# Patient Record
Sex: MALE | Race: WHITE | NOT HISPANIC OR LATINO | Employment: OTHER | URBAN - METROPOLITAN AREA
[De-identification: names, ages, dates, MRNs, and addresses within clinical notes are randomized per-mention and may not be internally consistent; named-entity substitution may affect disease eponyms.]

---

## 2017-01-24 ENCOUNTER — ALLSCRIPTS OFFICE VISIT (OUTPATIENT)
Dept: OTHER | Facility: OTHER | Age: 27
End: 2017-01-24

## 2017-06-26 ENCOUNTER — ALLSCRIPTS OFFICE VISIT (OUTPATIENT)
Dept: OTHER | Facility: OTHER | Age: 27
End: 2017-06-26

## 2017-07-07 ENCOUNTER — GENERIC CONVERSION - ENCOUNTER (OUTPATIENT)
Dept: OTHER | Facility: OTHER | Age: 27
End: 2017-07-07

## 2017-10-12 ENCOUNTER — ALLSCRIPTS OFFICE VISIT (OUTPATIENT)
Dept: OTHER | Facility: OTHER | Age: 27
End: 2017-10-12

## 2018-01-10 NOTE — PROCEDURES
Procedures by Berenice Doshi MD at 2016  11:19 AM      Author:  Berenice Doshi MD Service:  Neurology Author Type:  Physician     Filed:  2016 11:26 AM Date of Service:  2016 11:19 AM Status:  Signed     :  Berenice Doshi MD (Physician)            ELECTROENCEPHALOGRAM (EEG)      Patient Name:  Aliyah Fletcher  MRN: [id number]   :  1990 File #: [id number]   Age: 23 y o  Encounter #: [id number]   Date performed: 2016           Report date: 2016          Study type: Routine EEG    ICD 10 diagnosis: Spells/Fit NOS R56 9    Start time: 08:15 End time: 08:48     -------------------------------------------------------------------------------------------------------------------   Patient History: This recording was observed in a 23 y o  male with autistic spectrum disorder  and a single event concerning for a seizure to evaluate for risk of epilepsy  Medications: none listed    -------------------------------------------------------------------------------------------------------------------   Description of Procedure:  ·  32 channel digital recording with electrodes placed according to the International 10-20 system with additional  T1/T2 electrodes, EOG, EKG, and simultaneous  video  The recording was technically satisfactory  -------------------------------------------------------------------------------------------------------------------   EEG Description:    The recording was performed with the patient awake  During wakefulness, there were long runs of well regulated, low amplitude, posteriorly  dominant, symmetric 9-10 cps alpha rhythm that attenuated with eye opening  There were symmetric low amplitude, frontally dominant beta activities  There were symmetric low amplitude, centrally dominant theta activities  Drowsiness and sleep were not attained  -------------------------------------------------------------------------------------------------------------------   Activation Procedures:  Hyperventilation was not performed  Stepped photic stimulation between 1-20 cps was performed and induced symmetric  photic driving  Other findings: The single lead ECG demonstrated a regular rhythm     -------------------------------------------------------------------------------------------------------------------   EEG Interpretation: This Routine EEG recorded during wakefulness is normal  A normal EEG does not exclude the possibility of epilepsy  If clinically warranted, a repeat EEG  following sleep deprivation could be considered  Amna Myrick MD   [address] [facility]               Received for:Scott Hall Meckel M D    Nov 1 2016 12:05PM [facility] Standard Time

## 2018-01-12 NOTE — PROGRESS NOTES
Chief Complaint  pt here for Adacel shot - GMP      Active Problems    1  Anal irritation (569 49) (K62 89)   2  Autistic disorder, active (299 00) (F84 0)   3  External hemorrhoids (455 3) (K64 4)   4  Generalized tonic-clonic seizure (780 39) (G40 409)   5  Rectal bleeding (569 3) (K62 5)   6  Screening for depression (V79 0) (Z13 89)   7  Seizure disorder (345 90) (G40 909)    Current Meds   1  Anusol-HC 25 MG Rectal Suppository; INSERT 1 SUPP Twice daily PRN hemorrhoid; Therapy: 64TDT8248 to (Last [id number])  Requested for: 26Jun2017 Ordered   2  Claritin CAPS; Therapy: (Recorded:13Oct2016) to Recorded   3  LevoFLOXacin 500 MG Oral Tablet; TAKE 1 TABLET DAILY AS DIRECTED; Therapy: 44HBH8001 to ([id number])  Requested for: 26Jun2017; Last   Rx:26Jun2017 Ordered    Allergies    1  Bactrim TABS   2   Ceclor CAPS    Plan  Need for tetanus booster    · Adacel 5-2-15 5 LF-MCG/0 5 Intramuscular Suspension    Future Appointments    Date/Time Provider Specialty Site   40/64/3318 79:74 AM Jacoby Cano DO Family [facility]     Signatures   Electronically signed by : Jerome Sibley DO; Oct 12 1460 11:11AM EST                       (Author)

## 2018-01-12 NOTE — PROGRESS NOTES
Assessment    1  Encounter for preventive health examination (V70 0) (Z00 00)   2  Autistic disorder (299 00) (F84 0)   3  Generalized tonic-clonic seizure (780 39) (G40 409)    Discussion/Summary  Impression: health maintenance visit  Currently, he eats a healthy diet  Prostate cancer screening: PSA is not indicated  Testicular cancer screening: the risks and benefits of testicular cancer screening were discussed  Colorectal cancer screening: colorectal cancer screening is not indicated  Advice and education were given regarding nutrition, aerobic exercise, sunscreen use and seat belt use  Patient discussion: discussed with the patient's family  HM done  form completed  mago 1 yr  Chief Complaint  Pt here for yearly PE and form completion  History of Present Illness  , Adult Male: The patient is being seen for a health maintenance evaluation  General Health: The patient's health since the last visit is described as good  He has regular dental visits  He denies vision problems  Lifestyle:  He consumes a diverse and healthy diet  He does not have any weight concerns  He does not use tobacco  He denies alcohol use  Screening: cancer screening reviewed and current  Prostate cancer screening includes no previous prostate-specific antigen testing  Testicular cancer screening includes no self testicular examinations  Colorectal cancer screening includes no previous colonoscopy  metabolic screening reviewed and current  risk screening reviewed and current  Cardiovascular risk factors: no hypertension, no diabetes, no stress, no obesity, no tobacco use and no sedentary lifestyle  Safety elements used: seat belt and smoke detector  HPI: HM  Needs yearly form  No new issues  Released from SLN  Review of Systems    Constitutional: No fever or chills, feels well, no tiredness, no recent weight gain or weight loss     Eyes: No complaints of eye pain, no red eyes, no discharge from eyes, no itchy eyes  ENT: no complaints of earache, no hearing loss, no nosebleeds, no nasal discharge, no sore throat, no hoarseness  Cardiovascular: No complaints of slow heart rate, no fast heart rate, no chest pain, no palpitations, no leg claudication, no lower extremity  Respiratory: No complaints of shortness of breath, no wheezing, no cough, no SOB on exertion, no orthopnea or PND  Gastrointestinal: No complaints of abdominal pain, no constipation, no nausea or vomiting, no diarrhea or bloody stools  Genitourinary: No complaints of dysuria, no incontinence, no hesitancy, no nocturia, no genital lesion, no testicular pain  Musculoskeletal: No complaints of arthralgia, no myalgias, no joint swelling or stiffness, no limb pain or swelling  Integumentary: No complaints of skin rash or skin lesions, no itching, no skin wound, no dry skin  Neurological: No compliants of headache, no confusion, no convulsions, no numbness or tingling, no dizziness or fainting, no limb weakness, no difficulty walking  Psychiatric: Is not suicidal, no sleep disturbances, no anxiety or depression, no change in personality, no emotional problems  Endocrine: No complaints of proptosis, no hot flashes, no muscle weakness, no erectile dysfunction, no deepening of the voice, no feelings of weakness  Hematologic/Lymphatic: No complaints of swollen glands, no swollen glands in the neck, does not bleed easily, no easy bruising  Active Problems    1  Autistic disorder (299 00) (F84 0)   2  Generalized tonic-clonic seizure (780 39) (G40 409)   3   Seizure disorder (345 90) (G40 909)    Past Medical History    · History of Acute noninfective otitis externa of left ear, unspecified type (380 22) (H60 502)   · History of Cough (786 2) (R05)   · History of upper respiratory infection (V12 09) (Z87 09)   · History of Other acute nonsuppurative otitis media of left ear (381 00) (H65 192)   · History of Other acute nonsuppurative otitis media of left ear (381 00) (H65 192)   · History of Other fatigue (780 79) (R53 83)   · History of Paronychia (681 9)   · History of Seizure-like activity (780 39) (R56 9)   · History of Skin lesion (709 9) (L98 9)   · History of Skin rash (782 1) (R21)   · History of Staphylococcus aureus infection (041 11) (A49 01)   · History of Visit for pre-operative examination (V72 84) (T44 245)    Surgical History    · History of Adenoidectomy   · History of Dental Surgery   · History of Myringotomy - With Ventilating Tube Insertion    Family History  Mother    · No pertinent family history  Father    · No pertinent family history  Maternal Grandmother    · Family history of malignant neoplasm of breast (V16 3) (Z80 3)  Paternal Grandmother    · Family history of Brain tumor   · Family history of chronic obstructive pulmonary disease (V17 6) (Z82 5)   · Family history of hypertension (V17 49) (Z82 49)  Maternal Grandfather    · Family history of Salivary gland cancer  Family History    · Family history of No Significant Family History    Social History    · Never A Smoker   · Never Drank Alcohol    Current Meds   1  Claritin CAPS; Therapy: (Recorded:13Oct2016) to Recorded    Allergies    1  Bactrim TABS   2  Ceclor CAPS    Vitals   Recorded: 01OJQ8245 73:80UY   Systolic 276   Diastolic 70   Height 6 ft 0 5 in   Weight 189 lb 8 0 oz   BMI Calculated 25 35   BSA Calculated 2 1     Physical Exam    Constitutional   General appearance: No acute distress, well appearing and well nourished  Eyes   Pupils and irises: Equal, round and reactive to light  Ears, Nose, Mouth, and Throat   Oropharynx: Normal with no erythema, edema, exudate or lesions  Pulmonary   Auscultation of lungs: Clear to auscultation  Cardiovascular   Auscultation of heart: Normal rate and rhythm, normal S1 and S2, without murmurs      Examination of extremities for edema and/or varicosities: Normal     Abdomen   Abdomen: Non-tender, no masses  Lymphatic   Palpation of lymph nodes in neck: No lymphadenopathy  Musculoskeletal   Gait and station: Normal     Neurologic   Cranial nerves: Cranial nerves 2-12 intact  Psychiatric   Orientation to person, place and time: Abnormal   stable for Damian        Signatures   Electronically signed by : Rekha Zheng DO; Jan 24 1568  9:17AM EST                       (Author)

## 2018-01-13 VITALS
DIASTOLIC BLOOD PRESSURE: 86 MMHG | WEIGHT: 192 LBS | BODY MASS INDEX: 26.01 KG/M2 | SYSTOLIC BLOOD PRESSURE: 122 MMHG | HEIGHT: 72 IN

## 2018-01-13 VITALS
DIASTOLIC BLOOD PRESSURE: 70 MMHG | BODY MASS INDEX: 25.12 KG/M2 | SYSTOLIC BLOOD PRESSURE: 118 MMHG | HEIGHT: 73 IN | WEIGHT: 189.5 LBS

## 2018-01-16 NOTE — PROGRESS NOTES
Assessment    1  Encounter for preventive health examination (V70 0) (Z00 00)   2  Autistic disorder (299 00) (F84 0)    Plan  Health Maintenance    · Begin or continue regular aerobic exercise  Gradually work up to at least 3 sessions of 30  minutes of exercise a week ; Status:Complete;   Done: 04GXS5410 08:56AM   · Eat a low fat and low cholesterol diet ; Status:Complete;   Done: 49DYQ0830 08:56AM   · We recommend routine visits to a dentist ; Status:Complete;   Done: 21JOP4205 08:56AM   · We recommend that you follow the "Mediterranean diet "; Status:Complete;   Done:  30XNL7504 08:56AM    Discussion/Summary  Impression: health maintenance visit  Currently, he eats a healthy diet  Testicular cancer screening: the risks and benefits of testicular cancer screening were discussed  Colorectal cancer screening: colorectal cancer screening is not indicated  The adacel revd  Advice and education were given regarding nutrition and aerobic exercise  Patient discussion: discussed with the patient's family  Chief Complaint  PT IS HERE FOR A YEARLY PHYS  History of Present Illness  HM, Adult Male: The patient is being seen for a health maintenance evaluation  General Health: The patient's health since the last visit is described as good  He has regular dental visits  He denies vision problems  Lifestyle:  He consumes a diverse and healthy diet  He does not have any weight concerns  He exercises regularly  He does not use tobacco  He denies alcohol use  He denies drug use  Screening: cancer screening reviewed and current  Prostate cancer screening includes no previous evaluation  Testicular cancer screening includes no self testicular examinations  Colorectal cancer screening includes no previous screening  metabolic screening reviewed and updated  Metabolic screening includes no previous lipid profile and no previous glucose screening  risk screening reviewed and current     Cardiovascular risk factors: no hypertension, no diabetes, no obesity and no tobacco use  Safety elements used: seat belt, smoke detector and gun safe  HPI: 24 yo WM for HM OV  Review of Systems    Constitutional: no fever and no chills  Eyes: no eye pain  ENT: no complaints of earache, no hearing loss, no nosebleeds, no nasal discharge, no sore throat, no hoarseness  Cardiovascular: no chest pain  Respiratory: no shortness of breath  Gastrointestinal: No complaints of abdominal pain, no constipation, no nausea or vomiting, no diarrhea or bloody stools  Genitourinary: No complaints of dysuria, no incontinence, no hesitancy, no nocturia, no genital lesion, no testicular pain  Neurological: as noted in HPI  Psychiatric: as noted in HPI  Active Problems    1  Autistic disorder (299 00) (F84 0)    Past Medical History    · History of Cough (786 2) (R05)   · History of upper respiratory infection (V12 09) (Z87 09)   · History of Paronychia (681 9)   · History of Skin lesion (709 9) (L98 9)   · History of Skin rash (782 1) (R21)   · History of Staphylococcus aureus infection (041 11) (A49 01)   · History of Visit for pre-operative examination (V72 84) (W95 077)    Surgical History    · History of Adenoidectomy   · History of Dental Surgery   · History of Myringotomy - With Ventilating Tube Insertion    Family History    · No pertinent family history    · No pertinent family history    · Family history of No Significant Family History    Social History    · Never A Smoker   · Never Drank Alcohol    Current Meds   1  No Reported Medications Recorded    Allergies    1  Bactrim TABS   2  Ceclor CAPS    Vitals   Recorded: 33HTG5476 16:46KN   Systolic 586   Diastolic 72   Height 6 ft 0 5 in   Weight 189 lb 8 0 oz   BMI Calculated 25 35   BSA Calculated 2 1     Physical Exam    Constitutional   General appearance: No acute distress, well appearing and well nourished      Eyes   Pupils and irises: Equal, round and reactive to light     Pulmonary   Respiratory effort: No increased work of breathing or signs of respiratory distress  Auscultation of lungs: Clear to auscultation  Cardiovascular   Auscultation of heart: Normal rate and rhythm, normal S1 and S2, without murmurs  Examination of extremities for edema and/or varicosities: Normal     Abdomen   Abdomen: Non-tender, no masses  Lymphatic   Palpation of lymph nodes in neck: No lymphadenopathy  Neurologic   Cranial nerves: Cranial nerves 2-12 intact      Psychiatric   Orientation to person, place and time: Abnormal        Signatures   Electronically signed by : Mariana Colunga DO; Jan 19 8454  8:57AM EST                       (Author)

## 2018-01-24 RX ORDER — HYDROCORTISONE ACETATE 25 MG/1
SUPPOSITORY RECTAL 2 TIMES DAILY
COMMUNITY
Start: 2017-06-26 | End: 2018-10-15 | Stop reason: ALTCHOICE

## 2018-01-24 RX ORDER — NEOMYCIN SULFATE, POLYMYXIN B SULFATE AND HYDROCORTISONE 10; 3.5; 1 MG/ML; MG/ML; [USP'U]/ML
SUSPENSION/ DROPS AURICULAR (OTIC)
COMMUNITY
Start: 2016-04-29 | End: 2020-01-17

## 2018-01-24 RX ORDER — LORATADINE 10 MG/1
10 CAPSULE, LIQUID FILLED ORAL AS NEEDED
COMMUNITY

## 2018-01-26 ENCOUNTER — OFFICE VISIT (OUTPATIENT)
Dept: FAMILY MEDICINE CLINIC | Facility: CLINIC | Age: 28
End: 2018-01-26
Payer: COMMERCIAL

## 2018-01-26 VITALS
BODY MASS INDEX: 24.09 KG/M2 | DIASTOLIC BLOOD PRESSURE: 74 MMHG | WEIGHT: 181.8 LBS | SYSTOLIC BLOOD PRESSURE: 110 MMHG | HEIGHT: 73 IN

## 2018-01-26 DIAGNOSIS — G40.409 GENERALIZED TONIC-CLONIC SEIZURE (HCC): ICD-10-CM

## 2018-01-26 DIAGNOSIS — Z00.00 PHYSICAL EXAM, ANNUAL: Primary | ICD-10-CM

## 2018-01-26 DIAGNOSIS — F84.0 AUTISTIC DISORDER, ACTIVE: ICD-10-CM

## 2018-01-26 PROCEDURE — 99395 PREV VISIT EST AGE 18-39: CPT | Performed by: FAMILY MEDICINE

## 2018-01-26 RX ORDER — LORATADINE AND PSEUDOEPHEDRINE 10; 240 MG/1; MG/1
1 TABLET, EXTENDED RELEASE ORAL
COMMUNITY
End: 2019-02-15 | Stop reason: SDUPTHER

## 2018-01-26 RX ORDER — NEOMYCIN SULFATE, POLYMYXIN B SULFATE AND HYDROCORTISONE 10; 3.5; 1 MG/ML; MG/ML; [USP'U]/ML
SUSPENSION/ DROPS AURICULAR (OTIC)
COMMUNITY
Start: 2016-04-29 | End: 2019-02-15 | Stop reason: SDUPTHER

## 2018-01-26 NOTE — PROGRESS NOTES
Assessment/Plan:    Physical exam, annual  HM completed  No new issues  Autistic disorder, active  Stable    Generalized tonic-clonic seizure ([facility] [address] )  None since July 2016  No meds  Diagnoses and all orders for this visit:    Physical exam, annual    Autistic disorder, active    Generalized tonic-clonic seizure ([facility] [address] )    Other orders  -     neomycin-polymyxin-hydrocortisone (CORTISPORIN) 0 35%-10,000 units/mL-1% otic suspension; Administer into ears  -     Loratadine (CLARITIN) 10 MG CAPS; Take by mouth  -     hydrocortisone (ANUSOL-HC) 25 mg suppository; Insert into the rectum Twice daily  -     loratadine-pseudoephedrine (CLARITIN-D 24-HOUR)  mg per 24 hr tablet; Take 1 tablet by mouth  -     neomycin-polymyxin-hydrocortisone (CORTISPORIN) 0 35%-10,000 units/mL-1% otic suspension; INSTILL 3 DROPS IN AFFECTED EAR(S) 3-4 TIMES DAILY  Subjective:        Patient ID: Elsa Guerrero is a 24 y o  male  Pt here for yearly prev exam              Review of Systems   Constitutional: Negative for fever and unexpected weight change  Eyes: Negative for visual disturbance  Respiratory: Negative for chest tightness and shortness of breath  Cardiovascular: Negative for chest pain, palpitations and leg swelling  Gastrointestinal: Negative for abdominal pain  Genitourinary: Negative for dysuria  Musculoskeletal: Negative for arthralgias and gait problem  Skin: Negative for rash  Neurological: Negative for dizziness and headaches  Psychiatric/Behavioral: Negative for behavioral problems  The patient is not nervous/anxious  Objective:  /74 (BP Location: Right arm, Patient Position: Sitting, Cuff Size: Standard)   Ht 6' 1" (1 854 m)   Wt 82 5 kg (181 lb 12 8 oz)   BMI 23 99 kg/m²        Physical Exam   Constitutional: He appears well-nourished  HENT:   Head: Normocephalic and atraumatic     Right Ear: External ear normal    Left Ear: External ear normal    Mouth/Throat: Oropharynx is clear and moist    Eyes: EOM are normal  Pupils are equal, round, and reactive to light  No scleral icterus  Neck: Normal range of motion  No thyromegaly present  Cardiovascular: Normal rate, regular rhythm and intact distal pulses  No murmur heard  Pulmonary/Chest: Effort normal and breath sounds normal    Abdominal: Soft  Bowel sounds are normal    Musculoskeletal: He exhibits no edema  Lymphadenopathy:     He has no cervical adenopathy  Neurological: He displays normal reflexes  No cranial nerve deficit  Coordination normal    Skin: Skin is warm  Psychiatric: He has a normal mood and affect   His behavior is normal  Thought content normal

## 2018-08-23 ENCOUNTER — TRANSITIONAL CARE MANAGEMENT (OUTPATIENT)
Dept: FAMILY MEDICINE CLINIC | Facility: CLINIC | Age: 28
End: 2018-08-23

## 2018-08-29 ENCOUNTER — OFFICE VISIT (OUTPATIENT)
Dept: FAMILY MEDICINE CLINIC | Facility: CLINIC | Age: 28
End: 2018-08-29
Payer: COMMERCIAL

## 2018-08-29 VITALS
BODY MASS INDEX: 25.49 KG/M2 | WEIGHT: 192.34 LBS | SYSTOLIC BLOOD PRESSURE: 122 MMHG | DIASTOLIC BLOOD PRESSURE: 70 MMHG | HEIGHT: 73 IN

## 2018-08-29 DIAGNOSIS — R56.9 SEIZURE (HCC): Primary | ICD-10-CM

## 2018-08-29 DIAGNOSIS — F84.0 AUTISM DISORDER: ICD-10-CM

## 2018-08-29 DIAGNOSIS — R74.01 TRANSAMINITIS: ICD-10-CM

## 2018-08-29 PROCEDURE — 99496 TRANSJ CARE MGMT HIGH F2F 7D: CPT | Performed by: FAMILY MEDICINE

## 2018-08-29 RX ORDER — LEVETIRACETAM 500 MG/1
500 TABLET ORAL 2 TIMES DAILY
COMMUNITY
Start: 2018-08-22 | End: 2018-08-29 | Stop reason: SDUPTHER

## 2018-08-30 RX ORDER — LEVETIRACETAM 500 MG/1
500 TABLET ORAL 2 TIMES DAILY
Qty: 60 TABLET | Refills: 3 | Status: SHIPPED | OUTPATIENT
Start: 2018-08-30 | End: 2018-10-15 | Stop reason: SDUPTHER

## 2018-08-30 NOTE — PROGRESS NOTES
Assessment/Plan:    Patient's hospitalization has been reviewed in full with the patient's mother and father  All studies labs and EEG have been reviewed  At this time Rohan Ramirez is stable and back to his normal routine  He is back to work  He did have a seizure approximately 2 years ago  He did see [facility] in the past   We will try to get him and for follow-up  We will continue to write for his medication  He will need a hepatic panel and lipid panel in approximately 2 weeks  Hopefully his ALT and AST are back to normal   He otherwise will continue with his routine follow-up with our office  Diagnoses and all orders for this visit:    Seizure [facility])  -     Ambulatory referral to Neurology; Future    Transaminitis  -     Hepatic function panel; Future  -     Lipid panel; Future    Autism disorder    Other orders  -     levETIRAcetam (KEPPRA) 500 mg tablet; Take 500 mg by mouth 2 (two) times a day  -     levETIRAcetam (KEPPRA) 500 mg tablet; Take 1 tablet (500 mg total) by mouth 2 (two) times a day          Subjective:        Patient ID: Queta Arguello is a 24 y o  male  Chief Complaint   Patient presents with    Transition of Care Management     Here for hospital follow up, seizure  Patient here status post seizure  Patient was hospitalized at [facility] from August 20th to 20 second  He has been placed on Keppra  He is doing well and is back to his normal routine including work  Mom and dad her here today with him  The following portions of the patient's history were reviewed and updated as appropriate: allergies, current medications, past family history, past social history and problem list     Review of Systems   Constitutional: Negative for appetite change, fatigue, fever and unexpected weight change  HENT: Negative for congestion, ear pain, postnasal drip, rhinorrhea, sinus pain, sinus pressure and sore throat  Eyes: Negative for redness and visual disturbance  Respiratory: Negative for chest tightness and shortness of breath  Cardiovascular: Negative for chest pain, palpitations and leg swelling  Gastrointestinal: Negative for abdominal distention, abdominal pain, diarrhea and nausea  Endocrine: Negative for cold intolerance and heat intolerance  Genitourinary: Negative for dysuria and hematuria  Musculoskeletal: Negative for gait problem  Skin: Negative for pallor and rash  Neurological: Negative for dizziness and headaches  Autism   Psychiatric/Behavioral: Negative for behavioral problems  The patient is nervous/anxious  Objective:  Date and time hospital follow up call was made:  8/23/2018  8:39 AM  Hospital care reviewed:  Records reviewed  Patient was hopsitalized at:  [facility]  Date of admission:  8/20/18  Date of discharge:  8/22/18  Diagnosis:  seizures  Disposition:  Home  Were the patients medicaitons reviewed and updated:  Yes  Current symptoms:  None  Post hospital issues:  None  Should patient be enrolled in anticoag monitoring?:  No  Scheduled for follow up?:  Yes  Did you obtain your prescribed medications:  Yes  Do you need help managing your perscriptions or medications:  No  Is transportation to your appointments needed:  No  I have advised the patient to call PCP with any new or worsening symptoms (please type in name along with any credentials):  Jose Dixon CMA  Living Arrangements:  Parents  Counseling:  Family       /70 (BP Location: Right arm, Patient Position: Sitting, Cuff Size: Standard)   Ht 6' 0 5" (1 842 m)   Wt 87 2 kg (192 lb 5 4 oz)   BMI 25 73 kg/m²      Physical Exam   Constitutional: He appears well-nourished  No distress  HENT:   Head: Normocephalic and atraumatic  Eyes: Conjunctivae and EOM are normal  Pupils are equal, round, and reactive to light  No scleral icterus  Neck: Neck supple  No thyromegaly present     Cardiovascular: Normal rate, regular rhythm and intact distal pulses  No murmur heard  Pulmonary/Chest: Effort normal and breath sounds normal  He has no wheezes  Abdominal: Soft  He exhibits no distension  Musculoskeletal: He exhibits no edema  Lymphadenopathy:     He has no cervical adenopathy  Neurological: He is alert  He displays normal reflexes  Coordination normal    Skin: Skin is warm  No pallor  Psychiatric:   Stable autistic behavior   Nursing note and vitals reviewed

## 2018-09-06 ENCOUNTER — TELEPHONE (OUTPATIENT)
Dept: FAMILY MEDICINE CLINIC | Facility: CLINIC | Age: 28
End: 2018-09-06

## 2018-09-06 DIAGNOSIS — F84.5 ASPERGER SYNDROME: Primary | ICD-10-CM

## 2018-09-06 DIAGNOSIS — F84.0 AUTISM: ICD-10-CM

## 2018-09-06 NOTE — TELEPHONE ENCOUNTER
Patient's mother is asking for a script for Equine Therapy (horseback riding)    She will pick it up [phone]

## 2018-10-15 ENCOUNTER — TELEPHONE (OUTPATIENT)
Dept: NEUROLOGY | Facility: CLINIC | Age: 28
End: 2018-10-15

## 2018-10-15 ENCOUNTER — OFFICE VISIT (OUTPATIENT)
Dept: NEUROLOGY | Facility: CLINIC | Age: 28
End: 2018-10-15

## 2018-10-15 VITALS
HEIGHT: 73 IN | WEIGHT: 194 LBS | BODY MASS INDEX: 25.71 KG/M2 | SYSTOLIC BLOOD PRESSURE: 111 MMHG | HEART RATE: 91 BPM | DIASTOLIC BLOOD PRESSURE: 67 MMHG

## 2018-10-15 DIAGNOSIS — G40.409 GENERALIZED TONIC-CLONIC SEIZURE (HCC): Primary | ICD-10-CM

## 2018-10-15 DIAGNOSIS — R56.9 SEIZURE (HCC): ICD-10-CM

## 2018-10-15 PROCEDURE — 99214 OFFICE O/P EST MOD 30 MIN: CPT | Performed by: NURSE PRACTITIONER

## 2018-10-15 RX ORDER — LEVETIRACETAM 500 MG/1
500 TABLET ORAL 2 TIMES DAILY
Qty: 60 TABLET | Refills: 5 | Status: SHIPPED | OUTPATIENT
Start: 2018-10-15 | End: 2019-02-15 | Stop reason: SDUPTHER

## 2018-10-15 NOTE — PROGRESS NOTES
Patient ID: Anayeli Bangura is a 24 y o  male  Assessment/Plan:    Generalized tonic-clonic seizure ([address] )  Second lifetime seizure on 8/20/18, witnessed by his parents  Again, the description fits with a generalized tonic clonic seizure  He was started on keppra 500mg BID at [facility] AT THE [facility], and has not had any recurrence of seizures since then  [address] and EEG done during August 2018 admission were normal  They did not pursue an MRI, as Erick Mcdonald would need to be put anesthesia for this to be done and ultimately, the results would not change our treatment plan at this time  I will have him remain on keppra 500mg twice daily  Side effects were discussed  I do not feel that any further testing is needed at this time, as again the results would not change our treatment plan  Seizure safety and first aid were discussed  They will call with any new or suspected seizures  Subjective:    Anayeli Bangura is a 24year old male, last seen by Dr Gracy Bonilla on 11/09/16 for follow-up of a witnessed seizure on 7/5/16  The patient was seen at [facility] for that event  According to the ED note the patient was at his care center sitting at a table when he had an episode of generalized tonic clonic activity for 2-3 minutes witnessed by staff  The patient reportedly fell out of his chair and hit his head at that time  EMS was told that the patient had stopped breathing for about 30 seconds after the episode ended  The patient was not started on any seizure medication at that time, and ultimately after 2 visits with Dr Gracy Bonilla, it was decided not to start any AEDs given normal EEG and very few overall risk factors for epilepsy  He presents for follow-up today due to a second seizure event, witnessed by his parents  His mother tells me that on the morning of 8/20/18 Erick Mcdonald was sitting at the table in the kitchen coloring  She went out into the garage for no more than 10 minutes to help her    When she came back into the kitchen, he was slumped over in the chair  Initially she thought he was bending over to pick something up off the floor but when she got closer she saw that he was convulsing and his eyes were rolled back into his head  He was also making gagging sounds  His mother lowered him onto the floor on his side and told her  to call 911  The shaking lasted about 4 minutes and then resolved  By the time that EMS arrived, he was fully awake, but was tired  He was not able to walk from the kitchen to the front door  He was admitted at [facility] for 3 days  There were no further seizures  He was started on keppra 500mg BID  Since coming home he has not had any further events concerning for seizure  He is taking the keppra 500mg BID without issues  No side effects noted, mood is stable  His parents do tell me that he often "spaces out" but they feel that this is more daydreaming  His parents deny any history of myoclonus, staring spells, automatisms, unexplained hyperkinetic behaviors, olfactory / gustatory hallucinations, epigastric rising events, ben vu events, visual hallucinations, unexplained nocturnal enuresis, or nocturnal tongue biting  Last seizure prior to this was on 7/5/16         INTERVAL HISTORY:    [facility] 8/22/18: Partially motion degraded study demonstrates no acute intracranial hemorrhage, mass effect, or CT evidence of acute infarction  Extended EEG from 8/22/18: Normal      The patient had a normal EEG on 11/1/16      Current AEDs:  keppra 500mg BID       PREVIOUS HISTORY REVIEWED:     Note from 10/13/16:     Per mother, the patient had just finished lunch when the seizure started to occur  She says that he got stiff, "went down", starting jerking, and then stopped breathing  The caretaker gave the patient CPR at that time  His mother states that he was confused after the episode and it took him an hour or two to return to baseline   She denies him ever having any seizures before this occurred  The patient's parents deny any jerking activity but do note that the patient has a tremor in his hands sometimes  Per parents, the patient did not have difficulty sleeping the nights before the seizure occurred  His mother denies any changes in the patient's behavior the days leading up to when the seizure occurred       Seizure Types:  1   generalized tonic clonic seizure  See description above      Risk Factors for Epilepsy:   Autism     Seizure Triggers:  None     Previous Work-up:  The patient had a normal head CT on 7/5/16 after his witnessed seizure  Note that this CT was degraded by motion      Previous Medical and Surgical treatments for Epilepsy:  None      Discussion from 813/16:  23year old male with no risk factors for epilepsy other than autism had a single generalized tonic clonic seizure on July 5th  No identifiable precipitants for the event  Patient did appear to stop breathing during the seizure  CT was normal      The description provided of the event was sufficiently typical of a generalized tonic clonic seizure that I don't think the diagnosis is in doubt      Exact etiology of the seizure is unclear, though patients with autism do develop seizures, often without any other risk factors  Thus, I don't think an MRI is warranted at this point especially since the patient would have to be put under general anesthesia to get a good MRI      The decision whether or not to start the patient on a seizure medication after the first seizure depends on many factors  The seizure was of typical duration  There was no evidence of focality in the description of what the seizure looked like  Had there been evidence of focality, that would favor putting the patient on medication as a focal manifestation implies the presence of an epileptogenic area of the brain and higher risk of seizure occurrence   On the other hand, favoring a decision to start him on medications, are the fact that there was no apparent precipitant the seizure and the fact that the patient stopped breathing during the seizure, making it more dangerous for him to potentially have another seizure  The following portions of the patient's history were reviewed and updated as appropriate: past family history, past social history and past surgical history  Objective:    Blood pressure 111/67, pulse 91, height 6' 0 5" (1 842 m), weight 88 kg (194 lb)  Physical Exam   Constitutional: He appears well-developed and well-nourished  HENT:   Head: Normocephalic  Eyes: Pupils are equal, round, and reactive to light  Lids are normal    Neurological: He is alert  He has normal strength  Coordination normal    Psychiatric:   Limited verbal, occasionally interrupting during conversation but able to be re-directed  Pleasant   Vitals reviewed  Neurological Exam  Mental Status  Awake and alert  Follows one-step commands  Cranial Nerves  CN III, IV, VI: Extraocular movements intact bilaterally  Normal lids and orbits bilaterally  Pupils equal round and reactive to light bilaterally  CN V: Facial sensation is normal   CN VII: Full and symmetric facial movement  CN VIII: Hearing is normal   CN IX, X: Palate elevates symmetrically  Normal gag reflex  CN XI: Shoulder shrug strength is normal   CN XII: Tongue midline without atrophy or fasciculations  Motor   Strength is 5/5 throughout all four extremities  Coordination  Finger-to-nose, rapid alternating movements and heel-to-shin normal bilaterally without dysmetria  Gait  Casual gait is normal including stance, stride, and arm swing  Able to rise from chair without using arms  ROS:    Review of Systems   Constitutional: Negative  HENT: Negative  Eyes: Negative  Respiratory: Negative  Cardiovascular: Negative  Gastrointestinal: Negative  Endocrine: Negative  Genitourinary: Negative  Musculoskeletal: Negative  Skin: Negative  Allergic/Immunologic: Positive for environmental allergies  Neurological: Positive for tremors and seizures  Hematological: Negative  Psychiatric/Behavioral: Positive for sleep disturbance (Occasionally)

## 2018-10-15 NOTE — TELEPHONE ENCOUNTER
Left VM on mom number ketting her know about provider will be coming in at 11:00 but we also have a 1:15 on Angy's schedule

## 2018-10-15 NOTE — PATIENT INSTRUCTIONS
1  Continue on keppra 500mg twice daily  2  Call with any new or suspected seizures or any mood/personality changes  FIRST AID FOR SEIZURES    What to Do If You Witness a Seizure:     Generalized convulsive (called a generalized tonic-clonic or grand mal seizure)  During this seizure, the person may cry out, suddenly stiffen up, make jerking movements, and fall  The person may turn pale or blue from difficulty breathing  Actions:  1  Stay calm  Talk in a soothing voice and if possible keep onlookers away  2  Prevent injury  Move objects away that the person might hit while jerking uncontrollably  3  Time when the seizure starts and ends  Most seizures stop after only a few minutes  4  Turn him or her gently onto one side  This will help keep the airway clear  5  Never place anything in his/her mouth or give him/her anything by mouth during a seizure  -- Do not give the person water, pills, or food until fully alert  6  Loosen tight clothing or jewelry around his/her neck  7  Make the person as comfortable as possible  8  Place something soft under their head  9  Do not hold the person down  If the person having a seizure thrashes around there is no need for you to restrain them  They are more likely to be combative if restrained  Remember to consider your safety as well  10  Keep onlookers away  11  Be sensitive and supportive, and ask others to do the same  12  Stay with the person until he/she is fully alert  Complex partial seizure (confusional spells)  During this kind of seizure, the person may have a glassy stare; give no response or inappropriate responses when questioned; sit, stand, or walk about aimlessly; make lip smacking or chewing motions; fidget with clothes; appear to be drunk, drugged, or confused  Actions:  1  Make sure the person is safe and wont harm themselves  2  Try to remove harmful objects from around the person (tools, utensils, glasses)    3  Do NOT be aggressive or attempt to restrain the person  They are more likely to be combative if restrained  Remember to consider your safety as well  4  Help prevent the person from wandering, and direct the person to chair or safe position  5  Never place anything in his/her mouth or give him/her anything by mouth during a seizure  -- Do not give the person water, pills, or food until fully alert  6  Keep onlookers away  7  Be sensitive and supportive, and ask others to do the same  8  Stay with the person until he/she is fully alert  CALL 911 if:  1  A convulsive seizure lasts more than 5 minutes  2  The person turns blue during the seizure  3  The person does not start breathing after the seizure  Begin mouth to mouth resuscitation if this would occur  4  The person has one seizure right after the other without coming back to normal consciousness between the seizures  5  The person has not regained consciousness or is still confused after 30 minutes  6  You know the person does not have epilepsy  7  You know the person has diabetes or low blood sugar  8  The person is pregnant, ill, or injured  9  The seizure occurred in water, because the person may have inhaled water  10  The person requests an ambulance or medical help  Rescue medication  Your doctor may prescribe a rescue medication such as lorazepam (Ativan), diazepam (Valium / Diastat), or clonazepam (Klonopin) to terminate a seizure or if you have a history of cluster of seizures   Follow the instructions given by your doctor for these medications    Recognizing Common Seizures (examples)   · Simple partial seizures: Isolated twitching, numbness, sweating, dizziness, nausea/vomiting, disturbances to hearing, vision, smell or taste  No loss of consciousness occurs, and the person remains aware of his/her environment     · Complex partial seizures: Staring, motionless, picking at clothes, smacking lips, swallowing repeatedly or wandering around  The person is not aware of their surroundings and is not fully responsive  · Atonic seizures: Drop attacks or sudden, rapid fall to ground with rapid recovery  · Myoclonic seizures: Brief forceful jerks which can affect the whole body or just part of it  · Absence seizures: May appear to be daydreaming or spacing out   The person is momentarily unresponsive and unaware of what is happening around him/her  · Tonic seizures: Stiffening of part or of the entire body  · Generalized Tonic-Clonic Seizures  Grand-mal seizure   Sudden loss of consciousness with body stiffening followed by continuous jerking movements  A blue tinge around the mouth is likely but lack of oxygen is rare  Loss of bladder and/or bowel control may occur

## 2018-10-15 NOTE — ASSESSMENT & PLAN NOTE
Second lifetime seizure on 8/20/18, witnessed by his parents  Again, the description fits with a generalized tonic clonic seizure  He was started on keppra 500mg BID at [facility] THE [facility], and has not had any recurrence of seizures since then  [facility] and EEG done during August 2018 admission were normal  They did not pursue an MRI, as Isak Law would need to be put anesthesia for this to be done and ultimately, the results would not change our treatment plan at this time  I will have him remain on keppra 500mg twice daily  Side effects were discussed  I do not feel that any further testing is needed at this time, as again the results would not change our treatment plan  Seizure safety and first aid were discussed  They will call with any new or suspected seizures

## 2019-02-15 ENCOUNTER — APPOINTMENT (OUTPATIENT)
Dept: LAB | Facility: CLINIC | Age: 29
End: 2019-02-15
Payer: COMMERCIAL

## 2019-02-15 ENCOUNTER — OFFICE VISIT (OUTPATIENT)
Dept: FAMILY MEDICINE CLINIC | Facility: CLINIC | Age: 29
End: 2019-02-15
Payer: COMMERCIAL

## 2019-02-15 ENCOUNTER — OFFICE VISIT (OUTPATIENT)
Dept: NEUROLOGY | Facility: CLINIC | Age: 29
End: 2019-02-15
Payer: COMMERCIAL

## 2019-02-15 VITALS
HEIGHT: 72 IN | SYSTOLIC BLOOD PRESSURE: 100 MMHG | DIASTOLIC BLOOD PRESSURE: 60 MMHG | BODY MASS INDEX: 25.81 KG/M2 | WEIGHT: 190.6 LBS

## 2019-02-15 VITALS
HEART RATE: 82 BPM | BODY MASS INDEX: 25.31 KG/M2 | DIASTOLIC BLOOD PRESSURE: 70 MMHG | HEIGHT: 73 IN | WEIGHT: 191 LBS | SYSTOLIC BLOOD PRESSURE: 104 MMHG

## 2019-02-15 DIAGNOSIS — H69.82 DYSFUNCTION OF LEFT EUSTACHIAN TUBE: Primary | ICD-10-CM

## 2019-02-15 DIAGNOSIS — F84.0 AUTISM DISORDER: ICD-10-CM

## 2019-02-15 DIAGNOSIS — R56.9 SEIZURE (HCC): ICD-10-CM

## 2019-02-15 DIAGNOSIS — G40.909 SEIZURE DISORDER (HCC): ICD-10-CM

## 2019-02-15 DIAGNOSIS — G40.909 SEIZURE DISORDER (HCC): Primary | ICD-10-CM

## 2019-02-15 PROCEDURE — 80177 DRUG SCRN QUAN LEVETIRACETAM: CPT

## 2019-02-15 PROCEDURE — 99213 OFFICE O/P EST LOW 20 MIN: CPT | Performed by: FAMILY MEDICINE

## 2019-02-15 PROCEDURE — 99213 OFFICE O/P EST LOW 20 MIN: CPT | Performed by: PSYCHIATRY & NEUROLOGY

## 2019-02-15 PROCEDURE — 36415 COLL VENOUS BLD VENIPUNCTURE: CPT

## 2019-02-15 RX ORDER — PREDNISONE 10 MG/1
TABLET ORAL
Qty: 21 TABLET | Refills: 0 | Status: SHIPPED | OUTPATIENT
Start: 2019-02-15 | End: 2020-01-16

## 2019-02-15 RX ORDER — LEVETIRACETAM 500 MG/1
500 TABLET ORAL 2 TIMES DAILY
Qty: 90 TABLET | Refills: 5 | Status: SHIPPED | OUTPATIENT
Start: 2019-02-15 | End: 2019-08-27 | Stop reason: SDUPTHER

## 2019-02-15 NOTE — PROGRESS NOTES
Patient ID: Betty Martínez is a 34 y o  male  Assessment/Plan:     1  Symptomatic secondarily generalized epilepsy ([id number])  2  Autism     Discussion Summary:  I last saw the patient in November 2016  I'd only seen him twice at that point and he had been referred after he had a seizure in July 2016  At that time no evidence of focality was observed in the seizure and CT scan and EEG were normal  No apparent precipitants were identified so I didn't think it necessary to put the patient on seizure medication at that time  I did tell the patient's mother that there was still of course a chance that a seizure could recur and if they did that he should be reevaluated  Indeed in August 2018 the patient had another generalized tonic clonic seizure  This was witnessed by his parents and his mother states that the patient was turning his head and body to the left at the onset  There were no precipitants for the event and again CT scan and EEG were normal at [facility]  The patient does have autism but other than that does not have any other risk factors for epilepsy  The patient was started on Keppra 500 mg tablets, 1 tablet twice a day and has been seizure free since then with no apparent side effects  Mina Bland saw him at the epilepsy center on 10/15/18 and he's here for additional follow-up  I agreed that the patient needs to be on seizure medication because he had two seizures now and evidence of focality in this second seizure increases the risk for seizures recurring  Letha Lob is a good choice and I do think he needs to get a Keppra level drawn to see whether this dose can be expected to maintain seizure control  With the evidence of focality in the semiology of the event I considered having him undergo an MRI, but the patient needs to be put under general anesthesia to have that done so at this point with his neurologic exam being non-focal I think we can hold off on getting an MRI   I would only schedule one if seizures continue to occur despite his seizure medication  I will see the patient back in 6 months for 1 hour  Subjective:    HPI    32 month follow-up of a 34year old male referred to me by Dr Emmanuel Wade for further evaluation and management of a witnessed seizure on 7/5/16  He has now known risk factors other than autism  The patient was seen at [facility] for that event  According to the ED note the patient was at this care center sitting at a table when he had a single generalized tonic clonic seizure for 2-3 minutes witnessed by staff  The patient reportedly fell out of his chair and hit his head at that time  EMS was told that the patient had stopped breathing for about 30 seconds after the episode ended  The patient was not started on any seizure medication at that time  When I saw the patient for the first time on 10/13/16, his CT scan had been normal though the description of the episode he experienced led me not to doubt the diagnosis  Exact etiology of the seizure was unclear, though patients with autism do develop seizures, often without any other risk factors so an MRI was not ordered  The seizure was of typical duration  There was no evidence of focality in the description of what the seizure looked like and no identifiable precipitants, so the patient was not started on any seizure medication  When I saw the patient on 11/9/16, his EEG had come back normal, reaffirming my decision not to start the patient on any AEDs  The patients mother understood that this didnt mean that patient would never have a seizure again and would avoid common seizure triggers such as sleep deprivation  Last seen 11/09/16  INTERVAL HISTORY:     The patient saw [employer], CRNP on 10/15/18 after the patient had his second witnessed seizure on 8/20/18  The description of the seizure fit with a generalized tonic clonic seizure   He was started on Keppra 500 mg BID at [facility] and CT head and EEG done while there were normal  Darlene Miller decided to keep him on that dose at the time and did not see the need for any further workup  Patient did not have any seizures since his visit with Marisel Avalos  Current AEDs:  Keppra 500 mg, 1 tablet BID    The patient's mother described his second seizure as an event when the patient got stiff and started to tremor  He turned to the left and slid down to the floor  The event lasted for about 5 minutes  The first one was worse as he came out a lot slower  He was able to walk the 20 feet to the front door at this second event with assistance  He was confused ad weak for several hours afterward  His mother stated that there was nothing different about that day and that the patient had a good night sleep before  The patient initially had some fatigue on this medication but has no side effects now  The patient reportedly has not slept well the last 2 days and his mother thinks he has an ear infection  The following portions of the patient's history were reviewed and updated as appropriate: allergies, current medications, past family history, past medical history, past social history, past surgical history and problem list          Objective:    Blood pressure 104/70, pulse 82, height 6' 0 5" (1 842 m), weight 86 6 kg (191 lb)  Physical Exam   Constitutional: He is oriented to person, place, and time  He appears well-developed and well-nourished  HENT:   Head: Normocephalic and atraumatic  Left Ear: External ear normal    Erythema of right tympanic membrane   Eyes: Pupils are equal, round, and reactive to light  EOM and lids are normal    Neck: Neck supple  Cardiovascular: Normal rate and regular rhythm  Pulmonary/Chest: Effort normal    Musculoskeletal: Normal range of motion  Neurological: He is alert and oriented to person, place, and time  He has normal strength and normal reflexes  Skin: Skin is warm and dry  Psychiatric: His speech is normal    Limited verbal, occasionally interrupting during conversation but able to be re-directed  Slightly distressed from an ear infection today   Vitals reviewed  Neurological Exam  Mental Status  Awake and alert  Speech is normal  Language is fluent with no aphasia  Fund of knowledge is abnormal     Cranial Nerves  CN II: Visual fields full to confrontation  CN III, IV, VI: Extraocular movements intact bilaterally  Extraocular movements intact bilaterally  Normal lids and orbits bilaterally  Pupils equal round and reactive to light bilaterally  CN VII: Full and symmetric facial movement  CN VIII: Hearing is normal   CN IX, X: Palate elevates symmetrically  Normal gag reflex  CN XI: Shoulder shrug strength is normal   CN XII: Tongue midline without atrophy or fasciculations  Motor   Strength is 5/5 throughout all four extremities  Patient has slight tremor bilaterally in his upper extremities  Sensory  Light touch is normal in upper and lower extremities  Reflexes  Deep tendon reflexes are 2+ and symmetric in all four extremities with downgoing toes bilaterally  Coordination  Right: Finger-to-nose normal   Left: Finger-to-nose normal     Gait  Casual gait is normal including stance, stride, and arm swing  Normal tandem gait  ROS:    Review of Systems   Constitutional: Negative for activity change, appetite change, chills, diaphoresis, fatigue, fever and unexpected weight change  HENT: Negative for congestion, dental problem, drooling, ear discharge, ear pain, facial swelling, hearing loss, mouth sores, nosebleeds, postnasal drip, rhinorrhea, sinus pressure, sinus pain, sneezing, sore throat, tinnitus, trouble swallowing and voice change  Eyes: Negative for photophobia, pain, discharge, redness, itching and visual disturbance  Respiratory: Negative for apnea, cough, choking, chest tightness, shortness of breath, wheezing and stridor  Cardiovascular: Negative for chest pain, palpitations and leg swelling  Gastrointestinal: Negative for abdominal distention, abdominal pain, anal bleeding, blood in stool, constipation, diarrhea, nausea, rectal pain and vomiting  Endocrine: Negative for cold intolerance, heat intolerance, polydipsia, polyphagia and polyuria  Genitourinary: Negative for decreased urine volume, difficulty urinating, discharge, dysuria, enuresis, flank pain, frequency, genital sores, hematuria, penile pain, penile swelling, scrotal swelling, testicular pain and urgency  Musculoskeletal: Negative for arthralgias, back pain, gait problem, joint swelling, myalgias, neck pain and neck stiffness  Skin: Negative for color change, pallor, rash and wound  Allergic/Immunologic: Negative for environmental allergies, food allergies and immunocompromised state  Neurological: Negative for dizziness, tremors, seizures, syncope, facial asymmetry, speech difficulty, weakness, light-headedness, numbness and headaches  Hematological: Negative for adenopathy  Does not bruise/bleed easily  Psychiatric/Behavioral: Positive for sleep disturbance  Negative for agitation, behavioral problems, confusion, decreased concentration, dysphoric mood, hallucinations, self-injury and suicidal ideas  The patient is not nervous/anxious and is not hyperactive  Counseling Attestation:  Time in: 9:15, Time out: 9:30  Total time encounter was 15 minutes and 10 minutes were spent counseling the patient      Attestation:   By signing my name below, Anya Mix, attest that this documentation has been prepared under the direction and in the presence of Shahriar Travis MD  Electronically Signed: Ish Franklin  02/15/19     I, Shahriar Travis, personally performed the services described in this documentation  All medical record entries made by the ish were at my direction and in my presence   I have reviewed the chart and discharge instructions and agree that the record reflects my personal performance and is accurate and complete  Shyla Paz MD  02/15/19

## 2019-02-15 NOTE — PROGRESS NOTES
Assessment/Plan:     Diagnoses and all orders for this visit:    Dysfunction of left eustachian tube  -     predniSONE 10 mg tablet; 6-5-4-3-2-1        Based on exam today it appears that the patient has eustachian tube dysfunction on the left side  There is some retraction on the right also so treatment will be OTC antihistamine with Claritin  Would advise prednisone taper pack  Patient would likely not be compliant with Flonase or Nasacort  Discussed that prednisone and Keppra are compatible per neurologist   Follow up if no improvement  Subjective:   Chief Complaint   Patient presents with    Earache     right ear pain      Patient ID: Dhaval Villa is a 34 y o  male  This is a pleasant 61-year-old autistic male who is here with his mom  She is noticing complaints of restlessness and decrease sleep with the patient cupping his right ear  There has been no fever  There is no cough or nasal congestion  There is no recent travel  Patient just seen this morning by Neurology for seizure follow-up  Patient is on [address]  Earache    Affected ear: Complaint is of the right but findings are on the left  This is a new problem  The current episode started yesterday  Episode frequency: Nighttime was worse  The problem has been waxing and waning  There has been no fever  Associated symptoms comments: Appetite seems to be down  He has tried nothing for the symptoms  His past medical history is significant for a tympanostomy tube ( remote history of multiple tubes)  The following portions of the patient's history were reviewed and updated as appropriate: allergies, current medications, past family history, past medical history, past social history, past surgical history and problem list     Review of Systems   Constitutional: Negative for fatigue and fever  HENT: Positive for ear pain  Respiratory: Negative  Cardiovascular: Negative  Gastrointestinal: Negative           Was not himself last night out at dinner at his favorite [employer]  Genitourinary: Negative  Musculoskeletal: Negative  Psychiatric/Behavioral: Negative  Objective:      /60   Ht 6' (1 829 m)   Wt 86 5 kg (190 lb 9 6 oz)   BMI 25 85 kg/m²          Physical Exam   Constitutional: He appears well-developed and well-nourished  Relatively nonverbal 81-year-old male cooperative in no acute distress   HENT:   Head: Normocephalic  Right Ear: Ear canal normal  Tympanic membrane is retracted  Left Ear: Ear canal normal  Tympanic membrane is bulging  Tympanic membrane mobility is abnormal    Ears:    Neck: Normal range of motion  Cardiovascular: Normal rate  Pulmonary/Chest: Effort normal and breath sounds normal    Musculoskeletal: Normal range of motion  Lymphadenopathy:     He has no cervical adenopathy  Psychiatric: He has a normal mood and affect   His behavior is normal

## 2019-02-15 NOTE — PATIENT INSTRUCTIONS
1  Continue taking Keppra 500 mg, 1 tablet twice daily  2  Have your blood work drawn to check the level of your seizure medication  3  Follow up with me in 6 months and call with any questions or concerns

## 2019-02-17 LAB — LEVETIRACETAM SERPL-MCNC: 12 UG/ML (ref 10–40)

## 2019-03-01 ENCOUNTER — OFFICE VISIT (OUTPATIENT)
Dept: FAMILY MEDICINE CLINIC | Facility: CLINIC | Age: 29
End: 2019-03-01
Payer: COMMERCIAL

## 2019-03-01 VITALS
HEIGHT: 73 IN | DIASTOLIC BLOOD PRESSURE: 84 MMHG | BODY MASS INDEX: 24.65 KG/M2 | WEIGHT: 186 LBS | SYSTOLIC BLOOD PRESSURE: 122 MMHG

## 2019-03-01 DIAGNOSIS — G40.409 GENERALIZED TONIC-CLONIC SEIZURE (HCC): ICD-10-CM

## 2019-03-01 DIAGNOSIS — F84.0 AUTISM DISORDER: ICD-10-CM

## 2019-03-01 DIAGNOSIS — Z00.00 HEALTHCARE MAINTENANCE: Primary | ICD-10-CM

## 2019-03-01 PROCEDURE — 99395 PREV VISIT EST AGE 18-39: CPT | Performed by: FAMILY MEDICINE

## 2019-03-04 PROBLEM — Z00.00 PHYSICAL EXAM, ANNUAL: Status: RESOLVED | Noted: 2018-01-26 | Resolved: 2019-03-04

## 2019-03-05 NOTE — PROGRESS NOTES
[facility] MAINTENANCE COMPLETED  MEDICAL PROBLEM LIST REVIEWED  RECOMMEND REPEAT LIPID PANEL AND LFTS  RECHECK 1 YEAR  ssment/Plan:  [facility]IS [address]       Diagnoses and all orders for this visit:    Healthcare maintenance    Autism disorder    Generalized tonic-clonic seizure ([address] )        Lifestyle Advice: continue current medications          Subjective:      Patient ID: Dhaval Villa is a 34 y o  male  HERE FOR   NO NEW ISSUES    Diet: low fat diet  Exercise: intermittently  Dental: regular dental visits  Vision: no vision problems  Preventative Health: Male Preventative: Cholesterol screening up to date    The following portions of the patient's history were reviewed and updated as appropriate: allergies, current medications, past family history, past medical history, past social history, past surgical history and problem list     Review of Systems   Constitutional: Negative for appetite change, fatigue, fever and unexpected weight change  HENT: Negative for congestion, ear pain, postnasal drip, rhinorrhea, sinus pressure, sinus pain and sore throat  Eyes: Negative for redness and visual disturbance  Respiratory: Negative for chest tightness and shortness of breath  Cardiovascular: Negative for chest pain, palpitations and leg swelling  Gastrointestinal: Negative for abdominal distention, abdominal pain, diarrhea and nausea  Endocrine: Negative for cold intolerance and heat intolerance  Genitourinary: Negative for dysuria and hematuria  Musculoskeletal: Negative for arthralgias, gait problem and myalgias  Skin: Negative for pallor and rash  Neurological: Negative for dizziness and headaches  Psychiatric/Behavioral: Negative for behavioral problems           Objective:    /84 (BP Location: Right arm, Patient Position: Standing)   Ht 6' 0 5" (1 842 m)   Wt 84 4 kg (186 lb)   BMI 24 88 kg/m²          Physical Exam   Constitutional: He is oriented to person, place, and time  He appears well-nourished  No distress  HENT:   Head: Normocephalic and atraumatic  Right Ear: Tympanic membrane and external ear normal    Left Ear: Tympanic membrane and external ear normal    Nose: Nose normal    Mouth/Throat: Oropharynx is clear and moist    Eyes: Pupils are equal, round, and reactive to light  Conjunctivae and EOM are normal  No scleral icterus  Neck: Normal range of motion  Neck supple  No thyromegaly present  Cardiovascular: Normal rate, regular rhythm and intact distal pulses  No murmur heard  Pulses:       Carotid pulses are 0 on the right side, and 0 on the left side  Pulmonary/Chest: Effort normal and breath sounds normal  He has no wheezes  Abdominal: Soft  He exhibits no distension  Musculoskeletal: He exhibits no edema  Lymphadenopathy:     He has no cervical adenopathy  Neurological: He is alert and oriented to person, place, and time  He has normal reflexes  No cranial nerve deficit  Coordination normal    Skin: Skin is warm  No pallor  Psychiatric:   Denisa Whitlock note and vitals reviewed  Cefaclor and Sulfamethoxazole-trimethoprim    Saulo Dougherty had no medications administered during this visit    Health Maintenance   Topic Date Due    Depression Screening PHQ  08/29/2019 (Originally 1990)    INFLUENZA VACCINE  02/15/2020 (Originally 7/1/2018)    BMI: Adult  03/01/2020    DTaP,Tdap,and Td Vaccines (7 - Td) 10/12/2027    HEPATITIS B VACCINES  Completed      Social History     Socioeconomic History    Marital status: Single     Spouse name: Not on file    Number of children: Not on file    Years of education: Not on file    Highest education level: Not on file   Occupational History    Not on file   Social Needs    Financial resource strain: Not on file    Food insecurity:     Worry: Not on file     Inability: Not on file    Transportation needs:     Medical: Not on file     Non-medical: Not on file   Tobacco Use    Smoking status: Never Smoker    Smokeless tobacco: Never Used   Substance and Sexual Activity    Alcohol use: No    Drug use: Not on file    Sexual activity: Not on file   Lifestyle    Physical activity:     Days per week: Not on file     Minutes per session: Not on file    Stress: Not on file   Relationships    Social connections:     Talks on phone: Not on file     Gets together: Not on file     Attends Jainism service: Not on file     Active member of club or organization: Not on file     Attends meetings of clubs or organizations: Not on file     Relationship status: Not on file    Intimate partner violence:     Fear of current or ex partner: Not on file     Emotionally abused: Not on file     Physically abused: Not on file     Forced sexual activity: Not on file   Other Topics Concern    Not on file   Social History Narrative    Not on file      Family History   Problem Relation Age of Onset    Breast cancer Maternal Grandmother     Cancer Maternal Grandfather         Salivary gland cancer    Other Paternal Grandmother         brain tumor    COPD Paternal Grandmother     Hypertension Paternal Grandmother     No Known Problems Mother     No Known Problems Father       Past Medical History:   Diagnosis Date    Seizure-like activity ([facility] )     last assessed: 7/11/16    Skin lesion     last assessed: 12/30/13    Skin rash     last assessed: 3/1/13    Staph aureus infection     last assessed: 8/20/13      has a past surgical history that includes Adenoidectomy; Dental surgery; and Myringotomy w/ tubes     Patient Active Problem List    Diagnosis Date Noted    Seizure [facility]) 08/21/2018    Generalized tonic-clonic seizure ([facility] ) 10/13/2016    Autism disorder 10/10/2012       Current Outpatient Medications:     levETIRAcetam (KEPPRA) 500 mg tablet, Take 1 tablet (500 mg total) by mouth 2 (two) times a day, Disp: 90 tablet, Rfl: 5    Loratadine (CLARITIN) 10 MG CAPS, Take 10 mg by mouth as needed  , Disp: , Rfl:     neomycin-polymyxin-hydrocortisone (CORTISPORIN) 0 35%-10,000 units/mL-1% otic suspension, Administer into ears, Disp: , Rfl:     predniSONE 10 mg tablet, 6-5-4-3-2-1 (Patient not taking: Reported on 3/1/2019), Disp: 21 tablet, Rfl: 0

## 2019-03-13 ENCOUNTER — CLINICAL SUPPORT (OUTPATIENT)
Dept: FAMILY MEDICINE CLINIC | Facility: CLINIC | Age: 29
End: 2019-03-13
Payer: COMMERCIAL

## 2019-03-13 DIAGNOSIS — Z23 ENCOUNTER FOR IMMUNIZATION: Primary | ICD-10-CM

## 2019-03-13 PROCEDURE — 86580 TB INTRADERMAL TEST: CPT | Performed by: FAMILY MEDICINE

## 2019-03-15 ENCOUNTER — CLINICAL SUPPORT (OUTPATIENT)
Dept: FAMILY MEDICINE CLINIC | Facility: CLINIC | Age: 29
End: 2019-03-15

## 2019-03-15 DIAGNOSIS — Z11.1 ENCOUNTER FOR PPD SKIN TEST READING: Primary | ICD-10-CM

## 2019-03-15 LAB
INDURATION: 0 MM
TB SKIN TEST: NEGATIVE

## 2019-08-27 ENCOUNTER — OFFICE VISIT (OUTPATIENT)
Dept: NEUROLOGY | Facility: CLINIC | Age: 29
End: 2019-08-27
Payer: COMMERCIAL

## 2019-08-27 VITALS
SYSTOLIC BLOOD PRESSURE: 112 MMHG | DIASTOLIC BLOOD PRESSURE: 68 MMHG | WEIGHT: 171.2 LBS | HEART RATE: 80 BPM | BODY MASS INDEX: 22.69 KG/M2 | HEIGHT: 73 IN

## 2019-08-27 DIAGNOSIS — G47.09 OTHER INSOMNIA: ICD-10-CM

## 2019-08-27 DIAGNOSIS — R56.9 SEIZURE (HCC): ICD-10-CM

## 2019-08-27 DIAGNOSIS — F84.0 AUTISM DISORDER: Primary | ICD-10-CM

## 2019-08-27 PROCEDURE — 99213 OFFICE O/P EST LOW 20 MIN: CPT | Performed by: PSYCHIATRY & NEUROLOGY

## 2019-08-27 RX ORDER — LEVETIRACETAM 500 MG/1
500 TABLET ORAL 2 TIMES DAILY
Qty: 180 TABLET | Refills: 3 | Status: SHIPPED | OUTPATIENT
Start: 2019-08-27 | End: 2020-09-09

## 2019-08-27 NOTE — PROGRESS NOTES
Patient ID: Gina Saucedo is a 34 y o  male  Assessment/Plan:    Rosalinda Ramos was seen today for seizures  Diagnoses and all orders for this visit:    Autism disorder    Seizure ([address] )  -     levETIRAcetam (KEPPRA) 500 mg tablet; Take 1 tablet (500 mg total) by mouth 2 (two) times a day    Other insomnia        Discussion Summary:  34year old male with autism who had had a seizure in 2016 for which I did not put him on any seizure medication since CT and EEG were normal  He was then lost to follow up but came back in November 2018 after his having had a second seizure in August 2018  There was some focality to the onset with the patient turning to the left at the onset of the seizure  He had been started on Keppra 500 mg BID and I agreed with the plan for him to stay on it given that this was his second seizure and there was evidence of focality of onset  He's remained seizure free since then and has been tolerating the Keppra well  He has had trouble staying asleep at night however  He had that problem to some degree already anyway and his mom is not sure how much of the current persistent sleep problem is simply part of his autism versus how much is due to [address]  Keppra can contribute to this but the patient has not shown any indication of the irritability and anxiety that Keppra can cause in the daytime  In order to alleviate the sleep problem, patient's mom will try giving the patient melatonin and if that doesn't work we'll give him CBD oil  His sleep is important not only because it is keeping his parents up, but also because he needs good quality and amount of sleep in order to remain seizure free  Patient and his family are going [facility] in [place]  The first time they went a few years ago the patient's grandmother was diagnosed with a brain tumor and hopefully this visit will be more pleasant  I'll see the patient back in 6 months for 30 minutes   Patient's mother will call me if neither the melatonin nor CBD oil help him sleep  Subjective:    HPI    6 month follow-up of a 34year old male referred to me by Dr Rickey Carrasquillo for further evaluation and management of a witnessed seizure on 7/5/16  He has no known risk factors other than autism  The patient was seen at [facility] for that event  According to the ED note the patient was at his care center sitting at a table when he had a single generalized tonic clonic seizure for 2-3 minutes witnessed by staff  The patient reportedly fell out of his chair and hit his head at that time  EMS was told that the patient had stopped breathing for about 30 seconds after the episode ended  The patient was not started on any seizure medication at that time  When I saw the patient for the first time on 10/13/16, his CT scan had been normal though the description of the episode he experienced led me not to doubt the diagnosis  Exact etiology of the seizure was unclear, though patients with autism do develop seizures, often without any other risk factors so an MRI was not ordered  The seizure was of typical duration  There was no evidence of focality in the description of what the seizure looked like and no identifiable precipitants, so the patient was not started on any seizure medication  When I saw the patient on 11/9/16, his EEG had come back normal, reaffirming my decision not to start the patient on any AEDs  The patients mother understood that this didnt mean that patient would never have a seizure again and would avoid common seizure triggers such as sleep deprivation  In August 2018 the patient had another generalized tonic clonic seizure  This was witnessed by his parents and his mother states that the patient was turning his head and body to the left at the onset  There were no precipitants for the event and again CT scan and EEG were normal at [facility]   The patient was started on Keppra 500 mg tablets, 1 tablet twice a day     When I saw the patient on 2/15/19, I agreed that the patient needed to be on seizure medication and fortunately he had not had another seizure  I ordered a levetiracetam level and considered an MRI to better determine focality but the patient who require anesthesia for the study so I held off at that time  Last seen 2/15/19  INTERVAL HISTORY:     Levetiracetam level on 2/15/19 was 12  Current AEDs:  Keppra 500 mg, 1 tablet BID    The patient has not had any seizures but has had trouble sleeping at night  He wakes up a lot  Last night was the first night he had 7 hours of straight sleep in some time  His mother thinks side effects has had this for ages but that lately it has gotten worse  She is unsure if this started progressing when the Keppra was added  Her niece had seizures and cannot take melatonin so she was concerned about giving that to the patient  She also had questions about CBD oil  His mother does not think the patient is overly tired during the day  They are going on vacation to [facility] soon  They have not been there in 5-6 years  The patients grandmother was there with them at that time and while in [facility] they had to take her to the [facility] ED for a brain tumor  She was discharged to the [facility] then flew back and had an operation at [facility]  The following portions of the patient's history were reviewed and updated as appropriate: allergies, current medications, past family history, past medical history, past social history, past surgical history and problem list          Objective:    Blood pressure 112/68, pulse 80, height 6' 0 5" (1 842 m), weight 77 7 kg (171 lb 3 2 oz)  Physical Exam   Constitutional: He is oriented to person, place, and time  He appears well-developed and well-nourished  HENT:   Head: Normocephalic and atraumatic     Right Ear: External ear normal    Left Ear: External ear normal    Eyes: Pupils are equal, round, and reactive to light  EOM and lids are normal    Neck: Neck supple  Cardiovascular: Normal rate and regular rhythm  Pulmonary/Chest: Effort normal    Musculoskeletal: Normal range of motion  Neurological: He is alert and oriented to person, place, and time  He has normal reflexes  Coordination normal    Skin: Skin is warm and dry  Psychiatric: He has a normal mood and affect  His speech is normal    Autism   Vitals reviewed  Neurological Exam  Mental Status  Awake and alert  Oriented to person, place and time  Speech is normal  Language is fluent with no aphasia  Limited verbal, occasionally interrupting during conversation but able to be re-directed  Cranial Nerves  CN II: Visual acuity is normal  Visual fields full to confrontation  CN III, IV, VI: Extraocular movements intact bilaterally  Extraocular movements intact bilaterally  Normal lids and orbits bilaterally  Pupils equal round and reactive to light bilaterally  CN V: Facial sensation is normal   CN VII: Full and symmetric facial movement  CN VIII: Hearing is normal   CN IX, X: Palate elevates symmetrically  Normal gag reflex  CN XI: Shoulder shrug strength is normal   CN XII: Tongue midline without atrophy or fasciculations  Sensory  Sensation is intact to light touch, pinprick, vibration and proprioception in all four extremities  Reflexes  Deep tendon reflexes are 2+ and symmetric in all four extremities with downgoing toes bilaterally  Coordination  Finger-to-nose, rapid alternating movements and heel-to-shin normal bilaterally without dysmetria  Gait  Normal casual, toe, heel and tandem gait  Romberg is absent  ROS:    Review of Systems   Constitutional: Negative  Negative for appetite change and fever  HENT: Negative  Negative for hearing loss, tinnitus, trouble swallowing and voice change  Eyes: Negative  Negative for photophobia and pain  Respiratory: Negative  Negative for shortness of breath  Cardiovascular: Negative  Negative for palpitations  Gastrointestinal: Negative  Negative for nausea and vomiting  Endocrine: Negative  Negative for cold intolerance and heat intolerance  Genitourinary: Negative  Negative for dysuria, frequency and urgency  Musculoskeletal: Negative  Negative for myalgias and neck pain  Skin: Negative  Negative for rash  Neurological: Negative  Negative for dizziness, tremors, seizures, syncope, facial asymmetry, speech difficulty, weakness, light-headedness, numbness and headaches  Hematological: Negative  Does not bruise/bleed easily  Psychiatric/Behavioral: Positive for sleep disturbance  Negative for confusion and hallucinations  Counseling Attestation:  Time in: 9:40, Time out: 9:55  Total time encounter was 15 minutes and 10 minutes were spent counseling the patient      Attestation:   By signing my name below, vinicius Dean that this documentation has been prepared under the direction and in the presence of Selma North MD  Electronically Signed: Avinash Ling  08/27/19     I, Selma North, personally performed the services described in this documentation  All medical record entries made by the scribe were at my direction and in my presence   I have reviewed the chart and discharge instructions and agree that the record reflects my personal performance and is accurate and complete  Allison Matias MD  08/27/19

## 2019-08-27 NOTE — PATIENT INSTRUCTIONS
1  Continue to take Keppra 500 mg tablets, 1 tablet twice a day  2  You may start to take melatonin or CBD oil as needed for sleep  3  Follow up with me in 6 months and call with any questions or concerns

## 2019-08-28 PROBLEM — G47.09 OTHER INSOMNIA: Status: ACTIVE | Noted: 2019-08-28

## 2020-01-16 ENCOUNTER — OFFICE VISIT (OUTPATIENT)
Dept: FAMILY MEDICINE CLINIC | Facility: CLINIC | Age: 30
End: 2020-01-16
Payer: COMMERCIAL

## 2020-01-16 VITALS
WEIGHT: 163 LBS | TEMPERATURE: 97.9 F | HEART RATE: 71 BPM | DIASTOLIC BLOOD PRESSURE: 84 MMHG | SYSTOLIC BLOOD PRESSURE: 124 MMHG | OXYGEN SATURATION: 97 % | HEIGHT: 73 IN | BODY MASS INDEX: 21.6 KG/M2

## 2020-01-16 DIAGNOSIS — H72.92 PERFORATION OF EAR DRUM, LEFT: Primary | ICD-10-CM

## 2020-01-16 PROCEDURE — 99213 OFFICE O/P EST LOW 20 MIN: CPT | Performed by: NURSE PRACTITIONER

## 2020-01-16 PROCEDURE — 3008F BODY MASS INDEX DOCD: CPT | Performed by: NURSE PRACTITIONER

## 2020-01-16 NOTE — PATIENT INSTRUCTIONS
Only use q tips externally as needed  If any concerns regarding muffled hearing/clogged you can start Flonase  Please call the office if you are experiencing any worsening of symptoms or no symptom improvement

## 2020-01-16 NOTE — PROGRESS NOTES
Assessment/Plan:    Perforation of left TM/ Cerumen  Mother reassured that findings show cerumen in ear, no evidence of bleeding or abnormal discharge  Chronic perforation noted- discussed with mother that ENT would be recommended but she states they never do anything for it and he isn't symptomatic so she will just watch at this time  Advised to not use q tips except for very external use  Noted small amount of fluid in right ear, advised to try flonase  Start Flonase, this is an intranasal corticosteroid  This is 1-2 sprays each nostril once daily  Please be aware that this can cause nasal mucosa irritation  Stop using if you experience any nose bleeds  This can be used for allergy symptoms as well as ear discomfort/pressure  Please call the office if you are experiencing any worsening of symptoms or no symptom improvement  Patient verbalizes understand and agrees with treatment plan  Diagnoses and all orders for this visit:    Perforation of ear drum, left                Subjective:        Patient ID: Qing Bremudez is a 34 y o  male  Chief Complaint   Patient presents with    Ear Problem     L ear fullness; no pain indicated       Here for evaluation of possible impacted cerumen or ear infection/abnormality  Mom cleaned ear with 4 q tips which was brown wet discharge, she wasn't sure if it was wax or not  This was Tuesday evening  Wednesday morning they used 3 q tips on it, and then that evening the discharge looked bloody  Has had 6 sets of tubes and numerous ear infections  Tubes not present at this time  There is one ear that they were told has an ear in it  This is just the left ear  No indication that ear is painful  Mom states that the discharge was reddish in color and she wasn't sure if this was blood or not  She states he has hx of multiple tube placements and has hx of perforated ear drum on one side that has stayed persistent  He does not exhibit symptoms of hearing loss or dizziness  The following portions of the patient's history were reviewed and updated as appropriate: allergies, current medications, past family history, past social history and problem list     Review of Systems   Unable to perform ROS: Other (autism/ limited verbal)         Objective:  /84 (BP Location: Left arm, Patient Position: Sitting, Cuff Size: Standard)   Pulse 71   Temp 97 9 °F (36 6 °C) (Temporal)   Ht 6' 0 5" (1 842 m)   Wt 73 9 kg (163 lb)   SpO2 97%   BMI 21 80 kg/m²      Physical Exam   Constitutional: He is oriented to person, place, and time  He appears well-developed and well-nourished  No distress  HENT:   Head: Normocephalic and atraumatic  Right Ear: External ear normal  No tenderness  No foreign bodies  Tympanic membrane is not perforated, not erythematous, not retracted and not bulging  A middle ear effusion is present  Left Ear: External ear normal  No tenderness  No foreign bodies  Tympanic membrane is perforated  Tympanic membrane is not erythematous  No middle ear effusion  Mild amount of yellow/brown/reddish cerumen external left ear   Chronic perforation noted in left TM around 2-3:00   Eyes: Conjunctivae and lids are normal  Right eye exhibits no discharge  Left eye exhibits no discharge  Neck: Normal range of motion  Neck supple  Cardiovascular: Normal rate and regular rhythm  No murmur heard  Pulmonary/Chest: Effort normal and breath sounds normal  No respiratory distress  He has no wheezes  Musculoskeletal: Normal range of motion  He exhibits no deformity  Neurological: He is alert and oriented to person, place, and time  Coordination normal    Skin: Skin is warm and dry  He is not diaphoretic  Psychiatric: He has a normal mood and affect  His speech is normal and behavior is normal  Judgment and thought content normal  Cognition and memory are normal    Nursing note and vitals reviewed               Current Outpatient Medications:     levETIRAcetam (KEPPRA) 500 mg tablet, Take 1 tablet (500 mg total) by mouth 2 (two) times a day, Disp: 180 tablet, Rfl: 3    Loratadine (CLARITIN) 10 MG CAPS, Take 10 mg by mouth as needed  , Disp: , Rfl:   Allergies   Allergen Reactions    Cefaclor Hives    Sulfamethoxazole-Trimethoprim Hives

## 2020-02-24 ENCOUNTER — TELEPHONE (OUTPATIENT)
Dept: FAMILY MEDICINE CLINIC | Facility: CLINIC | Age: 30
End: 2020-02-24

## 2020-02-24 ENCOUNTER — TELEPHONE (OUTPATIENT)
Dept: NEUROLOGY | Facility: CLINIC | Age: 30
End: 2020-02-24

## 2020-02-24 DIAGNOSIS — G47.09 OTHER INSOMNIA: Primary | ICD-10-CM

## 2020-02-24 DIAGNOSIS — R56.9 SEIZURE (HCC): ICD-10-CM

## 2020-02-24 DIAGNOSIS — R56.9 GENERALIZED-ONSET SEIZURES (HCC): ICD-10-CM

## 2020-02-24 DIAGNOSIS — G40.409 GENERALIZED TONIC-CLONIC SEIZURE (HCC): ICD-10-CM

## 2020-02-24 NOTE — TELEPHONE ENCOUNTER
Richie Cutler called from [facility]'s neurology requesting an ambulatory referral to neurology be placed for pt for [id number]   [id number]  [id number]    Please advise if ok to do I will place pt has an appointment on 2/27/2020 they just need this in epic

## 2020-02-24 NOTE — TELEPHONE ENCOUNTER
Ambulatory referral was placed per Dr Murcia's permission  Calling to inform ambulatory referral was placed  lmom for Julia Muller informing referral was done in epic any questions please call

## 2020-02-26 ENCOUNTER — TELEPHONE (OUTPATIENT)
Dept: FAMILY MEDICINE CLINIC | Facility: CLINIC | Age: 30
End: 2020-02-26

## 2020-02-26 NOTE — TELEPHONE ENCOUNTER
Patient's mother will be faxing a physical form for Dr Garcia Gandara to fill out  His next physical is in March, but he needs it filled out before

## 2020-02-27 ENCOUNTER — TELEPHONE (OUTPATIENT)
Dept: NEUROLOGY | Facility: CLINIC | Age: 30
End: 2020-02-27

## 2020-03-05 ENCOUNTER — OFFICE VISIT (OUTPATIENT)
Dept: NEUROLOGY | Facility: CLINIC | Age: 30
End: 2020-03-05
Payer: COMMERCIAL

## 2020-03-05 VITALS
HEIGHT: 73 IN | SYSTOLIC BLOOD PRESSURE: 124 MMHG | DIASTOLIC BLOOD PRESSURE: 86 MMHG | WEIGHT: 164.5 LBS | HEART RATE: 74 BPM | BODY MASS INDEX: 21.8 KG/M2

## 2020-03-05 DIAGNOSIS — R56.9 GENERALIZED-ONSET SEIZURES (HCC): ICD-10-CM

## 2020-03-05 DIAGNOSIS — R56.9 SEIZURE (HCC): ICD-10-CM

## 2020-03-05 DIAGNOSIS — G40.409 GENERALIZED TONIC-CLONIC SEIZURE (HCC): Primary | ICD-10-CM

## 2020-03-05 PROCEDURE — 1036F TOBACCO NON-USER: CPT | Performed by: NURSE PRACTITIONER

## 2020-03-05 PROCEDURE — 99214 OFFICE O/P EST MOD 30 MIN: CPT | Performed by: NURSE PRACTITIONER

## 2020-03-05 NOTE — TELEPHONE ENCOUNTER
Mom called and asked for the status for his paperwork  She asked if the paperwork can be faxed today

## 2020-03-05 NOTE — PROGRESS NOTES
Patient ID: Philippe Gaines is a 34 y o  male  [address] Neurology [facility]  Follow Up Visit    Impression/Plan    Mr Blanca Ojeda is a 34 y o  male with autism and epilepsy manifest generalized tonic clonic seizures  Suspect some localization related to the fact that the patient turned his head and body to the left prior to having a GTC seizure  He has had two unprovoked lifetime seizures that were both GTC seizures  Mother would like patient to eventually stop seizure medication  Discussed that after two years of seizure freedom we can have the patient obtain an EEG  If the EEG is normal, we can wean the patient off of his Levetiracetam that he has been taking since August of 2018  Would like to obtain an MRI but patient would require sedation; discussed with mother and will defer at this time  She is aware that there is still some risk of having another seizure even if the EEG is normal and his medication are stopped  He will continue taking his current medication, follow up in 6 months after obtaining EEG, and we will discuss results and stopping his medication at that time  Patient Instructions   1  Continue to take the Levetiracetam (Keppra) 500mg twice daily  2  If you would like to come off of medication, two years will be up after August of 2020  We would like you to obtain an EEG after the two years is complete (August or September before your appointment)  We will review the study and discuss coming off of medication at your next appointment in September  3  Please call office with any seizures or concerns  Diagnoses and all orders for this visit:    Generalized tonic-clonic seizure ([address] )  -     Ambulatory referral to Neurology  -     EEG Routine and awake;  Future    Seizure [facility])  -     Ambulatory referral to Neurology    Generalized-onset seizures [facility])  -     Ambulatory referral to Neurology        Subjective    Philippe Gaines is a 34year old male returning to the [facility] for follow up  His first seizure was in 2016 while at work  He had what was described as a GTC seizure that lasted 2-3 minutes  One of the co-workers thought he may have stopped breathing so CPR was started  When he went to the ER his head CT was normal and no medications were started  He had an EEG on 11/01/2016 which was normal     Anila Robertson had a second GTC seizure 08/22/2018  This event was witnessed by his mother who states that he was sitting in a chair and his head and body turned to the left  He then slumped over in the chair, slid to the floor and started having convulsions  This lasted longer than the last one, but mother was unable to tell me for how long  He had an EEG and CT of the head at that time which were both normal  He was then started on Levetiracetam 500mg tablets, one tablet twice per day  Since starting on the Levetiracetam, the patient has not had any further seizures  He is tolerating the medication well  Mother denies missed doses or side effects  No mood changes  Her only complaint is about his sleep but does not find it very bothersome and does not think that it effects the patient that much  He does not seem tired and functions well at work  He wakes up in the middle of the night and knocks on her bedroom door, she asks if he is okay and when he says yes she tells him to go back to bed and he usually does  She has tried giving him melatonin and CBD oil but does not think they help much  Advised against using products that contain diphenhydramine or benadryl as they can decrease the seizure threshold       Mother is inquiring about wether he can come off of medication after he has been seizure free for two years as she states that this was something she had discussed with Dr Vito Noe in the past      Interval Events:   Seizures since last visit: None  Hospitalizations: no    Current AEDs:  Levetiracetam 500mg tablets- one tablet twice per day  Medication side effects: None  Medication adherence: Yes    Event/Seizure semiology:  1  GTC seizure with possible focal onset as patient turned his head and body to the left at onset prior to GTC seizure  Special Features  Status epilepticus: no  Self Injury Seizures: no  Precipitating Factors: none    Epilepsy Risk Factors:  Intellectual disability    Prior AEDs:  None    Prior Evaluation:  11/1/2016- routine EEG- normal  08/22/2018- routine EEG- normal  CT head wo contrast 07/05/2016 and 08/22/2018 - both motion degraded studies without acute findings or evidence of mass  History Reviewed: The following were reviewed and updated as appropriate: allergies, current medications, past medical history, past social history, past surgical history and problem list    Psychiatric History:  None    Social History:   Driving: No  Lives Alone: No  Lives with parents  Occupation: works at [employer] obtained by [place] and reviewed by myself:  Review of Systems   Constitutional: Negative  Negative for appetite change and fever  HENT: Negative  Negative for hearing loss, tinnitus, trouble swallowing and voice change  Eyes: Negative  Negative for photophobia and pain  Respiratory: Negative  Negative for shortness of breath  Cardiovascular: Negative  Negative for palpitations  Gastrointestinal: Negative  Negative for nausea and vomiting  Endocrine: Negative  Negative for cold intolerance and heat intolerance  Genitourinary: Negative  Negative for dysuria, frequency and urgency  Musculoskeletal: Negative  Negative for myalgias and neck pain  Skin: Negative  Negative for rash  Neurological: Negative  Negative for dizziness, tremors, seizures, syncope, facial asymmetry, speech difficulty, weakness, light-headedness, numbness and headaches  Hematological: Negative  Does not bruise/bleed easily  Psychiatric/Behavioral: Negative  Negative for confusion, hallucinations and sleep disturbance  Objective    /86 (BP Location: Left arm, Patient Position: Sitting, Cuff Size: Standard)   Pulse 74   Ht 6' 0 5" (1 842 m)   Wt 74 6 kg (164 lb 8 oz)   BMI 22 00 kg/m²          Physical Exam   Constitutional: He appears well-developed and well-nourished  HENT:   Head: Normocephalic  Eyes: Pupils are equal, round, and reactive to light  Conjunctivae and EOM are normal    Cardiovascular: Normal rate, regular rhythm and normal heart sounds  Pulmonary/Chest: Effort normal and breath sounds normal    Abdominal: Soft  Neurological: He has normal strength  Reflex Scores:       Bicep reflexes are 2+ on the right side and 2+ on the left side  Brachioradialis reflexes are 2+ on the right side and 2+ on the left side  Patellar reflexes are 2+ on the right side and 2+ on the left side  Achilles reflexes are 2+ on the right side and 2+ on the left side  Vitals reviewed  Neurologic Exam :    Mental Status   Attention: decreased  Concentration: decreased  Level of consciousness: alert  Patient has autism  Verbalizes with single words  Able to follow simple, one step commands  Usually answers "yes" to most questions  Easily distractible  There was some difficulty maintaining focus and cooperation of patient during neurologic exam      Cranial Nerves     CN II   Visual fields full to confrontation  CN III, IV, VI   Pupils are equal, round, and reactive to light  Extraocular motions are normal      CN V   Facial sensation intact  CN VII   Facial expression full, symmetric  CN VIII   Hearing: intact    CN XI   CN XI normal      CN XII   CN XII normal      Motor Exam   Muscle bulk: normal  Overall muscle tone: normal    Strength   Strength 5/5 throughout       Sensory Exam   Light touch normal    Vibration normal      Gait, Coordination, and Reflexes     Gait  Gait: wide-based    Reflexes   Right brachioradialis: 2+  Left brachioradialis: 2+  Right biceps: 2+  Left biceps: 2+  Right patellar: 2+  Left patellar: 2+  Right achilles: 2+  Left achilles: 2+

## 2020-03-05 NOTE — PATIENT INSTRUCTIONS
1  Continue to take the Levetiracetam (Keppra) 500mg twice daily  2  If you would like to come off of medication, two years will be up after August of 2020  We would like you to obtain an EEG after the two years is complete (August or September before your appointment)  We will review the study and discuss coming off of medication at your next appointment in September  3  Please call office with any seizures or concerns

## 2020-03-12 ENCOUNTER — OFFICE VISIT (OUTPATIENT)
Dept: FAMILY MEDICINE CLINIC | Facility: CLINIC | Age: 30
End: 2020-03-12
Payer: COMMERCIAL

## 2020-03-12 VITALS
WEIGHT: 164.8 LBS | HEART RATE: 70 BPM | TEMPERATURE: 97.8 F | SYSTOLIC BLOOD PRESSURE: 120 MMHG | HEIGHT: 73 IN | OXYGEN SATURATION: 97 % | BODY MASS INDEX: 21.84 KG/M2 | DIASTOLIC BLOOD PRESSURE: 80 MMHG

## 2020-03-12 DIAGNOSIS — G40.409 GENERALIZED TONIC-CLONIC SEIZURE (HCC): ICD-10-CM

## 2020-03-12 DIAGNOSIS — F84.0 AUTISM DISORDER: ICD-10-CM

## 2020-03-12 DIAGNOSIS — Z00.00 HEALTHCARE MAINTENANCE: Primary | ICD-10-CM

## 2020-03-12 PROCEDURE — 99395 PREV VISIT EST AGE 18-39: CPT | Performed by: FAMILY MEDICINE

## 2020-03-12 PROCEDURE — 3008F BODY MASS INDEX DOCD: CPT | Performed by: FAMILY MEDICINE

## 2020-03-12 NOTE — PROGRESS NOTES
Assessment/Plan:    Health Maintenance  Lifestyle Advice: return for routine annual checkups  Diet: well balanced diet  Exercise: occasionally  Dental: regular dental visits  Vision: no vision problems  Preventative Health: Male Preventative: Exercises regularly    Health care maintenance completed  Exam stable  Forms completed for via  Recheck yearly  Diagnoses and all orders for this visit:    Healthcare maintenance    Autism disorder    Generalized tonic-clonic seizure ([facility] [address] )                  Subjective:      Patient ID: Megan Blount is a 34 y o  male  Yearly physical   Mom says everything is stable  The following portions of the patient's history were reviewed and updated as appropriate: allergies, current medications, past family history, past medical history, past social history, past surgical history and problem list     Review of Systems   Constitutional: Negative for appetite change, fatigue, fever and unexpected weight change  HENT: Negative for congestion, dental problem, postnasal drip and rhinorrhea  Eyes: Negative for redness and visual disturbance  Respiratory: Negative for shortness of breath  Cardiovascular: Negative for chest pain, palpitations and leg swelling  Gastrointestinal: Negative for abdominal distention, abdominal pain, diarrhea and nausea  Endocrine: Negative for cold intolerance and heat intolerance  Musculoskeletal: Negative for gait problem and myalgias  Skin: Negative for pallor and rash  Neurological: Negative for dizziness  Hematological: Does not bruise/bleed easily  Psychiatric/Behavioral: Negative for behavioral problems          Stable autism spectrum         Objective:    /80 (BP Location: Left arm, Patient Position: Sitting, Cuff Size: Standard)   Pulse 70   Temp 97 8 °F (36 6 °C)   Ht 6' 0 5" (1 842 m)   Wt 74 8 kg (164 lb 12 8 oz)   SpO2 97%   BMI 22 04 kg/m²          Physical Exam   Constitutional: He is oriented to person, place, and time  He appears well-nourished  No distress  HENT:   Head: Normocephalic and atraumatic  Right Ear: Tympanic membrane normal    Left Ear: Tympanic membrane normal    Eyes: Pupils are equal, round, and reactive to light  Conjunctivae and EOM are normal  No scleral icterus  Neck: Normal range of motion  Neck supple  No thyromegaly present  Cardiovascular: Normal rate, regular rhythm, normal heart sounds and intact distal pulses  No murmur heard  Pulses:       Carotid pulses are 0 on the right side, and 0 on the left side  Pulmonary/Chest: Effort normal and breath sounds normal  No respiratory distress  He has no wheezes  Abdominal: Soft  He exhibits no distension  Musculoskeletal: He exhibits no edema  Lymphadenopathy:     He has no cervical adenopathy  Neurological: He is alert and oriented to person, place, and time  He has normal reflexes  No cranial nerve deficit  Skin: Skin is warm  No pallor  Psychiatric:   Stable mood for patient   Nursing note and vitals reviewed  Cefaclor and Sulfamethoxazole-trimethoprim    [place] Given had no medications administered during this visit    Health Maintenance   Topic Date Due    Depression Screening PHQ  1992    HIV Screening  12/02/2007    Annual Physical  03/01/2020    Influenza Vaccine  04/08/2020 (Originally 7/1/2019)    BMI: Adult  03/05/2021    DTaP,Tdap,and Td Vaccines (7 - Td) 10/12/2027    Pneumococcal Vaccine: 65+ Years (1 of 2 - PCV13) 12/02/2057    Hepatitis B Vaccine  Completed    IPV Vaccine  Completed    Pneumococcal Vaccine: Pediatrics (0 to 5 Years) and At-Risk Patients (6 to 59 Years)  Aged Out    HIB Vaccine  Aged Out    Hepatitis A Vaccine  Aged Out    Meningococcal ACWY Vaccine  Aged Out    HPV Vaccine  Aged Out      Social History     Socioeconomic History    Marital status: Single     Spouse name: Not on file    Number of children: Not on file    Years of education: Not on file  Highest education level: Not on file   Occupational History    Not on file   Social Needs    Financial resource strain: Not on file    Food insecurity:     Worry: Not on file     Inability: Not on file    Transportation needs:     Medical: Not on file     Non-medical: Not on file   Tobacco Use    Smoking status: Never Smoker    Smokeless tobacco: Never Used   Substance and Sexual Activity    Alcohol use: No    Drug use: Not on file    Sexual activity: Not on file   Lifestyle    Physical activity:     Days per week: Not on file     Minutes per session: Not on file    Stress: Not on file   Relationships    Social connections:     Talks on phone: Not on file     Gets together: Not on file     Attends Worship service: Not on file     Active member of club or organization: Not on file     Attends meetings of clubs or organizations: Not on file     Relationship status: Not on file    Intimate partner violence:     Fear of current or ex partner: Not on file     Emotionally abused: Not on file     Physically abused: Not on file     Forced sexual activity: Not on file   Other Topics Concern    Not on file   Social History Narrative    Not on file      Family History   Problem Relation Age of Onset    Breast cancer Maternal Grandmother     Cancer Maternal Grandfather         Salivary gland cancer    Other Paternal Grandmother         brain tumor    COPD Paternal Grandmother     Hypertension Paternal Grandmother     No Known Problems Mother     No Known Problems Father       Past Medical History:   Diagnosis Date    Seizure-like activity ([facility] [address] )     last assessed: 7/11/16    Skin lesion     last assessed: 12/30/13    Skin rash     last assessed: 3/1/13    Staph aureus infection     last assessed: 8/20/13      has a past surgical history that includes Adenoidectomy; Dental surgery; and Myringotomy w/ tubes     Patient Active Problem List    Diagnosis Date Noted    Other insomnia 08/28/2019    Seizure ([facility] ) 08/21/2018    Generalized tonic-clonic seizure ([facility] ) 10/13/2016    Autism disorder 10/10/2012       Current Outpatient Medications:     levETIRAcetam (KEPPRA) 500 mg tablet, Take 1 tablet (500 mg total) by mouth 2 (two) times a day, Disp: 180 tablet, Rfl: 3    Loratadine (CLARITIN) 10 MG CAPS, Take 10 mg by mouth as needed  , Disp: , Rfl:

## 2020-08-20 ENCOUNTER — HOSPITAL ENCOUNTER (OUTPATIENT)
Dept: NEUROLOGY | Facility: CLINIC | Age: 30
Discharge: HOME | End: 2020-08-20
Payer: COMMERCIAL

## 2020-08-20 DIAGNOSIS — G40.409 GENERALIZED TONIC-CLONIC SEIZURE (HCC): ICD-10-CM

## 2020-08-20 PROCEDURE — 95816 EEG AWAKE AND DROWSY: CPT | Performed by: PSYCHIATRY & NEUROLOGY

## 2020-08-20 PROCEDURE — 95816 EEG AWAKE AND DROWSY: CPT

## 2020-08-21 ENCOUNTER — TELEPHONE (OUTPATIENT)
Dept: NEUROLOGY | Facility: CLINIC | Age: 30
End: 2020-08-21

## 2020-08-21 NOTE — TELEPHONE ENCOUNTER
----- Message from [facility], RN sent at 8/21/2020  1:15 PM EDT -----    ----- Message -----  From: LIAM Dias  Sent: 8/20/2020   5:06 PM EDT  To: [facility]    EEG is normal we will discuss medication changes at his visit in a few weeks

## 2020-08-21 NOTE — TELEPHONE ENCOUNTER
Left detailed message advising pt of EEG results and recommendations  Pt was advised to call back if any questions arises

## 2020-09-09 ENCOUNTER — OFFICE VISIT (OUTPATIENT)
Dept: NEUROLOGY | Facility: CLINIC | Age: 30
End: 2020-09-09
Payer: COMMERCIAL

## 2020-09-09 VITALS
SYSTOLIC BLOOD PRESSURE: 121 MMHG | HEART RATE: 80 BPM | BODY MASS INDEX: 22.13 KG/M2 | TEMPERATURE: 97.4 F | WEIGHT: 167 LBS | DIASTOLIC BLOOD PRESSURE: 71 MMHG | HEIGHT: 73 IN

## 2020-09-09 DIAGNOSIS — R56.9 SEIZURE (HCC): ICD-10-CM

## 2020-09-09 DIAGNOSIS — F84.0 AUTISM DISORDER: ICD-10-CM

## 2020-09-09 DIAGNOSIS — G40.409 GENERALIZED TONIC-CLONIC SEIZURE (HCC): Primary | ICD-10-CM

## 2020-09-09 PROCEDURE — 99214 OFFICE O/P EST MOD 30 MIN: CPT | Performed by: NURSE PRACTITIONER

## 2020-09-09 PROCEDURE — 1036F TOBACCO NON-USER: CPT | Performed by: NURSE PRACTITIONER

## 2020-09-09 RX ORDER — LEVETIRACETAM 500 MG/1
TABLET ORAL
Qty: 30 TABLET | Refills: 0 | Status: SHIPPED | OUTPATIENT
Start: 2020-09-09 | End: 2020-09-30

## 2020-09-09 NOTE — PROGRESS NOTES
Patient ID: Tricia Pollock is a 34 y o  male with autism and epilepsy, who is returning to Neurology office for follow up of his seizures  Assessment/Plan:    Generalized tonic-clonic seizure ([address] )  Patient with a total of two lifetime seizures who has been seizure free for over two years on levetiracetam monotherapy  He has had 3 EEGs in the past (2016, 2018, and August 2020) all of which have been normal  No MRI has been done as this would likely require sedation  2 prior CT head w/o contrast in 2016 and 2018 were both without acute findings or evidence of mass but were motion degraded studies  His intellectual disability does place him at increased risk for seizures  Mother has continued to express wanting patient to come off of medication given his 2 years of seizure freedom  We did discuss the risks of coming off of the medication, particularly that even though he has had normal EEGs, he may have a seizure once decreasing or coming off of medication  We also discussed that there are no clear long term effects of levetiracetam and is generally safe for continued long term use  He is currently on the lowest dose of levetiracetam used to prevent seizures  After risks were discussed, mother did decide that she would like to attempt to have patient come off of medication  Will have patient taper off of medication slowly  Instructed mother to call the office with any breakthrough seizures and we will have patient restart current dose of levetiracetam  Discussed that if she notices the patient having abnormal behaviors, staring spells, episodes of incontinence overnight, tongue bite during sleep (blood on pillow), patient complaining of pain or soreness in the morning, or new uncontrolled muscle jerks she should call the office  We discussed that he should not be left alone in case he does have a seizure as he would be at risk for injury  Advised against bathing alone, swimming, and ladder use  Patient will follow up with Dr Gee Angelo in 3 months or sooner if needed  Autism disorder  Patient remains at baseline level of functioning  Lives with mother who is his caretaker  There are some behavioral issues which may be worsened by use of levetiracetam  Continue supportive care  He will Return in about 3 months (around 12/9/2020) for with Dr Gee Angelo  Subjective:  Mr Junior Ayala is a 34 y o  male with autism and epilepsy manifest generalized tonic clonic seizures  Suspect some localization related to the fact that the patient turned his head and body to the left prior to having a GTC seizure  He has had two unprovoked lifetime seizures that were both GTC seizures  He was last seen in the office on 03/05/2020  He had remained seizure free at that time on levetiracetam monotherapy  Mother expressed desire to have patient come off of medication as soon as he is able  At that time we discussed that after two years of seizure freedom we could obtain EEG  If the EEG was normal, could discuss weaning off of levetiracetam  EEG was performed on 08/20/2020 and was normal      At today's visit, patient's mother confirms that he has remained seizure free  Discussed EEG results  No new medical issues or recent illnesses since prior visit  At baseline level of functioning  Notes that he may have an increase in behavioral issues and trouble sleeping from the levetiracetam       Mother still wishes to attempt to come off of levetiracetam  Agreeable to decrease slowly  Discussed risks of coming off of seizure medications and mother confirms that she is fully aware of risks but would like to try to get him off of the medication if it is possible that he may not need it any longer  Patient has constant supervision at home       Interval Events:   Seizures since last visit: None  Hospitalizations: no     Current AEDs:  Levetiracetam 500mg tablets- one tablet twice per day  Medication side effects: possible mood changes, insomnia  Medication adherence: Yes     Event/Seizure semiology:  1  GTC seizure with possible focal onset as patient turned his head and body to the left at onset prior to [place] Yusefrt seizure      Special Features  Status epilepticus: no  Self Injury Seizures: no  Precipitating Factors: none     Epilepsy Risk Factors:  Intellectual disability     Prior AEDs:  None     Prior Evaluation:  11/1/2016- routine EEG- normal  08/22/2018- routine EEG- normal  08/20/2020- Routine EEG- normal  CT head wo contrast 07/05/2016 and 08/22/2018 - both motion degraded studies without acute findings or evidence of mass         History Reviewed: The following were reviewed and updated as appropriate: allergies, current medications, past medical history, past social history, past surgical history and problem list     Psychiatric History:  None     Social History:   Driving: No  Lives Alone: No  Lives with parents  Occupation: works at [employer]    His history was also obtained from his mother, who was present at today's visit  I reviewed prior neurology notes, as documented in [employer], and summarized above  The following portions of the patient's history were reviewed and updated as appropriate: allergies, current medications, past family history, past medical history, past social history, past surgical history and problem list      Objective:    Blood pressure 121/71, pulse 80, temperature (!) 97 4 °F (36 3 °C), height 6' 0 5" (1 842 m), weight 75 8 kg (167 lb)  Physical Exam   No apparent distress  Appears well nourished  Head normocephalic  No peripheral edema  Visible skin without rashes or abnormalities     Neurologic Exam  Mental status- alert to person  Verbalizes with single words  Frequent interruptions and trying to walk out of room to go look for his pens he lost  Follows simple, one step commands  May require repeated commands for some tasks   Performs tasks better when asked by mother  Answers yes to most questions (mother notes he won't usually say no)  Easily distractible  Limited attention and knowledge  No clear understanding of medical situation  Cranial Nerves- blinks to stimuli from all directions, EOMS normal, facial sensation and muscles symmetric, hearing intact bilaterally to finger rubs, tongue midline, palate rise symmetrical, shoulder shrug symmetrical     Motor-  5/5 strength all extremities  Normal bulk and tone  Moves all extremities freely  No tremor  Sensory-  Intact distally in all extremities to light touch  DTRs- 2+ and symmetric in all extremities    Gait- wide based casual gait, mostly steady  No assistive device  Coordination- FNF intact with multiple prompts and direction assistance from mother  ROS:  Review of Systems   Constitutional: Negative  Negative for appetite change and fever  HENT: Negative  Negative for hearing loss, tinnitus, trouble swallowing and voice change  Eyes: Negative  Negative for photophobia and pain  Respiratory: Negative  Negative for shortness of breath  Cardiovascular: Negative  Negative for palpitations  Gastrointestinal: Negative  Negative for nausea and vomiting  Endocrine: Negative  Negative for cold intolerance  Genitourinary: Negative  Negative for dysuria, frequency and urgency  Musculoskeletal: Negative  Negative for myalgias and neck pain  Skin: Negative  Negative for rash  Neurological: Negative  Negative for dizziness, tremors, seizures, syncope, facial asymmetry, speech difficulty, weakness, light-headedness, numbness and headaches  Hematological: Negative  Does not bruise/bleed easily  Psychiatric/Behavioral: Negative  Negative for confusion, hallucinations and sleep disturbance  ROS obtained by MA and reviewed by myself

## 2020-09-10 NOTE — ASSESSMENT & PLAN NOTE
Patient remains at baseline level of functioning  Lives with mother who is his caretaker  There are some behavioral issues which may be worsened by use of levetiracetam  Continue supportive care

## 2020-09-10 NOTE — ASSESSMENT & PLAN NOTE
Patient with a total of two lifetime seizures who has been seizure free for over two years on levetiracetam monotherapy  He has had 3 EEGs in the past (2016, 2018, and August 2020) all of which have been normal  No MRI has been done as this would likely require sedation  2 prior CT head w/o contrast in 2016 and 2018 were both without acute findings or evidence of mass but were motion degraded studies  His intellectual disability does place him at increased risk for seizures  Mother has continued to express wanting patient to come off of medication given his 2 years of seizure freedom  We did discuss the risks of coming off of the medication, particularly that even though he has had normal EEGs, he may have a seizure once decreasing or coming off of medication  We also discussed that there are no clear long term effects of levetiracetam and is generally safe for continued long term use  He is currently on the lowest dose of levetiracetam used to prevent seizures  After risks were discussed, mother did decide that she would like to attempt to have patient come off of medication  Will have patient taper off of medication slowly  Instructed mother to call the office with any breakthrough seizures and we will have patient restart current dose of levetiracetam  Discussed that if she notices the patient having abnormal behaviors, staring spells, episodes of incontinence overnight, tongue bite during sleep (blood on pillow), patient complaining of pain or soreness in the morning, or new uncontrolled muscle jerks she should call the office  We discussed that he should not be left alone in case he does have a seizure as he would be at risk for injury  Advised against bathing alone, swimming, and ladder use  Patient will follow up with Dr Faustino Zaragoza in 3 months or sooner if needed

## 2020-09-30 DIAGNOSIS — R56.9 SEIZURE (HCC): ICD-10-CM

## 2020-09-30 RX ORDER — LEVETIRACETAM 500 MG/1
TABLET ORAL
Qty: 180 TABLET | Refills: 3 | Status: ON HOLD | OUTPATIENT
Start: 2020-09-30 | End: 2021-03-31 | Stop reason: SDUPTHER

## 2021-01-13 ENCOUNTER — TELEPHONE (OUTPATIENT)
Dept: NEUROLOGY | Facility: CLINIC | Age: 31
End: 2021-01-13

## 2021-01-13 NOTE — TELEPHONE ENCOUNTER
LMOM for pt to call office to r/s appt with new epileptologist   If pt calls office please schedule 60 new appt with epileptologist  [FreeTextEntry1] : Urinary retention managed with indwelling fowler. Does well when he uses the correct catheter. Silver coating seems to be helping decrease his infection risk. \par \par --COnt monthly cath change. will order via item #\par --Increase po fluid intake\par --Begin methenamine with Vit C\par --Keep cath secure at all times\par --Bowel regimen\par

## 2021-03-27 ENCOUNTER — HOSPITAL ENCOUNTER (INPATIENT)
Facility: HOSPITAL | Age: 31
LOS: 1 days | DRG: 201 | End: 2021-03-28
Attending: EMERGENCY MEDICINE | Admitting: INTERNAL MEDICINE
Payer: COMMERCIAL

## 2021-03-27 ENCOUNTER — APPOINTMENT (EMERGENCY)
Dept: CT IMAGING | Facility: HOSPITAL | Age: 31
DRG: 201 | End: 2021-03-27
Payer: COMMERCIAL

## 2021-03-27 DIAGNOSIS — S09.90XA TRAUMATIC INJURY OF HEAD, INITIAL ENCOUNTER: ICD-10-CM

## 2021-03-27 DIAGNOSIS — R55 SYNCOPE: Primary | ICD-10-CM

## 2021-03-27 DIAGNOSIS — R94.31 ABNORMAL EKG: ICD-10-CM

## 2021-03-27 DIAGNOSIS — G40.409 GENERALIZED TONIC-CLONIC SEIZURE (HCC): ICD-10-CM

## 2021-03-27 DIAGNOSIS — R56.9 SEIZURE (HCC): ICD-10-CM

## 2021-03-27 LAB
ALBUMIN SERPL BCP-MCNC: 3.7 G/DL (ref 3.5–5)
ALP SERPL-CCNC: 67 U/L (ref 46–116)
ALT SERPL W P-5'-P-CCNC: 26 U/L (ref 12–78)
ANION GAP SERPL CALCULATED.3IONS-SCNC: 9 MMOL/L (ref 4–13)
AST SERPL W P-5'-P-CCNC: 18 U/L (ref 5–45)
ATRIAL RATE: 74 BPM
BASOPHILS # BLD AUTO: 0.02 THOUSANDS/ΜL (ref 0–0.1)
BASOPHILS NFR BLD AUTO: 0 % (ref 0–1)
BILIRUB SERPL-MCNC: 0.46 MG/DL (ref 0.2–1)
BUN SERPL-MCNC: 15 MG/DL (ref 5–25)
CALCIUM SERPL-MCNC: 8.9 MG/DL (ref 8.3–10.1)
CHLORIDE SERPL-SCNC: 95 MMOL/L (ref 100–108)
CO2 SERPL-SCNC: 29 MMOL/L (ref 21–32)
CREAT SERPL-MCNC: 0.91 MG/DL (ref 0.6–1.3)
EOSINOPHIL # BLD AUTO: 0.05 THOUSAND/ΜL (ref 0–0.61)
EOSINOPHIL NFR BLD AUTO: 1 % (ref 0–6)
ERYTHROCYTE [DISTWIDTH] IN BLOOD BY AUTOMATED COUNT: 11.9 % (ref 11.6–15.1)
GFR SERPL CREATININE-BSD FRML MDRD: 114 ML/MIN/1.73SQ M
GLUCOSE SERPL-MCNC: 101 MG/DL (ref 65–140)
HCT VFR BLD AUTO: 42.9 % (ref 36.5–49.3)
HGB BLD-MCNC: 15.2 G/DL (ref 12–17)
IMM GRANULOCYTES # BLD AUTO: 0.05 THOUSAND/UL (ref 0–0.2)
IMM GRANULOCYTES NFR BLD AUTO: 1 % (ref 0–2)
LYMPHOCYTES # BLD AUTO: 1.24 THOUSANDS/ΜL (ref 0.6–4.47)
LYMPHOCYTES NFR BLD AUTO: 17 % (ref 14–44)
MCH RBC QN AUTO: 30.4 PG (ref 26.8–34.3)
MCHC RBC AUTO-ENTMCNC: 35.4 G/DL (ref 31.4–37.4)
MCV RBC AUTO: 86 FL (ref 82–98)
MONOCYTES # BLD AUTO: 0.44 THOUSAND/ΜL (ref 0.17–1.22)
MONOCYTES NFR BLD AUTO: 6 % (ref 4–12)
NEUTROPHILS # BLD AUTO: 5.7 THOUSANDS/ΜL (ref 1.85–7.62)
NEUTS SEG NFR BLD AUTO: 75 % (ref 43–75)
NRBC BLD AUTO-RTO: 0 /100 WBCS
P AXIS: 54 DEGREES
PLATELET # BLD AUTO: 145 THOUSANDS/UL (ref 149–390)
PMV BLD AUTO: 10.4 FL (ref 8.9–12.7)
POTASSIUM SERPL-SCNC: 3.8 MMOL/L (ref 3.5–5.3)
PR INTERVAL: 158 MS
PROT SERPL-MCNC: 6.7 G/DL (ref 6.4–8.2)
QRS AXIS: 17 DEGREES
QRSD INTERVAL: 110 MS
QT INTERVAL: 372 MS
QTC INTERVAL: 412 MS
RBC # BLD AUTO: 5 MILLION/UL (ref 3.88–5.62)
SODIUM SERPL-SCNC: 133 MMOL/L (ref 136–145)
T WAVE AXIS: 60 DEGREES
TROPONIN I SERPL-MCNC: <0.02 NG/ML
VENTRICULAR RATE: 74 BPM
WBC # BLD AUTO: 7.5 THOUSAND/UL (ref 4.31–10.16)

## 2021-03-27 PROCEDURE — 99285 EMERGENCY DEPT VISIT HI MDM: CPT

## 2021-03-27 PROCEDURE — 80053 COMPREHEN METABOLIC PANEL: CPT | Performed by: EMERGENCY MEDICINE

## 2021-03-27 PROCEDURE — 96361 HYDRATE IV INFUSION ADD-ON: CPT

## 2021-03-27 PROCEDURE — 93005 ELECTROCARDIOGRAM TRACING: CPT

## 2021-03-27 PROCEDURE — 99291 CRITICAL CARE FIRST HOUR: CPT | Performed by: EMERGENCY MEDICINE

## 2021-03-27 PROCEDURE — 36415 COLL VENOUS BLD VENIPUNCTURE: CPT | Performed by: EMERGENCY MEDICINE

## 2021-03-27 PROCEDURE — 72125 CT NECK SPINE W/O DYE: CPT

## 2021-03-27 PROCEDURE — G1004 CDSM NDSC: HCPCS

## 2021-03-27 PROCEDURE — 84484 ASSAY OF TROPONIN QUANT: CPT | Performed by: EMERGENCY MEDICINE

## 2021-03-27 PROCEDURE — 93010 ELECTROCARDIOGRAM REPORT: CPT

## 2021-03-27 PROCEDURE — 96360 HYDRATION IV INFUSION INIT: CPT

## 2021-03-27 PROCEDURE — 70450 CT HEAD/BRAIN W/O DYE: CPT

## 2021-03-27 PROCEDURE — 85025 COMPLETE CBC W/AUTO DIFF WBC: CPT | Performed by: EMERGENCY MEDICINE

## 2021-03-27 RX ADMIN — SODIUM CHLORIDE 1000 ML: 0.9 INJECTION, SOLUTION INTRAVENOUS at 22:21

## 2021-03-28 ENCOUNTER — HOSPITAL ENCOUNTER (INPATIENT)
Facility: HOSPITAL | Age: 31
LOS: 3 days | Discharge: HOME | DRG: 179 | End: 2021-03-31
Attending: GENERAL PRACTICE | Admitting: INTERNAL MEDICINE
Payer: COMMERCIAL

## 2021-03-28 VITALS
HEART RATE: 127 BPM | DIASTOLIC BLOOD PRESSURE: 76 MMHG | HEIGHT: 73 IN | SYSTOLIC BLOOD PRESSURE: 118 MMHG | RESPIRATION RATE: 20 BRPM | OXYGEN SATURATION: 96 % | TEMPERATURE: 97 F | WEIGHT: 185.41 LBS | BODY MASS INDEX: 24.57 KG/M2

## 2021-03-28 DIAGNOSIS — R55 SYNCOPE AND COLLAPSE: ICD-10-CM

## 2021-03-28 DIAGNOSIS — G40.409 GENERALIZED TONIC-CLONIC SEIZURE (HCC): ICD-10-CM

## 2021-03-28 DIAGNOSIS — R56.9 SEIZURE (HCC): ICD-10-CM

## 2021-03-28 DIAGNOSIS — F84.0 AUTISM DISORDER: Primary | ICD-10-CM

## 2021-03-28 DIAGNOSIS — I49.8 BRUGADA SYNDROME: ICD-10-CM

## 2021-03-28 PROBLEM — R94.31 ABNORMAL EKG: Status: ACTIVE | Noted: 2021-03-28
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
## 2021-03-28 LAB
ANION GAP SERPL CALCULATED.3IONS-SCNC: 10 MMOL/L (ref 4–13)
ATRIAL RATE: 73 BPM
BASOPHILS # BLD AUTO: 0.01 THOUSANDS/ΜL (ref 0–0.1)
BASOPHILS NFR BLD AUTO: 0 % (ref 0–1)
BUN SERPL-MCNC: 14 MG/DL (ref 5–25)
CALCIUM SERPL-MCNC: 8.7 MG/DL (ref 8.3–10.1)
CHLORIDE SERPL-SCNC: 104 MMOL/L (ref 100–108)
CO2 SERPL-SCNC: 26 MMOL/L (ref 21–32)
CREAT SERPL-MCNC: 0.81 MG/DL (ref 0.6–1.3)
EOSINOPHIL # BLD AUTO: 0.06 THOUSAND/ΜL (ref 0–0.61)
EOSINOPHIL NFR BLD AUTO: 1 % (ref 0–6)
ERYTHROCYTE [DISTWIDTH] IN BLOOD BY AUTOMATED COUNT: 12.1 % (ref 11.6–15.1)
GFR SERPL CREATININE-BSD FRML MDRD: 121 ML/MIN/1.73SQ M
GLUCOSE SERPL-MCNC: 90 MG/DL (ref 65–140)
HCT VFR BLD AUTO: 43.5 % (ref 36.5–49.3)
HGB BLD-MCNC: 15.2 G/DL (ref 12–17)
IMM GRANULOCYTES # BLD AUTO: 0.05 THOUSAND/UL (ref 0–0.2)
IMM GRANULOCYTES NFR BLD AUTO: 1 % (ref 0–2)
LYMPHOCYTES # BLD AUTO: 0.98 THOUSANDS/ΜL (ref 0.6–4.47)
LYMPHOCYTES NFR BLD AUTO: 11 % (ref 14–44)
MCH RBC QN AUTO: 30 PG (ref 26.8–34.3)
MCHC RBC AUTO-ENTMCNC: 34.9 G/DL (ref 31.4–37.4)
MCV RBC AUTO: 86 FL (ref 82–98)
MONOCYTES # BLD AUTO: 0.68 THOUSAND/ΜL (ref 0.17–1.22)
MONOCYTES NFR BLD AUTO: 7 % (ref 4–12)
NEUTROPHILS # BLD AUTO: 7.46 THOUSANDS/ΜL (ref 1.85–7.62)
NEUTS SEG NFR BLD AUTO: 80 % (ref 43–75)
NRBC BLD AUTO-RTO: 0 /100 WBCS
P AXIS: 47 DEGREES
PLATELET # BLD AUTO: 171 THOUSANDS/UL (ref 149–390)
PMV BLD AUTO: 9.5 FL (ref 8.9–12.7)
POTASSIUM SERPL-SCNC: 3.6 MMOL/L (ref 3.5–5.3)
PR INTERVAL: 176 MS
QRS AXIS: 12 DEGREES
QRSD INTERVAL: 104 MS
QT INTERVAL: 370 MS
QTC INTERVAL: 407 MS
RBC # BLD AUTO: 5.06 MILLION/UL (ref 3.88–5.62)
SODIUM SERPL-SCNC: 140 MMOL/L (ref 136–145)
T WAVE AXIS: 56 DEGREES
TSH SERPL DL<=0.05 MIU/L-ACNC: 0.95 UIU/ML (ref 0.36–3.74)
VENTRICULAR RATE: 73 BPM
WBC # BLD AUTO: 9.24 THOUSAND/UL (ref 4.31–10.16)

## 2021-03-28 PROCEDURE — 99236 HOSP IP/OBS SAME DATE HI 85: CPT | Performed by: HOSPITALIST

## 2021-03-28 PROCEDURE — 85025 COMPLETE CBC W/AUTO DIFF WBC: CPT | Performed by: PHYSICIAN ASSISTANT

## 2021-03-28 PROCEDURE — 84443 ASSAY THYROID STIM HORMONE: CPT | Performed by: PHYSICIAN ASSISTANT

## 2021-03-28 PROCEDURE — 99254 IP/OBS CNSLTJ NEW/EST MOD 60: CPT

## 2021-03-28 PROCEDURE — 93010 ELECTROCARDIOGRAM REPORT: CPT

## 2021-03-28 PROCEDURE — 99253 IP/OBS CNSLTJ NEW/EST LOW 45: CPT | Performed by: PSYCHIATRY & NEUROLOGY

## 2021-03-28 PROCEDURE — 80048 BASIC METABOLIC PNL TOTAL CA: CPT | Performed by: PHYSICIAN ASSISTANT

## 2021-03-28 PROCEDURE — 99223 1ST HOSP IP/OBS HIGH 75: CPT | Performed by: INTERNAL MEDICINE

## 2021-03-28 RX ORDER — MAGNESIUM HYDROXIDE/ALUMINUM HYDROXICE/SIMETHICONE 120; 1200; 1200 MG/30ML; MG/30ML; MG/30ML
30 SUSPENSION ORAL EVERY 6 HOURS PRN
Status: DISCONTINUED | OUTPATIENT
Start: 2021-03-28 | End: 2021-03-28 | Stop reason: HOSPADM

## 2021-03-28 RX ORDER — ACETAMINOPHEN 325 MG/1
975 TABLET ORAL EVERY 6 HOURS PRN
Status: DISCONTINUED | OUTPATIENT
Start: 2021-03-28 | End: 2021-03-31 | Stop reason: HOSPADM

## 2021-03-28 RX ORDER — ONDANSETRON 2 MG/ML
4 INJECTION INTRAMUSCULAR; INTRAVENOUS EVERY 6 HOURS PRN
Status: DISCONTINUED | OUTPATIENT
Start: 2021-03-28 | End: 2021-03-28 | Stop reason: HOSPADM

## 2021-03-28 RX ORDER — SODIUM CHLORIDE 9 MG/ML
75 INJECTION, SOLUTION INTRAVENOUS CONTINUOUS
Status: DISCONTINUED | OUTPATIENT
Start: 2021-03-28 | End: 2021-03-28

## 2021-03-28 RX ORDER — LEVETIRACETAM 500 MG/1
500 TABLET ORAL EVERY 12 HOURS SCHEDULED
Status: CANCELLED | OUTPATIENT
Start: 2021-03-28

## 2021-03-28 RX ORDER — LEVETIRACETAM 250 MG/1
500 TABLET ORAL EVERY 12 HOURS SCHEDULED
Status: DISCONTINUED | OUTPATIENT
Start: 2021-03-28 | End: 2021-03-28 | Stop reason: HOSPADM

## 2021-03-28 RX ORDER — SODIUM CHLORIDE 9 MG/ML
75 INJECTION, SOLUTION INTRAVENOUS CONTINUOUS
Status: CANCELLED | OUTPATIENT
Start: 2021-03-28 | End: 2021-03-28

## 2021-03-28 RX ORDER — ONDANSETRON 2 MG/ML
4 INJECTION INTRAMUSCULAR; INTRAVENOUS ONCE
Status: COMPLETED | OUTPATIENT
Start: 2021-03-28 | End: 2021-03-28

## 2021-03-28 RX ORDER — ONDANSETRON 2 MG/ML
4 INJECTION INTRAMUSCULAR; INTRAVENOUS EVERY 6 HOURS PRN
Status: DISCONTINUED | OUTPATIENT
Start: 2021-03-28 | End: 2021-03-31 | Stop reason: HOSPADM

## 2021-03-28 RX ORDER — LORAZEPAM 2 MG/ML
INJECTION INTRAMUSCULAR
Status: COMPLETED
Start: 2021-03-28 | End: 2021-03-28

## 2021-03-28 RX ORDER — LORAZEPAM 2 MG/ML
1 INJECTION INTRAMUSCULAR ONCE
Status: COMPLETED | OUTPATIENT
Start: 2021-03-28 | End: 2021-03-28

## 2021-03-28 RX ORDER — LEVETIRACETAM 500 MG/1
500 TABLET ORAL EVERY 12 HOURS SCHEDULED
Status: DISCONTINUED | OUTPATIENT
Start: 2021-03-29 | End: 2021-03-31 | Stop reason: HOSPADM

## 2021-03-28 RX ORDER — MAGNESIUM HYDROXIDE/ALUMINUM HYDROXICE/SIMETHICONE 120; 1200; 1200 MG/30ML; MG/30ML; MG/30ML
30 SUSPENSION ORAL EVERY 6 HOURS PRN
Status: CANCELLED | OUTPATIENT
Start: 2021-03-28

## 2021-03-28 RX ORDER — LANOLIN ALCOHOL/MO/W.PET/CERES
100 CREAM (GRAM) TOPICAL DAILY
Status: DISCONTINUED | OUTPATIENT
Start: 2021-03-29 | End: 2021-03-31 | Stop reason: HOSPADM

## 2021-03-28 RX ORDER — ACETAMINOPHEN 325 MG/1
650 TABLET ORAL EVERY 6 HOURS PRN
Status: DISCONTINUED | OUTPATIENT
Start: 2021-03-28 | End: 2021-03-28

## 2021-03-28 RX ORDER — ACETAMINOPHEN 325 MG/1
650 TABLET ORAL EVERY 6 HOURS PRN
Status: CANCELLED | OUTPATIENT
Start: 2021-03-28

## 2021-03-28 RX ORDER — ONDANSETRON 2 MG/ML
4 INJECTION INTRAMUSCULAR; INTRAVENOUS EVERY 6 HOURS PRN
Status: CANCELLED | OUTPATIENT
Start: 2021-03-28

## 2021-03-28 RX ORDER — LORAZEPAM 2 MG/ML
2 INJECTION INTRAMUSCULAR
Status: DISCONTINUED | OUTPATIENT
Start: 2021-03-28 | End: 2021-03-31 | Stop reason: HOSPADM

## 2021-03-28 RX ORDER — ACETAMINOPHEN 325 MG/1
650 TABLET ORAL EVERY 6 HOURS PRN
Status: DISCONTINUED | OUTPATIENT
Start: 2021-03-28 | End: 2021-03-28 | Stop reason: HOSPADM

## 2021-03-28 RX ORDER — LANOLIN ALCOHOL/MO/W.PET/CERES
800 CREAM (GRAM) TOPICAL DAILY
Status: DISCONTINUED | OUTPATIENT
Start: 2021-03-29 | End: 2021-03-31 | Stop reason: HOSPADM

## 2021-03-28 RX ORDER — ONDANSETRON 2 MG/ML
INJECTION INTRAMUSCULAR; INTRAVENOUS
Status: COMPLETED
Start: 2021-03-28 | End: 2021-03-28

## 2021-03-28 RX ORDER — MAGNESIUM HYDROXIDE/ALUMINUM HYDROXICE/SIMETHICONE 120; 1200; 1200 MG/30ML; MG/30ML; MG/30ML
30 SUSPENSION ORAL EVERY 6 HOURS PRN
Status: DISCONTINUED | OUTPATIENT
Start: 2021-03-28 | End: 2021-03-31 | Stop reason: HOSPADM

## 2021-03-28 RX ADMIN — ONDANSETRON 4 MG: 2 INJECTION INTRAMUSCULAR; INTRAVENOUS at 00:15

## 2021-03-28 RX ADMIN — SODIUM CHLORIDE 75 ML/HR: 0.9 INJECTION, SOLUTION INTRAVENOUS at 02:39

## 2021-03-28 RX ADMIN — LORAZEPAM 1 MG: 2 INJECTION INTRAMUSCULAR; INTRAVENOUS at 00:15

## 2021-03-28 RX ADMIN — LEVETIRACETAM 1000 MG: 100 INJECTION, SOLUTION INTRAVENOUS at 00:36

## 2021-03-28 RX ADMIN — LORAZEPAM 1 MG: 2 INJECTION INTRAMUSCULAR at 00:15

## 2021-03-28 RX ADMIN — LEVETIRACETAM 500 MG: 250 TABLET ORAL at 21:15

## 2021-03-28 RX ADMIN — LEVETIRACETAM 500 MG: 250 TABLET ORAL at 09:10

## 2021-03-28 NOTE — ASSESSMENT & PLAN NOTE
Pt has abnormal EKG, showing signs of Brugada syndrome in V1 and V2  Could have led to syncopal episode earlier in the night  Cardiology consulted  Appreciate input

## 2021-03-28 NOTE — ED PROVIDER NOTES
History  Chief Complaint   Patient presents with    Seizure - Prior Hx Of     Pt arrived EMS after having seizure at home per mother  Pt struck right side of head on wooden end table today  Per mom pt at baseline on arrival  Pt last seizure 2 5 years ago  Pt with autism     A 79-year-old male with past medical history of autism and seizure disorder; presents after a seizure vs syncope  History is provided by the patient's mother, secondary to the patient's autism  Mother states that the patient has seemed unwell for the past several days, and this evening while helping his parents clean he lost consciousness and became "stiff"  Mother denies any shaking or seizure-like activity at that time  Mother states the patient quickly regained consciousness, however seems more sluggish than normal   Mother reports the patient did strike his head against a wooden table  Patient did have one episode of vomiting upon regaining consciousness  Patient has otherwise not had fever, chills, chest pain, shortness of breath, abdominal pain, diarrhea, peripheral edema and rashes  Mother states that the patient's last seizure was approximately 2 5 years ago, and after several negative EEGs he was weaned off of his Keppra this past fall  Patient's prior seizures had been true tonic clonic seizures  Assessment & plan: Altered mental status, seizure vs syncope however given history it is more likely syncope  Patient awake and alert at this time, neurologically intact  Will check lab work for anemia, renal impairment and electrolyte abnormality  Will check EKG for arrhythmia  Will obtain CT head and cervical spine to evaluate for traumatic injury  History provided by:  Patient, parent and medical records  Seizure - Prior Hx Of      Prior to Admission Medications   Prescriptions Last Dose Informant Patient Reported? Taking?    Loratadine (CLARITIN) 10 MG CAPS Not Taking at Unknown time  Yes No   Sig: Take 10 mg by mouth as needed     levETIRAcetam (KEPPRA) 500 mg tablet Not Taking at Unknown time  No No   Sig: TAKE 1 TABLET BY MOUTH TWICE A DAY   Patient not taking: Reported on 3/27/2021      Facility-Administered Medications: None       Past Medical History:   Diagnosis Date    Seizure-like activity ([facility] [address] )     last assessed: 7/11/16    Skin lesion     last assessed: 12/30/13    Skin rash     last assessed: 3/1/13    Staph aureus infection     last assessed: 8/20/13       Past Surgical History:   Procedure Laterality Date    ADENOIDECTOMY      DENTAL SURGERY      wisdom teeth    MYRINGOTOMY W/ TUBES      with ventilating tube insertion; x6       Family History   Problem Relation Age of Onset    Breast cancer Maternal Grandmother     Cancer Maternal Grandfather         Salivary gland cancer    Other Paternal Grandmother         brain tumor    COPD Paternal Grandmother     Hypertension Paternal Grandmother     No Known Problems Mother     No Known Problems Father      I have reviewed and agree with the history as documented  E-Cigarette/Vaping    E-Cigarette Use Never User      E-Cigarette/Vaping Substances     Social History     Tobacco Use    Smoking status: Never Smoker    Smokeless tobacco: Never Used   Substance Use Topics    Alcohol use: No    Drug use: Not on file       Review of Systems   Gastrointestinal: Positive for nausea and vomiting  Neurological: Positive for syncope (vs seizure)  All other systems reviewed and are negative  Physical Exam  Physical Exam  General Appearance: awake and alert, nad, non toxic appearing  Skin:  Warm, dry, intact  HEENT: Normocephalic  Ecchymosis and abrasion noted to right forehead and temporal region  PERRLA, EOMI  Neck: Supple, trachea midline  No midline cervical spine tenderness, cervical collar in place    Cardiac: RRR; no murmurs, rub, gallops  Pulmonary: lungs CTAB; no wheezes, rales, rhonchi  Gastrointestinal: abdomen soft, nontender, nondistended; no guarding or rebound tenderness; good bowel sounds, no mass or bruits  Extremities:  no pedal edema, 2+ pulses; no calf tenderness, no clubbing, no cyanosis  Neuro:  no focal motor or sensory deficits, CN 2-12 grossly intact  Psych:  Normal mood and affect, normal judgement and insight      Vital Signs  ED Triage Vitals [03/27/21 2059]   Temperature Pulse Respirations Blood Pressure SpO2   (!) 97 °F (36 1 °C) 73 20 136/95 97 %      Temp Source Heart Rate Source Patient Position - Orthostatic VS BP Location FiO2 (%)   Axillary Monitor Lying Right arm --      Pain Score       --           Vitals:    03/27/21 2059 03/27/21 2307 03/28/21 0027 03/28/21 0153   BP: 136/95 144/83 119/72 115/73   Pulse: 73 82 90 78   Patient Position - Orthostatic VS: Lying Lying Lying Lying         Visual Acuity  Visual Acuity      Most Recent Value   L Pupil Size (mm)  4   R Pupil Size (mm)  4          ED Medications  Medications   levETIRAcetam (KEPPRA) tablet 500 mg (has no administration in time range)   sodium chloride 0 9 % bolus 1,000 mL (0 mL Intravenous Stopped 3/28/21 0023)   LORazepam (ATIVAN) injection 1 mg (1 mg Intravenous Given 3/28/21 0015)   ondansetron (ZOFRAN) injection 4 mg (4 mg Intravenous Given 3/28/21 0015)   levETIRAcetam (KEPPRA) 1,000 mg in sodium chloride 0 9 % 100 mL IVPB (0 mg Intravenous Stopped 3/28/21 0059)       Diagnostic Studies  Results Reviewed     Procedure Component Value Units Date/Time    Troponin I [[id number]]  (Normal) Collected: 03/27/21 2208    Lab Status: Final result Specimen: Blood from Arm, Left Updated: 03/27/21 2234     Troponin I <0 02 ng/mL     Comprehensive metabolic panel [[id number]]  (Abnormal) Collected: 03/27/21 2144    Lab Status: Final result Specimen: Blood from Arm, Right Updated: 03/27/21 2226     Sodium 133 mmol/L      Potassium 3 8 mmol/L      Chloride 95 mmol/L      CO2 29 mmol/L      ANION GAP 9 mmol/L      BUN 15 mg/dL      Creatinine 0 91 mg/dL      Glucose 101 mg/dL Calcium 8 9 mg/dL      AST 18 U/L      ALT 26 U/L      Alkaline Phosphatase 67 U/L      Total Protein 6 7 g/dL      Albumin 3 7 g/dL      Total Bilirubin 0 46 mg/dL      eGFR 114 ml/min/1 73sq m     Narrative:      [place] guidelines for Chronic Kidney Disease (CKD):     Stage 1 with normal or high GFR (GFR > 90 mL/min/1 73 square meters)    Stage 2 Mild CKD (GFR = 60-89 mL/min/1 73 square meters)    Stage 3A Moderate CKD (GFR = 45-59 mL/min/1 73 square meters)    Stage 3B Moderate CKD (GFR = 30-44 mL/min/1 73 square meters)    Stage 4 Severe CKD (GFR = 15-29 mL/min/1 73 square meters)    Stage 5 End Stage CKD (GFR <15 mL/min/1 73 square meters)  Note: GFR calculation is accurate only with a steady state creatinine    CBC and differential [[id number]]  (Abnormal) Collected: 03/27/21 2144    Lab Status: Final result Specimen: Blood from Arm, Right Updated: 03/27/21 2150     WBC 7 50 Thousand/uL      RBC 5 00 Million/uL      Hemoglobin 15 2 g/dL      Hematocrit 42 9 %      MCV 86 fL      MCH 30 4 pg      MCHC 35 4 g/dL      RDW 11 9 %      MPV 10 4 fL      Platelets 281 Thousands/uL      nRBC 0 /100 WBCs      Neutrophils Relative 75 %      Immat GRANS % 1 %      Lymphocytes Relative 17 %      Monocytes Relative 6 %      Eosinophils Relative 1 %      Basophils Relative 0 %      Neutrophils Absolute 5 70 Thousands/µL      Immature Grans Absolute 0 05 Thousand/uL      Lymphocytes Absolute 1 24 Thousands/µL      Monocytes Absolute 0 44 Thousand/µL      Eosinophils Absolute 0 05 Thousand/µL      Basophils Absolute 0 02 Thousands/µL                  CT head without contrast   Final Result by Scooby Montero MD (03/27 2324)      No acute intracranial abnormality  Workstation performed: [id number]         CT cervical spine without contrast   Final Result by Scooby Montero MD (03/27 2328)      No cervical spine fracture or traumatic malalignment                     Workstation performed: [id number]                    Procedures  CriticalCare Time  Performed by: Stefanie Zhang DO  Authorized by: Stefanie Zhang DO     Critical care provider statement:     Critical care time (minutes):  30    Critical care time was exclusive of:  Separately billable procedures and treating other patients and teaching time    Critical care was necessary to treat or prevent imminent or life-threatening deterioration of the following conditions:  CNS failure or compromise    Critical care was time spent personally by me on the following activities:  Obtaining history from patient or surrogate, development of treatment plan with patient or surrogate, examination of patient, evaluation of patient's response to treatment, re-evaluation of patient's condition, ordering and review of radiographic studies, ordering and performing treatments and interventions, review of old charts and ordering and review of laboratory studies    I assumed direction of critical care for this patient from another provider in my specialty: no         ECG 12 Lead Documentation  Date/Time: today/date: 3/28/2021  Performed by: Stephanie Smith    ECG reviewed by me, the ED Provider: yes    Patient location:  ED   Previous ECG:  No old for comparison    Rate:  74  ECG rate assessment: normal    Rhythm: sinus rhythm    Ectopy:  none    QRS axis:  Normal  Intervals:  Right bundle branch block   Q waves: None   ST segments:  Elevation in V2   T waves: normal      Impression:  Normal sinus rhythm, right bundle-branch block with isolated ST elevation in V2          ED Course  ED Course as of Mar 28 0201   Sat Mar 27, 2021   2216 Spoke with Dr Talia Wilks, cards on call, regarding EKG  Agrees morphology of V2 is concerning for brugada, recommends admission for cardiology and neurology evaluation      2235 Labs within normal limits      2257 Pt resting comfortably, mother states patient still appears somewhat sluggish    Updated on labs and EKG, agreeable to admission  Pending CT studies  2327 No acute intracranial abnormality  CT head without contrast   2337 No cervical spine fracture or traumatic malalignment  CT cervical spine without contrast   2348 Mother updated on imaging results  Agreeable to admission  Eva Cruz Mar 28, 2021   0024 Called to bedside as patient was found to have seizure like activity  Upon arrival to the room, pt unresponsive with tonic clonic activity  Seizure activity spontaneously resolved in < 1 min  Pt given ativan 1 mg and zofran (as pt had large episode of vomiting at home)  Pt also loaded with Keppra  Will continue to monitor in the ED      0035 Pt remains post-ictal at this time  HR has improved  Will monitor in ED prior to transfer to the floor  0111 No additional seizure activity at this time  Pt remains post ictal, however is responsive to his mom  Pt received Keppra  Will proceed with transfer to med/surg floor  SLIM aware  SBIRT 22yo+      Most Recent Value   SBIRT (22 yo +)   In order to provide better care to our patients, we are screening all of our patients for alcohol and drug use  Would it be okay to ask you these screening questions?   No Filed at: 03/28/2021 0005                    MDM    Disposition  Final diagnoses:   Syncope   Traumatic injury of head, initial encounter   Abnormal EKG     Time reflects when diagnosis was documented in both MDM as applicable and the Disposition within this note     Time User Action Codes Description Comment    3/28/2021 12:03 AM Juanito Saldana [address] [R55] Syncope     3/28/2021 12:03 AM Vinita Saldana Add [S09 90XA] Traumatic injury of head, initial encounter     3/28/2021 12:03 AM Vinita Saldana Add [R94 31] Abnormal EKG     3/28/2021  1:23 AM Nyoka Sebastian Add [R56 9] Seizure ([address] )     3/28/2021  1:23 AM Nyoka [place] Add [V01 877] Generalized tonic-clonic seizure [facility])       ED Disposition     ED Disposition Condition Date/Time Comment    Admit Jose Holman Mar 28, 2021 12:02 AM Case was discussed with ALEKSANDR and the patient's admission status was agreed to be Admission Status: inpatient status to the service of Dr Barbara Shukla   Follow-up Information    None         Current Discharge Medication List      CONTINUE these medications which have NOT CHANGED    Details   levETIRAcetam (KEPPRA) 500 mg tablet TAKE 1 TABLET BY MOUTH TWICE A DAY  Qty: 180 tablet, Refills: 3    Associated Diagnoses: Seizure (HCC)      Loratadine (CLARITIN) 10 MG CAPS Take 10 mg by mouth as needed             No discharge procedures on file      PDMP Review     None          ED Provider  Electronically Signed by           Mariel Cortez DO  03/28/21 020

## 2021-03-28 NOTE — ASSESSMENT & PLAN NOTE
Likely caused his syncopal epidose  Spoke with Dr Des Clark from cardiology and he recommends transfer to [facility] for EP evaluation and likely ICD placement  Mother is aware and is in agreement      Spoke with Dr Tye Mariano who is accepting in transfer

## 2021-03-28 NOTE — H&P
[address] [facility]  H&P- Vanesa Justice 1990, 31 y o  male MRN: [id number]  Unit/Bed#: ED 32 Encounter: [id number]  Primary Care Provider: Bruce Fallon DO   Date and time admitted to hospital: 3/27/2021  8:54 PM    Seizure [facility])  Assessment & Plan  Presented with fall hitting his left cheek against a aleksander-E-boy chair, resulting in minor facial scrapes  Etiology undetermined, syncopal vs seizure  Pt has a hx of seizure starting 4 1/2 years ago  Mother reports 2 seizures total in his lifetime, she only witnessed one but reportedly, both seizures were tonic clonic  Pt was recently taken off Talasim in October 2020 as he did not have anymore seizures  Tonight, patient had an unresponsive episode following vacuuming, patient then became and stiff and fell leaning forward  No tonic-clonic motions were appreciated  Patient was down for several minutes (between 3-5mins), some lethargy afterward but difficult to assess mental status as patient is very minimally verbal at baseline  In ED, patient had witnessed tonic clonic seizure  Was given 1mg of ativan, 2mg of zofran and loaded on Keppra  CT head and CT neck were unremarkable  EKG shows some abnormalities consistent with Brugada Syndrome  Plan:   - Continue Keppra   - Neurology consult  Appreciate input  - Cardiology consult  Appreciate input  Abnormal EKG  Assessment & Plan  Pt has abnormal EKG, showing signs of Brugada syndrome in V1 and V2  Could have led to syncopal episode earlier in the night  Cardiology consulted  Appreciate input      Autism disorder  Assessment & Plan  Very minimally verbal at baseline  Mother will stay at bedside for assistance with communication      VTE Prophylaxis: Ambulate patient  / sequential compression device   Code Status:  Level 1-full code  Discussion with family:  Discussed with patient and mother    Anticipated Length of Stay:  Patient will be admitted on an Inpatient basis with an anticipated length of stay of  greater than 2 midnights  Justification for Hospital Stay:  Seizures    Total Time for Visit, including Counseling / Coordination of Care: 30 minutes  Greater than 50% of this total time spent on direct patient counseling and coordination of care  Chief Complaint:   Seizure    History of Present Illness:    Jesus Matos is a 31 y o  male with PMH autism disorder and previous seizures who presents with 1 episode of fall  Mother states patient was playing with a VAC when he became unresponsive and began to lean over lazy Boy chair  He continued to be on responsive and progressed to fall hitting his left cheek on the chair  No abnormal motions were appreciated  Mother states that he was down for approximately 3-5 minutes  He was lethargic afterwards but difficult to assess whether he was altered due to being minimally verbal at baseline secondary to autism disorder  Of note, patient does have a history of seizures for which he was on Keppra  He was taken off of Terraf[employer]ia October of 2020 after not having seizures for prolonged period of time  In the ED, CT head and neck were unremarkable  EKG showed signs of Brugada syndrome which could have also been cause of syncopal episode  Labs otherwise unremarkable  Review of Systems:    Review of Systems   Unable to perform ROS: Patient nonverbal       Past Medical and Surgical History:     Past Medical History:   Diagnosis Date    Seizure-like activity ([facility] [address] )     last assessed: 7/11/16    Skin lesion     last assessed: 12/30/13    Skin rash     last assessed: 3/1/13    Staph aureus infection     last assessed: 8/20/13       Past Surgical History:   Procedure Laterality Date    ADENOIDECTOMY      DENTAL SURGERY      wisdom teeth    MYRINGOTOMY W/ TUBES      with ventilating tube insertion; x6       Meds/Allergies:    Prior to Admission medications    Medication Sig Start Date End Date Taking?  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  Patient Pre-hospital Living Situation:  Home with family  Patient Pre-hospital Level of Mobility:  Independent  Patient Pre-hospital Diet Restrictions:  None  Substance Use History:   Social History     Substance and Sexual Activity   Alcohol Use No     Social History     Tobacco Use   Smoking Status Never Smoker   Smokeless Tobacco Never Used     Social History     Substance and Sexual Activity   Drug Use Not on file       Family History:    non-contributory    Physical Exam:     Vitals:   Blood Pressure: 119/72 (03/28/21 0027)  Pulse: 90 (03/28/21 0027)  Temperature: (!) 97 °F (36 1 °C) (03/27/21 2059)  Temp Source: Axillary (03/27/21 2059)  Respirations: 17 (03/28/21 0027)  Weight - Scale: 83 2 kg (183 lb 6 8 oz) (03/28/21 0022)  SpO2: 95 % (03/28/21 0027)    Physical Exam  Vitals signs and nursing note reviewed  Constitutional:       General: He is not in acute distress  Appearance: Normal appearance  HENT:      Head: Normocephalic and atraumatic  Mouth/Throat:      Mouth: Mucous membranes are moist       Pharynx: Oropharynx is clear  No oropharyngeal exudate  Eyes:      Extraocular Movements: Extraocular movements intact  Cardiovascular:      Rate and Rhythm: Normal rate and regular rhythm  Pulses: Normal pulses  Heart sounds: Normal heart sounds  No murmur  No friction rub  No gallop  Pulmonary:      Effort: Pulmonary effort is normal  No respiratory distress  Breath sounds: Normal breath sounds  No stridor  No wheezing or rales  Abdominal:      General: Abdomen is flat  Bowel sounds are normal  There is no distension  Palpations: Abdomen is soft  Tenderness: There is no abdominal tenderness  Musculoskeletal:      Right lower leg: No edema  Left lower leg: No edema  Skin:     General: Skin is warm and dry  Neurological:      Mental Status: He is alert  Additional Data:     Lab Results: I have personally reviewed pertinent reports  Results from last 7 days   Lab Units 03/27/21  2144   WBC Thousand/uL 7 50   HEMOGLOBIN g/dL 15 2   HEMATOCRIT % 42 9   PLATELETS Thousands/uL 145*   NEUTROS PCT % 75   LYMPHS PCT % 17   MONOS PCT % 6   EOS PCT % 1     Results from last 7 days   Lab Units 03/27/21  2144   SODIUM mmol/L 133*   POTASSIUM mmol/L 3 8   CHLORIDE mmol/L 95*   CO2 mmol/L 29   BUN mg/dL 15   CREATININE mg/dL 0 91   ANION GAP mmol/L 9   CALCIUM mg/dL 8 9   ALBUMIN g/dL 3 7   TOTAL BILIRUBIN mg/dL 0 46   ALK PHOS U/L 67   ALT U/L 26   AST U/L 18   GLUCOSE RANDOM mg/dL 101                       Imaging: I have personally reviewed pertinent reports  CT head without contrast   Final Result by Mignon Godoy MD (03/27 2324)      No acute intracranial abnormality  Workstation performed: [id number]         CT cervical spine without contrast   Final Result by Mignon Godoy MD (03/27 2328)      No cervical spine fracture or traumatic malalignment  Workstation performed: [id number]             EKG, Pathology, and Other Studies Reviewed on Admission:   · EKG:  Possible Brugada syndrome    Allscripts / Epic Records Reviewed: Yes     ** Please Note: This note has been constructed using a voice recognition system   **

## 2021-03-28 NOTE — ASSESSMENT & PLAN NOTE
Presented with fall hitting his left cheek against a [employer] chair, resulting in minor facial scrapes  Etiology undetermined, syncopal vs seizure  Pt has a hx of seizure starting 4 1/2 years ago  Mother reports 2 seizures total in his lifetime, she only witnessed one but reportedly, both seizures were tonic clonic  Pt was recently taken off [place] in October 2020 as he did not have anymore seizures  Tonight, patient had an unresponsive episode following vacuuming, patient then became and stiff and fell leaning forward  No tonic-clonic motions were appreciated  Patient was down for several minutes (between 3-5mins), some lethargy afterward but difficult to assess mental status as patient is very minimally verbal at baseline  In ED, patient had witnessed tonic clonic seizure  Was given 1mg of ativan, 2mg of zofran and loaded on Keppra  CT head and CT neck were unremarkable  EKG shows some abnormalities consistent with Brugada Syndrome  Plan:   - Continue Keppra   - Neurology consult  Appreciate input  - Cardiology consult  Appreciate input

## 2021-03-28 NOTE — PLAN OF CARE
Problem: Potential for Falls  Goal: Patient will remain free of falls  Description: INTERVENTIONS:  - Assess patient frequently for physical needs  -  Identify cognitive and physical deficits and behaviors that affect risk of falls    -  [place] fall precautions as indicated by assessment   - Educate patient/family on patient safety including physical limitations  - Instruct patient to call for assistance with activity based on assessment  - Modify environment to reduce risk of injury  - Consider OT/PT consult to assist with strengthening/mobility  Outcome: Progressing     Problem: PAIN - ADULT  Goal: Verbalizes/displays adequate comfort level or baseline comfort level  Description: Interventions:  - Encourage patient to monitor pain and request assistance  - Assess pain using appropriate pain scale  - Administer analgesics based on type and severity of pain and evaluate response  - Implement non-pharmacological measures as appropriate and evaluate response  - Consider cultural and social influences on pain and pain management  - Notify physician/advanced practitioner if interventions unsuccessful or patient reports new pain  Outcome: Progressing     Problem: INFECTION - ADULT  Goal: Absence or prevention of progression during hospitalization  Description: INTERVENTIONS:  - Assess and monitor for signs and symptoms of infection  - Monitor lab/diagnostic results  - Monitor all insertion sites, i e  indwelling lines, tubes, and drains  - Monitor endotracheal if appropriate and nasal secretions for changes in amount and color  - [place] appropriate cooling/warming therapies per order  - Administer medications as ordered  - Instruct and encourage patient and family to use good hand hygiene technique  - Identify and instruct in appropriate isolation precautions for identified infection/condition  Outcome: Progressing     Problem: SAFETY ADULT  Goal: Patient will remain free of falls  Description: INTERVENTIONS:  - Assess patient frequently for physical needs  -  Identify cognitive and physical deficits and behaviors that affect risk of falls    -  [place] fall precautions as indicated by assessment   - Educate patient/family on patient safety including physical limitations  - Instruct patient to call for assistance with activity based on assessment  - Modify environment to reduce risk of injury  - Consider OT/PT consult to assist with strengthening/mobility  Outcome: Progressing  Goal: Maintain or return to baseline ADL function  Description: INTERVENTIONS:  -  Assess patient's ability to carry out ADLs; assess patient's baseline for ADL function and identify physical deficits which impact ability to perform ADLs (bathing, care of mouth/teeth, toileting, grooming, dressing, etc )  - Assess/evaluate cause of self-care deficits   - Assess range of motion  - Assess patient's mobility; develop plan if impaired  - Assess patient's need for assistive devices and provide as appropriate  - Encourage maximum independence but intervene and supervise when necessary  - Involve family in performance of ADLs  - Assess for home care needs following discharge   - Consider OT consult to assist with ADL evaluation and planning for discharge  - Provide patient education as appropriate  Outcome: Progressing  Goal: Maintain or return mobility status to optimal level  Description: INTERVENTIONS:  - Assess patient's baseline mobility status (ambulation, transfers, stairs, etc )    - Identify cognitive and physical deficits and behaviors that affect mobility  - Identify mobility aids required to assist with transfers and/or ambulation (gait belt, sit-to-stand, lift, walker, cane, etc )  - [place] fall precautions as indicated by assessment  - Record patient progress and toleration of activity level on Mobility SBAR; progress patient to next Phase/Stage  - Instruct patient to call for assistance with activity based on assessment  - Consider rehabilitation consult to assist with strengthening/weightbearing, etc   Outcome: Progressing     Problem: DISCHARGE PLANNING  Goal: Discharge to home or other facility with appropriate resources  Description: INTERVENTIONS:  - Identify barriers to discharge w/patient and caregiver  - Arrange for needed discharge resources and transportation as appropriate  - Identify discharge learning needs (meds, wound care, etc )  - Arrange for interpretive services to assist at discharge as needed  - Refer to Case Management Department for coordinating discharge planning if the patient needs post-hospital services based on physician/advanced practitioner order or complex needs related to functional status, cognitive ability, or social support system  Outcome: Progressing     Problem: Knowledge Deficit  Goal: Patient/family/caregiver demonstrates understanding of disease process, treatment plan, medications, and discharge instructions  Description: Complete learning assessment and assess knowledge base    Interventions:  - Provide teaching at level of understanding  - Provide teaching via preferred learning methods  Outcome: Progressing     Problem: NEUROSENSORY - ADULT  Goal: Achieves stable or improved neurological status  Description: INTERVENTIONS  - Monitor and report changes in neurological status  - Monitor vital signs such as temperature, blood pressure, glucose, and any other labs ordered   - Initiate measures to prevent increased intracranial pressure  - Monitor for seizure activity and implement precautions if appropriate      Outcome: Progressing  Goal: Remains free of injury related to seizures activity  Description: INTERVENTIONS  - Maintain airway, patient safety  and administer oxygen as ordered  - Monitor patient for seizure activity, document and report duration and description of seizure to physician/advanced practitioner  - If seizure occurs,  ensure patient safety during seizure  - Reorient patient post seizure  - Seizure pads on all 4 side rails  - Instruct patient/family to notify RN of any seizure activity including if an aura is experienced  - Instruct patient/family to call for assistance with activity based on nursing assessment  - Administer anti-seizure medications if ordered    Outcome: Progressing

## 2021-03-28 NOTE — ED NOTES
Patient starting to seize at this time  Patient turned on side and ED provider notified  Suction set up and patient placed on non-rebreather  Patient given 1 mg lorazepam IV by Homero Lang RN per VO from [facility] at this time  Patient also given 2 mg zofran IV by Homero Lang RN per VO from [facility] at this time        Scott Hall RN  03/28/21 0025

## 2021-03-28 NOTE — CASE MANAGEMENT
Notified of pt requiring transfer to [facility] for higher LOC- CMN completed and placed in dc folder

## 2021-03-28 NOTE — CONSULTS
Cardiology Consultation  MD Sofía Anguiano MD [place] Askew, DO, Ivana Kilts [place], MD Elena Master, DO, Renee Piper DO, [facility]  ----------------------------------------------------------------  [address] Préscorina Lopez, [address] 31 y o  male MRN: [id number]  Unit/Bed#: [facility] -01 Encounter: [id number]      03/28/21    Referring Physician: Mariaelena Welch DO    Chief Complain/Reason for Referal:  Abnormal electrocardiogram    IMPRESSION:  1   seizure disorder  2  Autism disorder   3  Abnormal electrocardiogram with saddle shaped ST segment deformity of V2 with biphasic T-wave concerning for type 2 Brugada pattern       DISCUSSION/RECOMMENDATIONS:     Tele showed sinus tachycardia this morning, but no episodes of ventricular tachycardia   In speaking to his mother, the episode he had yesterday was significantly different than the seizure episodes that he has had past   In the past he always has a postictal state for quite some time  Yesterday he was wrapping up a cord on the vacuum when he slowly fell forward onto a chair, hit his head on the side table and rolled onto the floor  He did not have any tonic clonic activity, and by the time EMS arrived, was already returning to his baseline state of consciousness  She notes that this is significantly different than his prior seizure episodes  He was out for about 3-5 minutes  When he awoke he vomited   Given his abnormal presentation with syncope, ECG is suggestive of a type 2 Brugada pattern, would plan for transfer to [facility] for evaluation with electrophysiology  This was discussed with Dr April Winkler last night by the on-call cardiologist Dr Lorna Marks  Patient may need EP study, and if definite Brugada syndrome diagnosed, will need ICD  This was all discussed with his mom today as well as his primary attending      Niko James DO, [facility]    EKG: sinus rhythm with With saddle shaped ST segment deformity of V2 with biphasic T-wave concerning for type 2 Brugada pattern       ======================================================    HPI:  I am seeing this patient in cardiology consultation for:   Abnormal EKG    Kaden Santos is a 31 y o  male with  Autism and previous seizures who presented yesterday with an episode of unresponsiveness  The story is he was in a chair, became unresponsive and again the lean over the chair progressing to a fall hitting his left cheek on the chair  No abnormal motions were appreciated per the history in the chart  His mother stated he was down for about 3-5 minutes and was lethargic afterwards but is difficult to assess whether he was altered due to being minimally verbal at baseline secondary to his autism disorder  He has been diagnosed with seizures in the past but was taken off of Cellca in October of this past year after not having seizures for prolonged period of time          Past Medical History:   Diagnosis Date    Seizure-like activity ([facility] [address] )     last assessed: 7/11/16    Skin lesion     last assessed: 12/30/13    Skin rash     last assessed: 3/1/13    Staph aureus infection     last assessed: 8/20/13         Scheduled Meds:  Current Facility-Administered Medications   Medication Dose Route Frequency Provider Last Rate    acetaminophen  650 mg Oral Q6H PRN Bobetta [place], PA-C      aluminum-magnesium hydroxide-simethicone  30 mL Oral Q6H PRN Bobetta [place], PA-C      levETIRAcetam  500 mg Oral Q12H [address] [place] [address]      ondansetron  4 mg Intravenous Q6H PRN Bobetta [place], PA-C      sodium chloride  75 mL/hr Intravenous Continuous Bobetta [place], PA-C 75 mL/hr (03/28/21 0239)     Continuous Infusions:sodium chloride, 75 mL/hr, Last Rate: 75 mL/hr (03/28/21 0239)      PRN Meds:   acetaminophen    aluminum-magnesium hydroxide-simethicone    ondansetron  Allergies   Allergen Reactions    Cefaclor Hives    Sulfamethoxazole-Trimethoprim Hives     I reviewed the Home Medication list in the chart       Family History   Problem Relation Age of Onset    Breast cancer Maternal Grandmother     Cancer Maternal Grandfather         Salivary gland cancer    Other Paternal Grandmother         brain tumor    COPD Paternal Grandmother     Hypertension Paternal Grandmother     No Known Problems Mother     No Known Problems Father        Social History     Socioeconomic History    Marital status: Single     Spouse name: Not on file    Number of children: Not on file    Years of education: Not on file    Highest education level: Not on file   Occupational History    Not on file   Social Needs    Financial resource strain: Not on file    Food insecurity     Worry: Not on file     Inability: Not on file    Transportation needs     Medical: Not on file     Non-medical: Not on file   Tobacco Use    Smoking status: Never Smoker    Smokeless tobacco: Never Used   Substance and Sexual Activity    Alcohol use: Never     Frequency: Never    Drug use: Not on file    Sexual activity: Not on file   Lifestyle    Physical activity     Days per week: Not on file     Minutes per session: Not on file    Stress: Not on file   Relationships    Social connections     Talks on phone: Not on file     Gets together: Not on file     Attends Confucianism service: Not on file     Active member of club or organization: Not on file     Attends meetings of clubs or organizations: Not on file     Relationship status: Not on file    Intimate partner violence     Fear of current or ex partner: Not on file     Emotionally abused: Not on file     Physically abused: Not on file     Forced sexual activity: Not on file   Other Topics Concern    Not on file   Social History Narrative    Not on file       Review of Systems   Review of Systems   Unable to perform ROS: Patient nonverbal   Neurological: Positive for seizures and syncope  Vitals:    03/28/21 0700   BP: 106/67   Pulse: 79   Resp: 17   Temp: 98 °F (36 7 °C)   SpO2: 96%     I/O       03/26 0701 - 03/27 0700 03/27 0701 - 03/28 0700 03/28 0701 - 03/29 0700    IV Piggyback  1153 3     Total Intake(mL/kg)  1153 3 (13 7)     Net  +1153 3                Weight (last 2 days)     Date/Time   Weight    03/28/21 0153   84 1 (185 41)    03/28/21 0022   83 2 (183 42)    03/27/21 2059   81 9 (180 56)              Physical Exam     General appearance: alert and oriented, in no acute distress  Head: Normocephalic, without obvious abnormality, atraumatic  Eyes: conjunctivae/corneas clear  Anicteric    Neck: no adenopathy, no carotid bruit, no JVD  Lungs: clear to auscultation bilaterally  Heart: regular rate and rhythm, S1, S2 normal, no murmur, no click, rub or gallop  Abdomen:  soft, non-tender; bowel sounds normal; no masses,  no organomegaly  Extremities: extremities normal, warm and well-perfused; no cyanosis, clubbing, or edema  Skin: Skin color, texture, turgor normal  No rashes or lesions     Results from last 7 days   Lab Units 03/28/21  0605 03/27/21  2144   WBC Thousand/uL 9 24 7 50   HEMOGLOBIN g/dL 15 2 15 2   HEMATOCRIT % 43 5 42 9   PLATELETS Thousands/uL 171 145*   NEUTROS PCT % 80* 75   MONOS PCT % 7 6     Results from last 7 days   Lab Units 03/28/21  0605 03/27/21  2144   POTASSIUM mmol/L 3 6 3 8   CHLORIDE mmol/L 104 95*   CO2 mmol/L 26 29   BUN mg/dL 14 15   CREATININE mg/dL 0 81 0 91   CALCIUM mg/dL 8 7 8 9     Results from last 7 days   Lab Units 03/28/21  0605 03/27/21  2144   POTASSIUM mmol/L 3 6 3 8   CHLORIDE mmol/L 104 95*   CO2 mmol/L 26 29   BUN mg/dL 14 15   CREATININE mg/dL 0 81 0 91   CALCIUM mg/dL 8 7 8 9   ALK PHOS U/L  --  67   ALT U/L  --  26   AST U/L  --  18     No results found for: TROPONINT      Results from last 7 days   Lab Units 03/27/21  2208   TROPONIN I ng/mL <0 02                       I have personally reviewed the EKG, CXR and Telemetry images directly  Patient Active Problem List    Diagnosis Date Noted    Abnormal EKG 03/28/2021     Priority: Low    Other insomnia 08/28/2019     Priority: Low    Seizure ([facility] [address] ) 08/21/2018     Priority: Low    Generalized tonic-clonic seizure ([facility] [address] ) 10/13/2016     Priority: Low    Autism disorder 10/10/2012     Priority: Low       Portions of the record may have been created with voice recognition software  Occasional wrong word or "sound a like" substitutions may have occurred due to the inherent limitations of voice recognition software  Read the chart carefully and recognize, using context, where substitutions have occurred      Nicole Gusman DO, [facility]  3/28/2021 10:49 AM

## 2021-03-28 NOTE — ED NOTES
C-collar taken off by MARILYN Saldana DO at this time        Rebekah Guerrero, JOHNSON  03/27/21 9920

## 2021-03-28 NOTE — PLAN OF CARE
Problem: Potential for Falls  Goal: Patient will remain free of falls  Description: INTERVENTIONS:  - Assess patient frequently for physical needs  -  Identify cognitive and physical deficits and behaviors that affect risk of falls    -  [place] fall precautions as indicated by assessment   - Educate patient/family on patient safety including physical limitations  - Instruct patient to call for assistance with activity based on assessment  - Modify environment to reduce risk of injury  - Consider OT/PT consult to assist with strengthening/mobility  Outcome: Progressing     Problem: PAIN - ADULT  Goal: Verbalizes/displays adequate comfort level or baseline comfort level  Description: Interventions:  - Encourage patient to monitor pain and request assistance  - Assess pain using appropriate pain scale  - Administer analgesics based on type and severity of pain and evaluate response  - Implement non-pharmacological measures as appropriate and evaluate response  - Consider cultural and social influences on pain and pain management  - Notify physician/advanced practitioner if interventions unsuccessful or patient reports new pain  Outcome: Progressing     Problem: INFECTION - ADULT  Goal: Absence or prevention of progression during hospitalization  Description: INTERVENTIONS:  - Assess and monitor for signs and symptoms of infection  - Monitor lab/diagnostic results  - Monitor all insertion sites, i e  indwelling lines, tubes, and drains  - Monitor endotracheal if appropriate and nasal secretions for changes in amount and color  - [place] appropriate cooling/warming therapies per order  - Administer medications as ordered  - Instruct and encourage patient and family to use good hand hygiene technique  - Identify and instruct in appropriate isolation precautions for identified infection/condition  Outcome: Progressing     Problem: SAFETY ADULT  Goal: Patient will remain free of falls  Description: INTERVENTIONS:  - Assess patient frequently for physical needs  -  Identify cognitive and physical deficits and behaviors that affect risk of falls    -  [place] fall precautions as indicated by assessment   - Educate patient/family on patient safety including physical limitations  - Instruct patient to call for assistance with activity based on assessment  - Modify environment to reduce risk of injury  - Consider OT/PT consult to assist with strengthening/mobility  Outcome: Progressing  Goal: Maintain or return to baseline ADL function  Description: INTERVENTIONS:  -  Assess patient's ability to carry out ADLs; assess patient's baseline for ADL function and identify physical deficits which impact ability to perform ADLs (bathing, care of mouth/teeth, toileting, grooming, dressing, etc )  - Assess/evaluate cause of self-care deficits   - Assess range of motion  - Assess patient's mobility; develop plan if impaired  - Assess patient's need for assistive devices and provide as appropriate  - Encourage maximum independence but intervene and supervise when necessary  - Involve family in performance of ADLs  - Assess for home care needs following discharge   - Consider OT consult to assist with ADL evaluation and planning for discharge  - Provide patient education as appropriate  Outcome: Progressing  Goal: Maintain or return mobility status to optimal level  Description: INTERVENTIONS:  - Assess patient's baseline mobility status (ambulation, transfers, stairs, etc )    - Identify cognitive and physical deficits and behaviors that affect mobility  - Identify mobility aids required to assist with transfers and/or ambulation (gait belt, sit-to-stand, lift, walker, cane, etc )  - [place] fall precautions as indicated by assessment  - Record patient progress and toleration of activity level on Mobility SBAR; progress patient to next Phase/Stage  - Instruct patient to call for assistance with activity based on assessment  - Consider rehabilitation consult to assist with strengthening/weightbearing, etc   Outcome: Progressing     Problem: DISCHARGE PLANNING  Goal: Discharge to home or other facility with appropriate resources  Description: INTERVENTIONS:  - Identify barriers to discharge w/patient and caregiver  - Arrange for needed discharge resources and transportation as appropriate  - Identify discharge learning needs (meds, wound care, etc )  - Arrange for interpretive services to assist at discharge as needed  - Refer to Case Management Department for coordinating discharge planning if the patient needs post-hospital services based on physician/advanced practitioner order or complex needs related to functional status, cognitive ability, or social support system  Outcome: Progressing     Problem: Knowledge Deficit  Goal: Patient/family/caregiver demonstrates understanding of disease process, treatment plan, medications, and discharge instructions  Description: Complete learning assessment and assess knowledge base    Interventions:  - Provide teaching at level of understanding  - Provide teaching via preferred learning methods  Outcome: Progressing     Problem: NEUROSENSORY - ADULT  Goal: Achieves stable or improved neurological status  Description: INTERVENTIONS  - Monitor and report changes in neurological status  - Monitor vital signs such as temperature, blood pressure, glucose, and any other labs ordered   - Initiate measures to prevent increased intracranial pressure  - Monitor for seizure activity and implement precautions if appropriate      Outcome: Progressing  Goal: Remains free of injury related to seizures activity  Description: INTERVENTIONS  - Maintain airway, patient safety  and administer oxygen as ordered  - Monitor patient for seizure activity, document and report duration and description of seizure to physician/advanced practitioner  - If seizure occurs,  ensure patient safety during seizure  - Reorient patient post seizure  - Seizure pads on all 4 side rails  - Instruct patient/family to notify RN of any seizure activity including if an aura is experienced  - Instruct patient/family to call for assistance with activity based on nursing assessment  - Administer anti-seizure medications if ordered    Outcome: Progressing

## 2021-03-28 NOTE — ASSESSMENT & PLAN NOTE
Patient has a questionable seizure history  His mother states that he had 2 apparent seizures in his lifetime  He had been on Keppra but was taken off in October 2020 as he had not had a seizure in years  perhabs he had arrythmias all along and the LOC was thought to be a seizure instead of cardiac syncope

## 2021-03-28 NOTE — UTILIZATION REVIEW
Initial Clinical Review  Arrived to ED 3/27 @2054  Admission: Date/Time/Statement:   Admission Orders (From admission, onward)     Ordered        03/28/21 0003  Inpatient Admission  Once                   Orders Placed This Encounter   Procedures    Inpatient Admission     Standing Status:   Standing     Number of Occurrences:   1     Order Specific Question:   Level of Care     Answer:   Med Surg [16]     Order Specific Question:   Estimated length of stay     Answer:   More than 2 Midnights     Order Specific Question:   Certification     Answer:   I certify that inpatient services are medically necessary for this patient for a duration of greater than two midnights  See H&P and MD Progress Notes for additional information about the patient's course of treatment  ED Arrival Information     Expected Arrival Acuity Means of Arrival Escorted By Service Admission Type    - 3/27/2021 20:54 Urgent [facility] Urgent    Arrival Complaint    seizure        Chief Complaint   Patient presents with    Seizure - Prior Hx Of     Pt arrived EMS after having seizure at home per mother  Pt struck right side of head on wooden end table today  Per mom pt at baseline on arrival  Pt last seizure 2 5 years ago  Pt with autism     Assessment/Plan: 30 yo m w/hx autism and seizure to ED from home by EMS admitted as inpatient due to seizure  Presented after pt became unresponsive and leaned over in his chair  He then fell, hitting L cheek on chair and was down for 3-5 minutes  Lethargic afterward, difficulty to assess mental state as he is minimally verbal at baseline  EKG showed brugada syndrome, CT head/neck wnl  Exam w/minor facial scrapes  Neuro and cardio consulted, continue AED's      3/28:  Per cardio: sinus tach on tele, no v tach  His episode yesterday was different from prior seizure episodes  Previously, he had postictal state for quite some time and this time he did not   There was no tonic/clonic activity and he was returning toward baseline upon EMS Arrival  He vomited upon waking  EKG suggests type 2 Brugada pattern, plan for transfer to [facility] for EP study  If this is definitively diagnosed, he will need ICU       DECISION MADE TO TRANSFER TO [facility] FOR EP STUDY, WILL BE TRANSFERRED ON Sunday, 3/28/21    ED Triage Vitals [03/27/21 2059]   Temperature Pulse Respirations Blood Pressure SpO2   (!) 97 °F (36 1 °C) 73 20 136/95 97 %      Temp Source Heart Rate Source Patient Position - Orthostatic VS BP Location FiO2 (%)   Axillary Monitor Lying Right arm --      Pain Score       --          Wt Readings from Last 1 Encounters:   03/28/21 84 1 kg (185 lb 6 5 oz)     Additional Vital Signs:   Date/Time  Temp  Pulse  Resp  BP  SpO2  Calculated FIO2 (%) - Nasal Cannula  Nasal Cannula O2 Flow Rate (L/min)  O2 Device  Patient Position - Orthostatic VS   03/28/21 0700  98 °F (36 7 °C)  79  17  106/67  96 %  --  --  None (Room air)  Lying   03/28/21 0153  97 2 °F (36 2 °C)Abnormal   78  20  115/73  97 %  --  --  None (Room air)  Lying   03/28/21 0027  --  90  17  119/72  95 %  28  2 L/min  Nasal cannula  Lying   03/27/21 2307  --  82  18  144/83  98 %  --  --  None (Room air)  Lying       Pertinent Labs/Diagnostic Test Results:      3/27 EKG: sinus rhythm with With saddle shaped ST segment deformity of V2 with biphasic T-wave concerning for type 2 Brugada pattern     3/27 CT head; nothing acute  3/27 CT c spine; nothing acute    Results from last 7 days   Lab Units 03/28/21  0605 03/27/21  2144   WBC Thousand/uL 9 24 7 50   HEMOGLOBIN g/dL 15 2 15 2   HEMATOCRIT % 43 5 42 9   PLATELETS Thousands/uL 171 145*   NEUTROS ABS Thousands/µL 7 46 5 70         Results from last 7 days   Lab Units 03/28/21  0605 03/27/21  2144   SODIUM mmol/L 140 133*   POTASSIUM mmol/L 3 6 3 8   CHLORIDE mmol/L 104 95*   CO2 mmol/L 26 29   ANION GAP mmol/L 10 9   BUN mg/dL 14 15   CREATININE mg/dL 0 81 0 91   EGFR ml/min/1 73sq m 121 114   CALCIUM mg/dL 8 7 8 9     Results from last 7 days   Lab Units 03/27/21  2144   AST U/L 18   ALT U/L 26   ALK PHOS U/L 67   TOTAL PROTEIN g/dL 6 7   ALBUMIN g/dL 3 7   TOTAL BILIRUBIN mg/dL 0 46         Results from last 7 days   Lab Units 03/28/21  0605 03/27/21  2144   GLUCOSE RANDOM mg/dL 90 101       Results from last 7 days   Lab Units 03/27/21  2208   TROPONIN I ng/mL <0 02             Results from last 7 days   Lab Units 03/28/21  0605   TSH 3RD GENERATON uIU/mL 0 953       ED Treatment:   Medication Administration from 03/27/2021 2054 to 03/28/2021 0140       Date/Time Order Dose Route Action     03/27/2021 2221 sodium chloride 0 9 % bolus 1,000 mL 1,000 mL Intravenous New Bag     03/28/2021 0015 LORazepam (ATIVAN) injection 1 mg 1 mg Intravenous Given     03/28/2021 0015 ondansetron (ZOFRAN) injection 4 mg 4 mg Intravenous Given     03/28/2021 0036 levETIRAcetam (KEPPRA) 1,000 mg in sodium chloride 0 9 % 100 mL IVPB 1,000 mg Intravenous New Bag        Past Medical History:   Diagnosis Date    Seizure-like activity ([facility] )     last assessed: 7/11/16    Skin lesion     last assessed: 12/30/13    Skin rash     last assessed: 3/1/13    Staph aureus infection     last assessed: 8/20/13     Present on Admission:   [facility]   Autism disorder      Admitting Diagnosis: Seizure ([facility] ) [R56 9]  Syncope [R55]  Abnormal EKG [R94 31]  Generalized tonic-clonic seizure ([facility] ) [G40 409]  Traumatic injury of head, initial encounter [S09 90XA]  Age/Sex: 31 y o  male  Admission Orders:  Scheduled Medications:  levETIRAcetam, 500 mg, Oral, Q12H [facility]      Continuous IV Infusions:  sodium chloride, 75 mL/hr, Intravenous, Continuous      PRN Meds:  acetaminophen, 650 mg, Oral, Q6H PRN  aluminum-magnesium hydroxide-simethicone, 30 mL, Oral, Q6H PRN  ondansetron, 4 mg, Intravenous, Q6H PRN    tele  SCD's    IP CONSULT TO CARDIOLOGY  IP CONSULT TO NEUROLOGY    Network Utilization Review Department  ATTENTION: Please call with any questions or concerns to [phone] and carefully listen to the prompts so that you are directed to the right person  All voicemails are confidential   Melany Muss all requests for admission clinical reviews, approved or denied determinations and any other requests to dedicated fax number below belonging to the campus where the patient is receiving treatment   List of dedicated fax numbers for the Facilities:  [address] DENIALS (Administrative/Medical Necessity) [phone]   [address] (Maternity/NICU/Pediatrics) [phone]   [address] Fer Navarro [address] (  [address] "[address]) [address] [place] [address] Anisa Garvin [address] [place] [address] [phone]

## 2021-03-28 NOTE — DISCHARGE SUMMARY
Sasha 50  Discharge- Qing Bermudez 1990, 31 y o  male MRN: [id number]  Unit/Bed#: E2 -01 Encounter: [id number]  Primary Care Provider: Aria Lux DO   Date and time admitted to hospital: 3/27/2021  8:54 PM    * Brugada syndrome  Assessment & Plan  Likely caused his syncopal epidose  Spoke with Dr Belle Gusman from cardiology and he recommends [facility] for EP evaluation and likely ICD placement  Mother is aware and is in agreement  Spoke with Dr Javi Ace who is accepting in transfer    Seizure [facility])  Assessment & Plan  Patient has a questionable seizure history  His mother states that he had 2 apparent seizures in his lifetime  He had been on Keppra but was taken off in October 2020 as he had not had a seizure in years  perhabs he had arrythmias all along and the LOC was thought to be a seizure instead of cardiac syncope  Autism disorder  Assessment & Plan  Very minimally verbal at baseline  Mother will stay at bedside for assistance with communication      Discharging Physician / Practitioner: Miguel Reason,   PCP: Aria Lux DO  Admission Date:   Admission Orders (From admission, onward)     Ordered        03/28/21 0003  Inpatient Admission  Once                   Discharge Date: 03/28/21    Resolved Problems  Date Reviewed: 3/28/2021    None            Consultations During Hospital Stay:  · Cardiology      ·       Reason for Admission:  Syncope versus seizure      Hospital Course:     Qing Bermudez is a 31 y o  male patient who originally presented to the hospital on 3/27/2021 due to syncope versus seizure  He has autism disorder as well as questionable previous seizures  Mother states that he has had 2 seizures in his life it was actually taken off of [employer] in October 2020 because he had not had any seizure activity for long period of time    On the day of admission he was playing with the [facility] Pin[facility] cord and then became on responsive  Was not plugged in  He fell and hit his left cheek and his right brow  Had significant contusion to the right brow but no subdural hematoma or brain abnormality  He loss of consciousness afterwards for 3-5 minutes  No tonic-clonic movements noted  He was lethargic afterwards but difficult to assess due to the fact that he is minimally verbal due to his autism  In the hospital 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 for possible defibrillator placement by electrophysiology Cardiology  I would still have him checked out by Neurology as well for possible seizure disorder  Please see above list of diagnoses and related plan for additional information  Condition at Discharge: poor       Discharge Day Visit / Exam:     Subjective:  Feels well  Back to baseline mental status per mother  No specific complaints but he really cannot have a conversation  Vitals: Blood Pressure: 106/67 (03/28/21 0700)  Pulse: 79 (03/28/21 0700)  Temperature: 98 °F (36 7 °C) (03/28/21 0700)  Temp Source: Tympanic (03/28/21 0700)  Respirations: 17 (03/28/21 0700)  Height: 6' 1" (185 4 cm) (03/28/21 0153)  Weight - Scale: 84 1 kg (185 lb 6 5 oz) (03/28/21 0153)  SpO2: 96 % (03/28/21 0700)    Exam:     Physical Exam  Vitals signs and nursing note reviewed  HENT:      Head: Normocephalic and atraumatic  Comments: Right facial contusion  No laceration    Eyes:      Pupils: Pupils are equal, round, and reactive to light  Cardiovascular:      Rate and Rhythm: Normal rate and regular rhythm  Heart sounds: No murmur  No friction rub  No gallop  Pulmonary:      Effort: Pulmonary effort is normal       Breath sounds: Normal breath sounds  No wheezing or rales     Abdominal:      General: Bowel sounds are normal  Palpations: Abdomen is soft  Tenderness: There is no abdominal tenderness  Musculoskeletal:      Right lower leg: No edema  Left lower leg: No edema            Discharge instructions/Information to patient and family:   See after visit summary for information provided to patient and family  Provisions for Follow-Up Care:  See after visit summary for information related to follow-up care and any pertinent home health orders  Disposition:     Other: [facility]       Discharge Statement:  I spent 37 minutes discharging the patient  This time was spent on the day of discharge  I had direct contact with the patient on the day of discharge  Greater than 50% of the total time was spent examining patient, answering all patient questions, arranging and discussing plan of care with patient as well as directly providing post-discharge instructions  Additional time then spent on discharge activities  Discharge Medications:  See after visit summary for reconciled discharge medications provided to patient and family        ** Please Note: This note has been constructed using a voice recognition system **

## 2021-03-28 NOTE — LETTER
[address] [facility]  [address] [address]  Dept: [phone]    March 31, 2021     Patient: Zeina Hester   YOB: 1990   Date of Visit: 3/28/2021       To Whom it May Concern:    Zeina Hester is under my professional care  He was seen in the hospital from 3/28/2021   to 03/31/21  He may return to work on 4/1/21 with the following limitations no lifting more then 10 lbs with the left arm  If you have any questions or concerns, please don't hesitate to call           Sincerely,          Joanna Richmond MD

## 2021-03-28 NOTE — CONSULTS
Consultation - Neurology   Ev Avalos 31 y o  male MRN: [id number]  Unit/Bed#: E2 -01 Encounter: [id number]      Physician Requesting Consult: [place] DO  Inpatient consult to Neurology  Consult performed by: Richa Hong MD  Consult ordered by: Ajit Carver PA-C        Reason for Consult / Principal Problem: break through seizure      Assessment:  Breakthrough seizures in setting of seizure disorder versus convulsive syncope events in context of newly discovered cardiac rhythm abnormalities  Currently back to neurologic baseline with no additional events after starting back on the keppra  According to mother no prior known cardiac abnormalities  She states the event at home was different from his prior events is that there was mostly stiffening very minimal shaking and shorter post ictal period  However the event in the emergency room later yesterday evening mother states has been very similar to the events in the past   Perhaps the event at home last night was a convulsive syncope event the stress on the body from this then precipitated an epileptic seizure, difficult to state with certainty  Plan:  Continue cardiology workup  Continue the keppra 500 mg po bid  No additional inpt neuro recs      HPI: Ev Avalos is a 31 y o  male with autism who has history of occasional seizures with last one 2 and half years ago recently titrated off the Keppra 500 mg twice a day middle of October 2020  Patient was a patient of Dr Bushra Stapleton who is no longer with [facility]  According to mother yesterday evening she was with him in the reduce cleaning the house    He was putting the vacuum away widening up the hose when he suddenly dropped the hose leaned over the back of the lazy boy hit the right side of his head on either the end table or power cord protect her she states period she states that he seemed to have stiffened as he gone down when he was lying on the ground is very stiff unusual breathing pattern slightly trembling  Said this lasted about 15 minutes and she called EMS by the time EMS came that activity  There slowly able to sit him up and and stand him up walking into  The ambulance  Mother row the ambulance with him says he slowly improved  About 45 minutes to an hour after being in the emergency room he suddenly while looking at mother he does stiffened up any had a generalized tonic-clonic event for couple minutes we which shaking significantly similar to his event 2 and half years ago and the initial 1 for half to 5 years ago  Ativan and Keppra were given  Has no further events since tight time  According to mother he has no history of syncope as she is aware of  At baseline and he is currently at his baseline according mother, he can follow some commands usually gets focus on activity any to redirect him  He strong walks without any difficulties  Very minimal verbalization  Prior history: According to the mother his 1st seizure was 4 half to 5 years ago grand mal and then she had another seizure 2 and half years ago similar grand mal at that point was started on Keppra  No side effects with the Keppra but given that he had had the seizures he had been weaned off  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 [employer]  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 is very proficient at work  Does well rarely agitated  He has a certain routine he follows he gets the mail takes the garbage out walks good distance to do these things and a daily basis  No changes recent routine  No additional stressors the mother can think of  He is not a great sleeper no change with that  He sleeps on and off wakes up 1-2 times a night these restroom  No recent infectious symptoms  He has been eating and drinking well  No change in his behavior          ROS:  Per 12 point review, doesn't appear to be in distress, cannot properly obtain given his mentation  Historical Information   Past Medical History:   Diagnosis Date    Seizure-like activity ([address] )     last assessed: 7/11/16    Skin lesion     last assessed: 12/30/13    Skin rash     last assessed: 3/1/13    Staph aureus infection     last assessed: 8/20/13     Past Surgical History:   Procedure Laterality Date    ADENOIDECTOMY      DENTAL SURGERY      wisdom teeth    MYRINGOTOMY W/ TUBES      with ventilating tube insertion; x6     Social History   Social History     Tobacco Use   Smoking Status Never Smoker   Smokeless Tobacco Never Used     Social History     Substance and Sexual Activity   Alcohol Use Never    Frequency: Never     Social History     Substance and Sexual Activity   Drug Use Not on file       Family History: non-contributory      Meds/Allergies     Allergies   Allergen Reactions    Cefaclor Hives    Sulfamethoxazole-Trimethoprim Hives       all current active meds have been reviewed    Objective   Vitals:Blood pressure 106/67, pulse 79, temperature 98 °F (36 7 °C), temperature source Tympanic, resp  rate 17, height 6' 1" (1 854 m), weight 84 1 kg (185 lb 6 5 oz), SpO2 96 %  ,Body mass index is 24 46 kg/m²  Physical Exam:   Physical Exam General appearance: alert, appears stated age and cooperative  Head: abrasion rt forehead  Extremities: extremities normal, atraumatic, no cyanosis or edema    Neurologic:  Cognitive:  Mental status: Alert, oriented  Self and date of birth  Speaks monotone voice   Without in flexion  He was able to write a sentence for me clear letters he writes sentence backwards  He was able to count fingers  Continuously every few seconds during exam he would grab the iPhone  Review YouTube videos  Overall was   Calm and pleasant not very interested in participating the examination  He tends to say "yes" to everything I was asking  CN: CNII-XII normal  Blink to threat intact throughout  Motor: normal  Tone and bulk    He give for power handgrip bilaterally for pushing and pulling the repetitive verbal prompts at least 4 power does not appear to be giving full effort  Bilateral lower extremities he would lift each 1 appropriately on verbal command but unable to properly assess strength testing his he does drop his extremities down after couple seconds  He gave full power bilateral plantar flexion but unable to get him to do dorsiflexion  Sensory: says "yes" to cold tuning fork all extremities when asking him if cold  Cerebellar: bilat endpt tremor  Able to perform finger to nose, heel to shin with verbal/visual cues from mother  DTR's: 2 ue bilat, +cross adductors, 2 ankles bilat  Plantars: downgoing bilat        Lab Results: I have personally reviewed pertinent reports        Imaging Studies: I have personally reviewed pertinent films in PACS    EKG, Pathology, and Other Studies: I have personally reviewed pertinent films in PACS

## 2021-03-29 LAB
ANION GAP SERPL CALCULATED.3IONS-SCNC: 2 MMOL/L (ref 4–13)
ATRIAL RATE: 98 BPM
BASOPHILS # BLD AUTO: 0.01 THOUSANDS/ΜL (ref 0–0.1)
BASOPHILS NFR BLD AUTO: 0 % (ref 0–1)
BUN SERPL-MCNC: 14 MG/DL (ref 5–25)
CALCIUM SERPL-MCNC: 8.9 MG/DL (ref 8.3–10.1)
CHLORIDE SERPL-SCNC: 110 MMOL/L (ref 100–108)
CO2 SERPL-SCNC: 29 MMOL/L (ref 21–32)
CREAT SERPL-MCNC: 1.01 MG/DL (ref 0.6–1.3)
EOSINOPHIL # BLD AUTO: 0.07 THOUSAND/ΜL (ref 0–0.61)
EOSINOPHIL NFR BLD AUTO: 1 % (ref 0–6)
ERYTHROCYTE [DISTWIDTH] IN BLOOD BY AUTOMATED COUNT: 12.3 % (ref 11.6–15.1)
GFR SERPL CREATININE-BSD FRML MDRD: 101 ML/MIN/1.73SQ M
GLUCOSE SERPL-MCNC: 86 MG/DL (ref 65–140)
HCT VFR BLD AUTO: 45.7 % (ref 36.5–49.3)
HGB BLD-MCNC: 15.9 G/DL (ref 12–17)
IMM GRANULOCYTES # BLD AUTO: 0.02 THOUSAND/UL (ref 0–0.2)
IMM GRANULOCYTES NFR BLD AUTO: 0 % (ref 0–2)
LYMPHOCYTES # BLD AUTO: 1.54 THOUSANDS/ΜL (ref 0.6–4.47)
LYMPHOCYTES NFR BLD AUTO: 21 % (ref 14–44)
MAGNESIUM SERPL-MCNC: 2.4 MG/DL (ref 1.6–2.6)
MCH RBC QN AUTO: 30.6 PG (ref 26.8–34.3)
MCHC RBC AUTO-ENTMCNC: 34.8 G/DL (ref 31.4–37.4)
MCV RBC AUTO: 88 FL (ref 82–98)
MONOCYTES # BLD AUTO: 0.72 THOUSAND/ΜL (ref 0.17–1.22)
MONOCYTES NFR BLD AUTO: 10 % (ref 4–12)
NEUTROPHILS # BLD AUTO: 4.97 THOUSANDS/ΜL (ref 1.85–7.62)
NEUTS SEG NFR BLD AUTO: 68 % (ref 43–75)
NRBC BLD AUTO-RTO: 0 /100 WBCS
P AXIS: 39 DEGREES
PLATELET # BLD AUTO: 163 THOUSANDS/UL (ref 149–390)
PMV BLD AUTO: 9.8 FL (ref 8.9–12.7)
POTASSIUM SERPL-SCNC: 3.7 MMOL/L (ref 3.5–5.3)
PR INTERVAL: 148 MS
QRS AXIS: -6 DEGREES
QRSD INTERVAL: 94 MS
QT INTERVAL: 332 MS
QTC INTERVAL: 423 MS
RBC # BLD AUTO: 5.19 MILLION/UL (ref 3.88–5.62)
SODIUM SERPL-SCNC: 141 MMOL/L (ref 136–145)
T WAVE AXIS: 43 DEGREES
VENTRICULAR RATE: 98 BPM
WBC # BLD AUTO: 7.33 THOUSAND/UL (ref 4.31–10.16)

## 2021-03-29 PROCEDURE — 80048 BASIC METABOLIC PNL TOTAL CA: CPT | Performed by: INTERNAL MEDICINE

## 2021-03-29 PROCEDURE — 83735 ASSAY OF MAGNESIUM: CPT | Performed by: INTERNAL MEDICINE

## 2021-03-29 PROCEDURE — 93010 ELECTROCARDIOGRAM REPORT: CPT | Performed by: INTERNAL MEDICINE

## 2021-03-29 PROCEDURE — 99254 IP/OBS CNSLTJ NEW/EST MOD 60: CPT | Performed by: INTERNAL MEDICINE

## 2021-03-29 PROCEDURE — 99255 IP/OBS CONSLTJ NEW/EST HI 80: CPT | Performed by: PSYCHIATRY & NEUROLOGY

## 2021-03-29 PROCEDURE — 99232 SBSQ HOSP IP/OBS MODERATE 35: CPT | Performed by: GENERAL PRACTICE

## 2021-03-29 PROCEDURE — 02HK3KZ INSERTION OF DEFIBRILLATOR LEAD INTO RIGHT VENTRICLE, PERCUTANEOUS APPROACH: ICD-10-PCS | Performed by: INTERNAL MEDICINE

## 2021-03-29 PROCEDURE — 93005 ELECTROCARDIOGRAM TRACING: CPT

## 2021-03-29 PROCEDURE — 85025 COMPLETE CBC W/AUTO DIFF WBC: CPT | Performed by: INTERNAL MEDICINE

## 2021-03-29 PROCEDURE — 0JH608Z INSERTION OF DEFIBRILLATOR GENERATOR INTO CHEST SUBCUTANEOUS TISSUE AND FASCIA, OPEN APPROACH: ICD-10-PCS | Performed by: INTERNAL MEDICINE

## 2021-03-29 RX ORDER — POTASSIUM CHLORIDE 20 MEQ/1
20 TABLET, EXTENDED RELEASE ORAL ONCE
Status: COMPLETED | OUTPATIENT
Start: 2021-03-29 | End: 2021-03-29

## 2021-03-29 RX ADMIN — THIAMINE HCL TAB 100 MG 100 MG: 100 TAB at 09:50

## 2021-03-29 RX ADMIN — POTASSIUM CHLORIDE 20 MEQ: 1500 TABLET, EXTENDED RELEASE ORAL at 10:57

## 2021-03-29 RX ADMIN — LEVETIRACETAM 500 MG: 500 TABLET, FILM COATED ORAL at 09:50

## 2021-03-29 RX ADMIN — FOLIC ACID TAB 400 MCG 800 MCG: 400 TAB at 09:51

## 2021-03-29 RX ADMIN — LEVETIRACETAM 500 MG: 500 TABLET, FILM COATED ORAL at 20:28

## 2021-03-29 RX ADMIN — Medication 1 TABLET: at 09:50

## 2021-03-29 NOTE — ASSESSMENT & PLAN NOTE
Seizure   Presented s/p fall and sustained facial injury  Syncope vs  Seizure was considered  He was taken off of Keppra in October 2020  Since that time he did not have any seizures  He has unresponsive episode where he became stiff and fell leaning forward  No tonic-clonic motion noted  He was down for about 3-5 min, lethargic post seizure  He received 1 mg of ativan and 2 mg Zofran and Keppra loaded

## 2021-03-29 NOTE — ASSESSMENT & PLAN NOTE
· Seizure   · Presented s/p fall and sustained facial injury  Syncope vs  Seizure was considered  · He was taken off of Keppra in October 2020  Since that time he did not have any seizures  · He has unresponsive episode where he became stiff and fell leaning forward  ·  No tonic-clonic motion noted  He was down for about 3-5 min, lethargic post seizure    · Zofran as needed   · Keppra 500 mg Q12H started  · Tonic clonic seizure documented 45 minutes after arrival in ED

## 2021-03-29 NOTE — ASSESSMENT & PLAN NOTE
EKG suggestive of Brugada syndrome, possible syncope  CT head neg  Neurology consulted  V EEG monitoring for 24 hours  Seizure precautions  Ativan as needed  Fall precautions  Keep HOB>30

## 2021-03-29 NOTE — ASSESSMENT & PLAN NOTE
· EKG suggestive of Brugada syndrome, possible syncope  · EP following for EP study and possible ICD vs loop recorder

## 2021-03-29 NOTE — PLAN OF CARE
Problem: Potential for Falls  Goal: Patient will remain free of falls  Description: INTERVENTIONS:  - Assess patient frequently for physical needs  -  Identify cognitive and physical deficits and behaviors that affect risk of falls    -  [place] fall precautions as indicated by assessment   - Educate patient/family on patient safety including physical limitations  - Instruct patient to call for assistance with activity based on assessment  - Modify environment to reduce risk of injury  - Consider OT/PT consult to assist with strengthening/mobility  Outcome: Progressing     Problem: PAIN - ADULT  Goal: Verbalizes/displays adequate comfort level or baseline comfort level  Description: Interventions:  - Encourage patient to monitor pain and request assistance  - Assess pain using appropriate pain scale  - Administer analgesics based on type and severity of pain and evaluate response  - Implement non-pharmacological measures as appropriate and evaluate response  - Consider cultural and social influences on pain and pain management  - Notify physician/advanced practitioner if interventions unsuccessful or patient reports new pain  Outcome: Progressing     Problem: INFECTION - ADULT  Goal: Absence or prevention of progression during hospitalization  Description: INTERVENTIONS:  - Assess and monitor for signs and symptoms of infection  - Monitor lab/diagnostic results  - Monitor all insertion sites, i e  indwelling lines, tubes, and drains  - Monitor endotracheal if appropriate and nasal secretions for changes in amount and color  - [place] appropriate cooling/warming therapies per order  - Administer medications as ordered  - Instruct and encourage patient and family to use good hand hygiene technique  - Identify and instruct in appropriate isolation precautions for identified infection/condition  Outcome: Progressing     Problem: SAFETY ADULT  Goal: Patient will remain free of falls  Description: INTERVENTIONS:  - Assess patient frequently for physical needs  -  Identify cognitive and physical deficits and behaviors that affect risk of falls    -  [place] fall precautions as indicated by assessment   - Educate patient/family on patient safety including physical limitations  - Instruct patient to call for assistance with activity based on assessment  - Modify environment to reduce risk of injury  - Consider OT/PT consult to assist with strengthening/mobility  Outcome: Progressing  Goal: Maintain or return to baseline ADL function  Description: INTERVENTIONS:  -  Assess patient's ability to carry out ADLs; assess patient's baseline for ADL function and identify physical deficits which impact ability to perform ADLs (bathing, care of mouth/teeth, toileting, grooming, dressing, etc )  - Assess/evaluate cause of self-care deficits   - Assess range of motion  - Assess patient's mobility; develop plan if impaired  - Assess patient's need for assistive devices and provide as appropriate  - Encourage maximum independence but intervene and supervise when necessary  - Involve family in performance of ADLs  - Assess for home care needs following discharge   - Consider OT consult to assist with ADL evaluation and planning for discharge  - Provide patient education as appropriate  Outcome: Progressing  Goal: Maintain or return mobility status to optimal level  Description: INTERVENTIONS:  - Assess patient's baseline mobility status (ambulation, transfers, stairs, etc )    - Identify cognitive and physical deficits and behaviors that affect mobility  - Identify mobility aids required to assist with transfers and/or ambulation (gait belt, sit-to-stand, lift, walker, cane, etc )  - [place] fall precautions as indicated by assessment  - Record patient progress and toleration of activity level on Mobility SBAR; progress patient to next Phase/Stage  - Instruct patient to call for assistance with activity based on assessment  - Consider rehabilitation consult to assist with strengthening/weightbearing, etc   Outcome: Progressing     Problem: DISCHARGE PLANNING  Goal: Discharge to home or other facility with appropriate resources  Description: INTERVENTIONS:  - Identify barriers to discharge w/patient and caregiver  - Arrange for needed discharge resources and transportation as appropriate  - Identify discharge learning needs (meds, wound care, etc )  - Arrange for interpretive services to assist at discharge as needed  - Refer to Case Management Department for coordinating discharge planning if the patient needs post-hospital services based on physician/advanced practitioner order or complex needs related to functional status, cognitive ability, or social support system  Outcome: Progressing     Problem: Knowledge Deficit  Goal: Patient/family/caregiver demonstrates understanding of disease process, treatment plan, medications, and discharge instructions  Description: Complete learning assessment and assess knowledge base    Interventions:  - Provide teaching at level of understanding  - Provide teaching via preferred learning methods  Outcome: Progressing     Problem: NEUROSENSORY - ADULT  Goal: Achieves stable or improved neurological status  Description: INTERVENTIONS  - Monitor and report changes in neurological status  - Monitor vital signs such as temperature, blood pressure, glucose, and any other labs ordered   - Initiate measures to prevent increased intracranial pressure  - Monitor for seizure activity and implement precautions if appropriate      Outcome: Progressing  Goal: Remains free of injury related to seizures activity  Description: INTERVENTIONS  - Maintain airway, patient safety  and administer oxygen as ordered  - Monitor patient for seizure activity, document and report duration and description of seizure to physician/advanced practitioner  - If seizure occurs,  ensure patient safety during seizure  - Reorient patient post seizure  - Seizure pads on all 4 side rails  - Instruct patient/family to notify RN of any seizure activity including if an aura is experienced  - Instruct patient/family to call for assistance with activity based on nursing assessment  - Administer anti-seizure medications if ordered    Outcome: Progressing

## 2021-03-29 NOTE — CONSULTS
Consultation - Electrophysiology-Cardiology (EP)   Barbara Cleary 31 y o  male MRN: [id number]  Unit/Bed#: [facility] 423-01 Encounter: [id number]      Inpatient consult to Cardiology  Consult performed by: Bentley Mcintosh PA-C  Consult ordered by: Shilpi Lynn MD          Assessment/Plan   Primary diagnosis:   1  Syncope    * patient [facility] to [facility] [place] [facility] due to syncope with new Brugada type 2 pattern seen on ECG, EP consulted for ICD evaluation   * ECG reviewed and is consistent with brugada type 2 pattern, tele at [facility] per reports had been completely normal and tele at \[facility]\"" has been reviewed and normal    * patient did have ECG in 2018 at [facility] which was read as a normal ECG, our office is working on obtaining this ECG for our review    * at this time there are no reversible etiologies to describe this brugada pattern    * EF unknown but likely normal    * at this time our final recommendation is to be determined after we discuss the case with the Father and Mother together at 430pm, will be either    1  ICD implant for secondary prevention of SCD     2  Class 1C challenge in EP lab if positive ICD implant     3  Continued monitoring with ILR       Secondary diagnosis:   1  Autism   2  Seizure disorder      Cardiac Studies/Imaging:     Imaging: I have personally reviewed pertinent reports  ECHO: No results found for this or any previous visit  CATH/STRESS TEST:  None     EKG:       History of Present Illness   Physician Requesting Consult: Rufino Oquendo DO  Reason for Consult / Principal Problem: syncope     HPI: Barbara Cleary is a 31y o  year old male with a history as above who [place] [facility] [place] [facility] after episode of syncope with new brugada pattern present   EP consulted for ICD eval      History obtained from mother at bedside:   Two days ago patient was finishing vacuuming at his home when he suddenly dropped the vacuum  and fell on to the end to the sofa in the living room  Event was witnessed by his mother who reported his right arm locked to hook pattern but he did not have any tonic or clonic movements  Patient was completely unarousable for about 3-5 minutes with CPR not being started  Patient abruptly regain consciousness without any postictal state  This episode was different than his prior seizures which has not happened for quite some time  He did well hydrated throughout the day without any recent medication changes  Due to his event he was brought to [facility] where an EKG revealed new Brugada pattern  Due to his syncope and new Brugada pattern he was transferred to [facility] for EP evaluation  Currently patient is resting comfortably  Review of Systems  ROS as noted above, otherwise 12 point review of systems was performed and is negative         Historical Information   Past Medical History:   Diagnosis Date    Seizure-like activity ([facility] [address] )     last assessed: 7/11/16    Skin lesion     last assessed: 12/30/13    Skin rash     last assessed: 3/1/13    Staph aureus infection     last assessed: 8/20/13     Past Surgical History:   Procedure Laterality Date    ADENOIDECTOMY      DENTAL SURGERY      wisdom teeth    MYRINGOTOMY W/ TUBES      with ventilating tube insertion; x6     Social History     Substance and Sexual Activity   Alcohol Use Never    Frequency: Never     Social History     Substance and Sexual Activity   Drug Use Not on file     Social History     Tobacco Use   Smoking Status Never Smoker   Smokeless Tobacco Never Used     Family History: non-contributory    Meds/Allergies   Hospital Medications:   Current Facility-Administered Medications   Medication Dose Route Frequency    acetaminophen (TYLENOL) tablet 975 mg  975 mg Oral Q6H PRN    aluminum-magnesium hydroxide-simethicone (MYLANTA) oral suspension 30 mL  30 mL Oral J4M PRN    folic acid (FOLVITE) tablet 800 mcg  800 mcg Oral Daily    levETIRAcetam (KEPPRA) tablet 500 mg  500 mg Oral Q12H [address]    LORazepam (ATIVAN) injection 2 mg  2 mg Intravenous Q2H PRN Max 8/day    multivitamin-minerals (CENTRUM) tablet 1 tablet  1 tablet Oral Daily    ondansetron (ZOFRAN) injection 4 mg  4 mg Intravenous Q6H PRN    thiamine tablet 100 mg  100 mg Oral Daily     Home Medications:   Medications Prior to Admission   Medication    levETIRAcetam (KEPPRA) 500 mg tablet    Loratadine (CLARITIN) 10 MG CAPS       Allergies   Allergen Reactions    Cefaclor Hives    Sulfamethoxazole-Trimethoprim Hives       Objective   Vitals: Blood pressure 107/70, pulse 88, temperature 98 1 °F (36 7 °C), resp  rate 16, height 6' 1" (1 854 m), weight 84 1 kg (185 lb 6 5 oz), SpO2 97 %  Orthostatic Blood Pressures      Most Recent Value   Blood Pressure  107/70 filed at 03/29/2021 1056   Patient Position - Orthostatic VS  Lying filed at 03/29/2021 0300            Intake/Output Summary (Last 24 hours) at 3/29/2021 1340  Last data filed at 3/29/2021 1338  Gross per 24 hour   Intake 2335 ml   Output 2380 ml   Net -45 ml       Invasive Devices     Peripheral Intravenous Line            Peripheral IV 03/28/21 Left;Ventral (anterior) Forearm 1 day                Physical Exam  HENT:      Head: Normocephalic  Comments: Right lateral eye ecchymosis   Neck:      Musculoskeletal: Normal range of motion  Cardiovascular:      Rate and Rhythm: Normal rate and regular rhythm  Pulses: Normal pulses  Heart sounds: Normal heart sounds  Pulmonary:      Effort: Pulmonary effort is normal       Breath sounds: Normal breath sounds  Abdominal:      General: Abdomen is flat  Skin:     General: Skin is warm and dry  Neurological:      Mental Status: He is alert  Mental status is at baseline  Psychiatric:         Mood and Affect: Mood normal          Lab Results: I have personally reviewed pertinent lab results      Results from last 7 days   Lab Units 03/29/21  0511 03/28/21  6274 03/27/21  2144   WBC Thousand/uL 7 33 9 24 7 50   HEMOGLOBIN g/dL 15 9 15 2 15 2   HEMATOCRIT % 45 7 43 5 42 9   PLATELETS Thousands/uL 163 171 145*     Results from last 7 days   Lab Units 03/29/21  0511 03/28/21  0605 03/27/21  2144   POTASSIUM mmol/L 3 7 3 6 3 8   CHLORIDE mmol/L 110* 104 95*   CO2 mmol/L 29 26 29   BUN mg/dL 14 14 15   CREATININE mg/dL 1 01 0 81 0 91   CALCIUM mg/dL 8 9 8 7 8 9         Results from last 7 days   Lab Units 03/29/21  0511   MAGNESIUM mg/dL 2 4

## 2021-03-29 NOTE — ASSESSMENT & PLAN NOTE
Patient is a 31year old male with past medical history of seizures previously on Keppra 500 mg bid who presents with breakthrough seizures in setting of newly discovered possible Brugada rhythm pattern on EKG  Keppra completely weaned off in October of last year given no seizure activity of 2 5 years  Once weaned patient remained asx until this presentation  On presentation on the 27th patients initial episode was a 15 min episode of tonic activity with minimal trembling/abnormal breathing pattern  Second episode was similar to patients previous seizure history with tonic clonic shaking  Patients physical exam nonfocal and at baseline       Plan:  · Continue cardiology workup  · Will not get MRI or EEG as this would not change current management of seizures  · Increase keppra to 750 mg po bid  · Seizure precautions   · 2 mg IV ativan for any convulsive seizure activity lasting more than 1 minute  · If patient will eventually need pacemaker, or electronic device placement, consider MRI compatibility or notify neurology in the case we would like to get an MRI prior to placement   · No further inpatient neurology recommendations   · Will continue to follow as outpatient   · Patient has a follow up appointment with Dr Charlene Dahl on 4/20/21

## 2021-03-29 NOTE — ASSESSMENT & PLAN NOTE
Likely caused his syncopal epidose  Spoke with Dr Ashely Wolf from cardiology and he recommends transfer to [facility] for EP evaluation and likely ICD placement  Mother is aware and is in agreement      Spoke with Dr Ashlee Cleary who is accepting in transfer

## 2021-03-29 NOTE — UTILIZATION REVIEW
Notification of Inpatient Admission/Inpatient Authorization Request   This is a Notification of Inpatient Admission for [address]  Be advised that this patient was admitted to our facility under Inpatient Status  Contact Marcy Alvarez at [phone] for additional admission information  [facility] DEPT  DEDICATED -[phone]  Patient Name:   Loc Rosen   YOB: 1990       [address]   [address]:   [facility]  Tax ID: [id number]  NPI: [id number] Attending Provider/NPI:  Phone:  Address: Bella Bui Karen [[id number]]  [phone]  Same as the facility   Place of Service Code: 24 Place of Service Name:  [address] [place] [address]   Start Date: 3/28/21 0003 Discharge Date & Time: 3/28/2021  9:46 PM    Type of Admission: Inpatient Status Discharge Disposition   (if discharged): [address] [place] [address]   Patient Diagnoses: Seizure ([address] ) [R56 9]  Syncope [R55]  Abnormal EKG [R94 31]  Generalized tonic-clonic seizure ([address] ) [G40 409]  Traumatic injury of head, initial encounter [S09 90XA]     Orders: Admission Orders (From admission, onward)     Ordered        03/28/21 0003  Inpatient Admission  Once                    Assigned Utilization Review Contact: [address] [place] [address] Utilization Review Department  Phone: [phone]; Fax [phone]  Email: [email or web address]   ATTENTION PAYERS: Please call the assigned Utilization  directly with any questions or concerns ALL voicemails in the department are confidential  Send all requests for admission clinical reviews, approved or denied determinations and any other requests to dedicated fax number belonging to the campus where the patient is receiving treatment

## 2021-03-29 NOTE — UTILIZATION REVIEW
Notification of Inpatient Admission/Inpatient Authorization Request   This is a Notification of Inpatient Admission for [address]  Be advised that this patient was admitted to our facility under Inpatient Status  Contact [place] at [phone] for additional admission information  [facility] DEPT  DEDICATED -[phone]  Patient Name:   Arlene Ascencio   YOB: 1990       [address]   [address]:   Lita Perez  Tax ID: [id number]  NPI: [id number] Attending Provider/NPI:  Phone:  Address: Jeannine Jarrett [[id number]]   [phone]  Same as Facility   Place of Service Code: 24 Place of Service Name:  [address] [place] [address]   Start Date: 3/28/21 2207 Discharge Date & Time: No discharge date for patient encounter  Type of Admission: Inpatient Status Discharge Disposition (if discharged): [address] [place] [address]   Patient Diagnoses: Seizure [facility]) [R56 9]     Orders: Admission Orders (From admission, onward)     Ordered        03/28/21 2209  Inpatient Admission  Once                    Assigned Utilization Review Contact: [place]  Utilization   Network Utilization Review Department  Phone: [phone]; Fax [phone]  Email: [email or web address]   ATTENTION PAYERS: Please call the assigned Utilization  directly with any questions or concerns ALL voicemails in the department are confidential  Send all requests for admission clinical reviews, approved or denied determinations and any other requests to dedicated fax number belonging to the campus where the patient is receiving treatment

## 2021-03-29 NOTE — ASSESSMENT & PLAN NOTE
· Patient with history seizure presented with syncope vs  Seizure  · Struck his head and face, when he fell  · No fecal or urinary incontinance  · Head CT negative  · Cervical CT negative  · EKG at the time was considered and Brugada type 2  · For following for EP study

## 2021-03-29 NOTE — PROGRESS NOTES
[facility] Internal Medicine Progress Note  Patient: Dana Hoff 31 y o  male   MRN: [id number]  PCP: Sudheer Rhoades DO  Unit/Bed#: [facility] 423-01 Encounter: [id number]  Date Of Visit: 03/29/21    Assessment/Plan:    Brugada syndrome  Assessment & Plan  EKG suggestive of Brugada syndrome, possible syncope  CT head neg  Neurology consulted  V EEG monitoring for 24 hours  Seizure precautions  Ativan as needed  Fall precautions  Keep HOB>30  Cardiology consulted  Further testing to be completed tomorrow    * Syncope and collapse  Assessment & Plan  Patient with history seizure presented with syncope vs  Seizure  Struck his head and face, when he fell  No fecal or urinary incontinance  Head CT negative  Cervical CT negative  EKG at the time was considered and Brugada type 2  EP for an ICD placement    Generalized tonic-clonic seizure [facility])  Assessment & Plan  Seizure   Presented s/p fall and sustained facial injury  Syncope vs  Seizure was considered  He was taken off of Keppra in October 2020  Since that time he did not have any seizures  He has unresponsive episode where he became stiff and fell leaning forward  No tonic-clonic motion noted  He was down for about 3-5 min, lethargic post seizure  Zofran as needed   Keppra 500 mg Q12H  Tonic clonic seizure documented 45 minutes after arrival in ED      Autism disorder  Assessment & Plan  Very minimally verbal   Mother will stay at bedside for assistance with communication         VTE Pharmacologic Prophylaxis:   Pharmacologic:   Mechanical VTE Prophylaxis in Place: No    Patient Centered Rounds: I have performed bedside rounds with nursing staff today  Discussions with Specialists or Other Care Team Provider:     Education and Discussions with Family / Patient:     Time Spent for Care: 15 minutes  More than 50% of total time spent on counseling and coordination of care as described above      Current Length of Stay: 1 day(s)    Current Patient Status: Inpatient   Certification Statement: The patient will continue to require additional inpatient hospital stay due to Further testing for Brugada Syndrome    Discharge Plan / Estimated Discharge Date: unknown     Code Status: Level 1 - Full Code      Subjective: Mother was at bedside, during examination  Mother stated that he does not have any complaints  She showed me his right eye, which had some dried blood on the outer corner  She said he is drinking and eating well  Objective:     Vitals:   Temp (24hrs), Av 8 °F (36 6 °C), Min:97 °F (36 1 °C), Max:98 3 °F (36 8 °C)    Temp:  [97 °F (36 1 °C)-98 3 °F (36 8 °C)] 98 1 °F (36 7 °C)  HR:  [] 88  Resp:  [16-20] 16  BP: ()/(63-76) 107/70  SpO2:  [96 %-97 %] 97 %  Body mass index is 24 46 kg/m²  Input and Output Summary (last 24 hours): Intake/Output Summary (Last 24 hours) at 3/29/2021 1352  Last data filed at 3/29/2021 1338  Gross per 24 hour   Intake 2335 ml   Output 2380 ml   Net -45 ml       Physical Exam:     Physical Exam  HENT:      Head: Normocephalic  Nose: Nose normal       Mouth/Throat:      Mouth: Mucous membranes are moist    Eyes:      General:         Right eye: Discharge present  Extraocular Movements: Extraocular movements intact  Neck:      Musculoskeletal: Normal range of motion and neck supple  Cardiovascular:      Rate and Rhythm: Tachycardia present  Pulmonary:      Effort: Pulmonary effort is normal       Breath sounds: Normal breath sounds  Abdominal:      General: Abdomen is flat  Palpations: Abdomen is soft  Musculoskeletal: Normal range of motion  Skin:     General: Skin is warm and dry  Neurological:      Mental Status: He is alert           Additional Data:     Labs:    Results from last 7 days   Lab Units 21  0511   WBC Thousand/uL 7 33   HEMOGLOBIN g/dL 15 9   HEMATOCRIT % 45 7   PLATELETS Thousands/uL 163   NEUTROS PCT % 68   LYMPHS PCT % 21   MONOS PCT % 10   EOS PCT % 1 Results from last 7 days   Lab Units 03/29/21  0511  03/27/21  2144   POTASSIUM mmol/L 3 7   < > 3 8   CHLORIDE mmol/L 110*   < > 95*   CO2 mmol/L 29   < > 29   BUN mg/dL 14   < > 15   CREATININE mg/dL 1 01   < > 0 91   CALCIUM mg/dL 8 9   < > 8 9   ALK PHOS U/L  --   --  67   ALT U/L  --   --  26   AST U/L  --   --  18    < > = values in this interval not displayed  * I Have Reviewed All Lab Data Listed Above  * Additional Pertinent Lab Tests Reviewed: [facility] Reviewed    Imaging:    Imaging Reports Reviewed Today Include:   Imaging Personally Reviewed by Myself Includes:  ECG, CT head, CT cervical spine    Recent Cultures (last 7 days):           Last 24 Hours Medication List:   Current Facility-Administered Medications   Medication Dose Route Frequency Provider Last Rate    acetaminophen  975 mg Oral Q6H PRN Marlys Concepcion MD      aluminum-magnesium hydroxide-simethicone  30 mL Oral Q6H PRN Marlys Concepcion MD      folic acid  213 mcg Oral Daily Marlys Concepcion MD      levETIRAcetam  500 mg Oral Q12H [address] Marlys Concepcion MD      LORazepam  2 mg Intravenous Q2H PRN Max 8/day Marlys Concepcion MD      multivitamin-minerals  1 tablet Oral Daily Marlys Concepcion MD      ondansetron  4 mg Intravenous Q6H PRN Marlys Concepcion MD      thiamine  100 mg Oral Daily Marlys Concepcion MD          Today, Patient Was Seen By: Nova Muro    ** Please Note: This note has been constructed using a voice recognition system   **

## 2021-03-29 NOTE — CASE MANAGEMENT
Spoke with mom, explained CM program/CM role  LOS-1  Bundle-no  Unplanned readmission color-green  27 day readmission-no  Resides with parents, 2 SH, 3 MARLON  I PTA for ADL's/ambulation, family transports, disabled  PCP Dr Jensen Geiger  Denies HC/DME/IP Rehab  Denies MH illness, D&A abuse, patient is autistic  Primary contact is mother Alease Severs, [phone]  No POA/LW  Family will transport home    CM reviewed d/c planning process including the following: identifying help at home, patient preference for d/c planning needs, Discharge Lounge, [facility] Meds to Bed program, availability of treatment team to discuss questions or concerns patient and/or family may have regarding understanding medications and recognizing signs and symptoms once discharged  CM also encouraged patient to follow up with all recommended appointments after discharge  Patient advised of importance for patient and family to participate in managing patients medical well being  Patient/caregiver received discharge checklist  Content reviewed  Patient/caregiver encouraged to participate in discharge plan of care prior to discharge home

## 2021-03-29 NOTE — CONSULTS
NEUROLOGY RESIDENCY CONSULT NOTE     Name: Berta Carver   Age & Sex: 31 y o  male   MRN: [id number]  Unit/Bed#: [facility] 423-01   Encounter: [id number]  Length of Stay: 1    ASSESSMENT & PLAN     Generalized tonic-clonic seizure [facility])  Assessment & Plan  Patient is a 31year old male with past medical history of seizures previously on Keppra 500 mg bid who presents with breakthrough seizures in setting of newly discovered possible Brugada rhythm pattern on EKG  Keppra completely weaned off in October of last year given no seizure activity of 2 5 years  Once weaned patient remained asx until this presentation  On presentation on the 27th patients initial episode was a 15 min episode of tonic activity with minimal trembling/abnormal breathing pattern  Second episode was similar to patients previous seizure history with tonic clonic shaking  Patients physical exam nonfocal and at baseline  Plan:  Continue cardiology workup  Will not get MRI or EEG as this would not change current management of seizures  Increase keppra to 750 mg po bid  Seizure precautions   2 mg IV ativan for any convulsive seizure activity lasting more than 1 minute  If patient will eventually need pacemaker, or electronic device placement, consider MRI compatibility or notify neurology in the case we would like to get an MRI prior to placement   No further inpatient neurology recommendations   Will continue to follow as outpatient   Patient has a follow up appointment with Dr Juan Storey on 4/20/21      Patient has follow up appointment with Dr Juan Storey already scheduled for 4/20/21    SUBJECTIVE     Reason for Consult / Principal Problem: Seizure   Hx and PE limited by: Hx of autism     HPI: Berta Carver is a 31 y o   male with history significant for autism and occasional seizures with last being 2 and half years ago recently titrated off Keppra 500 mg b i d  and no known cardiac history who presents with seizure-like  activity   Mother endorses for the past few weeks patient seemed "off"  An example she give is him not eating the stuffed mushrooms he likes so much from giant as well as staring off into space (she endorses this is typical of him)  03/27 patient was helping his mother on house when he lost consciousness and became stiff while wrapping a cord around the vacuum while putting the vacuum away  He suddenly dropped the vacuum hose, leaned over the the back of the lazy boy and hit the right side of his head  During this time, mother states he seemed to have stiffened he went down and then he was lying on the ground with an unusual breathing pattern is slightly trembling while staff  The episode lasted for about 15 minutes and then she called EMS  EMS was able to slowly sit him up and stand him up to walk him into the ambulance  Mothered rode the ambulance with him and noted slow improvement  On arrival to the ED /95, HR 73, RR 20 and SpO2 97%  CTH normal   About 45 min to an hour after being in the ED he suddenly stiffened up and he had a generalized tonic-clonic event for a couple of minutes  Event similar to his event 2 5 years ago  Ativan and keppra 1000 mg given and no further events reported since that time  Patient evaluated by cardiology who reviewed EKG and became concerned for Brugada syndrome and recommended transfer to [facility] for electrophysiology evaluation     In terms of his seizure history, first seizure was approximately 5 years ago while at work  His mother was told that he had to have CPR  They went to the ED but then was discharged  Second seizure was 2 5 years ago was at home while he was sitting in a chair  He became stiff followed by slumping over and full body shaking  At this time he was admitted at [place] and started on 500 mg bid of keppra  While on Keppra, no further seizure activity   Used to be followed by Dr Corey Hancock and mid October last year he was completely titrated off the [address] as he had been seizure free while on it  Inpatient consult to Neurology     Performed by  Vicente Bergeron MD     Authorized by Mamta Ernst MD              Historical Information   Past Medical History:   Diagnosis Date    Seizure-like activity [facility])     last assessed: 16    Skin lesion     last assessed: 13    Skin rash     last assessed: 3/1/13    Staph aureus infection     last assessed: 13     Past Surgical History:   Procedure Laterality Date    ADENOIDECTOMY      DENTAL SURGERY      wisdom teeth    MYRINGOTOMY W/ TUBES      with ventilating tube insertion; x6     Social History   Social History     Substance and Sexual Activity   Alcohol Use Never    Frequency: Never     Social History     Substance and Sexual Activity   Drug Use Not on file     E-Cigarette/Vaping    E-Cigarette Use Never User      E-Cigarette/Vaping Substances     Social History     Tobacco Use   Smoking Status Never Smoker   Smokeless Tobacco Never Used     Family History: non-contributory  Meds/Allergies   all current active meds have been reviewed  Allergies   Allergen Reactions    Cefaclor Hives    Sulfamethoxazole-Trimethoprim Hives       Review of previous medical records was  completed  Review of Systems   Unable to perform ROS: Mental status change       OBJECTIVE     Patient ID: Gia Browne is a 31 y o  male  Vitals:   Vitals:    21 0449 21 0712 21 1056 21 1453   BP: 98/63 104/66 107/70 109/69   BP Location:       Pulse: 77 89 88 92   Resp:  18 16 16   Temp:  97 5 °F (36 4 °C) 98 1 °F (36 7 °C) 97 7 °F (36 5 °C)   TempSrc:       SpO2: 96% 96% 97% 96%   Weight:       Height:          Body mass index is 24 46 kg/m²       Intake/Output Summary (Last 24 hours) at 3/29/2021 1525  Last data filed at 3/29/2021 1338  Gross per 24 hour   Intake 2335 ml   Output 2380 ml   Net -45 ml       Temperature:   Temp (24hrs), Av 9 °F (36 6 °C), Min:97 5 °F (36 4 °C), Max:98 3 °F (36 8 °C)    Temperature: 97 7 °F (36 5 °C)    Invasive Devices: Invasive Devices     Peripheral Intravenous Line            Peripheral IV 03/28/21 Left;Ventral (anterior) Forearm 1 day                Physical Exam  Eyes:      Extraocular Movements: EOM normal       Pupils: Pupils are equal, round, and reactive to light  Neurological:      Deep Tendon Reflexes:      Reflex Scores:       Bicep reflexes are 2+ on the right side and 2+ on the left side  Brachioradialis reflexes are 2+ on the right side and 2+ on the left side  Patellar reflexes are 2+ on the right side and 2+ on the left side  Achilles reflexes are 2+ on the right side and 2+ on the left side  Neurologic Exam     Mental Status   Unable to assess mental status properly given his baseline cognition   Patient follows simple commands with mothers reinforcement   Able to write simple words and read them      Cranial Nerves     CN III, IV, VI   Pupils are equal, round, and reactive to light  Extraocular motions are normal    Nystagmus: none   Ophthalmoparesis: none    CN VII   Facial expression full, symmetric  CN VIII   CN VIII normal      CN XI   CN XI normal      CN XII   CN XII normal      Motor Exam     Strength   Right biceps: 5/5  Left biceps: 5/5  Right triceps: 5/5  Left triceps: 5/5Limited due to cognition  Moves all extremities independently   No asymmetric weakness noted      Gait, Coordination, and Reflexes     Tremor   Resting tremor: absent    Reflexes   Right brachioradialis: 2+  Left brachioradialis: 2+  Right biceps: 2+  Left biceps: 2+  Right patellar: 2+  Left patellar: 2+  Right achilles: 2+  Left achilles: 2+  Right plantar: normal  Left plantar: normal     LABORATORY DATA     Labs: I have personally reviewed pertinent reports      Results from last 7 days   Lab Units 03/29/21  0511 03/28/21  0605 03/27/21  2144   WBC Thousand/uL 7 33 9 24 7 50   HEMOGLOBIN g/dL 15 9 15 2 15 2   HEMATOCRIT % 45 7 43 5 42 9   PLATELETS Thousands/uL 163 171 145*   NEUTROS PCT % 68 80* 75   MONOS PCT % 10 7 6      Results from last 7 days   Lab Units 03/29/21  0511 03/28/21  0605 03/27/21  2144   POTASSIUM mmol/L 3 7 3 6 3 8   CHLORIDE mmol/L 110* 104 95*   CO2 mmol/L 29 26 29   BUN mg/dL 14 14 15   CREATININE mg/dL 1 01 0 81 0 91   CALCIUM mg/dL 8 9 8 7 8 9   ALK PHOS U/L  --   --  67   ALT U/L  --   --  26   AST U/L  --   --  18     Results from last 7 days   Lab Units 03/29/21  0511   MAGNESIUM mg/dL 2 4                  Results from last 7 days   Lab Units 03/27/21  2208   TROPONIN I ng/mL <0 02       IMAGING & DIAGNOSTIC TESTING     Radiology Results: I have personally reviewed pertinent reports  No orders to display       Other Diagnostic Testing: I have personally reviewed pertinent reports  ACTIVE MEDICATIONS     Current Facility-Administered Medications   Medication Dose Route Frequency    acetaminophen (TYLENOL) tablet 975 mg  975 mg Oral Q6H PRN    aluminum-magnesium hydroxide-simethicone (MYLANTA) oral suspension 30 mL  30 mL Oral U3L PRN    folic acid (FOLVITE) tablet 800 mcg  800 mcg Oral Daily    levETIRAcetam (KEPPRA) tablet 500 mg  500 mg Oral Q12H [address]    LORazepam (ATIVAN) injection 2 mg  2 mg Intravenous Q2H PRN Max 8/day    multivitamin-minerals (CENTRUM) tablet 1 tablet  1 tablet Oral Daily    ondansetron (ZOFRAN) injection 4 mg  4 mg Intravenous Q6H PRN    thiamine tablet 100 mg  100 mg Oral Daily       Prior to Admission medications    Medication Sig Start Date End Date Taking?  Authorizing Provider   levETIRAcetam (KEPPRA) 500 mg tablet TAKE 1 TABLET BY MOUTH TWICE A DAY  Patient not taking: Reported on 3/27/2021 9/30/20   Sophie Bermudez MD   Loratadine (CLARITIN) 10 MG CAPS Take 10 mg by mouth as needed      Historical Provider, MD         CODE STATUS & ADVANCED DIRECTIVES     Code Status: Level 1 - Full Code  Advance Directive and Living Will:      Power of :    POLST:        VTE Pharmacologic Prophylaxis: Does not meet criteria   VTE Mechanical Prophylaxis: sequential compression device    ==  Yaya Silva MD   [address] Neurology Residency, PGY-2

## 2021-03-29 NOTE — UTILIZATION REVIEW
Initial Clinical Review    Admission: Date/Time/Statement:   Admission Orders (From admission, onward)     Ordered        03/28/21 2209  Inpatient Admission  Once                   Orders Placed This Encounter   Procedures    Inpatient Admission     Standing Status:   Standing     Number of Occurrences:   1     Order Specific Question:   Admitting Physician     Answer:   Alfa Ames [[id number]]     Order Specific Question:   Level of Care     Answer:   Med Surg [16]     Order Specific Question:   Estimated length of stay     Answer:   More than 2 Midnights     Order Specific Question:   Certification     Answer:   I certify that inpatient services are medically necessary for this patient for a duration of greater than two midnights  See H&P and MD Progress Notes for additional information about the patient's course of treatment  Assessment/Plan: 31year old male, presented to the ED @ [address] [place] [address], Admitted, Transferred to [facility], higher level of care, via EMS  Admitted as Inpatient due to syncope  PTA, fell backwards and and hit his brow on the sofa  CT head Negative  ECG at that time was considered Brugada type 2 and transferred to [facility] for Consult EPS  Consult Neuro  Autism - very minimally verbal       VTE Prophylaxis: Heparin  / sequential compression device     03/29/2021  Consult Neuro:  Continue cardio work up  Will not get MRI or EEG for this would not change current management of seizures  Increase Keppra to 750mg PO BID  Seizure precautions  2mg IV ativan for nilsa convulsive seizure activity lasting > 1 min         03/26/2021  Consult EPS  ECG reviewed and is consistent with brugada type 2 pattern  At this time there are no reversible etiologies to describe this brugada pattern  At this time our final recommendation is to be determined after we discuss the case with the Father and Mother together at 430pm, will be either:  1  ICD implant for secondary prevention of SCD  2  Class 1C challenge in EP lab if positive ICD implant    3  Continued monitoring with ILR        Triage Vitals [03/28/21 2232]   Temperature Pulse Respirations Blood Pressure SpO2   98 1 °F (36 7 °C) 100 18 116/68 97 %      Temp Source Heart Rate Source Patient Position - Orthostatic VS BP Location FiO2 (%)   Oral Monitor Lying Right arm --      Pain Score       --          Wt Readings from Last 1 Encounters:   03/28/21 84 1 kg (185 lb 6 5 oz)     Additional Vital Signs:   Date/Time  Temp  Pulse  Resp  BP  MAP (mmHg)  SpO2  O2 Device  Patient Position - Orthostatic VS   03/29/21 14:53:02  97 7 °F (36 5 °C)  92  16  109/69  82  96 %  --  --   03/29/21 10:56:52  98 1 °F (36 7 °C)  88  16  107/70  82  97 %  --  --   03/29/21 0800  --  --  --  --  --  --  None (Room air)  --   03/29/21 07:12:47  97 5 °F (36 4 °C)  89  18  104/66  79  96 %  --  --   03/29/21 04:49:30  --  77  --  98/63  75  96 %  --  --   03/29/21 0300  98 3 °F (36 8 °C)  71  17  99/66  77  97 %  None (Room air)  Lying     Date and Time Eye Opening Best Verbal Response Best Motor Response Khadra Coma Scale Score   03/29/21 1200 4 3 6 13   03/29/21 0800 4 3 6 13   03/28/21 2300 4 3 6 13   03/28/21 2000 4 4 6 14   03/28/21 0900 4 3 6 13   03/28/21 0200 4 3 6 13     Pertinent Labs/Diagnostic Test Results:     Results from last 7 days   Lab Units 03/29/21  0511 03/28/21  0605 03/27/21  2144   WBC Thousand/uL 7 33 9 24 7 50   HEMOGLOBIN g/dL 15 9 15 2 15 2   HEMATOCRIT % 45 7 43 5 42 9   PLATELETS Thousands/uL 163 171 145*   NEUTROS ABS Thousands/µL 4 97 7 46 5 70     Results from last 7 days   Lab Units 03/29/21  0511 03/28/21  0605 03/27/21  2144   SODIUM mmol/L 141 140 133*   POTASSIUM mmol/L 3 7 3 6 3 8   CHLORIDE mmol/L 110* 104 95*   CO2 mmol/L 29 26 29   ANION GAP mmol/L 2* 10 9   BUN mg/dL 14 14 15   CREATININE mg/dL 1 01 0 81 0 91   EGFR ml/min/1 73sq m 101 121 114   CALCIUM mg/dL 8 9 8 7 8 9   MAGNESIUM mg/dL 2 4  --   --      Results from last 7 days   Lab Units 03/27/21  2144   AST U/L 18   ALT U/L 26   ALK PHOS U/L 67   TOTAL PROTEIN g/dL 6 7   ALBUMIN g/dL 3 7   TOTAL BILIRUBIN mg/dL 0 46     Results from last 7 days   Lab Units 03/29/21  0511 03/28/21  0605 03/27/21  2144   GLUCOSE RANDOM mg/dL 86 90 101     Results from last 7 days   Lab Units 03/27/21  2208   TROPONIN I ng/mL <0 02     Results from last 7 days   Lab Units 03/28/21  0605   TSH 3RD GENERATON uIU/mL 0 953     Past Medical History:   Diagnosis Date    Seizure-like activity ([facility] [address] )     last assessed: 7/11/16    Skin lesion     last assessed: 12/30/13    Skin rash     last assessed: 3/1/13    Staph aureus infection     last assessed: 8/20/13     Present on Admission:   Autism disorder   Generalized tonic-clonic seizure ([facility] [address] )   Syncope and collapse      Admitting Diagnosis: [facility] ([facility] [R56 9]  Age/Sex: 31 y o  male  Admission Orders:  Scheduled Medications:  folic acid, 664 mcg, Oral, Daily  levETIRAcetam, 500 mg, Oral, Q12H [address]  multivitamin-minerals, 1 tablet, Oral, Daily  thiamine, 100 mg, Oral, Daily      Continuous IV Infusions:     PRN Meds:  acetaminophen, 975 mg, Oral, Q6H PRN  aluminum-magnesium hydroxide-simethicone, 30 mL, Oral, Q6H PRN  LORazepam, 2 mg, Intravenous, Q2H PRN Max 8/day  ondansetron, 4 mg, Intravenous, Q6H PRN      Seizure precautions  Telemetry  Kranthi SCDs  IP CONSULT TO NEUROLOGY  IP CONSULT TO CARDIOLOGY    Network Utilization Review Department  ATTENTION: Please call with any questions or concerns to [phone] and carefully listen to the prompts so that you are directed to the right person  All voicemails are confidential   [facility] all requests for admission clinical reviews, approved or denied determinations and any other requests to dedicated fax number below belonging to the campus where the patient is receiving treatment   List of dedicated fax numbers for the Facilities:  FACILITY NAME UR FAX NUMBER   ADMISSION DENIALS (Administrative/Medical [facility]) [phone]   [address] (Maternity/NICU/Pediatrics) [address] [facility],[address]Th Floor [facility] 125 [facility]  [phone]   [address] (Michelle [address] "[address]) [address] [place] [address] Anisa Garvin [facility] [phone]   [place] [address] [phone]

## 2021-03-29 NOTE — ASSESSMENT & PLAN NOTE
Patient with history seizure presented with syncope vs  Seizure  He fell backwards on Lazy boy sofa and hit his brow  No fecal or urinary incontinance  Head CT negative  EKG at the time was considered and Brugada type 2, being transferred to [place] for evaluation by EP for an ICD placement

## 2021-03-29 NOTE — H&P
Kylah [id number] 1990, 31 y o  male MRN: [id number]  Unit/Bed#: [facility] 423-01 Encounter: [id number]  Primary Care Provider: Roz Arora DO   Date and time admitted to hospital: No admission date for patient encounter  * Fall  Assessment & Plan  Patient with history seizure presented with syncope vs  Seizure  He fell backwards on Lazy boy sofa and hit his brow  No fecal or urinary incontinance  Head CT negative  EKG at the time was considered and Brugada type 2, being transferred to [facility] for evaluation by EP for an ICD placement    Brugada syndrome  Assessment & Plan  EKG suggestive of Brugada syndrome, possible syncope  CT head neg  Neurology consulted  V EEG monitoring for 24 hours  Seizure precautions  Ativan as needed  Fall precautions  Keep HOB>30    Generalized tonic-clonic seizure ([facility] [address] )  Assessment & Plan  Seizure   Presented s/p fall and sustained facial injury  Syncope vs  Seizure was considered  He was taken off of Keppra in October 2020  Since that time he did not have any seizures  He has unresponsive episode where he became stiff and fell leaning forward  No tonic-clonic motion noted  He was down for about 3-5 min, lethargic post seizure  He received 1 mg of ativan and 2 mg Zofran and Keppra loaded  Autism disorder  Assessment & Plan  Very minimally verbal   Mother will stay at bedside for assistance with communication       VTE Prophylaxis: Heparin  / sequential compression device   Code Status: full code   POLST: POLST is not applicable to this patient  Discussion with family: mother at bedside     Anticipated Length of Stay:  Patient will be admitted on an Inpatient basis with an anticipated length of stay of  More than 2 midnights  Justification for Hospital Stay: due to syncope vs  Seizure     Total Time for Visit, including Counseling / Coordination of Care: 45 minutes    Greater than 50% of this total time spent on direct patient counseling and coordination of care  Chief Complaint:   syncope    History of Present Illness:    Rohith Jacobsen is a 31 y o  male with history of seizure disorder? And autism, presented to [facility] with symptom could be versus seizure disorder  Has a history of autism and has previous questionable seizures  Mother recalled to episodes of seizures in his lifetime  He was taken off of Keppra in October 2020 because he did not have any seizures for a long time  He became unresponsive yesterday while playing with a vacuum  which is not plugged than he fell and hit his cheek and right brow on the lazy Boy chair  He loss consciousness about 3-5 minutes  He has no tonic clonic movement noted  He was lethargic afterwards but difficult to arouse due to fact that he is minimally verbal at baseline  EKG done in the hospital was questionable regard at type 2 syndrome  He is being transferred [facility] for evaluation by EP for an ICD placement and for seizures per Neurology  Review of Systems:    Review of Systems   Constitutional: Negative  HENT: Negative  Eyes: Negative  Respiratory: Negative  Cardiovascular: Negative  Gastrointestinal: Negative  Genitourinary: Negative  Musculoskeletal: Negative  Skin: Negative  Neurological: Positive for seizures and weakness  Hematological: Negative  Psychiatric/Behavioral: Negative  All other systems reviewed and are negative        Past Medical and Surgical History:     Past Medical History:   Diagnosis Date    Seizure-like activity ([facility] [address] )     last assessed: 7/11/16    Skin lesion     last assessed: 12/30/13    Skin rash     last assessed: 3/1/13    Staph aureus infection     last assessed: 8/20/13       Past Surgical History:   Procedure Laterality Date    ADENOIDECTOMY      DENTAL SURGERY      wisdom teeth    MYRINGOTOMY W/ TUBES      with ventilating tube insertion; x6       Meds/Allergies:    Prior to Admission medications    Medication Sig Start Date End Date Taking? Authorizing Provider   levETIRAcetam (KEPPRA) 500 mg tablet TAKE 1 TABLET BY MOUTH TWICE A DAY  Patient not taking: Reported on 3/27/2021 9/30/20   Dana Grossman MD   Loratadine (CLARITIN) 10 MG CAPS Take 10 mg by mouth as needed      Historical Provider, MD     I have reviewed home medications with patient personally  Allergies: Allergies   Allergen Reactions    Cefaclor Hives    Sulfamethoxazole-Trimethoprim Hives       Social History:     Marital Status: Single   Occupation:       Substance Use History:   Social History     Substance and Sexual Activity   Alcohol Use Never    Frequency: Never     Social History     Tobacco Use   Smoking Status Never Smoker   Smokeless Tobacco Never Used     Social History     Substance and Sexual Activity   Drug Use Not on file       Family History:    Family History   Problem Relation Age of Onset    Breast cancer Maternal Grandmother     Cancer Maternal Grandfather         Salivary gland cancer    Other Paternal Grandmother         brain tumor    COPD Paternal Grandmother     Hypertension Paternal Grandmother     No Known Problems Mother     No Known Problems Father        Physical Exam:     Vitals:        Physical Exam  Vitals signs and nursing note reviewed  HENT:      Head: Normocephalic and atraumatic  Eyes:      General: No scleral icterus  Right eye: No discharge  Extraocular Movements: Extraocular movements intact  Conjunctiva/sclera: Conjunctivae normal       Pupils: Pupils are equal, round, and reactive to light  Cardiovascular:      Rate and Rhythm: Normal rate  Heart sounds: No murmur  No friction rub  No gallop  Pulmonary:      Effort: Pulmonary effort is normal  No respiratory distress  Breath sounds: Normal breath sounds  No stridor  No wheezing, rhonchi or rales  Chest:      Chest wall: No tenderness     Abdominal:      General: Abdomen is flat  Bowel sounds are normal  There is no distension  Palpations: Abdomen is soft  There is no mass  Tenderness: There is no abdominal tenderness  Hernia: No hernia is present  Musculoskeletal: Normal range of motion  General: No swelling, tenderness, deformity or signs of injury  Right lower leg: No edema  Left lower leg: No edema  Skin:     General: Skin is warm and dry  Coloration: Skin is not jaundiced  Findings: No bruising  Neurological:      General: No focal deficit present  Mental Status: He is alert  Additional Data:     Lab Results: I have personally reviewed pertinent reports  Results from last 7 days   Lab Units 03/28/21  0605   WBC Thousand/uL 9 24   HEMOGLOBIN g/dL 15 2   HEMATOCRIT % 43 5   PLATELETS Thousands/uL 171   NEUTROS PCT % 80*   LYMPHS PCT % 11*   MONOS PCT % 7   EOS PCT % 1     Results from last 7 days   Lab Units 03/28/21  0605 03/27/21  2144   SODIUM mmol/L 140 133*   POTASSIUM mmol/L 3 6 3 8   CHLORIDE mmol/L 104 95*   CO2 mmol/L 26 29   BUN mg/dL 14 15   CREATININE mg/dL 0 81 0 91   ANION GAP mmol/L 10 9   CALCIUM mg/dL 8 7 8 9   ALBUMIN g/dL  --  3 7   TOTAL BILIRUBIN mg/dL  --  0 46   ALK PHOS U/L  --  67   ALT U/L  --  26   AST U/L  --  18   GLUCOSE RANDOM mg/dL 90 101                       Imaging: I have personally reviewed pertinent reports  No orders to display       EKG, Pathology, and Other Studies Reviewed on Admission:   · EKG:  brugada in V2 lead    Allscripts / Epic Records Reviewed: Yes     ** Please Note: This note has been constructed using a voice recognition system   **

## 2021-03-30 ENCOUNTER — ANESTHESIA EVENT (INPATIENT)
Dept: NON INVASIVE DIAGNOSTICS | Facility: HOSPITAL | Age: 31
DRG: 179 | End: 2021-03-30
Payer: COMMERCIAL

## 2021-03-30 LAB
ANION GAP SERPL CALCULATED.3IONS-SCNC: 4 MMOL/L (ref 4–13)
ATRIAL RATE: 76 BPM
ATRIAL RATE: 78 BPM
ATRIAL RATE: 78 BPM
ATRIAL RATE: 79 BPM
ATRIAL RATE: 90 BPM
BUN SERPL-MCNC: 13 MG/DL (ref 5–25)
CALCIUM SERPL-MCNC: 9.6 MG/DL (ref 8.3–10.1)
CHLORIDE SERPL-SCNC: 107 MMOL/L (ref 100–108)
CO2 SERPL-SCNC: 28 MMOL/L (ref 21–32)
CREAT SERPL-MCNC: 0.84 MG/DL (ref 0.6–1.3)
ERYTHROCYTE [DISTWIDTH] IN BLOOD BY AUTOMATED COUNT: 12.3 % (ref 11.6–15.1)
GFR SERPL CREATININE-BSD FRML MDRD: 119 ML/MIN/1.73SQ M
GLUCOSE SERPL-MCNC: 86 MG/DL (ref 65–140)
HCT VFR BLD AUTO: 49.3 % (ref 36.5–49.3)
HGB BLD-MCNC: 16.9 G/DL (ref 12–17)
MCH RBC QN AUTO: 30.2 PG (ref 26.8–34.3)
MCHC RBC AUTO-ENTMCNC: 34.3 G/DL (ref 31.4–37.4)
MCV RBC AUTO: 88 FL (ref 82–98)
P AXIS: 52 DEGREES
P AXIS: 57 DEGREES
P AXIS: 59 DEGREES
P AXIS: 60 DEGREES
P AXIS: 61 DEGREES
PLATELET # BLD AUTO: 178 THOUSANDS/UL (ref 149–390)
PMV BLD AUTO: 10 FL (ref 8.9–12.7)
POTASSIUM SERPL-SCNC: 4.3 MMOL/L (ref 3.5–5.3)
PR INTERVAL: 148 MS
PR INTERVAL: 166 MS
PR INTERVAL: 168 MS
QRS AXIS: -2 DEGREES
QRS AXIS: -38 DEGREES
QRS AXIS: -40 DEGREES
QRS AXIS: -44 DEGREES
QRS AXIS: -46 DEGREES
QRSD INTERVAL: 102 MS
QRSD INTERVAL: 104 MS
QRSD INTERVAL: 104 MS
QRSD INTERVAL: 106 MS
QRSD INTERVAL: 106 MS
QT INTERVAL: 352 MS
QT INTERVAL: 416 MS
QT INTERVAL: 416 MS
QT INTERVAL: 420 MS
QT INTERVAL: 422 MS
QTC INTERVAL: 430 MS
QTC INTERVAL: 472 MS
QTC INTERVAL: 474 MS
QTC INTERVAL: 477 MS
QTC INTERVAL: 481 MS
RBC # BLD AUTO: 5.6 MILLION/UL (ref 3.88–5.62)
SODIUM SERPL-SCNC: 139 MMOL/L (ref 136–145)
T WAVE AXIS: 63 DEGREES
T WAVE AXIS: 67 DEGREES
T WAVE AXIS: 67 DEGREES
T WAVE AXIS: 68 DEGREES
T WAVE AXIS: 68 DEGREES
VENTRICULAR RATE: 76 BPM
VENTRICULAR RATE: 78 BPM
VENTRICULAR RATE: 78 BPM
VENTRICULAR RATE: 79 BPM
VENTRICULAR RATE: 90 BPM
WBC # BLD AUTO: 5.65 THOUSAND/UL (ref 4.31–10.16)

## 2021-03-30 PROCEDURE — 80048 BASIC METABOLIC PNL TOTAL CA: CPT | Performed by: GENERAL PRACTICE

## 2021-03-30 PROCEDURE — NC001 PR NO CHARGE: Performed by: INTERNAL MEDICINE

## 2021-03-30 PROCEDURE — 93005 ELECTROCARDIOGRAM TRACING: CPT

## 2021-03-30 PROCEDURE — C1769 GUIDE WIRE: HCPCS | Performed by: PHYSICIAN ASSISTANT

## 2021-03-30 PROCEDURE — 93010 ELECTROCARDIOGRAM REPORT: CPT | Performed by: INTERNAL MEDICINE

## 2021-03-30 PROCEDURE — 33249 INSJ/RPLCMT DEFIB W/LEAD(S): CPT | Performed by: INTERNAL MEDICINE

## 2021-03-30 PROCEDURE — 99232 SBSQ HOSP IP/OBS MODERATE 35: CPT | Performed by: INTERNAL MEDICINE

## 2021-03-30 PROCEDURE — C1777 LEAD, AICD, ENDO SINGLE COIL: HCPCS

## 2021-03-30 PROCEDURE — C1892 INTRO/SHEATH,FIXED,PEEL-AWAY: HCPCS | Performed by: PHYSICIAN ASSISTANT

## 2021-03-30 PROCEDURE — 33249 INSJ/RPLCMT DEFIB W/LEAD(S): CPT | Performed by: PHYSICIAN ASSISTANT

## 2021-03-30 PROCEDURE — C1722 AICD, SINGLE CHAMBER: HCPCS

## 2021-03-30 PROCEDURE — C1894 INTRO/SHEATH, NON-LASER: HCPCS | Performed by: PHYSICIAN ASSISTANT

## 2021-03-30 PROCEDURE — 85027 COMPLETE CBC AUTOMATED: CPT | Performed by: GENERAL PRACTICE

## 2021-03-30 RX ORDER — GENTAMICIN SULFATE 40 MG/ML
INJECTION, SOLUTION INTRAMUSCULAR; INTRAVENOUS CODE/TRAUMA/SEDATION MEDICATION
Status: COMPLETED | OUTPATIENT
Start: 2021-03-30 | End: 2021-03-30

## 2021-03-30 RX ORDER — MIDAZOLAM HYDROCHLORIDE 2 MG/2ML
INJECTION, SOLUTION INTRAMUSCULAR; INTRAVENOUS AS NEEDED
Status: DISCONTINUED | OUTPATIENT
Start: 2021-03-30 | End: 2021-03-30

## 2021-03-30 RX ORDER — DIPHENHYDRAMINE HYDROCHLORIDE 50 MG/ML
12.5 INJECTION INTRAMUSCULAR; INTRAVENOUS ONCE AS NEEDED
Status: CANCELLED | OUTPATIENT
Start: 2021-03-30

## 2021-03-30 RX ORDER — VANCOMYCIN HYDROCHLORIDE 1 G/200ML
1000 INJECTION, SOLUTION INTRAVENOUS ONCE
Status: DISCONTINUED | OUTPATIENT
Start: 2021-03-30 | End: 2021-03-31 | Stop reason: HOSPADM

## 2021-03-30 RX ORDER — LIDOCAINE HYDROCHLORIDE 10 MG/ML
INJECTION, SOLUTION EPIDURAL; INFILTRATION; INTRACAUDAL; PERINEURAL CODE/TRAUMA/SEDATION MEDICATION
Status: COMPLETED | OUTPATIENT
Start: 2021-03-30 | End: 2021-03-30

## 2021-03-30 RX ORDER — SODIUM CHLORIDE 9 MG/ML
INJECTION, SOLUTION INTRAVENOUS CONTINUOUS PRN
Status: DISCONTINUED | OUTPATIENT
Start: 2021-03-30 | End: 2021-03-30

## 2021-03-30 RX ORDER — FENTANYL CITRATE 50 UG/ML
INJECTION, SOLUTION INTRAMUSCULAR; INTRAVENOUS AS NEEDED
Status: DISCONTINUED | OUTPATIENT
Start: 2021-03-30 | End: 2021-03-30

## 2021-03-30 RX ORDER — HYDROMORPHONE HCL IN WATER/PF 6 MG/30 ML
0.2 PATIENT CONTROLLED ANALGESIA SYRINGE INTRAVENOUS
Status: CANCELLED | OUTPATIENT
Start: 2021-03-30

## 2021-03-30 RX ORDER — PROPOFOL 10 MG/ML
INJECTION, EMULSION INTRAVENOUS AS NEEDED
Status: DISCONTINUED | OUTPATIENT
Start: 2021-03-30 | End: 2021-03-30

## 2021-03-30 RX ORDER — VANCOMYCIN HYDROCHLORIDE 1 G/200ML
INJECTION, SOLUTION INTRAVENOUS AS NEEDED
Status: DISCONTINUED | OUTPATIENT
Start: 2021-03-30 | End: 2021-03-30

## 2021-03-30 RX ORDER — HYDROMORPHONE HCL/PF 1 MG/ML
0.5 SYRINGE (ML) INJECTION
Status: CANCELLED | OUTPATIENT
Start: 2021-03-30

## 2021-03-30 RX ORDER — FENTANYL CITRATE/PF 50 MCG/ML
50 SYRINGE (ML) INJECTION
Status: CANCELLED | OUTPATIENT
Start: 2021-03-30

## 2021-03-30 RX ORDER — GABAPENTIN 100 MG/1
100 CAPSULE ORAL 3 TIMES DAILY PRN
Status: DISCONTINUED | OUTPATIENT
Start: 2021-03-30 | End: 2021-03-31 | Stop reason: HOSPADM

## 2021-03-30 RX ORDER — LIDOCAINE HYDROCHLORIDE 10 MG/ML
0.5 INJECTION, SOLUTION EPIDURAL; INFILTRATION; INTRACAUDAL; PERINEURAL ONCE AS NEEDED
Status: CANCELLED | OUTPATIENT
Start: 2021-03-30

## 2021-03-30 RX ORDER — PROPRANOLOL HYDROCHLORIDE 1 MG/ML
5 INJECTION, SOLUTION INTRAVENOUS ONCE
Status: DISCONTINUED | OUTPATIENT
Start: 2021-03-30 | End: 2021-03-31 | Stop reason: HOSPADM

## 2021-03-30 RX ORDER — CHLORHEXIDINE GLUCONATE 0.12 MG/ML
15 RINSE ORAL ONCE
Status: DISCONTINUED | OUTPATIENT
Start: 2021-03-30 | End: 2021-03-31 | Stop reason: HOSPADM

## 2021-03-30 RX ORDER — PROPOFOL 10 MG/ML
INJECTION, EMULSION INTRAVENOUS CONTINUOUS PRN
Status: DISCONTINUED | OUTPATIENT
Start: 2021-03-30 | End: 2021-03-30

## 2021-03-30 RX ORDER — METOCLOPRAMIDE HYDROCHLORIDE 5 MG/ML
10 INJECTION INTRAMUSCULAR; INTRAVENOUS ONCE AS NEEDED
Status: CANCELLED | OUTPATIENT
Start: 2021-03-30

## 2021-03-30 RX ORDER — ONDANSETRON 2 MG/ML
4 INJECTION INTRAMUSCULAR; INTRAVENOUS ONCE AS NEEDED
Status: CANCELLED | OUTPATIENT
Start: 2021-03-30

## 2021-03-30 RX ADMIN — VANCOMYCIN HYDROCHLORIDE 1000 MG: 1 INJECTION, SOLUTION INTRAVENOUS at 15:02

## 2021-03-30 RX ADMIN — GENTAMICIN SULFATE 80 MG: 40 INJECTION, SOLUTION INTRAMUSCULAR; INTRAVENOUS at 16:39

## 2021-03-30 RX ADMIN — LIDOCAINE HYDROCHLORIDE 30 ML: 10 INJECTION, SOLUTION EPIDURAL; INFILTRATION; INTRACAUDAL; PERINEURAL at 15:35

## 2021-03-30 RX ADMIN — FENTANYL CITRATE 25 MCG: 50 INJECTION INTRAMUSCULAR; INTRAVENOUS at 15:33

## 2021-03-30 RX ADMIN — FENTANYL CITRATE 25 MCG: 50 INJECTION INTRAMUSCULAR; INTRAVENOUS at 14:32

## 2021-03-30 RX ADMIN — PROPOFOL 50 MCG/KG/MIN: 10 INJECTION, EMULSION INTRAVENOUS at 14:57

## 2021-03-30 RX ADMIN — PROCAINAMIDE HYDROCHLORIDE: 500 INJECTION INTRAMUSCULAR; INTRAVENOUS at 14:33

## 2021-03-30 RX ADMIN — LEVETIRACETAM 500 MG: 500 TABLET, FILM COATED ORAL at 21:24

## 2021-03-30 RX ADMIN — MIDAZOLAM 2 MG: 1 INJECTION INTRAMUSCULAR; INTRAVENOUS at 13:47

## 2021-03-30 RX ADMIN — FENTANYL CITRATE 25 MCG: 50 INJECTION INTRAMUSCULAR; INTRAVENOUS at 14:57

## 2021-03-30 RX ADMIN — IOHEXOL 10 ML: 350 INJECTION, SOLUTION INTRAVENOUS at 15:53

## 2021-03-30 RX ADMIN — MIDAZOLAM 2 MG: 1 INJECTION INTRAMUSCULAR; INTRAVENOUS at 13:54

## 2021-03-30 RX ADMIN — SODIUM CHLORIDE: 0.9 INJECTION, SOLUTION INTRAVENOUS at 13:37

## 2021-03-30 RX ADMIN — MIDAZOLAM 4 MG: 1 INJECTION INTRAMUSCULAR; INTRAVENOUS at 13:38

## 2021-03-30 RX ADMIN — PROPOFOL 50 MG: 10 INJECTION, EMULSION INTRAVENOUS at 14:57

## 2021-03-30 RX ADMIN — FENTANYL CITRATE 25 MCG: 50 INJECTION INTRAMUSCULAR; INTRAVENOUS at 15:16

## 2021-03-30 RX ADMIN — LEVETIRACETAM 500 MG: 500 TABLET, FILM COATED ORAL at 10:13

## 2021-03-30 NOTE — ANESTHESIA POSTPROCEDURE EVALUATION
Post-Op Assessment Note    CV Status:  Stable  Pain Score: 0    Pain management: adequate     Mental Status:  Awake   Hydration Status:  Stable   PONV Controlled:  None   Airway Patency:  Patent      Post Op Vitals Reviewed: Yes      Staff: CRNA   Comments: Accompanied Pt  up to his assigned Hospital room; VSS; AirwaY Patent; Mom with her Son; Eyes open; Non-combative; Cooperative; No c/o pain; No signs of SOB and/or dyspnea; Report given to RN's caring for the Pt  No complications documented      /81 (03/30/21 1741)    Temp (!) 96 1 °F (35 6 °C) (03/30/21 1740)    Pulse 74 (03/30/21 1741)   Resp 18 (03/30/21 1741)    SpO2 97 % (03/30/21 1741)

## 2021-03-30 NOTE — PROGRESS NOTES
Please use as non billable progress note:  Discussion between myself and Dr Shala Bowen with the patient's mother this a m   regarding next diagnostic steps  We discussed Brugada syndrome in detail as well as the diagnostic implications of procainamide challenge  Our plan today is to perform procainamide challenge in the electrophysiology lab with continuous EKG monitoring  If the test is positive for presence of type 1 Brugada pattern given his history we will implant secondary prevention defibrillator  If the test is negative for presence of type 1 Brugada pattern we will implant a loop recorder and patient can be discharged likely later today depending on timing of procedure or early tomorrow a m     We discussed the risks and benefits of both treatment options as well as potentially obtaining a 2nd opinion from a different center  Mother understands risks versus benefits and is willing to proceed with device implant if necessary today

## 2021-03-30 NOTE — PROGRESS NOTES
[address] [facility]  Progress Note [facility] 1990, 31 y o  male MRN: [id number]  Unit/Bed#: [facility] 423-01 Encounter: [id number]  Primary Care Provider: Roz Arora DO   Date and time admitted to hospital: 3/28/2021 10:07 PM    * Syncope and collapse  Assessment & Plan  · Patient with history seizure presented with syncope vs  Seizure  · Struck his head and face, when he fell  · No fecal or urinary incontinance  · Head CT negative  · Cervical CT negative  · EKG at the time was considered and Brugada type 2  · For following for EP study    Brugada syndrome  Assessment & Plan  · EKG suggestive of Brugada syndrome, possible syncope  · EP following for EP study and possible ICD vs loop recorder    Generalized tonic-clonic seizure ([facility] [address] )  Assessment & Plan  · Seizure   · Presented s/p fall and sustained facial injury  Syncope vs  Seizure was considered  · He was taken off of Keppra in October 2020  Since that time he did not have any seizures  · He has unresponsive episode where he became stiff and fell leaning forward  ·  No tonic-clonic motion noted  He was down for about 3-5 min, lethargic post seizure  · Zofran as needed   · Keppra 500 mg Q12H started  · Tonic clonic seizure documented 45 minutes after arrival in ED      Autism disorder  Assessment & Plan  · Very minimally verbal   · Mother will stay at bedside for assistance with communication         VTE Pharmacologic Prophylaxis:   Pharmacologic: Heparin  Mechanical VTE Prophylaxis in Place: Yes    Patient Centered Rounds: I have performed bedside rounds with nursing staff today  Education and Discussions with Family / Patient: patient and mother, plan of care3    Time Spent for Care: 20 minutes  More than 50% of total time spent on counseling and coordination of care as described above      Current Length of Stay: 2 day(s)    Current Patient Status: Inpatient   Certification Statement: The patient will continue to require additional inpatient hospital stay due to awaiting EP study    Discharge Plan: home when stable    Code Status: Level 1 - Full Code      Subjective:   Limited due to neuro deficits  Answers simple questions    Objective:     Vitals:   Temp (24hrs), Av 9 °F (36 1 °C), Min:96 2 °F (35 7 °C), Max:97 7 °F (36 5 °C)    Temp:  [96 2 °F (35 7 °C)-97 7 °F (36 5 °C)] 96 6 °F (35 9 °C)  HR:  [] 88  BP: (113-126)/(78-88) 113/78  SpO2:  [96 %-98 %] 96 %  Body mass index is 24 46 kg/m²  Input and Output Summary (last 24 hours): Intake/Output Summary (Last 24 hours) at 3/30/2021 1724  Last data filed at 3/30/2021 1502  Gross per 24 hour   Intake 370 ml   Output 2200 ml   Net -1830 ml       Physical Exam:     Physical Exam  Constitutional:       Appearance: Normal appearance  HENT:      Head: Normocephalic and atraumatic  Nose: Nose normal       Mouth/Throat:      Mouth: Mucous membranes are moist       Pharynx: Oropharynx is clear  Eyes:      Extraocular Movements: Extraocular movements intact  Cardiovascular:      Rate and Rhythm: Normal rate and regular rhythm  Pulmonary:      Effort: Pulmonary effort is normal       Breath sounds: No wheezing or rales  Neurological:      Mental Status: He is alert  Mental status is at baseline     Psychiatric:         Mood and Affect: Mood normal          Additional Data:     Labs:    Results from last 7 days   Lab Units 21  0535 21  0511   WBC Thousand/uL 5 65 7 33   HEMOGLOBIN g/dL 16 9 15 9   HEMATOCRIT % 49 3 45 7   PLATELETS Thousands/uL 178 163   NEUTROS PCT %  --  68   LYMPHS PCT %  --  21   MONOS PCT %  --  10   EOS PCT %  --  1     Results from last 7 days   Lab Units 21  0535  21  2144   SODIUM mmol/L 139   < > 133*   POTASSIUM mmol/L 4 3   < > 3 8   CHLORIDE mmol/L 107   < > 95*   CO2 mmol/L 28   < > 29   BUN mg/dL 13   < > 15   CREATININE mg/dL 0 84   < > 0 91   ANION GAP mmol/L 4   < > 9   CALCIUM mg/dL 9 6   < > 8  9   ALBUMIN g/dL  --   --  3 7   TOTAL BILIRUBIN mg/dL  --   --  0 46   ALK PHOS U/L  --   --  67   ALT U/L  --   --  26   AST U/L  --   --  18   GLUCOSE RANDOM mg/dL 86   < > 101    < > = values in this interval not displayed  * I Have Reviewed All Lab Data Listed Above  * Additional Pertinent Lab Tests Reviewed: All Labs Within Last 24 Hours Reviewed      Recent Cultures (last 7 days):           Last 24 Hours Medication List:   Current Facility-Administered Medications   Medication Dose Route Frequency Provider Last Rate    acetaminophen  975 mg Oral Q6H PRN Salina Bamberger, MD      aluminum-magnesium hydroxide-simethicone  30 mL Oral Q6H PRN Salina Bamberger, MD      chlorhexidine  15 mL Swish & Spit Once Kymberly Johnson      folic acid  889 mcg Oral Daily Salina Bamberger, MD      levETIRAcetam  500 mg Oral Q12H [address] Salina Bamberger, MD      LORazepam  2 mg Intravenous Q2H PRN Max 8/day Salina Bamberger, MD      multivitamin-minerals  1 tablet Oral Daily Salina Bamberger, MD      ondansetron  4 mg Intravenous Q6H PRN Salina Bamberger, MD      IV infusion builder   Intravenous Continuous Vinod Ramirez PA-C Stopped (03/30/21 1507)    propranolol  5 mg Intravenous Once Vinod Ramirez PA-C      thiamine  100 mg Oral Daily Salina Bamberger, MD      vancomycin  1,000 mg Intravenous Once Edgar Sandoval PA-C          Today, Patient Was Seen By: Mahesh Tsang MD    ** Please Note: Dictation voice to text software may have been used in the creation of this document   **

## 2021-03-30 NOTE — ANESTHESIA PREPROCEDURE EVALUATION
Procedure:  CARDIAC EPS    Relevant Problems   ANESTHESIA (within normal limits)      CARDIO   (+) Brugada syndrome      ENDO (within normal limits)      GI/HEPATIC (within normal limits)      /RENAL (within normal limits)      HEMATOLOGY (within normal limits)      MUSCULOSKELETAL (within normal limits)      NEURO/PSYCH   (+) Generalized tonic-clonic seizure (HCC)   (+) Seizure (HCC)      PULMONARY (within normal limits)      Other   (+) Autism disorder   (+) Syncope and collapse        Physical Exam    Airway    Mallampati score: II  TM Distance: >3 FB  Neck ROM: full     Dental   No notable dental hx     Cardiovascular  Rhythm: regular, Rate: normal, Cardiovascular exam normal    Pulmonary  Pulmonary exam normal Breath sounds clear to auscultation,     Other Findings        Anesthesia Plan  ASA Score- 3     Anesthesia Type- IV sedation with anesthesia with ASA Monitors  Additional Monitors:   Airway Plan:           Plan Factors-    Chart reviewed  Existing labs reviewed  Patient summary reviewed  Induction- intravenous  Postoperative Plan- Plan for postoperative opioid use  Informed Consent- Anesthetic plan and risks discussed with patient and mother  I personally reviewed this patient with the CRNA  Discussed and agreed on the Anesthesia Plan with the CRNA  Beatriz Flores Recent labs personally reviewed:  Lab Results   Component Value Date    WBC 5 65 03/30/2021    HGB 16 9 03/30/2021     03/30/2021     Lab Results   Component Value Date    K 4 3 03/30/2021    BUN 13 03/30/2021    CREATININE 0 84 03/30/2021     I, Stephy Cohen MD, have personally seen and evaluated the patient prior to anesthetic care  I have reviewed the pre-anesthetic record, and other medical records if appropriate to the anesthetic care  If a CRNA is involved in the case, I have reviewed the CRNA assessment, if present, and agree   Risks/benefits and alternatives discussed with patient including possible PONV, sore throat, and possibility of rare anesthetic and surgical emergencies

## 2021-03-31 ENCOUNTER — APPOINTMENT (INPATIENT)
Dept: RADIOLOGY | Facility: HOSPITAL | Age: 31
DRG: 179 | End: 2021-03-31
Payer: COMMERCIAL

## 2021-03-31 ENCOUNTER — APPOINTMENT (INPATIENT)
Dept: NON INVASIVE DIAGNOSTICS | Facility: HOSPITAL | Age: 31
DRG: 179 | End: 2021-03-31
Payer: COMMERCIAL

## 2021-03-31 VITALS
BODY MASS INDEX: 24.57 KG/M2 | WEIGHT: 185.41 LBS | HEART RATE: 89 BPM | HEIGHT: 73 IN | RESPIRATION RATE: 17 BRPM | DIASTOLIC BLOOD PRESSURE: 69 MMHG | TEMPERATURE: 98.7 F | OXYGEN SATURATION: 98 % | SYSTOLIC BLOOD PRESSURE: 113 MMHG

## 2021-03-31 LAB
ATRIAL RATE: 136 BPM
ATRIAL RATE: 145 BPM
ATRIAL RATE: 69 BPM
ATRIAL RATE: 72 BPM
ATRIAL RATE: 75 BPM
ATRIAL RATE: 76 BPM
ATRIAL RATE: 90 BPM
P AXIS: 47 DEGREES
P AXIS: 50 DEGREES
P AXIS: 51 DEGREES
P AXIS: 52 DEGREES
P AXIS: 53 DEGREES
P AXIS: 55 DEGREES
P AXIS: 59 DEGREES
PR INTERVAL: 142 MS
PR INTERVAL: 148 MS
PR INTERVAL: 150 MS
PR INTERVAL: 154 MS
PR INTERVAL: 160 MS
PR INTERVAL: 162 MS
PR INTERVAL: 166 MS
QRS AXIS: -16 DEGREES
QRS AXIS: -29 DEGREES
QRS AXIS: -3 DEGREES
QRS AXIS: -36 DEGREES
QRS AXIS: -5 DEGREES
QRS AXIS: -53 DEGREES
QRS AXIS: -57 DEGREES
QRSD INTERVAL: 108 MS
QRSD INTERVAL: 110 MS
QRSD INTERVAL: 82 MS
QRSD INTERVAL: 84 MS
QRSD INTERVAL: 98 MS
QT INTERVAL: 268 MS
QT INTERVAL: 280 MS
QT INTERVAL: 350 MS
QT INTERVAL: 400 MS
QT INTERVAL: 402 MS
QT INTERVAL: 408 MS
QT INTERVAL: 410 MS
QTC INTERVAL: 416 MS
QTC INTERVAL: 421 MS
QTC INTERVAL: 428 MS
QTC INTERVAL: 428 MS
QTC INTERVAL: 440 MS
QTC INTERVAL: 457 MS
QTC INTERVAL: 459 MS
T WAVE AXIS: 56 DEGREES
T WAVE AXIS: 59 DEGREES
T WAVE AXIS: 62 DEGREES
T WAVE AXIS: 64 DEGREES
T WAVE AXIS: 66 DEGREES
T WAVE AXIS: 66 DEGREES
T WAVE AXIS: 67 DEGREES
VENTRICULAR RATE: 136 BPM
VENTRICULAR RATE: 145 BPM
VENTRICULAR RATE: 69 BPM
VENTRICULAR RATE: 72 BPM
VENTRICULAR RATE: 75 BPM
VENTRICULAR RATE: 76 BPM
VENTRICULAR RATE: 90 BPM

## 2021-03-31 PROCEDURE — 99239 HOSP IP/OBS DSCHRG MGMT >30: CPT | Performed by: INTERNAL MEDICINE

## 2021-03-31 PROCEDURE — 93010 ELECTROCARDIOGRAM REPORT: CPT | Performed by: INTERNAL MEDICINE

## 2021-03-31 PROCEDURE — 71045 X-RAY EXAM CHEST 1 VIEW: CPT

## 2021-03-31 PROCEDURE — 93306 TTE W/DOPPLER COMPLETE: CPT

## 2021-03-31 PROCEDURE — 99024 POSTOP FOLLOW-UP VISIT: CPT | Performed by: INTERNAL MEDICINE

## 2021-03-31 PROCEDURE — 93005 ELECTROCARDIOGRAM TRACING: CPT

## 2021-03-31 PROCEDURE — 93306 TTE W/DOPPLER COMPLETE: CPT | Performed by: INTERNAL MEDICINE

## 2021-03-31 RX ORDER — LEVETIRACETAM 500 MG/1
500 TABLET ORAL EVERY 12 HOURS SCHEDULED
Qty: 60 TABLET | Refills: 0 | Status: SHIPPED | OUTPATIENT
Start: 2021-03-31 | End: 2021-04-20 | Stop reason: SDUPTHER

## 2021-03-31 RX ORDER — ACETAMINOPHEN 325 MG/1
975 TABLET ORAL EVERY 6 HOURS PRN
Refills: 0
Start: 2021-03-31

## 2021-03-31 RX ADMIN — ACETAMINOPHEN 975 MG: 325 TABLET ORAL at 03:21

## 2021-03-31 RX ADMIN — LEVETIRACETAM 500 MG: 500 TABLET, FILM COATED ORAL at 09:40

## 2021-03-31 RX ADMIN — FOLIC ACID TAB 400 MCG 800 MCG: 400 TAB at 09:40

## 2021-03-31 RX ADMIN — ACETAMINOPHEN 975 MG: 325 TABLET ORAL at 12:12

## 2021-03-31 RX ADMIN — Medication 1 TABLET: at 09:40

## 2021-03-31 RX ADMIN — THIAMINE HCL TAB 100 MG 100 MG: 100 TAB at 09:40

## 2021-03-31 NOTE — DISCHARGE INSTRUCTIONS
Please refer to post ICD implantation discharge instructions and restrictions below and your ICD booklet/temporary card  Keep incision dry for one week  Do not use lotions/powders/creams on incision  Leave outer bandage in place for 1 week - it is water proof, and as long as it is fully adhered to your skin you may shower with it  If it appears as though the bandage is coming off and/or there is any communication to the area of device incision, please then keep the whole area dry for the remaining week  After 1 week, please remove by pulling all edges away from the center of the bandage  No overhead reaching/pushing/pulling/lifting greater than 5-10lbs with left arm for six weeks  Please call the office if you notice redness, swelling, bleeding, or drainage from incision or if you develop fevers    Implantable Cardioverter Defibrillator      If you have any questions, please call [phone] to speak with a nurse (8:30am-4pm, or [phone] after hours)  For appointments, please call [phone]  WHAT YOU SHOULD KNOW:    An implantable cardioverter defibrillator (ICD) is a small device that monitors your heart rate and rhythm  It is commonly placed inside your upper chest region  It may be used if you have a ventricular arrhythmia, which is an irregular, dangerous rhythm from the bottom chamber of your heart  Some arrhythmias may cause your heart to suddenly stop beating  An ICD can give a shock to your heart to make it start beating again, or it can give pacing therapy (also known as pain-free therapy) to return your heart to normal rhythm  It is also a pacemaker, so it will pace your heart if needed to prevent it from beating too slowly  AFTER YOU LEAVE:      Follow up with your primary healthcare provider or cardiologist as directed: You will need to follow-up to have your ICD checked and make sure you are not having problems   Write down your questions so you remember to ask them during your visits  Driving: you are ok to drive 48 hours after device is implanted     Self-care:   · Ask about activity: Ask if you need to avoid moving your shoulder or arm, and for how long  Ask if you should avoid lifting heavy objects  Do not play any contact sports, such as football or wrestling, until your primary healthcare provider [facility] or cardiologist tells you it is okay  You may only be able to drive for a certain amount of time per day, or during certain hours  Ask when you can return to work  · Care for your skin over the ICD: see instructions above     When you get a shock from your ICD: A shock may feel like someone has hit you, or you may feel a thump in the chest  If someone is touching you when you get a shock, they may feel a tingling feeling  The first time you feel a shock, it may scare you  Sit or lie down and stay calm  Ask someone to stay with you if possible  Please either call your cardiologist or report to an emergency room  Safety instructions when you have an ICD:   · Carry an ID card for the ICD: This card has important information about your ICD  · Stay away from magnets or machines with electric fields: This includes MRI machines  Avoid leaning into a car engine or doing welding  These things can interfere with how your ICD works  · Tell airport security you have an ICD: You may need to be searched by hand when you go through a security gate  The security gate or handheld wand could harm your ICD  · Keep an ICD diary: Record when you get a shock and what you were doing before you got the shock  Keep track of how you felt before and after the shock, as well as how many shocks you received  Write down the day and time of each shock  Bring the diary with you when you see your PHP or cardiologist    · Carry medical alert identification: Wear medical alert jewelry or carry a card that says you have an ICD   Ask your PHP or cardiologist where to get these items  Contact your primary healthcare provider or cardiologist if:   · You have a fever  · You feel 1 or more shocks from your ICD and feel fine afterwards  · Your feet or ankles swell  · The area around your ICD is painful or tender after surgery  · The skin around your stitches or staples is red, swollen, or draining pus or fluid  · You have chills, a cough, and feel weak or achy  · You are sad or anxious and find it hard to do your usual activities  · You have questions or concerns about your condition or care  Seek care immediately or call 911 if:   · Your stitches or staples come apart  · Blood soaks through your bandage  · You feel more than 3 shocks in a row from your ICD  · You become weak, dizzy, or faint  · You feel your heart skip beats or beat very fast or slow, but you do not feel a shock from your ICD  · You have chest pain that does not go away with rest or medicine  © 2014 [address] [employer] is for End User's use only and may not be sold, redistributed or otherwise used for commercial purposes  All illustrations and images included in CareNotes® are the copyrighted property of A D [employer] M [employer]  or [employer]  The above information is an  only  It is not intended as medical advice for individual conditions or treatments  Talk to your doctor, nurse or pharmacist before following any medical regimen to see if it is safe and effective for you

## 2021-03-31 NOTE — PROGRESS NOTES
Patient with increased HR 150s, patient was going into bathroom at this time  EKG and vitals taken  States sinus tachycardia  Patient asymptomatic, Jesenia Waldrop with EP made aware

## 2021-03-31 NOTE — DISCHARGE SUMMARY
Discharge Summary - [facility] Internal Medicine    Patient Information: Shirl Nissen 31 y o  male MRN: [id number]  Unit/Bed#: [facility] 423-01 Encounter: [id number]    Discharging Physician / Practitioner: Bebeto Lopez MD  PCP: Agnes Whitlock DO  Admission Date: 3/28/2021  Discharge Date: 03/31/21    Disposition:     Home    Reason for Admission: syncope, seizure    Discharge Diagnoses:     Principal Problem:    Syncope and collapse  Active Problems:    Brugada syndrome    Generalized tonic-clonic seizure ([address] )    Autism disorder  Resolved Problems:    * No resolved hospital problems  *      Consultations During Hospital Stay:  · Neurology  · Electrophysiology    Procedures Performed:     · ICD implantation    Significant Findings / Test Results:     Echocardiogram: LEFT VENTRICLE:  Systolic function was normal  Ejection fraction was estimated to be 55 %  There were no regional wall motion abnormalities  Wall thickness was mildly increased  There was mild concentric hypertrophy  EKG: Normal sinus rhythm  Incomplete right bundle branch block  Nonspecific ST-t wave changes  ST-elevation in V2 with saddleback deformity consider type 2 Brugada pattern  Borderline ECG    Incidental Findings:   · None     Test Results Pending at Discharge (will require follow up): · None     Outpatient Tests Requested:  · ICD check    Complications:  None    Hospital Course:     Shirl Nissen is a 31 y o  male patient who originally presented to the hospital on 3/28/2021 due to syncope and seizure  He was seen in the ED and found to have an abnormal EKG consistent with brugada syndrome  He was seen by electrophysiology, who placed an ICD  For his seizures, Leonor Hove was restarted and he remained seizure free throughout his admission      Condition at Discharge: stable     Discharge Day Visit / Exam:     Subjective:  Limited due to neurologic deficits  Vitals: Blood Pressure: 113/69 (03/31/21 1059)  Pulse: 89 (03/31/21 1059)  Temperature: 98 7 °F (37 1 °C) (03/31/21 1059)  Temp Source: Axillary (03/31/21 1059)  Respirations: 17 (03/31/21 1059)  Height: 6' 1" (185 4 cm) (03/28/21 2232)  Weight - Scale: 84 1 kg (185 lb 6 5 oz) (03/28/21 2232)  SpO2: 98 % (03/31/21 1059)  Exam:   Physical Exam  Constitutional:       Appearance: Normal appearance  Cardiovascular:      Rate and Rhythm: Normal rate and regular rhythm  Pulmonary:      Effort: Pulmonary effort is normal       Breath sounds: No wheezing or rales  Neurological:      Mental Status: He is alert  Mental status is at baseline  Discussion with Family: family at bedside, discussed plan of care    Discharge instructions/Information to patient and family:   See after visit summary for information provided to patient and family  Provisions for Follow-Up Care:  See after visit summary for information related to follow-up care and any pertinent home health orders  Planned Readmission: No     Discharge Statement:  I spent 40 minutes discharging the patient  This time was spent on the day of discharge  I had direct contact with the patient on the day of discharge  Greater than 50% of the total time was spent examining patient, answering all patient questions, arranging and discussing plan of care with patient as well as directly providing post-discharge instructions  Additional time then spent on discharge activities  Discharge Medications:  See after visit summary for reconciled discharge medications provided to patient and family        ** Please Note: This note has been constructed using a voice recognition system **

## 2021-03-31 NOTE — PROGRESS NOTES
Progress Note - Electrophysiology-Cardiology (EP)   Elsa Guerrero 31 y o  male MRN: [id number]  Unit/Bed#: [facility] 423-01 Encounter: [id number]      Assessment/Plan:   Primary diagnosis:   1  Syncope               * patient presented to [facility] due to syncope with new Brugada type 2 pattern seen on ECG, EP consulted for ICD evaluation              * initial ECG's from [facility] consistent with type 2 brugada pattern, given his syncope we did perform procainamide challenge in the EP lab on 3-30 which was positive for transition to Type I Brugada pattern at 16minutes of procainamide infusion with ECG below  Due to this we did implant single chamber ICD for secondary prevention     * s/p [employer] SINGLE CHAMBER ICD by Dr Diomedes Awad on 3-30-21   * today his site is soft without any ecchymosis or hematoma    * device was interrogated and found to be in appropriate function    * CXRs reviewed without any PTX with proper lead placement    * I discussed proper post site care with his mother to which she verbalized understanding, written instructions also provided   * pt will f/u in our device clinic in 2 weeks time for device check, this appointment was placed in Epic    Secondary diagnosis:   1  Autism   2  Seizure disorder    EKG:       Imaging: I have personally reviewed pertinent reports  Results for orders placed during the hospital encounter of 21   Echo complete with contrast if indicated    Narrative [address]  [address]  [phone]    Transthoracic Echocardiogram  2D, M-mode, Doppler, and Color Doppler    Study date:  31-Mar-2021    Patient: Tigre Tejeda  MR number: [id number]  Account number: [de-identified]  : 1990  Age: 31 years  Gender: Male  Status: Inpatient  Location: Bedside  Height: 73 in  Weight: 185 lb  BP: 118/ 82 mmHg    Indications: Syncope and collapse      Diagnoses: R55  - Syncope and collapse    Sonographer: Jailyn Granado ANKUSH  Referring Physician:  Meg Palacios PA-C  Group:  [facility]  Cardiology Fellow:  Maia Rosales MD  Interpreting Physician:  DO VIET Bhatt    LEFT VENTRICLE:  Systolic function was normal  Ejection fraction was estimated to be 55 %  There were no regional wall motion abnormalities  Wall thickness was mildly increased  There was mild concentric hypertrophy  HISTORY: PRIOR HISTORY: Autism disorder  Seizure  Abnormal EKG  PROCEDURE: The procedure was performed at the bedside  This was a routine study  The transthoracic approach was used  The study included complete 2D imaging, M-mode, complete spectral Doppler, and color Doppler  The heart rate was 88 bpm,  at the start of the study  Images were obtained from the parasternal, apical, subcostal, and suprasternal notch acoustic windows  Echocardiographic views were limited due to poor acoustic window availability and lung interference  This was  a technically difficult study  LEFT VENTRICLE: Size was normal  Systolic function was normal  Ejection fraction was estimated to be 55 %  There were no regional wall motion abnormalities  Wall thickness was mildly increased  There was mild concentric hypertrophy  DOPPLER: Due to tachycardia, there was fusion of early and atrial contributions to ventricular filling  RIGHT VENTRICLE: The size was normal  Systolic function was normal  A pacing wire was present  LEFT ATRIUM: Size was normal     RIGHT ATRIUM: Size was normal     MITRAL VALVE: Valve structure was normal  There was normal leaflet separation  DOPPLER: The transmitral velocity was within the normal range  There was no evidence for stenosis  There was no regurgitation  AORTIC VALVE: The valve was trileaflet  Leaflets exhibited normal thickness and normal cuspal separation  DOPPLER: Transaortic velocity was within the normal range  There was no evidence for stenosis   There was no regurgitation  TRICUSPID VALVE: The valve structure was normal  There was normal leaflet separation  DOPPLER: The transtricuspid velocity was within the normal range  There was no evidence for stenosis  There was no significant regurgitation  PULMONIC VALVE: DOPPLER: The transpulmonic velocity was within the normal range  There was no significant regurgitation  PERICARDIUM: There was no pericardial effusion  AORTA: The root exhibited normal size  SYSTEMIC VEINS: IVC: The inferior vena cava was normal in size and course  Respirophasic changes were normal     SYSTEM MEASUREMENT TABLES    2D  LVEDV MOD A4C: 73 66 ml  LVEF MOD A4C: 56 22 %  LVESV MOD A4C: 32 25 ml  LVLd A4C: 7 62 cm  LVLs A4C: 6 56 cm  SV MOD A4C: 41 41 ml    [facility] Accredited Echocardiography Laboratory    Prepared and electronically signed by    Teresa Day DO  Signed 31-Mar-2021 14:12:49         Subjective/Objective   Subjective: today patient is followed post ICD implant and procainamide challenge       Hx unobtainable 2/2 patient's autism    Vitals: /69 (BP Location: Left arm)   Pulse 89   Temp 98 7 °F (37 1 °C) (Axillary)   Resp 17   Ht 6' 1" (1 854 m)   Wt 84 1 kg (185 lb 6 5 oz)   SpO2 98%   BMI 24 46 kg/m²   Vitals:    03/28/21 2232   Weight: 84 1 kg (185 lb 6 5 oz)     Orthostatic Blood Pressures      Most Recent Value   Blood Pressure  113/69 filed at 03/31/2021 1059   Patient Position - Orthostatic VS  Lying filed at 03/31/2021 1059            Intake/Output Summary (Last 24 hours) at 3/31/2021 1743  Last data filed at 3/31/2021 0750  Gross per 24 hour   Intake 120 ml   Output 1100 ml   Net -980 ml       Invasive Devices     None                             Scheduled Meds:  Current Facility-Administered Medications   Medication Dose Route Frequency Provider Last Rate    acetaminophen  975 mg Oral Q6H PRN Preethi Leach MD      aluminum-magnesium hydroxide-simethicone  30 mL Oral Q6H PRN Issa Khanna MD      chlorhexidine  15 mL Swish & Spit Once Vinod [place], [place]      folic acid  378 mcg Oral Daily Issa Khanna MD      gabapentin  100 mg Oral TID PRN Negar Marie PA-C      levETIRAcetam  500 mg Oral Q12H [address] Issa Khanna MD      LORazepam  2 mg Intravenous Q2H PRN Max 8/day Issa Khanna MD      multivitamin-minerals  1 tablet Oral Daily Issa Khanna MD      ondansetron  4 mg Intravenous Q6H PRN Issa Khanna MD      propranolol  5 mg Intravenous Once Vinod Ramirez PA-C      thiamine  100 mg Oral Daily Issa Khanna MD      vancomycin  1,000 mg Intravenous Once Vinod Ramirez PA-C       Continuous Infusions:   PRN Meds:   acetaminophen    aluminum-magnesium hydroxide-simethicone    gabapentin    LORazepam    ondansetron    Physical Exam:   GEN: NAD, alert and oriented, well appearing  SKIN: dry without significant lesions or rashes  HEENT: NCAT, PERRL, EOMs intact  NECK: No JVD or carotid bruits appreciated  CARDIOVASCULAR: RRR, normal S1, S2 without murmurs, rubs, or gallops appreciated, ICD site CDI, aquacell intact   LUNGS: Clear to auscultation bilaterally without wheezes, rhonchi, or rales  ABDOMEN: Soft, nontender, nondistended  EXTREMITIES/VASCULAR: perfused without clubbing, cyanosis, or edema b/l  PSYCH: Normal   NEURO: baseline                 Lab Results: I have personally reviewed pertinent lab results      Results from last 7 days   Lab Units 03/30/21  0535 03/29/21  0511 03/28/21  0605   WBC Thousand/uL 5 65 7 33 9 24   HEMOGLOBIN g/dL 16 9 15 9 15 2   HEMATOCRIT % 49 3 45 7 43 5   PLATELETS Thousands/uL 178 163 171     Results from last 7 days   Lab Units 03/30/21  0535 03/29/21  0511 03/28/21  0605   POTASSIUM mmol/L 4 3 3 7 3 6   CHLORIDE mmol/L 107 110* 104   CO2 mmol/L 28 29 26   BUN mg/dL 13 14 14   CREATININE mg/dL 0 84 1 01 0 81   CALCIUM mg/dL 9 6 8 9 8 7         Results from last 7 days   Lab Units 03/29/21  0511   MAGNESIUM mg/dL 2 4

## 2021-04-01 NOTE — OP NOTE
[facility]    PT NAME: Barbara Cleary  MRN: [id number]  : 1990    PERFORMING/ATTENDING PHY: Anita Mcbride MD    DATE OF PROCEDURE: 21    PREOPERATIVE DIAGNOSIS:  Brugada Type 2 pattern  Syncope  Seizure episode    Procedures:  Procainamide infusion       After a complete discussion regarding the techniques, benefits and risks of the infusion, the patient's mother provided written consent to proceed  Patient was sedated by Anesthesia team given his autism  Patient was brought to the EP lab and connected to defibrillator patches and 12 lead ECG  Procainamide 1 g was mixed in 250 cc of normal saline  It was infused at 50 milligram/minute  ECG was obtained every 2 minutes  Patient was noted to have change in regard a pattern from type 2 to type 1 during procainamide challenge  ECGs will be scanned into the chart  Given patient's syncopal history and Brugada type 1 pattern decision was made to proceed with ICD  There were no adverse events during procainamide challenge  CONCLUSION:   Positive procainamide challenge converting require a patent type 1 to type 2  Recommending defibrillator given history of syncope and type 1 Brugada pattern

## 2021-04-02 ENCOUNTER — TRANSITIONAL CARE MANAGEMENT (OUTPATIENT)
Dept: FAMILY MEDICINE CLINIC | Facility: CLINIC | Age: 31
End: 2021-04-02

## 2021-04-02 LAB
ATRIAL RATE: 78 BPM
P AXIS: 61 DEGREES
PR INTERVAL: 168 MS
QRS AXIS: -46 DEGREES
QRSD INTERVAL: 106 MS
QT INTERVAL: 422 MS
QTC INTERVAL: 481 MS
T WAVE AXIS: 67 DEGREES
VENTRICULAR RATE: 78 BPM

## 2021-04-02 PROCEDURE — 93010 ELECTROCARDIOGRAM REPORT: CPT | Performed by: INTERNAL MEDICINE

## 2021-04-02 NOTE — UTILIZATION REVIEW
Notification of Discharge  This is a Notification of Discharge from our facility [address]  Please be advised that this patient has been discharge from our facility  Below you will find the admission and discharge date and time including the patients disposition  PRESENTATION DATE: 3/28/2021 10:07 PM  OBS ADMISSION DATE:   IP ADMISSION DATE: 3/28/21 2207   DISCHARGE DATE: 3/31/2021  4:04 PM  DISPOSITION: Home/Self Care Home/Self Care   Admission Orders listed below:  Admission Orders (From admission, onward)     Ordered        03/28/21 2209  Inpatient Admission  Once                   Please contact the UR Department if additional information is required to close this patient's authorization/case  [address] [employer] Utilization Review Department  Main: [phone] x carefully listen to the prompts  All voicemails are confidential   [email or web address]  Send all requests for admission clinical reviews, approved or denied determinations and any other requests to dedicated fax number below belonging to the campus where the patient is receiving treatment   List of dedicated fax numbers:  [address] DENIALS (Administrative/Medical Necessity) [phone]   [address] (Maternity/NICU/Pediatrics) [phone]   Sadaf Rios [phone]   Maribell Soriano [phone]   Ashutosh River [phone]   Gerri Nyhan [facility] [address] [place] [address] [phone]   [facility]  [phone]   [address] [facility] [address]  [address] [address] [address] [facility] [phone]

## 2021-04-02 NOTE — PROGRESS NOTES
I called pt's mom she is aware and expressed verbal understanding  Advised to please the office if they need anything

## 2021-04-08 ENCOUNTER — TELEPHONE (OUTPATIENT)
Dept: FAMILY MEDICINE CLINIC | Facility: HOSPITAL | Age: 31
End: 2021-04-08

## 2021-04-08 NOTE — TELEPHONE ENCOUNTER
[facility] called asking for you to put an ambulatory referral for the patient to be seen by their office  If you would uses diagnosis codes of G40 409, F84 0, R56 9 he will be seen Dr Renee Kovacs at [facility] , in [facility]

## 2021-04-12 ENCOUNTER — OFFICE VISIT (OUTPATIENT)
Dept: FAMILY MEDICINE CLINIC | Facility: CLINIC | Age: 31
End: 2021-04-12
Payer: COMMERCIAL

## 2021-04-12 VITALS
BODY MASS INDEX: 22.86 KG/M2 | HEIGHT: 72 IN | OXYGEN SATURATION: 98 % | TEMPERATURE: 97.6 F | DIASTOLIC BLOOD PRESSURE: 78 MMHG | HEART RATE: 97 BPM | RESPIRATION RATE: 20 BRPM | SYSTOLIC BLOOD PRESSURE: 112 MMHG | WEIGHT: 168.8 LBS

## 2021-04-12 DIAGNOSIS — G40.909 SEIZURE DISORDER (HCC): ICD-10-CM

## 2021-04-12 DIAGNOSIS — Z71.89 ENCOUNTER FOR DISCUSSION REGARDING IMPLANTABLE CARDIOVERTER-DEFIBRILLATOR (ICD): ICD-10-CM

## 2021-04-12 DIAGNOSIS — I49.8 BRUGADA SYNDROME: ICD-10-CM

## 2021-04-12 DIAGNOSIS — F84.0 ACTIVE AUTISTIC DISORDER: ICD-10-CM

## 2021-04-12 DIAGNOSIS — R55 SYNCOPE AND COLLAPSE: Primary | ICD-10-CM

## 2021-04-12 PROCEDURE — 1111F DSCHRG MED/CURRENT MED MERGE: CPT | Performed by: FAMILY MEDICINE

## 2021-04-12 PROCEDURE — 99495 TRANSJ CARE MGMT MOD F2F 14D: CPT | Performed by: FAMILY MEDICINE

## 2021-04-13 DIAGNOSIS — R56.9 GENERALIZED-ONSET SEIZURES (HCC): ICD-10-CM

## 2021-04-13 DIAGNOSIS — G40.409 CENTRENCEPHALIC EPILEPSY (HCC): Primary | ICD-10-CM

## 2021-04-13 DIAGNOSIS — F84.0 AUTISTIC DISORDER: ICD-10-CM

## 2021-04-14 ENCOUNTER — IN-CLINIC DEVICE VISIT (OUTPATIENT)
Dept: CARDIOLOGY CLINIC | Facility: CLINIC | Age: 31
End: 2021-04-14

## 2021-04-14 DIAGNOSIS — Z95.810 SINGLE IMPLANTABLE CARDIOVERTER-DEFIBRILLATOR IN SITU: Primary | ICD-10-CM

## 2021-04-14 DIAGNOSIS — L03.90 CELLULITIS DUE TO STAPHYLOCOCCUS: Primary | ICD-10-CM

## 2021-04-14 DIAGNOSIS — B95.8 CELLULITIS DUE TO STAPHYLOCOCCUS: Primary | ICD-10-CM

## 2021-04-14 PROCEDURE — 99024 POSTOP FOLLOW-UP VISIT: CPT | Performed by: INTERNAL MEDICINE

## 2021-04-14 RX ORDER — DOXYCYCLINE 100 MG/1
100 CAPSULE ORAL 2 TIMES DAILY
Qty: 20 CAPSULE | Refills: 0 | Status: SHIPPED | OUTPATIENT
Start: 2021-04-14 | End: 2021-04-23 | Stop reason: SDUPTHER

## 2021-04-14 NOTE — PROGRESS NOTES
Results for orders placed or performed in visit on 04/14/21   Cardiac EP device report    Narrative    MDT VVI ICD - [address] [place] [address] Dr INTERROGATED IN THE [place] OFFICE  TEMP 98 2; WOUND CHECK: INCISION CLEAN WITH LAERAL EDGE OOZING, VERY TENDER, EDGES APPROXIMATED; STAPLES REMOVED; TTEXT/PICTURE SENT TO DT/LS  AREA CLEANED, AB OINTMENT, DRY DRESSING APPLIED PER DT VERBAL INSTRUCTION  PT TO START DOXYCYCLINE, SEE DT 4/16/21 FOR SITE CK  WOUND CARE AND RESTRICTIONS REVIEWED WITH MOTHER  BATTERY VOLTAGE ADEQUATE (13 4 YRS)   <0 1%  ALL LEAD PARAMETERS WITHIN NORMAL LIMITS  ALL OTHER TESTING WITHIN NORMAL LIMITS  NO SIGNIFICANT HIGH RATE EPISODES  OPTI-VOL FLUID THRESHOLD OPTIMIZING  NO PROGRAMMING CHANGES MADE TO DEVICE PARAMETERS  HOME REMOTE MONITORING ACTIVE  APPROPRIATELY FUNCTIONING ICD     EBS

## 2021-04-18 PROBLEM — R29.818 ABNORMAL NEUROLOGICAL FINDING SUGGESTIVE OF LUMBAR-LEVEL SPINAL DISORDER: Status: ACTIVE | Noted: 2021-04-18

## 2021-04-18 PROBLEM — G40.909 SEIZURE DISORDER (HCC): Status: ACTIVE | Noted: 2018-08-21

## 2021-04-18 NOTE — PROGRESS NOTES
Assessment/Plan:      Patient status post hospitalization for 3 days  Brugada  Syndrome diagnosed  Patient has ICD implanted  Incision looks clean  Has follow-up with Cardiology  Hospitalization reviewed with mother today  On medication for previous seizure disorder  Continue with regular diet has  Activity will be increased per Cardiology  She has is she will otherwise continue his routine visits to our office  Diagnoses and all orders for this visit:    Syncope and collapse    Brugada syndrome    Active autistic disorder    Seizure disorder ([address] )    Encounter for discussion regarding implantable cardioverter-defibrillator (ICD)    Other orders  -     Cancel: PNEUMOCOCCAL POLYSACCHARIDE VACCINE 23-VALENT =>1YO SQ IM          Subjective:        Patient ID: Gabriella Ruth is a 31 y o  male  Chief Complaint   Patient presents with    Transition of Care Management        68-year-old male with history of autism disorder was found unresponsive at home  911 was contacted and patient was brought to [facility]  Diagnosed with Brugada syndrome  Sodium level initially low at 133 but did correct  CT of the head and cervical spine were normal   Overall patient doing well  Has ICD placed in left upper chest area  Stable still present  Has cardiac follow-up  Review of systems is as of today      The following portions of the patient's history were reviewed and updated as appropriate: allergies, current medications, past family history, past social history and problem list     Review of Systems   Constitutional: Negative for appetite change, fatigue, fever and unexpected weight change  HENT: Negative for congestion, ear pain, postnasal drip, rhinorrhea, sinus pressure, sinus pain and sore throat  Eyes: Negative for redness  Respiratory: Negative for shortness of breath  Cardiovascular: Negative for chest pain, palpitations and leg swelling     Gastrointestinal: Negative for abdominal distention, abdominal pain and diarrhea  Endocrine: Negative for cold intolerance and heat intolerance  Genitourinary: Negative for dysuria and hematuria  Skin: Negative for pallor  Neurological: Negative for dizziness and headaches  Psychiatric/Behavioral: Negative for behavioral problems  Objective:  TCM Call (since 3/18/2021)     Date and time call was made  4/2/2021  9:51 AM    Hospital care reviewed  Records reviewed    Patient was hospitialized at  [facility]        Date of Admission  03/28/21    Date of discharge  03/31/21    Diagnosis  Autism disorder, Seizure     Disposition  Home    Were the patients medications reviewed and updated  No      TCM Call (since 3/18/2021)     Should patient be enrolled in anticoag monitoring? No    Scheduled for follow up? Yes    Patients specialists  Neurologist; Cardiologist    Cardiologist name  [facility]     Cardiologist contact #  [phone]    Neurologist name  Pernell Gallo MD    Neurologist contact #  [phone]    Referrals needed  none     Did you obtain your prescribed medications  Yes    Do you need help managing your prescriptions or medications  Yes    Why type of assitance do you need  Pt's mom manages pt's scripts    Is transportation to your appointment needed  Yes    Specify why  Pty does not drive    I have advised the patient to call PCP with any new or worsening symptoms  [facility] ANTHONY MÁRQUEZ     Comments  Erika Zavala is still a little slugish sicne getting out of hospital, but that si to be expected  No seizures  /78 (BP Location: Left arm, Patient Position: Sitting, Cuff Size: Adult)   Pulse 97   Temp 97 6 °F (36 4 °C) (Temporal)   Resp 20   Ht 6' (1 829 m)   Wt 76 6 kg (168 lb 12 8 oz)   SpO2 98%   BMI 22 89 kg/m²      Physical Exam  Vitals signs and nursing note reviewed  Constitutional:       General: He is not in acute distress  Appearance: He is not diaphoretic     HENT:      Head: Normocephalic and atraumatic  Eyes:      General: No scleral icterus  Conjunctiva/sclera: Conjunctivae normal       Pupils: Pupils are equal, round, and reactive to light  Neck:      Musculoskeletal: Normal range of motion and neck supple  Thyroid: No thyromegaly  Cardiovascular:      Rate and Rhythm: Normal rate and regular rhythm  Pulses:           Carotid pulses are 0 on the right side and 0 on the left side  Heart sounds: Normal heart sounds  No murmur  Pulmonary:      Effort: Pulmonary effort is normal  No respiratory distress  Breath sounds: Normal breath sounds  No wheezing  Abdominal:      General: There is no distension  Palpations: Abdomen is soft  Musculoskeletal:      Right lower leg: No edema  Left lower leg: No edema  Lymphadenopathy:      Cervical: No cervical adenopathy  Skin:     General: Skin is warm  Coloration: Skin is not pale  Neurological:      Mental Status: He is alert  Deep Tendon Reflexes: Reflexes are normal and symmetric     Psychiatric:      Comments: Stable behavior -autism spectrum more active today than usual visit                  Current Outpatient Medications:     acetaminophen (TYLENOL) 325 mg tablet, Take 3 tablets (975 mg total) by mouth every 6 (six) hours as needed for mild pain, headaches or fever, Disp: , Rfl: 0    levETIRAcetam (KEPPRA) 500 mg tablet, Take 1 tablet (500 mg total) by mouth every 12 (twelve) hours TAKE 1 TABLET BY MOUTH TWICE A DAY, Disp: 60 tablet, Rfl: 0    Loratadine (CLARITIN) 10 MG CAPS, Take 10 mg by mouth as needed  , Disp: , Rfl:     doxycycline monohydrate (MONODOX) 100 mg capsule, Take 1 capsule (100 mg total) by mouth 2 (two) times a day for 10 days, Disp: 20 capsule, Rfl: 0  Allergies   Allergen Reactions    Cefaclor Hives    Sulfamethoxazole-Trimethoprim Hives

## 2021-04-20 ENCOUNTER — OFFICE VISIT (OUTPATIENT)
Dept: NEUROLOGY | Facility: CLINIC | Age: 31
End: 2021-04-20
Payer: COMMERCIAL

## 2021-04-20 ENCOUNTER — TELEPHONE (OUTPATIENT)
Dept: CARDIOLOGY CLINIC | Facility: CLINIC | Age: 31
End: 2021-04-20

## 2021-04-20 VITALS
WEIGHT: 168.6 LBS | DIASTOLIC BLOOD PRESSURE: 62 MMHG | BODY MASS INDEX: 22.84 KG/M2 | SYSTOLIC BLOOD PRESSURE: 122 MMHG | HEIGHT: 72 IN | HEART RATE: 72 BPM

## 2021-04-20 DIAGNOSIS — I49.8 BRUGADA SYNDROME: ICD-10-CM

## 2021-04-20 DIAGNOSIS — R56.9 SEIZURE (HCC): Primary | ICD-10-CM

## 2021-04-20 DIAGNOSIS — F84.0 AUTISTIC DISORDER: ICD-10-CM

## 2021-04-20 PROCEDURE — 99215 OFFICE O/P EST HI 40 MIN: CPT | Performed by: PSYCHIATRY & NEUROLOGY

## 2021-04-20 RX ORDER — LEVETIRACETAM 500 MG/1
500 TABLET ORAL EVERY 12 HOURS SCHEDULED
Qty: 180 TABLET | Refills: 3 | Status: SHIPPED | OUTPATIENT
Start: 2021-04-20 | End: 2022-02-10 | Stop reason: SDUPTHER

## 2021-04-20 NOTE — TELEPHONE ENCOUNTER
Patient of Ronna Kayli had a pacer implanted recently  Patient's mother Scooby Roman) called today stating Yuliet Sanches is suppose to see you on Friday at the cath lab to look at his pacer site  [place] would like to know if they can arrive at 1:30 instead of 2:30?    Please advise, thank you

## 2021-04-20 NOTE — PROGRESS NOTES
Patient ID: Daved Cockayne is a 31 y o  male  Assessment/Plan:    No problem-specific Assessment & Plan notes found for this encounter  {Assess/PlanSmartLinks:81643}       Subjective:    HPI    {[facility] HPI texts:71455}    {Common ambulatory SmartLinks:02189}         Objective: There were no vitals taken for this visit  Physical Exam    Neurological Exam      ROS:    Review of Systems   Constitutional: Negative  Negative for appetite change and fever  HENT: Negative  Negative for hearing loss, tinnitus, trouble swallowing and voice change  Eyes: Negative  Negative for photophobia and pain  Respiratory: Negative  Negative for shortness of breath  Cardiovascular: Negative  Negative for palpitations  Gastrointestinal: Negative  Negative for nausea and vomiting  Endocrine: Negative  Negative for cold intolerance  Genitourinary: Negative  Negative for dysuria, frequency and urgency  Musculoskeletal: Negative  Negative for myalgias and neck pain  Skin: Negative  Negative for rash  Allergic/Immunologic: Negative  Neurological: Positive for seizures  Negative for dizziness, tremors, syncope, facial asymmetry, speech difficulty, weakness, light-headedness, numbness and headaches  Hematological: Negative  Does not bruise/bleed easily  Psychiatric/Behavioral: Negative  Negative for confusion, hallucinations and sleep disturbance  All other systems reviewed and are negative

## 2021-04-20 NOTE — PROGRESS NOTES
[facility]      Impression/Plan    Mr Bonnie Mahajan is a 31 y o  male with a history of autism and recently diagnosed Brugada syndrome s/p ICD presenting for follow up regarding possible epilepsy due to unknown cause manifest as apparent generalized tonic clonic seizures that began about 5 years ago  He was weaned off levetiracetam in 10/2020 after 2 years of seizure freedom  He suffered apparent syncope in 3/2021 with head strike and no apparent convulsion and limited postictal state  This was followed shortly afterward by a convulsion while in the ED  Workup revealed Brugada syndrome and ICD was placed  It is possible that all events may have been syncope with convulsion related to Brugada syndrome, but reports of convulsion and postictal state longer than is typical for syncope suggest some events have been epileptic seizures  A literature search reveals speculation of possible ion channel abnormalities that could be coexpressed in the heart and the brain causing both Brugada and epilepsy  A family with coexistence of epilepsy and Brugada syndrome with known SCN5A mutation has been reported (https://doi org/10 1016/j eplepsyres  2013 02 024)  At his point there remains enough concern for epilepsy that he will remain on levetiracetam 500 mg bid  His mother does not believe there are significant side effects from the levetiracetam other than possible mild contribution to irritable mood  Patient Instructions   1  Continue levetiracetam 500 mg twice daily  2  Have levetiracetam level drawn when blood work is done  3  Return in about 6 months to see Gabino Brown  Diagnoses and all orders for this visit:    Seizure [facility])  -     Ambulatory referral to Neurology  -     levETIRAcetam (KEPPRA) 500 mg tablet; Take 1 tablet (500 mg total) by mouth every 12 (twelve) hours  -     Levetiracetam level;  Future    Autistic disorder    Brugada syndrome        Subjective    Adam Arciniega is a 31 y o  male presenting to the [place] [address] [facility] regarding epilepsy in the setting of autism  He arrives with his mother who provides history  Prior records were reviewed including prior neurology notes, EEGs, [facility] records and records from his recent hospitalization  He was previously followed by Dr Demarco Moore  He was most recently seen by LIAM Hancock on 9/9/2020  At his last visit he was weaned off levetiracetam after discussion of risk/benefit in the setting of 2 years of seizure freedom on levetiracetam monotherapy  First seizure 5 5 years ago  Another seizure 2 5 years ago that resulted in levetiracetam  Hospitalized at [facility]  EEG normal  Mother was walking in from the garage while Dyana Barthel was sliding down in his chair at the kitchen table  His body was stiff  There were rapid diffuse shaking movements, abnormal breathing  Duration estimated at 3-5 minutes  There was a extended postictal period  Weaned off levetiracetam 10/2020  Presented to [facility] in late March 2021 after an event of collapse while working with the vacuum   He hit the right side of his head as he went down  There was no definite convulsion, but he may have appeared stiff with slight trembling and unusual breathing  He was unresponsive for about 15 minutes (family report, 3-5 min per records) and then woke up relatively suddenly  He was able to walk to the ambulance when EMS arrived  He was able to have a conversation in the ambulance  Then in the ED there was apparent generalized tonic clonic seizures with postictal state that began while he was partially reclined in bed  Given Ativan and levetiracetam in ED  ECG suggested Brugada type 2 syndrome  EP evaluation confirmed  ICD placed  He was discharged on levetiracetam 500 mg bid  His mother believes he was on telemetry during the second event  At baseline he can follow simple commands   His speech is limited  He uses a communication book  He is impulsive at times  His behavior and irritability may have improved mildly when he came off the levetiracetam and may have worsened some after restarted levetiracetam, but his mother is not particularly concerned and there may be circumstance changes that have driven the irritability as well  Current AEDs:  Levetiracetam 500 mg bid  Medication side effects: might contribute to irritability  Medication adherence: Yes    Event/Seizure semiology:  Generalized tonic clonic seizure   (head and body turned to left initially with at least one event)    Special Features  Status epilepticus: no  Self Injury Seizures: fall in 3/2021 cause hit to head  Precipitating Factors: none    Epilepsy Risk Factors:  Autism    Prior AEDs:  None    Prior Evaluation:  EEGs normal    History Reviewed: The following were reviewed and updated as appropriate: allergies, current medications, past family history, past medical history, past social history, past surgical history and problem list     Psychiatric History:  autism    Social History:   Driving: No  Lives Alone: No      ROS:  Constitutional: Negative  Negative for appetite change and fever  HENT: Negative  Negative for hearing loss, tinnitus, trouble swallowing and voice change  Eyes: Negative  Negative for photophobia and pain  Respiratory: Negative  Negative for shortness of breath  Cardiovascular: Negative  Negative for palpitations  Gastrointestinal: Negative  Negative for nausea and vomiting  Endocrine: Negative  Negative for cold intolerance  Genitourinary: Negative  Negative for dysuria, frequency and urgency  Musculoskeletal: Negative  Negative for myalgias and neck pain  Skin: Negative  Negative for rash  Allergic/Immunologic: Negative  Neurological: Positive for seizures   Negative for dizziness, tremors, syncope, facial asymmetry, speech difficulty, weakness, light-headedness, numbness and headaches  Hematological: Negative  Does not bruise/bleed easily  Psychiatric/Behavioral: Negative  Negative for confusion, hallucinations and sleep disturbance  All other systems reviewed and are negative  ROS reviewed and updated as appropriate  Objective    /62 (BP Location: Left arm, Patient Position: Sitting, Cuff Size: Standard)   Pulse 72   Ht 6' (1 829 m)   Wt 76 5 kg (168 lb 9 6 oz)   BMI 22 87 kg/m²      General Exam  General: well developed, no acute distress  HEENT: mucous membranes moist  Right scleral hemorrhage  Neck: good ROM  Neurological Exam  Mental Status: awake, alert  Able to follow most simple commands (hold up "2", give me thumbs up, look at my finger), but not always completely accurately  Uses simple words to respond to mom's prompts including reporting the correct weather outside  He writes words with very precise handwriting in a notebook  He was not able to identify the weather outside by pointing to a picture in his communication book when I asked  Attention is impaired, he loses focus on a task without redirection  Fund of knowledge is impaired  Language: verbal output is mostly limited to single words that his mother understands, but I struggle to understand  Cranial Nerves: Pupils equal and reactive to light  Visual fields full to confrontation  Extraocular motions intact with full versions, normal pursuits and saccades  Facial strength full and symmetric  Facial sensation intact in V1-V3  Hearing intact to finger rub bilaterally  Tongue protrudes to midline  Palate elevates symmetrically  Speech clear without notable dysarthria  Shoulder shrug activation full and symmetric  Motor: Normal bulk and tone  No pronator drift  Strength is 5/5 proximally and distally in all 4 extremities  No involuntary movements  Sensory: Sensation intact to light touch distally in all extremities  Coordination: Normal finger-to-nose      Station and gait: wide gait, steady  Reflexes: Reflexes 2+ throughout and symmetric  Xavi Myers MD   [address] [facility]  [facility] [address] [facility]      Total time spent on day of encounter: 65 minutes  The time was spent reviewing testing, obtaining/reviewing history, performing an exam, counseling/educating, ordering medication/tests/procedures, referring/communicating with other healthcare providers, documenting clinical information in the EMR, independently interpreting EEG/imaging results, and/ or coordinating care

## 2021-04-20 NOTE — PATIENT INSTRUCTIONS
1  Continue levetiracetam 500 mg twice daily  2  Have levetiracetam level drawn when blood work is done  3  Return in about 6 months to see Karen Vasquez

## 2021-04-23 DIAGNOSIS — L03.90 CELLULITIS DUE TO STAPHYLOCOCCUS: ICD-10-CM

## 2021-04-23 DIAGNOSIS — B95.8 CELLULITIS DUE TO STAPHYLOCOCCUS: ICD-10-CM

## 2021-04-23 RX ORDER — DOXYCYCLINE 100 MG/1
100 CAPSULE ORAL 2 TIMES DAILY
Qty: 14 CAPSULE | Refills: 0 | Status: SHIPPED | OUTPATIENT
Start: 2021-04-23 | End: 2021-04-30

## 2021-06-03 ENCOUNTER — IN-CLINIC DEVICE VISIT (OUTPATIENT)
Dept: CARDIOLOGY CLINIC | Facility: CLINIC | Age: 31
End: 2021-06-03

## 2021-06-03 DIAGNOSIS — Z95.810 PRESENCE OF AUTOMATIC CARDIOVERTER/DEFIBRILLATOR (AICD): Primary | ICD-10-CM

## 2021-06-03 PROCEDURE — RECHECK: Performed by: INTERNAL MEDICINE

## 2021-06-03 NOTE — PROGRESS NOTES
Results for orders placed or performed in visit on 06/03/21   Cardiac EP device report    Narrative    MDT VVI ICD - [facility]  site check f/u  aquacel was placed by dr vang---de leon

## 2021-08-20 ENCOUNTER — IN-CLINIC DEVICE VISIT (OUTPATIENT)
Dept: CARDIOLOGY CLINIC | Facility: CLINIC | Age: 31
End: 2021-08-20
Payer: COMMERCIAL

## 2021-08-20 DIAGNOSIS — Z95.810 PRESENCE OF AUTOMATIC CARDIOVERTER/DEFIBRILLATOR (AICD): Primary | ICD-10-CM

## 2021-08-20 PROCEDURE — 93282 PRGRMG EVAL IMPLANTABLE DFB: CPT | Performed by: INTERNAL MEDICINE

## 2021-08-20 NOTE — PROGRESS NOTES
Results for orders placed or performed in visit on 08/20/21   Cardiac EP device report    Narrative    MDT VVI ICD - [address] [place] [address]  INTERROGATED IN THE [place] OFFICE  BATTERY VOLTAGE ADEQUATE (13 1 YRS)   <0 1% (VVI 40)  ALL LEAD PARAMETERS WITHIN NORMAL LIMITS  11 SVT-WAVELET EPISODES, MAX DURATION 3:04 MINS W/AVAILABLE EGRMS SHOWING ST @  - 171 BPM  OPTI-VOL WITHIN NORMAL LIMITS  OUTER EDGE OF INCISION STILL WITH SURFACE IRRITATION, REDNESS - MOM STATES SHE IS STILL COVERING W/AB OINTMENT & BANDAGE TO PREVENT PATIENT FROM "PICKING OFF" SCAB  ADVISED TO  CALL WITH  ANY SIGNIFICANT CHANGE OR CONCERNS  NO PROGRAMMING CHANGES MADE TO DEVICE PARAMETERS  APPROPRIATELY FUNCTIONING ICD      EBS

## 2021-08-24 ENCOUNTER — CLINICAL SUPPORT (OUTPATIENT)
Dept: CARDIOLOGY CLINIC | Facility: CLINIC | Age: 31
End: 2021-08-24

## 2021-08-24 DIAGNOSIS — I49.8 BRUGADA SYNDROME: ICD-10-CM

## 2021-08-24 DIAGNOSIS — Z95.810 PRESENCE OF AUTOMATIC CARDIOVERTER/DEFIBRILLATOR (AICD): Primary | ICD-10-CM

## 2021-08-24 DIAGNOSIS — Z95.810 SINGLE IMPLANTABLE CARDIOVERTER-DEFIBRILLATOR IN SITU: ICD-10-CM

## 2021-08-24 PROCEDURE — RECHECK

## 2021-08-24 NOTE — PROGRESS NOTES
Teresitatoni Adalid is here today under the direction of Dr Cee Thompson  Pt had a device implant in March  The pt continues to pick at the wound, even though it is bandaged  Pt's mother is applying antibiotic ointment to the site and covering with a bandage  Dr Cee Thompson evaluated the site, applied antibiotic ointment and a bandaid to the site  Mother advised to continue to care for the site as she has been doing and call if any further problems or questions  Mother understands

## 2021-11-08 ENCOUNTER — CLINICAL SUPPORT (OUTPATIENT)
Dept: FAMILY MEDICINE CLINIC | Facility: CLINIC | Age: 31
End: 2021-11-08
Payer: COMMERCIAL

## 2021-11-08 DIAGNOSIS — Z11.1 SCREENING FOR TUBERCULOSIS: Primary | ICD-10-CM

## 2021-11-08 PROCEDURE — 86580 TB INTRADERMAL TEST: CPT | Performed by: FAMILY MEDICINE

## 2021-11-10 ENCOUNTER — CLINICAL SUPPORT (OUTPATIENT)
Dept: FAMILY MEDICINE CLINIC | Facility: CLINIC | Age: 31
End: 2021-11-10

## 2021-11-10 DIAGNOSIS — Z11.1 SCREENING FOR TUBERCULOSIS: Primary | ICD-10-CM

## 2021-11-10 LAB
INDURATION: 0 MM
TB SKIN TEST: NEGATIVE

## 2021-11-23 ENCOUNTER — REMOTE DEVICE CLINIC VISIT (OUTPATIENT)
Dept: CARDIOLOGY CLINIC | Facility: CLINIC | Age: 31
End: 2021-11-23
Payer: COMMERCIAL

## 2021-11-23 DIAGNOSIS — Z95.810 AICD (AUTOMATIC CARDIOVERTER/DEFIBRILLATOR) PRESENT: Primary | ICD-10-CM

## 2021-11-23 PROCEDURE — 93296 REM INTERROG EVL PM/IDS: CPT | Performed by: INTERNAL MEDICINE

## 2021-11-23 PROCEDURE — 93295 DEV INTERROG REMOTE 1/2/MLT: CPT | Performed by: INTERNAL MEDICINE

## 2021-11-24 ENCOUNTER — TELEMEDICINE (OUTPATIENT)
Dept: FAMILY MEDICINE CLINIC | Facility: CLINIC | Age: 31
End: 2021-11-24
Payer: COMMERCIAL

## 2021-11-24 DIAGNOSIS — U07.1 COVID-19 VIRUS INFECTION: Primary | ICD-10-CM

## 2021-11-24 PROCEDURE — G2012 BRIEF CHECK IN BY MD/QHP: HCPCS | Performed by: FAMILY MEDICINE

## 2021-11-24 PROCEDURE — 0241U HB NFCT DS VIR RESP RNA 4 TRGT: CPT | Performed by: FAMILY MEDICINE

## 2022-02-10 ENCOUNTER — OFFICE VISIT (OUTPATIENT)
Dept: NEUROLOGY | Facility: CLINIC | Age: 32
End: 2022-02-10
Payer: COMMERCIAL

## 2022-02-10 VITALS
TEMPERATURE: 97.3 F | HEART RATE: 87 BPM | HEIGHT: 73 IN | OXYGEN SATURATION: 98 % | WEIGHT: 182.4 LBS | DIASTOLIC BLOOD PRESSURE: 72 MMHG | RESPIRATION RATE: 18 BRPM | SYSTOLIC BLOOD PRESSURE: 124 MMHG | BODY MASS INDEX: 24.18 KG/M2

## 2022-02-10 DIAGNOSIS — R56.9 SEIZURE (HCC): Primary | ICD-10-CM

## 2022-02-10 DIAGNOSIS — G40.409 GENERALIZED TONIC-CLONIC SEIZURE (HCC): ICD-10-CM

## 2022-02-10 DIAGNOSIS — F84.0 ACTIVE AUTISTIC DISORDER: ICD-10-CM

## 2022-02-10 PROCEDURE — 99214 OFFICE O/P EST MOD 30 MIN: CPT | Performed by: NURSE PRACTITIONER

## 2022-02-10 RX ORDER — LEVETIRACETAM 500 MG/1
500 TABLET ORAL EVERY 12 HOURS SCHEDULED
Qty: 180 TABLET | Refills: 3 | Status: ON HOLD | OUTPATIENT
Start: 2022-02-10 | End: 2022-03-02 | Stop reason: SDUPTHER

## 2022-02-10 NOTE — PROGRESS NOTES
Patient ID: Barbara Cleary is a 36 y o  male with a history of autism and recently diagnosed Brugada syndrome s/p ICD presenting for follow up regarding possible epilepsy due to unknown cause manifest as apparent generalized tonic clonic seizures that began about 5 years ago  Assessment/Plan:    Generalized tonic-clonic seizure ([facility] [address] )  Patient has been seizure free since his last visit on levetiracetam monotherapy of 500 mg BID  Tolerating medication well but may contribute to some irritability  Prior EEGs normal though given semiology of events, he will continue with AED therapy to prevent breakthrough seizures  Neurologic exam at baseline  Patient will continue with levetiracetam 500 mg BID  Will check level prior to next visit  With further seizures his dose of levetiracetam could certainly be increased  Follow up in 6 months or sooner if needed  Active autistic disorder  Patient remains at baseline level of functioning  Lives with mother who is his caretaker  Continue supportive care  Subjective:  Barbara Cleary is a 36 y o  male with a history of autism and recently diagnosed Brugada syndrome s/p ICD presenting for follow up regarding possible epilepsy due to unknown cause manifest as apparent generalized tonic clonic seizures that began about 5 years ago  Last seen in the office by Dr Yesika Goetz on 4/20/2021  At that time, it was noted that it was possible that all events may be been syncope with convulsion related to Brugada syndrom ebut reports of convulsion and postictal state longer than typical for syncope did suggest epileptic seizures  He was on levetiracetam 500 mg BID at that time and was to continue medication and follow up in 6 month  Patient presents today with his mother  She provides the history for visit   Last March he was vacuuming with the hose in his hand was was leaning on the lazy boy and looked like he was passing out and called 911 since he hit his head on the wall  Went to [email or web address] and was diagnosed with Brugada sydrome  ICD placed  He was initially weaned from levetiracetam in September 2020 but when he was in the ER with brugada syndrome he did have a full blown GTC seizure which is why he has continued back on levetiracetam 500 mg twice per day  Since he has been back on levetiracetam he has continued to be seizure free  No clear side effects  Current AEDs:  - levetiracetam 500 mg BID    Event/Seizure semiology:  Generalized tonic clonic seizure   (head and body turned to left initially with at least one event)     Special Features  Status epilepticus: no  Self Injury Seizures: fall in 3/2021 cause hit to head  Precipitating Factors: none     Epilepsy Risk Factors:  Autism     Prior AEDs:  None     Prior Evaluation:  EEGs normal (2016, 2020)  CT head 3/2021- no acute findings    Psychiatric History:  autism     Social History:   Driving: No  Lives Alone: No       The following portions of the patient's history were reviewed and updated as appropriate: allergies, current medications, past family history, past medical history, past social history, past surgical history and problem list          Objective:    Blood pressure 124/72, pulse 87, temperature (!) 97 3 °F (36 3 °C), temperature source Tympanic, resp  rate 18, height 6' 1" (1 854 m), weight 82 7 kg (182 lb 6 4 oz), SpO2 98 %  Physical Exam  No apparent distress  Appears well nourished  Mood appropriate for situation     Neurologic Exam  Mental status- alert to person  Verbalizes with single works  Frequently stands up and interrupts to get mother's attention  Follows simple, one step commands  Limited attention and knowledge  Cranial Nerves- blinks to stimuli, EOMS normal, facial  muscles symmetric, hearing intact bilaterally to finger rubs, shoulder shrug symmetrical     Motor- No pronator drift  Appropriate strength without lateralizing weakness  Moves all extremities freely   No tremor  Sensory-  Intact distally in all extremities to light touch  DTRs- 2+ and symmetric in all extremities    Gait- wide based casual gait, steady    Coordination- high fives without issue      ROS:    Review of Systems   Constitutional: Negative  Negative for appetite change and fever  HENT: Negative  Negative for hearing loss, tinnitus, trouble swallowing and voice change  Eyes: Negative  Negative for photophobia and pain  Respiratory: Negative  Negative for shortness of breath  Cardiovascular: Negative  Negative for palpitations  Gastrointestinal: Negative  Negative for nausea and vomiting  Endocrine: Negative  Negative for cold intolerance  Genitourinary: Negative  Negative for dysuria, frequency and urgency  Musculoskeletal: Negative  Negative for myalgias and neck pain  Skin: Negative  Negative for rash  Allergic/Immunologic: Negative  Neurological: Negative  Negative for dizziness, tremors, seizures, syncope, facial asymmetry, speech difficulty, weakness, light-headedness, numbness and headaches  Hematological: Negative  Does not bruise/bleed easily  Psychiatric/Behavioral: Positive for sleep disturbance  Negative for confusion and hallucinations  ROS obtained by MA and reviewed by myself

## 2022-02-10 NOTE — PATIENT INSTRUCTIONS
1- continue current dose of leveitracetam 500 mg twice per day  2- Have blood work done when you are able  3- Call the office with any breakthrough seizures or concerns  4- follow up in 6 months or sooner if needed

## 2022-02-14 ENCOUNTER — OFFICE VISIT (OUTPATIENT)
Dept: FAMILY MEDICINE CLINIC | Facility: CLINIC | Age: 32
End: 2022-02-14
Payer: COMMERCIAL

## 2022-02-14 VITALS
SYSTOLIC BLOOD PRESSURE: 114 MMHG | DIASTOLIC BLOOD PRESSURE: 62 MMHG | OXYGEN SATURATION: 98 % | BODY MASS INDEX: 23.7 KG/M2 | TEMPERATURE: 97.4 F | HEART RATE: 83 BPM | HEIGHT: 72 IN | WEIGHT: 175 LBS

## 2022-02-14 DIAGNOSIS — H66.015 RECURRENT ACUTE SUPPURATIVE OTITIS MEDIA WITH SPONTANEOUS RUPTURE OF LEFT TYMPANIC MEMBRANE: Primary | ICD-10-CM

## 2022-02-14 DIAGNOSIS — L21.9 SEBORRHEIC DERMATITIS: ICD-10-CM

## 2022-02-14 PROCEDURE — 99214 OFFICE O/P EST MOD 30 MIN: CPT | Performed by: FAMILY MEDICINE

## 2022-02-14 RX ORDER — AZITHROMYCIN 250 MG/1
TABLET, FILM COATED ORAL
Qty: 6 TABLET | Refills: 0 | Status: SHIPPED | OUTPATIENT
Start: 2022-02-14 | End: 2022-02-18

## 2022-02-14 NOTE — PATIENT INSTRUCTIONS
Otitis Media, Ambulatory Care   GENERAL INFORMATION:   Otitis media  is an ear infection  Common symptoms include the following:   · Fever or a headache    · Ear pain    · Trouble hearing    · Ear feels plugged or full or you have ringing or buzzing in your ear    · Dizziness or you lose your balance    · Nausea or vomiting  Seek immediate care for the following symptoms:   · Seizure    · Fever and a stiff neck  Treatment for otitis media  may include any of the following:  · NSAIDs  help decrease swelling and pain or fever  This medicine is available with or without a doctor's order  NSAIDs can cause stomach bleeding or kidney problems in certain people  If you take blood thinner medicine, always ask your healthcare provider if NSAIDs are safe for you  Always read the medicine label and follow directions  · Ear drops  to help treat your ear pain  · Antibiotics  to help kill the germs that caused your ear infection  Care for otitis media:   · Use heat  Place a warm, moist washcloth on your ear to decrease pain  Apply for 15 to 20 minutes, 3 to 4 times a day    · Use ice  Ice helps decrease swelling and pain  Use an ice pack or put crushed ice in a plastic bag  Cover the ice pack with a towel and place it on your ear for 15 to 20 minutes, 3 to 4 times a day for 2 days  Prevent otitis media:   · Wash your hands often  This will help prevent the spread of germs  Encourage everyone in your house to wash their hands with soap and water after they use the bathroom  Everyone should also wash their hands after they change a child's diaper and before they prepare or eat food  · Stay away from people who are ill  Germs are easily and quickly spread through contact  Follow up with your healthcare provider as directed:  Write down your questions so you remember to ask them during your visits  CARE AGREEMENT:   You have the right to help plan your care   Learn about your health condition and how it may be treated  Discuss treatment options with your caregivers to decide what care you want to receive  You always have the right to refuse treatment  The above information is an  only  It is not intended as medical advice for individual conditions or treatments  Talk to your doctor, nurse or pharmacist before following any medical regimen to see if it is safe and effective for you  © 2014 [address] is for End User's use only and may not be sold, redistributed or otherwise used for commercial purposes  All illustrations and images included in CareNotes® are the copyrighted property of A D A M , Inc  or [employer]

## 2022-02-14 NOTE — PROGRESS NOTES
Assessment/Plan:       Diagnoses and all orders for this visit:    Recurrent acute suppurative otitis media with spontaneous rupture of left tympanic membrane  -     azithromycin (ZITHROMAX) 250 mg tablet; Take 2 tablets today then 1 tablet daily x 4 days    Seborrheic dermatitis  -     mupirocin (BACTROBAN) 2 % ointment; Apply topically 3 (three) times a day          Subjective:   Chief Complaint   Patient presents with    Follow-up     Pt states there is drainage coming from L ear and it has an odor started about Wednesday last week, mom states osme of the drainage was clear and white states it has slowed down Mom also states he has bumps almost pimple like on his scalp that have come back       Patient ID: Binta Yusuf is a 36 y o  male  This is a 61-year-old special needs ago accompanied by Mom who is having drainage from the left ear  This has been since Wednesday  He was not himself for the weekend  There was no documented fever  No cough no sore throat no rhinorrhea  He also is having some flare in his scalp condition  This involves small papules pimples that occur  COCID 19 ---- positive NO symptoms    Patient is status post defibrillator insertion in March 2021 after syncopal episode  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  The following portions of the patient's history were reviewed and updated as appropriate: allergies, current medications, past family history, past medical history, past social history, past surgical history and problem list       Review of Systems   Constitutional: Negative for fever  Quieter   HENT: Positive for ear discharge  Respiratory: Negative for cough  Neurological: Negative for seizures and syncope  Objective:      /62   Pulse 83   Temp (!) 97 4 °F (36 3 °C) (Temporal)   Ht 5' 11 5" (1 816 m)   Wt 79 4 kg (175 lb)   SpO2 98%   BMI 24 07 kg/m²          Physical Exam  Constitutional:       Appearance: Normal appearance     HENT:      Head: Right Ear: Tympanic membrane normal       Left Ear: Drainage present  A middle ear effusion is present  Tympanic membrane is bulging  Left ear perforated TM: possible  Cardiovascular:      Rate and Rhythm: Normal rate  Pulmonary:      Effort: Pulmonary effort is normal    Skin:     Findings: Rash present  Rash is vesicular  Neurological:      Mental Status: He is alert  Mental status is at baseline

## 2022-02-22 ENCOUNTER — REMOTE DEVICE CLINIC VISIT (OUTPATIENT)
Dept: CARDIOLOGY CLINIC | Facility: CLINIC | Age: 32
End: 2022-02-22
Payer: COMMERCIAL

## 2022-02-22 DIAGNOSIS — Z95.810 SINGLE IMPLANTABLE CARDIOVERTER-DEFIBRILLATOR IN SITU: Primary | ICD-10-CM

## 2022-02-22 PROCEDURE — 93295 DEV INTERROG REMOTE 1/2/MLT: CPT | Performed by: INTERNAL MEDICINE

## 2022-02-22 PROCEDURE — 93296 REM INTERROG EVL PM/IDS: CPT | Performed by: INTERNAL MEDICINE

## 2022-02-22 NOTE — PROGRESS NOTES
MDT VVI ICD - ACTIVE SYSTEM IS MRI CONDITIONAL   [employer] TRANSMISSION: BATTERY VOLTAGE ADEQUATE (12 6 YRS)   <0 1% (VVI 40); ALL AVAILABLE LEAD PARAMETERS WITHIN NORMAL LIMITS  5 SVT-WAVELET EPISODES, MOST RECENT EPISODE DURATION 49 SECS @  BPM  HX: SAME, NO CARDIAC MEDS  OPTI-VOL WITHIN NORMAL LIMITS  NORMAL DEVICE FUNCTION   ES

## 2022-02-25 ENCOUNTER — HOSPITAL ENCOUNTER (INPATIENT)
Facility: HOSPITAL | Age: 32
LOS: 5 days | Discharge: HOME | DRG: 053 | End: 2022-03-02
Attending: EMERGENCY MEDICINE | Admitting: EMERGENCY MEDICINE
Payer: COMMERCIAL

## 2022-02-25 ENCOUNTER — APPOINTMENT (INPATIENT)
Dept: NEUROLOGY | Facility: CLINIC | Age: 32
DRG: 053 | End: 2022-02-25
Payer: COMMERCIAL

## 2022-02-25 ENCOUNTER — APPOINTMENT (EMERGENCY)
Dept: RADIOLOGY | Facility: HOSPITAL | Age: 32
DRG: 053 | End: 2022-02-25
Payer: COMMERCIAL

## 2022-02-25 DIAGNOSIS — R41.82 ALTERED MENTAL STATUS: ICD-10-CM

## 2022-02-25 DIAGNOSIS — R03.0 ELEVATED BLOOD PRESSURE READING: ICD-10-CM

## 2022-02-25 DIAGNOSIS — K92.0 HEMATEMESIS: ICD-10-CM

## 2022-02-25 DIAGNOSIS — E87.1 HYPONATREMIA: ICD-10-CM

## 2022-02-25 DIAGNOSIS — H60.92 LEFT OTITIS EXTERNA: ICD-10-CM

## 2022-02-25 DIAGNOSIS — H66.42: ICD-10-CM

## 2022-02-25 DIAGNOSIS — R56.9 SEIZURE (HCC): ICD-10-CM

## 2022-02-25 DIAGNOSIS — G40.909 SEIZURE DISORDER (HCC): ICD-10-CM

## 2022-02-25 DIAGNOSIS — R55 SYNCOPE AND COLLAPSE: ICD-10-CM

## 2022-02-25 DIAGNOSIS — I49.8 BRUGADA SYNDROME: ICD-10-CM

## 2022-02-25 DIAGNOSIS — G40.919 BREAKTHROUGH SEIZURE (HCC): Primary | ICD-10-CM

## 2022-02-25 DIAGNOSIS — J34.9 SINUS DISEASE: ICD-10-CM

## 2022-02-25 DIAGNOSIS — H66.90 RECURRENT OTITIS MEDIA: ICD-10-CM

## 2022-02-25 LAB
2HR DELTA HS TROPONIN: -1 NG/L
4HR DELTA HS TROPONIN: 0 NG/L
ALBUMIN SERPL BCP-MCNC: 3.7 G/DL (ref 3.5–5)
ALP SERPL-CCNC: 67 U/L (ref 46–116)
ALT SERPL W P-5'-P-CCNC: 32 U/L (ref 12–78)
ANION GAP SERPL CALCULATED.3IONS-SCNC: 6 MMOL/L (ref 4–13)
ANION GAP SERPL CALCULATED.3IONS-SCNC: 8 MMOL/L (ref 4–13)
ANISOCYTOSIS BLD QL SMEAR: PRESENT
APPEARANCE CSF: NORMAL
APTT PPP: 31 SECONDS (ref 23–37)
AST SERPL W P-5'-P-CCNC: 34 U/L (ref 5–45)
ATRIAL RATE: 97 BPM
BASOPHILS # BLD AUTO: 0.02 THOUSANDS/ΜL (ref 0–0.1)
BASOPHILS # BLD MANUAL: 0 THOUSAND/UL (ref 0–0.1)
BASOPHILS NFR BLD AUTO: 0 % (ref 0–1)
BASOPHILS NFR MAR MANUAL: 0 % (ref 0–1)
BILIRUB SERPL-MCNC: 0.64 MG/DL (ref 0.2–1)
BUN SERPL-MCNC: 11 MG/DL (ref 5–25)
BUN SERPL-MCNC: 12 MG/DL (ref 5–25)
CALCIUM SERPL-MCNC: 8.4 MG/DL (ref 8.3–10.1)
CALCIUM SERPL-MCNC: 8.6 MG/DL (ref 8.3–10.1)
CARDIAC TROPONIN I PNL SERPL HS: 4 NG/L
CARDIAC TROPONIN I PNL SERPL HS: 5 NG/L
CARDIAC TROPONIN I PNL SERPL HS: 5 NG/L
CHLORIDE SERPL-SCNC: 95 MMOL/L (ref 100–108)
CHLORIDE SERPL-SCNC: 95 MMOL/L (ref 100–108)
CO2 SERPL-SCNC: 24 MMOL/L (ref 21–32)
CO2 SERPL-SCNC: 25 MMOL/L (ref 21–32)
CREAT SERPL-MCNC: 0.62 MG/DL (ref 0.6–1.3)
CREAT SERPL-MCNC: 0.74 MG/DL (ref 0.6–1.3)
CRP SERPL QL: 3.7 MG/L
EOSINOPHIL # BLD AUTO: 0.01 THOUSAND/ΜL (ref 0–0.61)
EOSINOPHIL # BLD MANUAL: 0 THOUSAND/UL (ref 0–0.4)
EOSINOPHIL NFR BLD AUTO: 0 % (ref 0–6)
EOSINOPHIL NFR BLD MANUAL: 0 % (ref 0–6)
ERYTHROCYTE [DISTWIDTH] IN BLOOD BY AUTOMATED COUNT: 12 % (ref 11.6–15.1)
ERYTHROCYTE [DISTWIDTH] IN BLOOD BY AUTOMATED COUNT: 12.3 % (ref 11.6–15.1)
ERYTHROCYTE [SEDIMENTATION RATE] IN BLOOD: 4 MM/HOUR (ref 0–14)
GFR SERPL CREATININE-BSD FRML MDRD: 124 ML/MIN/1.73SQ M
GFR SERPL CREATININE-BSD FRML MDRD: 134 ML/MIN/1.73SQ M
GLUCOSE CSF-MCNC: 67 MG/DL (ref 50–80)
GLUCOSE SERPL-MCNC: 106 MG/DL (ref 65–140)
GLUCOSE SERPL-MCNC: 107 MG/DL (ref 65–140)
GLUCOSE SERPL-MCNC: 116 MG/DL (ref 65–140)
GRAM STN SPEC: NORMAL
GRAM STN SPEC: NORMAL
HCT VFR BLD AUTO: 34.2 % (ref 36.5–49.3)
HCT VFR BLD AUTO: 41.1 % (ref 36.5–49.3)
HGB BLD-MCNC: 13 G/DL (ref 12–17)
HGB BLD-MCNC: 14.9 G/DL (ref 12–17)
IMM GRANULOCYTES # BLD AUTO: 0.05 THOUSAND/UL (ref 0–0.2)
IMM GRANULOCYTES NFR BLD AUTO: 0 % (ref 0–2)
INR PPP: 1.03 (ref 0.84–1.19)
LACTATE SERPL-SCNC: 1.1 MMOL/L (ref 0.5–2)
LACTATE SERPL-SCNC: 1.9 MMOL/L (ref 0.5–2)
LYMPHOCYTES # BLD AUTO: 0.4 THOUSAND/UL (ref 0.6–4.47)
LYMPHOCYTES # BLD AUTO: 0.54 THOUSANDS/ΜL (ref 0.6–4.47)
LYMPHOCYTES # BLD AUTO: 3 % (ref 14–44)
LYMPHOCYTES NFR BLD AUTO: 3 % (ref 14–44)
LYMPHOCYTES NFR CSF MANUAL: 23 %
MCH RBC QN AUTO: 30 PG (ref 26.8–34.3)
MCH RBC QN AUTO: 30.7 PG (ref 26.8–34.3)
MCHC RBC AUTO-ENTMCNC: 36.3 G/DL (ref 31.4–37.4)
MCHC RBC AUTO-ENTMCNC: 37.7 G/DL (ref 31.4–37.4)
MCV RBC AUTO: 81 FL (ref 82–98)
MCV RBC AUTO: 83 FL (ref 82–98)
MICROCYTES BLD QL AUTO: PRESENT
MONOCYTES # BLD AUTO: 0.26 THOUSAND/UL (ref 0–1.22)
MONOCYTES # BLD AUTO: 0.78 THOUSAND/ΜL (ref 0.17–1.22)
MONOCYTES NFR BLD AUTO: 5 % (ref 4–12)
MONOCYTES NFR BLD: 2 % (ref 4–12)
MONOS+MACROS CSF MANUAL: 15 %
NEUTROPHILS # BLD AUTO: 15.5 THOUSANDS/ΜL (ref 1.85–7.62)
NEUTROPHILS # BLD MANUAL: 12.01 THOUSAND/UL (ref 1.85–7.62)
NEUTROPHILS NFR CSF MANUAL: 58 %
NEUTS BAND NFR BLD MANUAL: 16 % (ref 0–8)
NEUTS BAND NFR CSF MANUAL: 4 %
NEUTS SEG NFR BLD AUTO: 75 % (ref 43–75)
NEUTS SEG NFR BLD AUTO: 92 % (ref 43–75)
NRBC BLD AUTO-RTO: 0 /100 WBCS
OSMOLALITY UR/SERPL-RTO: 259 MMOL/KG (ref 282–298)
OSMOLALITY UR: 217 MMOL/KG
P AXIS: 22 DEGREES
PLATELET # BLD AUTO: 104 THOUSANDS/UL (ref 149–390)
PLATELET # BLD AUTO: 162 THOUSANDS/UL (ref 149–390)
PLATELET BLD QL SMEAR: ABNORMAL
PMV BLD AUTO: 10.1 FL (ref 8.9–12.7)
PMV BLD AUTO: 9 FL (ref 8.9–12.7)
POTASSIUM SERPL-SCNC: 3.4 MMOL/L (ref 3.5–5.3)
POTASSIUM SERPL-SCNC: 3.5 MMOL/L (ref 3.5–5.3)
PR INTERVAL: 160 MS
PROCALCITONIN SERPL-MCNC: 0.3 NG/ML
PROLACTIN SERPL-MCNC: 18 NG/ML (ref 2.5–17.4)
PROLACTIN SERPL-MCNC: 23.3 NG/ML (ref 2.5–17.4)
PROT CSF-MCNC: 63 MG/DL (ref 15–45)
PROT SERPL-MCNC: 7 G/DL (ref 6.4–8.2)
PROTHROMBIN TIME: 13.1 SECONDS (ref 11.6–14.5)
QRS AXIS: 8 DEGREES
QRSD INTERVAL: 100 MS
QT INTERVAL: 342 MS
QTC INTERVAL: 434 MS
RBC # BLD AUTO: 4.23 MILLION/UL (ref 3.88–5.62)
RBC # BLD AUTO: 4.96 MILLION/UL (ref 3.88–5.62)
RBC # CSF MANUAL: 1400 UL (ref 0–10)
RBC # CSF MANUAL: 404 UL (ref 0–10)
S PNEUM AG UR QL: NEGATIVE
SODIUM 24H UR-SCNC: 66 MOL/L
SODIUM SERPL-SCNC: 126 MMOL/L (ref 136–145)
SODIUM SERPL-SCNC: 127 MMOL/L (ref 136–145)
T WAVE AXIS: 4 DEGREES
TOTAL CELLS COUNTED BLD: NO
TOTAL CELLS COUNTED SPEC: 100
TUBE # CSF: 4
VARIANT LYMPHS # BLD AUTO: 4 %
VENTRICULAR RATE: 97 BPM
WBC # BLD AUTO: 13.2 THOUSAND/UL (ref 4.31–10.16)
WBC # BLD AUTO: 16.9 THOUSAND/UL (ref 4.31–10.16)
WBC # CSF AUTO: 1 /UL (ref 0–5)

## 2022-02-25 PROCEDURE — 84484 ASSAY OF TROPONIN QUANT: CPT | Performed by: EMERGENCY MEDICINE

## 2022-02-25 PROCEDURE — 80048 BASIC METABOLIC PNL TOTAL CA: CPT | Performed by: INTERNAL MEDICINE

## 2022-02-25 PROCEDURE — 85730 THROMBOPLASTIN TIME PARTIAL: CPT | Performed by: INTERNAL MEDICINE

## 2022-02-25 PROCEDURE — 83930 ASSAY OF BLOOD OSMOLALITY: CPT | Performed by: INTERNAL MEDICINE

## 2022-02-25 PROCEDURE — 009U3ZX DRAINAGE OF SPINAL CANAL, PERCUTANEOUS APPROACH, DIAGNOSTIC: ICD-10-PCS | Performed by: EMERGENCY MEDICINE

## 2022-02-25 PROCEDURE — 89051 BODY FLUID CELL COUNT: CPT | Performed by: EMERGENCY MEDICINE

## 2022-02-25 PROCEDURE — 87070 CULTURE OTHR SPECIMN AEROBIC: CPT | Performed by: EMERGENCY MEDICINE

## 2022-02-25 PROCEDURE — 95816 EEG AWAKE AND DROWSY: CPT

## 2022-02-25 PROCEDURE — 82948 REAGENT STRIP/BLOOD GLUCOSE: CPT

## 2022-02-25 PROCEDURE — 89050 BODY FLUID CELL COUNT: CPT | Performed by: EMERGENCY MEDICINE

## 2022-02-25 PROCEDURE — 87483 CNS DNA AMP PROBE TYPE 12-25: CPT

## 2022-02-25 PROCEDURE — 93010 ELECTROCARDIOGRAM REPORT: CPT | Performed by: INTERNAL MEDICINE

## 2022-02-25 PROCEDURE — 85027 COMPLETE CBC AUTOMATED: CPT | Performed by: INTERNAL MEDICINE

## 2022-02-25 PROCEDURE — 83935 ASSAY OF URINE OSMOLALITY: CPT | Performed by: INTERNAL MEDICINE

## 2022-02-25 PROCEDURE — 95700 EEG CONT REC W/VID EEG TECH: CPT

## 2022-02-25 PROCEDURE — 87040 BLOOD CULTURE FOR BACTERIA: CPT | Performed by: INTERNAL MEDICINE

## 2022-02-25 PROCEDURE — 87798 DETECT AGENT NOS DNA AMP: CPT | Performed by: EMERGENCY MEDICINE

## 2022-02-25 PROCEDURE — 87449 NOS EACH ORGANISM AG IA: CPT | Performed by: EMERGENCY MEDICINE

## 2022-02-25 PROCEDURE — 99285 EMERGENCY DEPT VISIT HI MDM: CPT | Performed by: EMERGENCY MEDICINE

## 2022-02-25 PROCEDURE — 96374 THER/PROPH/DIAG INJ IV PUSH: CPT

## 2022-02-25 PROCEDURE — 95715 VEEG EA 12-26HR INTMT MNTR: CPT

## 2022-02-25 PROCEDURE — 85610 PROTHROMBIN TIME: CPT | Performed by: INTERNAL MEDICINE

## 2022-02-25 PROCEDURE — 99285 EMERGENCY DEPT VISIT HI MDM: CPT

## 2022-02-25 PROCEDURE — 93005 ELECTROCARDIOGRAM TRACING: CPT

## 2022-02-25 PROCEDURE — 99221 1ST HOSP IP/OBS SF/LOW 40: CPT | Performed by: OTOLARYNGOLOGY

## 2022-02-25 PROCEDURE — 36415 COLL VENOUS BLD VENIPUNCTURE: CPT | Performed by: EMERGENCY MEDICINE

## 2022-02-25 PROCEDURE — 85007 BL SMEAR W/DIFF WBC COUNT: CPT | Performed by: INTERNAL MEDICINE

## 2022-02-25 PROCEDURE — 99255 IP/OBS CONSLTJ NEW/EST HI 80: CPT | Performed by: PSYCHIATRY & NEUROLOGY

## 2022-02-25 PROCEDURE — 83605 ASSAY OF LACTIC ACID: CPT | Performed by: PHYSICIAN ASSISTANT

## 2022-02-25 PROCEDURE — C9113 INJ PANTOPRAZOLE SODIUM, VIA: HCPCS | Performed by: INTERNAL MEDICINE

## 2022-02-25 PROCEDURE — 95719 EEG PHYS/QHP EA INCR W/O VID: CPT | Performed by: PSYCHIATRY & NEUROLOGY

## 2022-02-25 PROCEDURE — 71045 X-RAY EXAM CHEST 1 VIEW: CPT

## 2022-02-25 PROCEDURE — 62270 DX LMBR SPI PNXR: CPT | Performed by: EMERGENCY MEDICINE

## 2022-02-25 PROCEDURE — 85652 RBC SED RATE AUTOMATED: CPT | Performed by: INTERNAL MEDICINE

## 2022-02-25 PROCEDURE — 99291 CRITICAL CARE FIRST HOUR: CPT | Performed by: EMERGENCY MEDICINE

## 2022-02-25 PROCEDURE — 99223 1ST HOSP IP/OBS HIGH 75: CPT | Performed by: INTERNAL MEDICINE

## 2022-02-25 PROCEDURE — 70450 CT HEAD/BRAIN W/O DYE: CPT

## 2022-02-25 PROCEDURE — 84300 ASSAY OF URINE SODIUM: CPT | Performed by: INTERNAL MEDICINE

## 2022-02-25 PROCEDURE — 84157 ASSAY OF PROTEIN OTHER: CPT | Performed by: EMERGENCY MEDICINE

## 2022-02-25 PROCEDURE — G1004 CDSM NDSC: HCPCS

## 2022-02-25 PROCEDURE — 84145 PROCALCITONIN (PCT): CPT | Performed by: INTERNAL MEDICINE

## 2022-02-25 PROCEDURE — 82945 GLUCOSE OTHER FLUID: CPT | Performed by: EMERGENCY MEDICINE

## 2022-02-25 PROCEDURE — 80177 DRUG SCRN QUAN LEVETIRACETAM: CPT | Performed by: EMERGENCY MEDICINE

## 2022-02-25 PROCEDURE — 84146 ASSAY OF PROLACTIN: CPT | Performed by: PHYSICIAN ASSISTANT

## 2022-02-25 PROCEDURE — 86140 C-REACTIVE PROTEIN: CPT | Performed by: INTERNAL MEDICINE

## 2022-02-25 PROCEDURE — 81001 URINALYSIS AUTO W/SCOPE: CPT | Performed by: PHYSICIAN ASSISTANT

## 2022-02-25 PROCEDURE — 80053 COMPREHEN METABOLIC PANEL: CPT | Performed by: EMERGENCY MEDICINE

## 2022-02-25 PROCEDURE — 85025 COMPLETE CBC W/AUTO DIFF WBC: CPT | Performed by: EMERGENCY MEDICINE

## 2022-02-25 PROCEDURE — 89050 BODY FLUID CELL COUNT: CPT | Performed by: PHYSICIAN ASSISTANT

## 2022-02-25 RX ORDER — PROPOFOL 10 MG/ML
INJECTION, EMULSION INTRAVENOUS
Status: DISPENSED
Start: 2022-02-25 | End: 2022-02-26

## 2022-02-25 RX ORDER — LORAZEPAM 2 MG/ML
2 INJECTION INTRAMUSCULAR EVERY 6 HOURS PRN
Status: DISCONTINUED | OUTPATIENT
Start: 2022-02-25 | End: 2022-03-02 | Stop reason: HOSPADM

## 2022-02-25 RX ORDER — LORAZEPAM 2 MG/ML
INJECTION INTRAMUSCULAR
Status: COMPLETED
Start: 2022-02-25 | End: 2022-02-25

## 2022-02-25 RX ORDER — PANTOPRAZOLE SODIUM 40 MG/1
40 INJECTION, POWDER, FOR SOLUTION INTRAVENOUS EVERY 12 HOURS SCHEDULED
Status: DISCONTINUED | OUTPATIENT
Start: 2022-02-25 | End: 2022-02-27

## 2022-02-25 RX ORDER — SODIUM CHLORIDE 1000 MG
1 TABLET, SOLUBLE MISCELLANEOUS
Status: DISCONTINUED | OUTPATIENT
Start: 2022-02-25 | End: 2022-02-26

## 2022-02-25 RX ORDER — ONDANSETRON 2 MG/ML
INJECTION INTRAMUSCULAR; INTRAVENOUS
Status: COMPLETED
Start: 2022-02-25 | End: 2022-02-25

## 2022-02-25 RX ORDER — LORAZEPAM 2 MG/ML
2 INJECTION INTRAMUSCULAR ONCE
Status: COMPLETED | OUTPATIENT
Start: 2022-02-25 | End: 2022-02-25

## 2022-02-25 RX ORDER — LEVETIRACETAM 750 MG/1
750 TABLET ORAL EVERY 12 HOURS SCHEDULED
Status: DISCONTINUED | OUTPATIENT
Start: 2022-02-25 | End: 2022-02-25

## 2022-02-25 RX ORDER — ONDANSETRON 2 MG/ML
4 INJECTION INTRAMUSCULAR; INTRAVENOUS ONCE
Status: DISCONTINUED | OUTPATIENT
Start: 2022-02-25 | End: 2022-03-02 | Stop reason: HOSPADM

## 2022-02-25 RX ORDER — POTASSIUM CHLORIDE 14.9 MG/ML
20 INJECTION INTRAVENOUS ONCE
Status: COMPLETED | OUTPATIENT
Start: 2022-02-25 | End: 2022-02-26

## 2022-02-25 RX ORDER — KETAMINE HYDROCHLORIDE 50 MG/ML
2 INJECTION, SOLUTION, CONCENTRATE INTRAMUSCULAR; INTRAVENOUS ONCE
Status: COMPLETED | OUTPATIENT
Start: 2022-02-25 | End: 2022-02-25

## 2022-02-25 RX ORDER — ACETAMINOPHEN 325 MG/1
650 TABLET ORAL EVERY 6 HOURS PRN
Status: DISCONTINUED | OUTPATIENT
Start: 2022-02-25 | End: 2022-03-02 | Stop reason: HOSPADM

## 2022-02-25 RX ORDER — OFLOXACIN 3 MG/ML
4 SOLUTION/ DROPS OPHTHALMIC 2 TIMES DAILY
Status: DISCONTINUED | OUTPATIENT
Start: 2022-02-25 | End: 2022-03-02 | Stop reason: HOSPADM

## 2022-02-25 RX ORDER — DEXAMETHASONE SODIUM PHOSPHATE 4 MG/ML
10 INJECTION, SOLUTION INTRA-ARTICULAR; INTRALESIONAL; INTRAMUSCULAR; INTRAVENOUS; SOFT TISSUE ONCE
Status: COMPLETED | OUTPATIENT
Start: 2022-02-25 | End: 2022-02-25

## 2022-02-25 RX ORDER — PROPOFOL 10 MG/ML
100 INJECTION, EMULSION INTRAVENOUS ONCE
Status: COMPLETED | OUTPATIENT
Start: 2022-02-25 | End: 2022-02-25

## 2022-02-25 RX ADMIN — SODIUM CHLORIDE 150 MG: 9 INJECTION, SOLUTION INTRAVENOUS at 20:31

## 2022-02-25 RX ADMIN — KETAMINE HYDROCHLORIDE 160 MG: 50 INJECTION, SOLUTION INTRAMUSCULAR; INTRAVENOUS at 18:53

## 2022-02-25 RX ADMIN — LORAZEPAM 2 MG: 2 INJECTION INTRAMUSCULAR; INTRAVENOUS at 10:33

## 2022-02-25 RX ADMIN — ONDANSETRON 4 MG: 2 INJECTION INTRAMUSCULAR; INTRAVENOUS at 17:37

## 2022-02-25 RX ADMIN — SODIUM CHLORIDE 400 MG: 9 INJECTION, SOLUTION INTRAVENOUS at 17:38

## 2022-02-25 RX ADMIN — PROPOFOL 100 MG: 10 INJECTION, EMULSION INTRAVENOUS at 19:01

## 2022-02-25 RX ADMIN — SODIUM CHLORIDE 1000 ML: 0.9 INJECTION, SOLUTION INTRAVENOUS at 11:43

## 2022-02-25 RX ADMIN — VANCOMYCIN HYDROCHLORIDE 2000 MG: 10 INJECTION, POWDER, LYOPHILIZED, FOR SOLUTION INTRAVENOUS at 21:14

## 2022-02-25 RX ADMIN — DEXAMETHASONE SODIUM PHOSPHATE 10 MG: 4 INJECTION INTRA-ARTICULAR; INTRALESIONAL; INTRAMUSCULAR; INTRAVENOUS; SOFT TISSUE at 19:44

## 2022-02-25 RX ADMIN — LEVETIRACETAM 1500 MG: 100 INJECTION INTRAVENOUS at 20:19

## 2022-02-25 RX ADMIN — SODIUM CHLORIDE 1000 ML: 0.9 INJECTION, SOLUTION INTRAVENOUS at 19:15

## 2022-02-25 RX ADMIN — CEFTRIAXONE 2000 MG: 2 INJECTION, POWDER, FOR SOLUTION INTRAMUSCULAR; INTRAVENOUS at 20:51

## 2022-02-25 RX ADMIN — PANTOPRAZOLE SODIUM 40 MG: 40 INJECTION, POWDER, FOR SOLUTION INTRAVENOUS at 17:37

## 2022-02-25 RX ADMIN — LEVETIRACETAM 4000 MG: 100 INJECTION, SOLUTION INTRAVENOUS at 10:44

## 2022-02-25 RX ADMIN — LORAZEPAM 2 MG: 2 INJECTION INTRAMUSCULAR; INTRAVENOUS at 10:44

## 2022-02-25 NOTE — CONSULTS
Consultation - ENT   Sudheer Patricio 36 y o  male MRN: [id number]  Unit/Bed#: ED 23 Encounter: [id number]      Assessment/Plan   Assessment:  Patient with known history of recurrent otitis media as a child  Less active disease as a young adult  Findings on CT scan relatively benign corresponding to a mild left otitis media, no findings to suspect severe otomastoiditis with intracranial compromise  Patient already completed treatment with azithromycin  No additional systemic antibiotic appears to be needed at this point  Patient does have findings that suggest associated left otitis externa  Retention cyst in the sphenoid sinus appears to be relatively recent, it was not present on a CT scan 1 year ago  The lack of associated mucosal thickening on   sphenoid sinus suggest a retention cyst without infection  Plan:  Ofloxacin ear drops bilaterally (otitis externa left ear,  wax right ear)    Follow-up as an outpatient in our office  History of Present Illness   Physician Requesting Consult: Heather Bates DO  Reason for Consult / Principal Problem:  History of otitis media  HPI: Sudheer Patricio is a 36y o  year old male who has been admitted through the emergency room with seizure  Briefly he has history of autism, status post ICD placement, also recurrent otitis media and at least 6 sets of tubes according the mother  Last myringotomy tube was at least fiber 6 years ago  Mother reports that in the more recent past he has been doing better from his ears  He was found nonetheless to have a left otorrhea , initially clear fluid and subsequently fall odor sick purulent discharge, approximately a week ago and treated with a Z-Real for otitis media  The otorrhea subsided  Patient had a CT scan of the head and routine labs that show elevated white blood cell count and hyponatremia, hypochloremia        Inpatient consult to ENT  Consult performed by: Iris Keyes MD  Consult ordered by: Heather Bates DO          Review of Systems    Revision of Systems:    Complete review done with mother as main source of information, only positive for the symptoms described in the H&P section above    Historical Information   Past Medical History:   Diagnosis Date    Brugada syndrome     Seizure-like activity ([facility] [address] )     last assessed: 7/11/16    Skin lesion     last assessed: 12/30/13    Skin rash     last assessed: 3/1/13    Staph aureus infection     last assessed: 8/20/13     Past Surgical History:   Procedure Laterality Date    ADENOIDECTOMY      CARDIAC ICD GENERATOR CHANGE  03/29/2021    DENTAL SURGERY      wisdom teeth    MYRINGOTOMY W/ TUBES      with ventilating tube insertion; x6     Social History   Social History     Substance and Sexual Activity   Alcohol Use Never     Social History     Substance and Sexual Activity   Drug Use Never     Social History     Tobacco Use   Smoking Status Never Smoker   Smokeless Tobacco Never Used     Family History:   Family History   Problem Relation Age of Onset    Breast cancer Maternal Grandmother     Cancer Maternal Grandfather         Salivary gland cancer    Other Paternal Grandmother         brain tumor    COPD Paternal Grandmother     Hypertension Paternal Grandmother     No Known Problems Mother     No Known Problems Father        Meds/Allergies   Current Facility-Administered Medications   Medication Dose Route Frequency    acetaminophen (TYLENOL) tablet 650 mg  650 mg Oral Q6H PRN    levETIRAcetam (KEPPRA) tablet 750 mg  750 mg Oral Q12H [address]    LORazepam (ATIVAN) injection 2 mg  2 mg Intravenous Q6H PRN    sodium chloride tablet 1 g  1 g Oral TID With Meals         Allergies   Allergen Reactions    Cefaclor Hives    Sulfamethoxazole-Trimethoprim Hives       Objective       Physical Exam   Blood pressure 148/91, pulse (!) 108, temperature 98 °F (36 7 °C), resp  rate 17, SpO2 95 %  Constitutional:  Patient in bed, does move voluntarily    No significant response  Well-developed and well-nourished, no apparent distress, non-toxic appearance  Appears to follow some very is simple commands with her mother's voice  Head: Normocephalic, patient generally has a bandage and electrodes from EEG that has just been completed  Face: Symmetric, no edema, no sinus tenderness  Right Ear: External ear normal   Auditory canal with wax impaction that significantly limits visualization of the Tympanic membrane  No edema no erythema, no otorrhea  No post-auricular erythema or tenderness  Left Ear: External ear normal   Auditory canal  with diffuse erythema, no discharge noticed, there is erythema over the tympanic membrane that appears to be retracted  No evident perforation  No post-auricular erythema or tenderness  Nose: Septum with posterior right spur, Mucosa moist, turbinates normal size, no edema  No discharge evident  Oral cavity:  Unable to examine  Neck:   Trachea midline  No masses or lesions  Pulmonary/Chest: Normal effort and rate  No respiratory distress         Lab Results: CBC:   Lab Results   Component Value Date    WBC 16 90 (H) 02/25/2022    HGB 14 9 02/25/2022    HCT 41 1 02/25/2022    MCV 83 02/25/2022     02/25/2022    MCH 30 0 02/25/2022    MCHC 36 3 02/25/2022    RDW 12 0 02/25/2022    MPV 9 0 02/25/2022    NRBC 0 02/25/2022   , CMP:   Lab Results   Component Value Date    K 3 4 (L) 02/25/2022    CL 95 (L) 02/25/2022    CO2 25 02/25/2022    BUN 11 02/25/2022    CREATININE 0 62 02/25/2022    CALCIUM 8 4 02/25/2022    AST 34 02/25/2022    ALT 32 02/25/2022    ALKPHOS 67 02/25/2022    EGFR 134 02/25/2022     Imaging Studies: I have personally reviewed images on the PACS system:   CT scan head 03/27/2021:  Deviated septum with posterior spur to the right  Sinuses completely clear  Middle ear and mastoid is well aerated bilaterally  CT scan head 02/25/2022:  Septal deviation with posterior spur to the right    Healthy frontal, ethmoid and maxillary sinuses, right sphenoid sinus clear, left sphenoid sinus with relatively large retention cyst   There is no mucosal thickening on the remaining walls of the sphenoid sinus  No polyps on the sphenoid ethmoid recess  The carotid canal is intact, the walls of the sinus are intact with no dehiscence  Middle ear appears aerated bilaterally  There is a normal appearing mastoid on the right ear  The left ear has focal areas of mastoid cells with opacification  Most of the mastoid cells are well aerated  There is no bone erosion, no dehiscences see of the bone  No evidence of coalescent mastoiditis or cholesteatoma  Assessment:  Patient with known history of recurrent otitis media as a child  Less active disease as a young adult  Findings on CT scan relatively benign corresponding to a mild left otitis media, no findings to suspect severe otomastoiditis with intracranial compromise  Patient already completed treatment with azithromycin  No additional systemic antibiotic appears to be needed at this point  Patient does have findings that suggest associated left otitis externa  Retention cyst in the sphenoid sinus appears to be relatively recent, it was not present on a CT scan 1 year ago  The lack of associated mucosal thickening on   sphenoid sinus suggest a retention cyst without infection  Plan:  Ofloxacin ear drops bilaterally (otitis externa left ear,  wax right ear)    Follow-up as an outpatient in our office  Code Status: Level 1 - Full Code  Advance Directive and Living Will:      Power of :    POLST:      Counseling/Coordination of Care: Total floor / unit time spent today 45 minutes  Greater than 50% of total time was spent with the patient and / or family counseling and / or coordination of care  A description of the counseling / coordination of care: Reviewed findings and plan regarding the ENT with the mother

## 2022-02-25 NOTE — ASSESSMENT & PLAN NOTE
- Recently treated with Z-aura  - ENT consulted and being treated for otitis externa      Followup with ENT on discharge

## 2022-02-25 NOTE — ASSESSMENT & PLAN NOTE
· POA, Na 127    Possible etiology of breakthrough seizure -- see related plan  · Now resolved with IV fluids, Na 140  · Monitor BMP

## 2022-02-25 NOTE — ASSESSMENT & PLAN NOTE
Patient remains at baseline level of functioning  Lives with mother who is his caretaker  Continue supportive care

## 2022-02-25 NOTE — ASSESSMENT & PLAN NOTE
-urine osm, serum osm, urine sodium pending  -repeat bmp as patient received 1 liter bolus in ed, slow correction  -possible etiology of seizure, although overall not very low, but with possible rapid change precipitating seizure     -mri pending

## 2022-02-25 NOTE — ED ATTENDING ATTESTATION
2/25/2022  IMiguel Angel MD, saw and evaluated the patient  I have discussed the patient with the resident/non-physician practitioner and agree with the resident's/non-physician practitioner's findings, Plan of Care, and MDM as documented in the resident's/non-physician practitioner's note, except where noted  All available labs and Radiology studies were reviewed  I was present for key portions of any procedure(s) performed by the resident/non-physician practitioner and I was immediately available to provide assistance  At this point I agree with the current assessment done in the Emergency Department  I have conducted an independent evaluation of this patient a history and physical is as follows:    ED Course         Critical Care Time  CriticalCare Time  Performed by: Miguel Angel Pulliam MD  Authorized by: Miguel Angel Pulliam MD     Critical care provider statement:     Critical care time (minutes):  40    Critical care time was exclusive of:  Separately billable procedures and treating other patients and teaching time    Critical care was necessary to treat or prevent imminent or life-threatening deterioration of the following conditions:  CNS failure or compromise    Critical care was time spent personally by me on the following activities:  Evaluation of patient's response to treatment, discussions with consultants, re-evaluation of patient's condition, review of old charts, examination of patient, obtaining history from patient or surrogate and ordering and performing treatments and interventions        37 yo male with autism, brugada, seizure on keppra, icd, found unresponsive for 10 minutes by mother  Pt had tongue biting, foaming at mouth, tonic activity noted  Pt was normal last night when he went to bed  Unable to get hx from patient and remains confused per mother  Pt treated for otitis last week with abx    Vss, afebrile, lungs cta, rrr, abdomen soft nontender, no focal neuro deficits, following commands, tongue bites, no bleeding  No spinal tenderness  Likely seizure  icd interrogation, cardiac workup, ct head  Pt in status epilepticus, required multiple doses of ativan, keppra load  Had neurology evaluate pt at bedside

## 2022-02-25 NOTE — ASSESSMENT & PLAN NOTE
-suspecting breakthrough seizure secondary to hyponatremia, possibly abx/infection?  -pending urine osm-if from polydipsia suspect urine osm below 100, serum osm and urine sodium pending  -recheck bmp as patient received 1 liter bolus normal saline  -s/p keppra load 4g, continue increased dose 750mg bid keppra  -seizure precautions  -neurology recs  -keppra level pending

## 2022-02-25 NOTE — ASSESSMENT & PLAN NOTE
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

## 2022-02-25 NOTE — QUICK NOTE
Notified by the epileptologist that at [id number] [phone] [id number], patient had a subclinical seizure captured on routine EEG  The seizure emerged from the right temporal lobe  There was some jaw clenching and gulping during the EEG, but this did not directly correlate with the seizure  After discussion with the epileptologist and attending Neurologist, will start the patient on video EEG monitoring  Will increase maintenance Keppra to 1500 mg BID  Will give Vimpat load of 400 mg once and then start Vimpat 150 mg BID  Patient will need frequent Neuro checks and may need higher level of care  Discussed with SLIM  Given ear infections, patient may require LP, however no meningeal signs on exam and patient is afebrile  No osseus abnormalities noted on CT head

## 2022-02-25 NOTE — CONSULTS
Consultation - Neurology   Adam Arciniega 36 y o  male MRN: [id number]  Unit/Bed#: ED 23 Encounter: [id number]      Assessment/Plan     Seizure [facility])  Assessment & Plan  36 y o  left handed male with autism, Brugada syndrome s/p ICD (2021), and possible epilepsy due to unknown cause that manifests as apparent generalized tonic clonic seizures that began in 2016 currently on Keppra 500 mg BID who presented to [facility] on 2/25/2022 due to possible breakthrough seizure at home and another seizure while in the ED  · Patient's mother found the patient lying in bed unresponsive, making grunting noises, and incontinent of urine  Upon arrival to the ED, patient was lethargic, but answering a few questions  However, while in the ED, patient suddenly made grunting noises, eyes were roving, and his body "stiffened "  Patient then began to have "twitching" all over his body and had left lateral tongue bite  Patient received a total of 4 mg of Ativan, but continued to twitch so then was loaded with Keppra 4 g with resolution of seizure-like activity  · CT head negative for acute intracranial abnormalities  · Labs revealed sodium 127, WBC 16 9, and Prolactin 23 3  Lactic acid WNL (1 9)  · Patient has been drinking a lot more water than usual over the last few days    Etiology for breakthrough seizures likely due to hyponatremia secondary to increased water intake  However will also check for intracranial pathology or any infectious causes (patient was recently treated for otitis media)  Plan:  - Routine EEG pending    If patient has another seizure, has fluctuating mental status, or continues to be somnolent, recommend video EEG monitoring  - MRI brain w/wo contrast pending  - S/p Ativan 4 mg and Keppra 4 g load  - Increase maintenance Keppra to 750 mg BID (was taking 500 mg BID at home)  - Slowly increase sodium level and closely monitor BMP  - UA and Levetiracetam level pending  - Seizure precautions  - PRN ativan for prolonged seizure-like activity  - Telemtery  - Patient does not drive  - Frequent neuro checks  Continue to monitor and notify neurology with any changes  - Medical management and supportive care per primary team  Correction of any metabolic or infectious disturbances    Hyponatremia  Assessment & Plan  - Sodium 127  - Continue to monitor BMP and slowly increase sodium level    Suppurative otitis media of left ear  Assessment & Plan  - Recently treated with Z-aura  - ENT consulted    Brugada syndrome  Assessment & Plan  - S/p ICD; interrogation pending    Active autistic disorder  Assessment & Plan  - At baseline, patient is independent with ADLs (can bathe, dress, and feed himself)  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  He is able to communicate and say a few words, but cannot hold a full conversation  He can follow simple commands  Gabriella Ruth will need follow up in in 6 weeks with epilepsy attending or advance practitioner  He will not require outpatient neurological testing  History of Present Illness     Reason for Consult / Principal Problem: breakthrough seizure  Hx and PE limited by: Patient is postictal and has autism; history obtained per mother and chart review    HPI: Gabriella Ruth is a 36 y o  left handed male with autism, Brugada syndrome s/p ICD (2021), and possible epilepsy due to unknown cause that manifests as apparent generalized tonic clonic seizures that began in 2016 currently on Keppra 500 mg BID who presented to [facility] on 2/25/2022 due to breakthrough seizure  Patient was in his normal state last night, but this morning his mother found him in bed making "grunting" noises and was not responsive  She also noted drooling and patient was incontinent of urine  Upon arrival to the ED, vitals stable  Patient was lethargic, but answering a few questions    However, while in the ED, patient suddenly made grunting noises, eyes were roving, and his body "stiffened "  Patient then began to have "twitching" all over his body and had left lateral tongue bite  Patient received a total of 4 mg of Ativan, but continued to twitch so then was loaded with Keppra 4 g  Neurology was urgently consulted  By the time Neurology arrived, the twitching had resolved, but patient was clenching/grinding his teeth, he was internally/externally rotating is legs, occasionally his head would move back and forth, and he did not arouse to repeated verbal and tactile stimuli  Patient appeared postictal   CT head negative for acute intracranial abnormalities  Labs revealed sodium 127 and WBC 16 9  Last week patient had an ear infection and was treated with a Z-aura  No other recent illnesses  No other changes to his medications  He has not missed any Keppra doses  Of note, patient's mother states that patient has been drinking a lot more water the last few days than is usual     Prior history:  Per chart review and patient's mother at bedside, patient had his first generalized tonic clonic seizure on 7/5/2016  No identifiable precipitants for the event  Patient appeared to stop breathing during the seizure  CT head unremarkable  Routine EEG was normal   Patient remained seizure free off of antiepileptics until 8/20/2018  Patient was observed to have "fits" with generalized tonic clonic seizure  CT head unremarkable and EEG was normal   Patient was started on Keppra 500 mg BID  In September 2020, patient had been seizure free for 2 years, so Keppra was slowly tapered off  In March 2021, patient presented to [facility] after collapsing while working with the vacuum  No definite convulsion, but he appeared stay with slight trembling and unusual breathing pattern  He was unresponsive for approximately 15 minutes and then woke up suddenly  He was able to walk to the ambulance when EMS arrived  While in the ED, patient had been lysed tonic-clonic seizure with postictal state    He was given Ativan and Keppra  EKG revealed regarded type 2 syndrome  ICD was placed and patient was discharged on Keppra 500 mg BID  Since then, patient had been seizure free  Birth history:   Patient was born via  without any complications    Developmental history: Patient was diagnosed with autism at 21 months  He went to special education and behavioral classes  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  Illnesses/head trauma: No history of febrile seizures, meningitis, or head trauma  At baseline, patient is fairly independent with his ADLs  He is able to dress, feed, and bathe himself  He in the morning he will occasionally make his own breakfast   He is able to communicate with 1-2 word answers with his mom, but cannot hold a conversation  He can follow commands      Consult to neurology  Consult performed by: Ephraim Raymundo PA-C  Consult ordered by: Salomon Davila MD          Review of Systems   Reason unable to perform ROS: postictal        Historical Information   Past Medical History:   Diagnosis Date    Brugada syndrome     Seizure-like activity ([facility] [address] )     last assessed: 16    Skin lesion     last assessed: 13    Skin rash     last assessed: 3/1/13    Staph aureus infection     last assessed: 13     Past Surgical History:   Procedure Laterality Date    ADENOIDECTOMY      CARDIAC ICD GENERATOR CHANGE  2021    DENTAL SURGERY      wisdom teeth    MYRINGOTOMY W/ TUBES      with ventilating tube insertion; x6     Social History   Social History     Substance and Sexual Activity   Alcohol Use Never     Social History     Substance and Sexual Activity   Drug Use Never     E-Cigarette/Vaping    E-Cigarette Use Never User      E-Cigarette/Vaping Substances    Nicotine No     THC No     CBD No     Flavoring No     Other No     Unknown No      Social History     Tobacco Use   Smoking Status Never Smoker   Smokeless Tobacco Never Used     Family History:   Family History   Problem Relation Age of Onset    Breast cancer Maternal Grandmother     Cancer Maternal Grandfather         Salivary gland cancer    Other Paternal Grandmother         brain tumor    COPD Paternal Grandmother     Hypertension Paternal Grandmother     No Known Problems Mother     No Known Problems Father        Review of previous medical records was completed  Reviewed prior notes, labs, CT head    Meds/Allergies   all current active meds have been reviewed, current meds:   Current Facility-Administered Medications   Medication Dose Route Frequency    acetaminophen (TYLENOL) tablet 650 mg  650 mg Oral Q6H PRN    levETIRAcetam (KEPPRA) tablet 750 mg  750 mg Oral Q12H [address]    LORazepam (ATIVAN) injection 2 mg  2 mg Intravenous Q6H PRN    and PTA meds:   Prior to Admission Medications   Prescriptions Last Dose Informant Patient Reported? Taking? Loratadine (CLARITIN) 10 MG CAPS   Yes No   Sig: Take 10 mg by mouth as needed     acetaminophen (TYLENOL) 325 mg tablet   No No   Sig: Take 3 tablets (975 mg total) by mouth every 6 (six) hours as needed for mild pain, headaches or fever   levETIRAcetam (KEPPRA) 500 mg tablet   No No   Sig: Take 1 tablet (500 mg total) by mouth every 12 (twelve) hours   mupirocin (BACTROBAN) 2 % ointment   No No   Sig: Apply topically 3 (three) times a day      Facility-Administered Medications: None       Allergies   Allergen Reactions    Cefaclor Hives    Sulfamethoxazole-Trimethoprim Hives       Objective   Vitals:Blood pressure 134/86, pulse 100, temperature 98 °F (36 7 °C), resp  rate 18, SpO2 92 %  ,There is no height or weight on file to calculate BMI  Intake/Output Summary (Last 24 hours) at 2/25/2022 1155  Last data filed at 2/25/2022 1143  Gross per 24 hour   Intake 1250 ml   Output --   Net 1250 ml       Invasive Devices:    Invasive Devices  Report    Peripheral Intravenous Line            Peripheral IV 02/25/22 Right Forearm <1 day    Peripheral IV 02/25/22 Right Hand <1 day                Physical Exam  Vitals and nursing note reviewed  Constitutional:       Comments: Patient lying in bed with eyes closed and not responding to external stimuli  Head occasionally rolls back and forth and has intermittent internal/external rotation of the legs  Grinding/clenching teeth  Patient appears postictal    HENT:      Head: Normocephalic and atraumatic  Mouth/Throat:      Comments: Left lateral tongue bite  Eyes:      Pupils: Pupils are equal, round, and reactive to light  Pulmonary:      Effort: Pulmonary effort is normal    Musculoskeletal:      Cervical back: Neck supple  Comments: Spontaneously moving all extremities   Skin:     General: Skin is warm and dry  Neurological:      Comments: See full neuro exam below       Neurologic Exam     Mental Status   Patient appears postictal   Does not arouse to repeated verbal and tactile stimuli  Patient does not follow any commands  For     Cranial Nerves     CN III, IV, VI   Pupils are equal, round, and reactive to light  Pupils 4 mm, round, reactive to light bilaterally  Occasional roving of the eyes, but no forced gaze deviation  Face appears symmetric at rest   Difficulty assessing remainder of cranial nerves due to current mental status  Motor Exam   Muscle bulk: normal  Initially, patient has slightly increased tone in the right upper extremity, but when reassessed several minutes later, tone had improved  Spontaneous movement in all 4 extremities  Intermittent internal/external rotation of the legs  Sensory Exam   Patient briskly withdraws to brief noxious stimuli in all extremities  Movements appear symmetric       Gait, Coordination, and Reflexes     Gait  Gait: (Unable to assess due to current mental status )  Patient occasionally had head turning back and forth and intermittent internal/external rotation of the hips, but otherwise no resting tremor noted   No twitching noted  4-5 beats of ankle clonus on the right  No ankle clonus on the left  Downgoing toes bilaterally  Lab Results:   I have personally reviewed pertinent reports  , CBC:   Results from last 7 days   Lab Units 02/25/22  0957   WBC Thousand/uL 16 90*   RBC Million/uL 4 96   HEMOGLOBIN g/dL 14 9   HEMATOCRIT % 41 1   MCV fL 83   PLATELETS Thousands/uL 162   , BMP/CMP:   Results from last 7 days   Lab Units 02/25/22  0957   SODIUM mmol/L 127*   POTASSIUM mmol/L 3 5   CHLORIDE mmol/L 95*   CO2 mmol/L 24   BUN mg/dL 12   CREATININE mg/dL 0 74   CALCIUM mg/dL 8 6   AST U/L 34   ALT U/L 32   ALK PHOS U/L 67   EGFR ml/min/1 73sq m 124   , Vitamin B12:   , HgBA1C:   , TSH:   , Coagulation:   , Lipid Profile:   , Ammonia:   , Urinalysis:       Invalid input(s): URIBILINOGEN, Drug Screen:   , Medication Drug Levels:       Invalid input(s): CARBAMAZEPINE,  PHENOBARB, LACOSAMIDE, OXCARBAZEPINE  Imaging Studies: I have personally reviewed pertinent reports  and I have personally reviewed pertinent films in PACS  EKG, Pathology, and Other Studies: I have personally reviewed pertinent reports  and I have personally reviewed pertinent films in PACS  VTE Prophylaxis: Sequential compression device Ian Aguirre     Code Status: Prior  Advance Directive and Living Will:      Power of :    POLST:      Counseling / Coordination of Care  Total time spent today 59 minutes  Greater than 50% of total time was spent with the patient and/or family counseling and/or coordination of care  A description of the counseling/coordination of care:  Patient was seen and evaluated  Discussed with attending  Chart reviewed thoroughly including laboratory and imaging studies    Plan of care discussed with patient and primary team

## 2022-02-25 NOTE — ED PROVIDER NOTES
History  Chief Complaint   Patient presents with    Seizure - Prior Hx Of     per pt mother this morning she found pt foaming at the mouth   pt has a hx of seizure  Patient is a 72-year-old male, with a history significant for seizures (Keppra 500 mg b i d ), autism (baseline one word speech, not always appropriate to questioning), Brugada syndrome with AICD, who presents to the ED today due to concern for seizure-like activity and altered mental status  Per patient's mother, patient is normally awake before everybody else in the house; however, he was not  She found him in his room, grunting and breathing abnormally, with abnormal movements characterized as tensing  Patient's mother reports an estimated 10 minute period of unresponsiveness prior to regaining alertness  On arrival, she states that he still is not at his baseline mental status but he is following basic commands  There is associated tongue biting  Patient has not been in status epilepticus in the past   Subjective history is limited from patient due to mental status, both acute changes and chronic baseline mental status  Per patient's mother, he was diagnosed with an ear infection, left, one week ago and he has since completed a course of antibiotics for this  - No language barrier    - History obtained from patient's mother   - History limited by patient mental status  - Previous charting was reviewed  Prior to Admission Medications   Prescriptions Last Dose Informant Patient Reported? Taking?    Loratadine (CLARITIN) 10 MG CAPS   Yes No   Sig: Take 10 mg by mouth as needed     acetaminophen (TYLENOL) 325 mg tablet   No No   Sig: Take 3 tablets (975 mg total) by mouth every 6 (six) hours as needed for mild pain, headaches or fever   levETIRAcetam (KEPPRA) 500 mg tablet   No No   Sig: Take 1 tablet (500 mg total) by mouth every 12 (twelve) hours   mupirocin (BACTROBAN) 2 % ointment   No No   Sig: Apply topically 3 (three) times a day      Facility-Administered Medications: None       Past Medical History:   Diagnosis Date    Brugada syndrome     Seizure-like activity ([facility] [address] )     last assessed: 7/11/16    Skin lesion     last assessed: 12/30/13    Skin rash     last assessed: 3/1/13    Staph aureus infection     last assessed: 8/20/13       Past Surgical History:   Procedure Laterality Date    ADENOIDECTOMY      CARDIAC ICD GENERATOR CHANGE  03/29/2021    DENTAL SURGERY      wisdom teeth    MYRINGOTOMY W/ TUBES      with ventilating tube insertion; x6       Family History   Problem Relation Age of Onset    Breast cancer Maternal Grandmother     Cancer Maternal Grandfather         Salivary gland cancer    Other Paternal Grandmother         brain tumor    COPD Paternal Grandmother     Hypertension Paternal Grandmother     No Known Problems Mother     No Known Problems Father      I have reviewed and agree with the history as documented  E-Cigarette/Vaping    E-Cigarette Use Never User      E-Cigarette/Vaping Substances    Nicotine No     THC No     CBD No     Flavoring No     Other No     Unknown No      Social History     Tobacco Use    Smoking status: Never Smoker    Smokeless tobacco: Never Used   Vaping Use    Vaping Use: Never used   Substance Use Topics    Alcohol use: Never    Drug use: Never        Review of Systems   Unable to perform ROS: Mental status change   Constitutional: Positive for activity change  Negative for fever  Skin: Positive for color change  Allergic/Immunologic: Positive for environmental allergies  Neurological: Positive for seizures         Physical Exam  ED Triage Vitals [02/25/22 0945]   Temperature Pulse Respirations Blood Pressure SpO2   98 °F (36 7 °C) 94 20 133/82 98 %      Temp src Heart Rate Source Patient Position - Orthostatic VS BP Location FiO2 (%)   -- Monitor -- -- --      Pain Score       --             Orthostatic Vital Signs  Vitals:    02/25/22 1000 02/25/22 1130 02/25/22 1230 02/25/22 1300   BP: 124/81 134/86 127/82 123/79   Pulse: 96 100 100 102       Physical Exam  Vitals and nursing note reviewed  Constitutional:       General: He is not in acute distress  Appearance: He is not toxic-appearing  Comments: Patient appears comfortable during my evaluation  Per mother, patient appears tired and out of a compared to his normal self    Patient is following simple commands but his verbal responses are greatly limited    Evidence of tongue biting on the anterior tongue    No septal hematoma   HENT:      Head: Normocephalic and atraumatic  Right Ear: Tympanic membrane, ear canal and external ear normal  There is impacted cerumen  Left Ear: Ear canal and external ear normal  There is no impacted cerumen  Ears:      Comments: Erythematous but not bulging left TM     Nose: Nose normal  No rhinorrhea  Mouth/Throat:      Mouth: Mucous membranes are moist       Pharynx: Oropharynx is clear  No oropharyngeal exudate or posterior oropharyngeal erythema  Eyes:      General: No scleral icterus  Right eye: No discharge  Left eye: No discharge  Conjunctiva/sclera: Conjunctivae normal       Pupils: Pupils are equal, round, and reactive to light  Neck:      Comments: Patient is spontaneously rotating their neck to the left and right during the history and physical exam interaction without difficulty or apparent discomfort    Cardiovascular:      Rate and Rhythm: Normal rate and regular rhythm  Heart sounds: Normal heart sounds  No murmur heard  No friction rub  No gallop  Comments: 2+ Radial  Pulmonary:      Effort: Pulmonary effort is normal  No respiratory distress  Breath sounds: Normal breath sounds  No stridor  No wheezing, rhonchi or rales  Abdominal:      General: Abdomen is flat  There is no distension  Palpations: Abdomen is soft  Tenderness: There is no abdominal tenderness   There is no right CVA tenderness, left CVA tenderness, guarding or rebound  Comments: Small area of bruising in the left lower quadrant    No obvious discomfort to palpation of the entire abdomen   Musculoskeletal:         General: No deformity  Cervical back: Neck supple  No tenderness  No muscular tenderness  Comments: No obvious discomfort to palpation of the C, T, L-spine    No bruising on the back or limbs   Lymphadenopathy:      Cervical: No cervical adenopathy  Skin:     General: Skin is warm and dry  Capillary Refill: Capillary refill takes less than 2 seconds  Findings: Bruising present  Neurological:      Mental Status: He is alert  Comments: Patient is answering occasionally and able to follow simple commands  Patient is moving all four extremities spontaneously  No facial droop  Tongue midline  Psychiatric:         Behavior: Behavior normal          ED Medications  Medications   LORazepam (ATIVAN) 2 mg/mL injection **ADS Override Pull** (2 mg  Given 2/25/22 1033)   levETIRAcetam (KEPPRA) 4,000 mg in sodium chloride 0 9 % 250 mL IVPB (0 mg Intravenous Stopped 2/25/22 1059)   LORazepam (ATIVAN) 2 mg/mL injection **ADS Override Pull** (2 mg  Given 2/25/22 1033)   LORazepam (ATIVAN) injection 2 mg (2 mg Intravenous Given 2/25/22 1044)   sodium chloride 0 9 % bolus 1,000 mL (0 mL Intravenous Stopped 2/25/22 1143)       Diagnostic Studies  Results Reviewed     Procedure Component Value Units Date/Time    HS Troponin I 4hr [[id number]]  (Normal) Collected: 02/25/22 1213    Lab Status: Final result Specimen: Blood from Arm, Right Updated: 02/25/22 1254     hs TnI 4hr 5 ng/L      Delta 4hr hsTnI 0 ng/L     Lactic acid, plasma [[id number]]  (Normal) Collected: 02/25/22 1213    Lab Status: Final result Specimen: Blood from Arm, Right Updated: 02/25/22 1247     LACTIC ACID 1 9 mmol/L     Narrative:      Result may be elevated if tourniquet was used during collection      Prolactin [[id number]]  (Abnormal) Collected: 02/25/22 1213    Lab Status: Final result Specimen: Blood from Arm, Right Updated: 02/25/22 1241     Prolactin 23 3 ng/mL     Osmolality, urine [[id number]]     Lab Status: No result Specimen: Urine     Osmolality-"If this is regarding a toxic alcohol, STOP  Test is not routinely indicated  Please consult medical  on call for further guidance " [[id number]]     Lab Status: No result Specimen: Blood     Sodium, urine, random [[id number]]     Lab Status: No result Specimen: Urine     Urinalysis with microscopic [[id number]]     Lab Status: No result Specimen: Urine     Lactic acid, plasma [[id number]]     Lab Status: No result Specimen: Blood     Prolactin [[id number]]     Lab Status: No result Specimen: Blood     HS Troponin 0hr (reflex protocol) [[id number]]  (Normal) Collected: 02/25/22 0957    Lab Status: Final result Specimen: Blood from Arm, Right Updated: 02/25/22 1039     hs TnI 0hr 5 ng/L     HS Troponin I 2hr [[id number]]     Lab Status: No result Specimen: Blood     CBC and differential [[id number]]  (Abnormal) Collected: 02/25/22 0957    Lab Status: Final result Specimen: Blood from Arm, Right Updated: 02/25/22 1034     WBC 16 90 Thousand/uL      RBC 4 96 Million/uL      Hemoglobin 14 9 g/dL      Hematocrit 41 1 %      MCV 83 fL      MCH 30 0 pg      MCHC 36 3 g/dL      RDW 12 0 %      MPV 9 0 fL      Platelets 369 Thousands/uL      nRBC 0 /100 WBCs      Neutrophils Relative 92 %      Immat GRANS % 0 %      Lymphocytes Relative 3 %      Monocytes Relative 5 %      Eosinophils Relative 0 %      Basophils Relative 0 %      Neutrophils Absolute 15 50 Thousands/µL      Immature Grans Absolute 0 05 Thousand/uL      Lymphocytes Absolute 0 54 Thousands/µL      Monocytes Absolute 0 78 Thousand/µL      Eosinophils Absolute 0 01 Thousand/µL      Basophils Absolute 0 02 Thousands/µL     Narrative: This is an appended report    These results have been appended to a previously verified report  Comprehensive metabolic panel [[id number]]  (Abnormal) Collected: 02/25/22 0957    Lab Status: Final result Specimen: Blood from Arm, Right Updated: 02/25/22 1026     Sodium 127 mmol/L      Potassium 3 5 mmol/L      Chloride 95 mmol/L      CO2 24 mmol/L      ANION GAP 8 mmol/L      BUN 12 mg/dL      Creatinine 0 74 mg/dL      Glucose 116 mg/dL      Calcium 8 6 mg/dL      AST 34 U/L      ALT 32 U/L      Alkaline Phosphatase 67 U/L      Total Protein 7 0 g/dL      Albumin 3 7 g/dL      Total Bilirubin 0 64 mg/dL      eGFR 124 ml/min/1 73sq m     Narrative:      [place] guidelines for Chronic Kidney Disease (CKD):     Stage 1 with normal or high GFR (GFR > 90 mL/min/1 73 square meters)    Stage 2 Mild CKD (GFR = 60-89 mL/min/1 73 square meters)    Stage 3A Moderate CKD (GFR = 45-59 mL/min/1 73 square meters)    Stage 3B Moderate CKD (GFR = 30-44 mL/min/1 73 square meters)    Stage 4 Severe CKD (GFR = 15-29 mL/min/1 73 square meters)    Stage 5 End Stage CKD (GFR <15 mL/min/1 73 square meters)  Note: GFR calculation is accurate only with a steady state creatinine    Fingerstick Glucose (POCT) [[id number]]  (Normal) Collected: 02/25/22 1011    Lab Status: Final result Updated: 02/25/22 1012     POC Glucose 106 mg/dl     Levetiracetam level [[id number]] Collected: 02/25/22 0957    Lab Status: In process Specimen: Blood from Arm, Right Updated: 02/25/22 1002                 CT head without contrast   Final Result by Donovan Patterson MD (02/25 1138)      No acute intracranial abnormality                    Workstation performed: [id number]         XR chest 1 view portable    (Results Pending)   MRI inpatient order    (Results Pending)         Procedures  Procedures      ED Course  ED Course as of 02/25/22 1316   Fri Feb 25, 2022   1006 [employer] pacemaker interrogation: No treated or monitored events    1007 ECG: Normal rate, regular rhythm, normal axis, no ectopy, brugada pattern ST changes   1009 WBC(!): 16 90  Likely secondary to seizure   1051 Called overhead to room  On arrival, patient was having a seizure characterized by grunting and roving eye movements, and rigidity  Patient received 4mg ativan  Due to continued roving eye movements, occasional unilateral eye deviation, pupil enlargement, and intermittent jaw clenching, another 2mg of ativan was administered in addition to a keppra load out of concern for status epilepticus  Neurology was contacted and they will evaluate patient  1143 Patient's eyes are now midline and his pupils are 2+ bilaterally  He purposefully reached for my hand when touched   1236 Patient is admitted to [address] [place] [address] 20yo+      Most Recent Value   SBIRT (23 yo +)    In order to provide better care to our patients, we are screening all of our patients for alcohol and drug use  Would it be okay to ask you these screening questions? No Filed at: 02/25/2022 1206                MDM  Number of Diagnoses or Management Options  Altered mental status  Breakthrough seizure ([facility] [address] )  Elevated blood pressure reading  Hyponatremia  Seizure disorder ([facility] [address] )  Diagnosis management comments: Patient is a 82-year-old male, with a history significant for seizures (Keppra 500 mg b i d ), autism (baseline one word speech, not always appropriate to questioning), Brugada syndrome with AICD, who presents to the ED today due to concern for seizure-like activity and altered mental status  Per patient's mother, patient is normally awake before everybody else in the house; however, he was not  She found him in his room, grunting and breathing abnormally, with abnormal movements characterized as tensing  Patient's mother reports an estimated 10 minute period of unresponsiveness prior to regaining alertness  On arrival, she states that he still is not at his baseline mental status but he is following basic commands    There is associated tongue biting  Patient is currently afebrile and hemodynamically stable  His physical exam grossly unremarkable on arrival except for a small bruise on the abdomen and areas of tongue biting, anterior tongue, erythematous but nonbulging left TM status post management with antibiotics  This presentation is concerning for:  Seizure, electrolyte abnormality, arrhythmia, ACS, intracranial abnormality/mass/hemorrhage  Will investigate CBC, CMP, troponin, ECG, CXR, CT head, pacemaker interrogation  Will manage based upon workup  Disposition  Final diagnoses:   Breakthrough seizure ([facility] )   Seizure disorder ([facility] )   Altered mental status   Elevated blood pressure reading   Hyponatremia     Time reflects when diagnosis was documented in both MDM as applicable and the Disposition within this note     Time User Action Codes Description Comment    2/25/2022 10:04 AM Adam Rosy A Add [G40 919] Breakthrough seizure ([facility] )     2/25/2022 10:04 AM Clinton Rosy A Add [G40 909] Seizure disorder ([facility] )     2/25/2022 10:04 AM Adam Rosy A Add [R41 82] Altered mental status     2/25/2022 10:05 AM Adam Rosy A Add [R03 0] Elevated blood pressure reading     2/25/2022 11:44 AM Clinton Rosy A Add [E87 1] Hyponatremia       ED Disposition     ED Disposition Condition Date/Time Comment    Admit Stable Fri Feb 25, 2022 12:35 PM Case was discussed with ALEKSANDR and the patient's admission status was agreed to be Admission Status: inpatient status to the service of Dr Shanti Castellanos   Follow-up Information    None         Patient's Medications   Discharge Prescriptions    No medications on file     No discharge procedures on file  PDMP Review     None           ED Provider  Attending physically available and evaluated [facility]  I managed the patient along with the ED Attending      Electronically Signed by         Mireille Franklin MD  02/25/22 2161

## 2022-02-25 NOTE — ASSESSMENT & PLAN NOTE
-with erythema, discharge, chronic hx, ent consult pending  -no meningeal signs on exam, afebrile no neck stiffness

## 2022-02-25 NOTE — ASSESSMENT & PLAN NOTE
· Suspecting breakthrough seizure secondary to hyponatremia, possibly abx/infection  Infectious work-up negative, if cultures remain negative today, d/c abx per ID   · Management as per primary team, neurology    S/p Keppra load and IV Ativan   · Was on vEEG, no further activity seen, now discontinued   · Follow up CSF studies   · Continue Keppra 1500 mg BID and Vimpat 150 mg Q12hr

## 2022-02-25 NOTE — ASSESSMENT & PLAN NOTE
Patient has been seizure free since his last visit on levetiracetam monotherapy of 500 mg BID  Tolerating medication well but may contribute to some irritability  Prior EEGs normal though given semiology of events, he will continue with AED therapy to prevent breakthrough seizures  Neurologic exam at baseline  Patient will continue with levetiracetam 500 mg BID  Will check level prior to next visit  With further seizures his dose of levetiracetam could certainly be increased  Follow up in 6 months or sooner if needed

## 2022-02-25 NOTE — ASSESSMENT & PLAN NOTE
36 y o  left handed male with autism, Brugada syndrome s/p ICD (2021), and possible epilepsy due to unknown cause that manifests as apparent generalized tonic clonic seizures that began in 2016 currently on Keppra 500 mg BID who presented to [facility] on 2/25/2022 due to possible breakthrough seizure at home and another seizure while in the ED  · Patient's mother found the patient lying in bed unresponsive, making grunting noises, and incontinent of urine  Upon arrival to the ED, patient was lethargic, but answering a few questions  However, while in the ED, patient suddenly made grunting noises, eyes were roving, and his body "stiffened "  Patient then began to have "twitching" all over his body and had left lateral tongue bite  Patient received a total of 4 mg of Ativan, but continued to twitch so then was loaded with Keppra 4 g with resolution of seizure-like activity  · CT head negative for acute intracranial abnormalities  · Labs revealed sodium 127, WBC 16 9, and Prolactin 23 3  Lactic acid WNL (1 9)  · Patient has been drinking a lot more water than usual over the last few days    Routine EEG completed 2/25/2022 demonstrated subclinical seizure  Patient was given Vimpat 400 mg x 1 and started on 150 mg Q12H and Keppra dose was increased to 1500 mg BID  Patient was placed on vEEG monitoring  LP completed and CSF results thus far: protein 63, glucose 67, WBCs 1, RBCs 1400, gram stain with rare polys and no organisms, culture with no growth, ME panel negative  Patient evaluated on 2/27/2022, patient continues to be more alert and interactive, but not quite back to baseline per patient's mother at bedside      2/28/2022: Patient not quite at baseline mentally according to mother due to latency of speech but otherwise mentally baseline    MRI brain w and wo contrast 3/2/2022: "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, asymmetrically thinner left fornix, and possibly smaller left mammillary body       A few white matter changes, suggestive of minimal chronic microangiopathy      Severe left sphenoid sinus disease with inspissated material "    Etiology for breakthrough seizures likely unprovoked but with added factor of hyponatremia  Not likely infectious  MRI brain w and wo contrast shows possibly smaller left hippocampal body compared to the right, which may be a seizure focus although on EEG, focus of higher epileptogenic potential is on the right  There may be a connection between the two areas of the mesial temporal lobe  Perhaps there is a smaller focus on the right hippocampal body not captured on vEEG causing unprovoked seizures  Plan:  - vEEG discontinued   - Continue to follow CSF studies  Culture, HSV 1,2 DNA PCR and VZV DNA PCR pending   - Abx discontinued due to negative blood cultures  - S/p Ativan 4 mg, Keppra 4 g load, Vimpat 400 mg load  - For discharge, patient on keppra 500mg BID and vimpat 150mg BID  - Sphenoid disease on MRI brain, patient to followup with ENT after discharge  - Continue to monitor sodium closely  - Seizure precautions  - PRN ativan for prolonged seizure-like activity  - Patient does not drive  - Frequent neuro checks  Continue to monitor and notify neurology with any changes  - Medical management and supportive care per primary team  Correction of any metabolic or infectious disturbances

## 2022-02-25 NOTE — H&P
[address] [facility]  H&P- [address] [facility] Inderjit Solorzano 1990, 36 y o  male MRN: [id number]  Unit/Bed#: ED 23 Encounter: [id number]  Primary Care Provider: Modesta Prabhakar DO   Date and time admitted to hospital: 2/25/2022  9:30 AM    Hyponatremia  Assessment & Plan  -urine osm, serum osm, urine sodium pending  -repeat bmp as patient received 1 liter bolus in ed, slow correction  -possible etiology of seizure, although overall not very low, but with possible rapid change precipitating seizure  -mri pending    Suppurative otitis media of left ear  Assessment & Plan  -with erythema, discharge, chronic hx, ent consult pending  -no meningeal signs on exam, afebrile no neck stiffness    Brugada syndrome  Assessment & Plan  Noted hx of brugada sp icd  -interrogation pending    Breakthrough seizure ([facility] [address] )  Assessment & Plan  -suspecting breakthrough seizure secondary to hyponatremia, possibly abx/infection?  -pending urine osm-if from polydipsia suspect urine osm below 100, serum osm and urine sodium pending  -recheck bmp as patient received 1 liter bolus normal saline  -s/p keppra load 4g, continue increased dose 750mg bid keppra  -seizure precautions  -neurology recs  -keppra level pending  -no meningeal signs, afebrile with breakthrough seizures in the setting of otitis media , will rule out meningitis, will obtain lumbar puncture, start ceftriaxone and vancomycin post, CT head noted    Active autistic disorder  Assessment & Plan  Per mother patient is independent at baseline      VTE Pharmacologic Prophylaxis: VTE Score: 2 Low Risk (Score 0-2) - Encourage Ambulation  Code Status: Level 1 - Full Code level 1  Discussion with family: Updated  (mother) at bedside  Anticipated Length of Stay: Patient will be admitted on an inpatient basis with an anticipated length of stay of greater than 2 midnights secondary to seizure      Total Time for Visit, including Counseling / Coordination of Care: 45 minutes Greater than 50% of this total time spent on direct patient counseling and coordination of care  Chief Complaint: seizure    History of Present Illness:  Connor Mead is a 36 y o  male   pmhx of Oliver Lovett with icd placed 2021, epilepsy, autism, chronic ottitis media, presented due to seizure like activity  Patients mom reports he was not responsive this morning, also noted to be drooling and incontinent  He had witnessed seizure while in the er, with tonic clonic motions and tongue bit  He received 4mg ativan and loaded with ekppra  Neurology has increased his home keppra from 500mg bid to 750mg bid, plan for veeg monitoring  He has also been found to have hyponatremia at 127  Patients mother also reports him drinking more water than usual over the past several days  He completed a zpack for ottitis media several days ago  Review of Systems:  Review of Systems   Unable to perform ROS: Mental status change       Past Medical and Surgical History:   Past Medical History:   Diagnosis Date    Brugada syndrome     Seizure-like activity ([facility] [address] )     last assessed: 7/11/16    Skin lesion     last assessed: 12/30/13    Skin rash     last assessed: 3/1/13    Staph aureus infection     last assessed: 8/20/13       Past Surgical History:   Procedure Laterality Date    ADENOIDECTOMY      CARDIAC ICD GENERATOR CHANGE  03/29/2021    DENTAL SURGERY      wisdom teeth    MYRINGOTOMY W/ TUBES      with ventilating tube insertion; x6       Meds/Allergies:  Prior to Admission medications    Medication Sig Start Date End Date Taking?  Authorizing Provider   acetaminophen (TYLENOL) 325 mg tablet Take 3 tablets (975 mg total) by mouth every 6 (six) hours as needed for mild pain, headaches or fever 3/31/21   Forrest Mackey MD   levETIRAcetam (KEPPRA) 500 mg tablet Take 1 tablet (500 mg total) by mouth every 12 (twelve) hours 2/10/22   LIAM Lopez   Loratadine (CLARITIN) 10 MG CAPS Take 10 mg by mouth as needed      Historical Provider, MD   mupirocin (BACTROBAN) 2 % ointment Apply topically 3 (three) times a day 0/02/74   Catrachita Reilly,      I have reviewed home medications with patient family member  Allergies: Allergies   Allergen Reactions    Cefaclor Hives    Sulfamethoxazole-Trimethoprim Hives       Social History:  Marital Status: Single   Occupation: none  Patient Pre-hospital Living Situation: Home  Patient Pre-hospital Level of Mobility: walks  Patient Pre-hospital Diet Restrictions:   Substance Use History:   Social History     Substance and Sexual Activity   Alcohol Use Never     Social History     Tobacco Use   Smoking Status Never Smoker   Smokeless Tobacco Never Used     Social History     Substance and Sexual Activity   Drug Use Never       Family History:  Family History   Problem Relation Age of Onset    Breast cancer Maternal Grandmother     Cancer Maternal Grandfather         Salivary gland cancer    Other Paternal Grandmother         brain tumor    COPD Paternal Grandmother     Hypertension Paternal Grandmother     No Known Problems Mother     No Known Problems Father        Physical Exam:     Vitals:   Blood Pressure: (!) 209/96 (02/25/22 1700)  Pulse: 76 (02/25/22 1700)  Temperature: 98 7 °F (37 1 °C) (02/25/22 1801)  Temp Source: Rectal (02/25/22 1801)  Respirations: 22 (02/25/22 1700)  SpO2: 94 % (02/25/22 1700)    Physical Exam  Vitals and nursing note reviewed  Constitutional:       General: He is not in acute distress  Appearance: He is well-developed  He is not ill-appearing, toxic-appearing or diaphoretic  Comments: Appears post ictal, moving extremities spontanesouly, moving neck spontaneously, responds to verbal and physical stimuli but not does open eyes   HENT:      Head: Normocephalic and atraumatic        Ears:      Comments: Left ear eyrthema with effusion     Mouth/Throat:      Comments: Left lateral tongue lac  Eyes:      General: No scleral icterus  Conjunctiva/sclera: Conjunctivae normal    Cardiovascular:      Rate and Rhythm: Normal rate and regular rhythm  Heart sounds: No murmur heard  No friction rub  No gallop  Pulmonary:      Effort: Pulmonary effort is normal  No respiratory distress  Breath sounds: Normal breath sounds  No stridor  No wheezing, rhonchi or rales  Chest:      Chest wall: No tenderness  Abdominal:      General: There is no distension  Palpations: Abdomen is soft  There is no mass  Tenderness: There is no abdominal tenderness  There is no guarding or rebound  Hernia: No hernia is present  Musculoskeletal:         General: No swelling, tenderness, deformity or signs of injury  Cervical back: Neck supple  Right lower leg: No edema  Left lower leg: No edema  Skin:     General: Skin is warm and dry  Coloration: Skin is not jaundiced or pale  Findings: No bruising, erythema, lesion or rash  Additional Data:     Lab Results:  Results from last 7 days   Lab Units 02/25/22  0957   WBC Thousand/uL 16 90*   HEMOGLOBIN g/dL 14 9   HEMATOCRIT % 41 1   PLATELETS Thousands/uL 162   NEUTROS PCT % 92*   LYMPHS PCT % 3*   MONOS PCT % 5   EOS PCT % 0     Results from last 7 days   Lab Units 02/25/22  1455 02/25/22  0957 02/25/22  0957   SODIUM mmol/L 126*   < > 127*   POTASSIUM mmol/L 3 4*   < > 3 5   CHLORIDE mmol/L 95*   < > 95*   CO2 mmol/L 25   < > 24   BUN mg/dL 11   < > 12   CREATININE mg/dL 0 62   < > 0 74   ANION GAP mmol/L 6   < > 8   CALCIUM mg/dL 8 4   < > 8 6   ALBUMIN g/dL  --   --  3 7   TOTAL BILIRUBIN mg/dL  --   --  0 64   ALK PHOS U/L  --   --  67   ALT U/L  --   --  32   AST U/L  --   --  34   GLUCOSE RANDOM mg/dL 107   < > 116    < > = values in this interval not displayed           Results from last 7 days   Lab Units 02/25/22  1011   POC GLUCOSE mg/dl 106         Results from last 7 days   Lab Units 02/25/22  1455 02/25/22  1213   LACTIC ACID mmol/L 1 1 1 9       Imaging: No pertinent imaging reviewed  XR chest 1 view portable   Final Result by Nory La DO (02/25 0412)      Borderline cardiomegaly  No acute pulmonary disease  Workstation performed: [id number]         CT head without contrast   Final Result by Jarvis Meigs, MD (02/25 1568)      No acute intracranial abnormality  Workstation performed: [id number]         MRI inpatient order    (Results Pending)       EKG and Other Studies Reviewed on Admission:   · EKG: brugada rbbb  ** Please Note: This note has been constructed using a voice recognition system   **

## 2022-02-26 ENCOUNTER — APPOINTMENT (INPATIENT)
Dept: NEUROLOGY | Facility: CLINIC | Age: 32
DRG: 053 | End: 2022-02-26
Payer: COMMERCIAL

## 2022-02-26 LAB
ALBUMIN SERPL BCP-MCNC: 3.4 G/DL (ref 3.5–5)
ALP SERPL-CCNC: 62 U/L (ref 46–116)
ALT SERPL W P-5'-P-CCNC: 33 U/L (ref 12–78)
ANION GAP SERPL CALCULATED.3IONS-SCNC: 5 MMOL/L (ref 4–13)
ANION GAP SERPL CALCULATED.3IONS-SCNC: 7 MMOL/L (ref 4–13)
AST SERPL W P-5'-P-CCNC: 38 U/L (ref 5–45)
BACTERIA UR QL AUTO: NORMAL /HPF
BILIRUB DIRECT SERPL-MCNC: 0.11 MG/DL (ref 0–0.2)
BILIRUB SERPL-MCNC: 0.53 MG/DL (ref 0.2–1)
BILIRUB UR QL STRIP: NEGATIVE
BUN SERPL-MCNC: 9 MG/DL (ref 5–25)
BUN SERPL-MCNC: 9 MG/DL (ref 5–25)
C GATTII+NEOFOR DNA CSF QL NAA+NON-PROBE: NOT DETECTED
CALCIUM SERPL-MCNC: 8.9 MG/DL (ref 8.3–10.1)
CALCIUM SERPL-MCNC: 9 MG/DL (ref 8.3–10.1)
CHLORIDE SERPL-SCNC: 107 MMOL/L (ref 100–108)
CHLORIDE SERPL-SCNC: 112 MMOL/L (ref 100–108)
CLARITY UR: CLEAR
CMV DNA CSF QL NAA+NON-PROBE: NOT DETECTED
CO2 SERPL-SCNC: 18 MMOL/L (ref 21–32)
CO2 SERPL-SCNC: 22 MMOL/L (ref 21–32)
COLOR UR: YELLOW
CREAT SERPL-MCNC: 0.69 MG/DL (ref 0.6–1.3)
CREAT SERPL-MCNC: 0.78 MG/DL (ref 0.6–1.3)
E COLI K1 DNA CSF QL NAA+NON-PROBE: NOT DETECTED
ERYTHROCYTE [DISTWIDTH] IN BLOOD BY AUTOMATED COUNT: 12.2 % (ref 11.6–15.1)
EV RNA CSF QL NAA+NON-PROBE: NOT DETECTED
GFR SERPL CREATININE-BSD FRML MDRD: 121 ML/MIN/1.73SQ M
GFR SERPL CREATININE-BSD FRML MDRD: 128 ML/MIN/1.73SQ M
GLUCOSE SERPL-MCNC: 116 MG/DL (ref 65–140)
GLUCOSE SERPL-MCNC: 120 MG/DL (ref 65–140)
GLUCOSE UR STRIP-MCNC: NEGATIVE MG/DL
GP B STREP DNA CSF QL NAA+NON-PROBE: NOT DETECTED
HAEM INFLU DNA CSF QL NAA+NON-PROBE: NOT DETECTED
HCT VFR BLD AUTO: 41.5 % (ref 36.5–49.3)
HGB BLD-MCNC: 14.8 G/DL (ref 12–17)
HGB UR QL STRIP.AUTO: NEGATIVE
HHV6 DNA CSF QL NAA+NON-PROBE: NOT DETECTED
HSV1 DNA CSF QL NAA+NON-PROBE: NOT DETECTED
HSV2 DNA CSF QL NAA+NON-PROBE: NOT DETECTED
HYALINE CASTS #/AREA URNS LPF: NORMAL /LPF
KETONES UR STRIP-MCNC: NEGATIVE MG/DL
L MONOCYTOG DNA CSF QL NAA+NON-PROBE: NOT DETECTED
LEUKOCYTE ESTERASE UR QL STRIP: NEGATIVE
MAGNESIUM SERPL-MCNC: 2.2 MG/DL (ref 1.6–2.6)
MCH RBC QN AUTO: 30.3 PG (ref 26.8–34.3)
MCHC RBC AUTO-ENTMCNC: 35.7 G/DL (ref 31.4–37.4)
MCV RBC AUTO: 85 FL (ref 82–98)
N MEN DNA CSF QL NAA+NON-PROBE: NOT DETECTED
NITRITE UR QL STRIP: NEGATIVE
NON-SQ EPI CELLS URNS QL MICRO: NORMAL /HPF
PARECHOVIRUS A RNA CSF QL NAA+NON-PROBE: NOT DETECTED
PH UR STRIP.AUTO: 7.5 [PH]
PHOSPHATE SERPL-MCNC: 2.9 MG/DL (ref 2.7–4.5)
PLATELET # BLD AUTO: 169 THOUSANDS/UL (ref 149–390)
PMV BLD AUTO: 9.2 FL (ref 8.9–12.7)
POTASSIUM SERPL-SCNC: 3.9 MMOL/L (ref 3.5–5.3)
POTASSIUM SERPL-SCNC: 4.3 MMOL/L (ref 3.5–5.3)
PROCALCITONIN SERPL-MCNC: 0.23 NG/ML
PROT SERPL-MCNC: 6.9 G/DL (ref 6.4–8.2)
PROT UR STRIP-MCNC: NEGATIVE MG/DL
RBC # BLD AUTO: 4.89 MILLION/UL (ref 3.88–5.62)
RBC #/AREA URNS AUTO: NORMAL /HPF
S PNEUM DNA CSF QL NAA+NON-PROBE: NOT DETECTED
SODIUM SERPL-SCNC: 135 MMOL/L (ref 136–145)
SODIUM SERPL-SCNC: 136 MMOL/L (ref 136–145)
SP GR UR STRIP.AUTO: 1.01 (ref 1–1.03)
UROBILINOGEN UR QL STRIP.AUTO: 0.2 E.U./DL
VANCOMYCIN TROUGH SERPL-MCNC: 18.3 UG/ML (ref 10–20)
VZV DNA CSF QL NAA+NON-PROBE: NOT DETECTED
WBC # BLD AUTO: 8.45 THOUSAND/UL (ref 4.31–10.16)
WBC #/AREA URNS AUTO: NORMAL /HPF

## 2022-02-26 PROCEDURE — 83735 ASSAY OF MAGNESIUM: CPT | Performed by: INTERNAL MEDICINE

## 2022-02-26 PROCEDURE — 84100 ASSAY OF PHOSPHORUS: CPT | Performed by: INTERNAL MEDICINE

## 2022-02-26 PROCEDURE — 99233 SBSQ HOSP IP/OBS HIGH 50: CPT | Performed by: PSYCHIATRY & NEUROLOGY

## 2022-02-26 PROCEDURE — 87529 HSV DNA AMP PROBE: CPT | Performed by: PHYSICIAN ASSISTANT

## 2022-02-26 PROCEDURE — 95712 VEEG 2-12 HR INTMT MNTR: CPT

## 2022-02-26 PROCEDURE — 84145 PROCALCITONIN (PCT): CPT | Performed by: INTERNAL MEDICINE

## 2022-02-26 PROCEDURE — C9113 INJ PANTOPRAZOLE SODIUM, VIA: HCPCS | Performed by: INTERNAL MEDICINE

## 2022-02-26 PROCEDURE — 99291 CRITICAL CARE FIRST HOUR: CPT | Performed by: EMERGENCY MEDICINE

## 2022-02-26 PROCEDURE — 80202 ASSAY OF VANCOMYCIN: CPT | Performed by: INTERNAL MEDICINE

## 2022-02-26 PROCEDURE — 80048 BASIC METABOLIC PNL TOTAL CA: CPT | Performed by: PHYSICIAN ASSISTANT

## 2022-02-26 PROCEDURE — 80076 HEPATIC FUNCTION PANEL: CPT | Performed by: PHYSICIAN ASSISTANT

## 2022-02-26 PROCEDURE — 85027 COMPLETE CBC AUTOMATED: CPT | Performed by: INTERNAL MEDICINE

## 2022-02-26 PROCEDURE — 99255 IP/OBS CONSLTJ NEW/EST HI 80: CPT | Performed by: INTERNAL MEDICINE

## 2022-02-26 PROCEDURE — NC001 PR NO CHARGE: Performed by: PHYSICIAN ASSISTANT

## 2022-02-26 RX ORDER — DEXAMETHASONE SODIUM PHOSPHATE 4 MG/ML
5 INJECTION, SOLUTION INTRA-ARTICULAR; INTRALESIONAL; INTRAMUSCULAR; INTRAVENOUS; SOFT TISSUE EVERY 6 HOURS SCHEDULED
Status: DISCONTINUED | OUTPATIENT
Start: 2022-02-26 | End: 2022-02-28

## 2022-02-26 RX ORDER — ALBUMIN, HUMAN INJ 5% 5 %
12.5 SOLUTION INTRAVENOUS ONCE
Status: COMPLETED | OUTPATIENT
Start: 2022-02-26 | End: 2022-02-26

## 2022-02-26 RX ADMIN — VANCOMYCIN HYDROCHLORIDE 1250 MG: 10 INJECTION, POWDER, LYOPHILIZED, FOR SOLUTION INTRAVENOUS at 21:18

## 2022-02-26 RX ADMIN — OFLOXACIN 4 DROP: 3 SOLUTION OPHTHALMIC at 21:17

## 2022-02-26 RX ADMIN — CEFTRIAXONE 2000 MG: 2 INJECTION, POWDER, FOR SOLUTION INTRAMUSCULAR; INTRAVENOUS at 10:15

## 2022-02-26 RX ADMIN — SODIUM CHLORIDE TAB 1 GM 1 G: 1 TAB at 12:30

## 2022-02-26 RX ADMIN — SODIUM CHLORIDE 150 MG: 9 INJECTION, SOLUTION INTRAVENOUS at 09:30

## 2022-02-26 RX ADMIN — ALBUMIN (HUMAN) 12.5 G: 12.5 INJECTION, SOLUTION INTRAVENOUS at 08:38

## 2022-02-26 RX ADMIN — PANTOPRAZOLE SODIUM 40 MG: 40 INJECTION, POWDER, FOR SOLUTION INTRAVENOUS at 08:39

## 2022-02-26 RX ADMIN — POTASSIUM PHOSPHATE, MONOBASIC POTASSIUM PHOSPHATE, DIBASIC 9 MMOL: 224; 236 INJECTION, SOLUTION, CONCENTRATE INTRAVENOUS at 07:55

## 2022-02-26 RX ADMIN — ACYCLOVIR SODIUM 775 MG: 50 INJECTION, SOLUTION INTRAVENOUS at 06:07

## 2022-02-26 RX ADMIN — DEXAMETHASONE SODIUM PHOSPHATE 5 MG: 4 INJECTION INTRA-ARTICULAR; INTRALESIONAL; INTRAMUSCULAR; INTRAVENOUS; SOFT TISSUE at 08:38

## 2022-02-26 RX ADMIN — ACYCLOVIR SODIUM 775 MG: 50 INJECTION, SOLUTION INTRAVENOUS at 00:13

## 2022-02-26 RX ADMIN — VANCOMYCIN HYDROCHLORIDE 1250 MG: 10 INJECTION, POWDER, LYOPHILIZED, FOR SOLUTION INTRAVENOUS at 13:37

## 2022-02-26 RX ADMIN — ACYCLOVIR SODIUM 775 MG: 50 INJECTION, SOLUTION INTRAVENOUS at 14:44

## 2022-02-26 RX ADMIN — LEVETIRACETAM 1500 MG: 100 INJECTION INTRAVENOUS at 09:00

## 2022-02-26 RX ADMIN — DEXAMETHASONE SODIUM PHOSPHATE 5 MG: 4 INJECTION INTRA-ARTICULAR; INTRALESIONAL; INTRAMUSCULAR; INTRAVENOUS; SOFT TISSUE at 17:51

## 2022-02-26 RX ADMIN — LEVETIRACETAM 1500 MG: 100 INJECTION INTRAVENOUS at 21:17

## 2022-02-26 RX ADMIN — SODIUM CHLORIDE TAB 1 GM 1 G: 1 TAB at 09:30

## 2022-02-26 RX ADMIN — PANTOPRAZOLE SODIUM 40 MG: 40 INJECTION, POWDER, FOR SOLUTION INTRAVENOUS at 21:17

## 2022-02-26 RX ADMIN — DEXAMETHASONE SODIUM PHOSPHATE 5 MG: 4 INJECTION INTRA-ARTICULAR; INTRALESIONAL; INTRAMUSCULAR; INTRAVENOUS; SOFT TISSUE at 12:30

## 2022-02-26 RX ADMIN — CEFTRIAXONE 2000 MG: 2 INJECTION, POWDER, FOR SOLUTION INTRAMUSCULAR; INTRAVENOUS at 20:23

## 2022-02-26 RX ADMIN — VANCOMYCIN HYDROCHLORIDE 1250 MG: 10 INJECTION, POWDER, LYOPHILIZED, FOR SOLUTION INTRAVENOUS at 06:09

## 2022-02-26 RX ADMIN — SODIUM CHLORIDE 150 MG: 9 INJECTION, SOLUTION INTRAVENOUS at 21:18

## 2022-02-26 RX ADMIN — POTASSIUM CHLORIDE 20 MEQ: 14.9 INJECTION, SOLUTION INTRAVENOUS at 01:18

## 2022-02-26 RX ADMIN — ENOXAPARIN SODIUM 40 MG: 40 INJECTION SUBCUTANEOUS at 09:30

## 2022-02-26 NOTE — CONSULTS
Consultation - Infectious Disease   Bushra Brunson 36 y o  male MRN: [id number]  Unit/Bed#: ICU 05 Encounter: [id number]      Inpatient consult to Infectious Diseases  Consult performed by: Daniele Edouard MD  Consult ordered by: Brant Blank MD          IMPRESSION & RECOMMENDATIONS:   Impression:  1  Seizure disorder with postictal state  R/0 underlying infection component  2  Autism  3  Brugada syndrome s/p AICD insertion     Recommendations:    Discussed with the primary critical care service and patient's mother who is at bedside  1  At present there is no overt evidence of a underlying infection   2  As discussed agree with discontinuing acyclovir  3  MRI when EEG leads removed  4  If cultures remain negative over next 48 hours would discontinue antibiotics        HISTORY OF PRESENT ILLNESS:    Reason for Consult:  R/0 meningitis/encephalitis  HPI:  Patient is unable to give history  The history was obtained from the critical care service and chart as well as the patient's mother who is at bedside  Bushra Brunson is a 36y o  year old male with a past history of autism and seizure disorder, Brugada syndrome with AICD (2021) who was admitted yesterday from the emergency room where he entered with altered mental status and seizure  Previous seizures were approximately 10 months ago after AICD insertion  Patient also has a history of chronic otitis media with recent azithromycin p o  Rx for acute left otitis media completed 2/22  Here the patient had another seizure and confirmed on EEG, CT of the head without contrast revealed no acute intracranial abnormality  There was a left mastoid effusion with polypoid mucosal disease of the left sphenoid sinus  An LP was performed with an elevated protein of 63, normal glucose, traumatic tap with 1400 rbc's but only 4 wbc's of which 58% neutrophils 4% bands 23% lymphs and 15% monocytes  G stain was negative and cultures at 2400 hours are also negative  The meningitis encephalitis PCR panel was negative  Peripheral WBC count was 16,900 with 92% segs  Patient had negative sepsis parameters  Blood cultures x2 are negative so far  The patient yesterday was initially started on IV acyclovir, ceftriaxone and vancomycin  Which were continued today  The patient has remained afebrile  An MRI of the brain is planned once patient deemed stable  The patient's initial CMP showed hyponatremia with a sodium of 127 which has now been corrected  Review of Systems as per mother seizures, altered mental status  A [id number] point system-based review of systems is otherwise negative  PAST MEDICAL HISTORY:  Past Medical History:   Diagnosis Date    Brugada syndrome     Seizure-like activity ([facility] [address] )     last assessed: 16    Skin lesion     last assessed: 13    Skin rash     last assessed: 3/1/13    Staph aureus infection     last assessed: 13     Past Surgical History:   Procedure Laterality Date    ADENOIDECTOMY      CARDIAC ICD GENERATOR CHANGE  2021    DENTAL SURGERY      wisdom teeth    MYRINGOTOMY W/ TUBES      with ventilating tube insertion; x6       FAMILY HISTORY:  Non-contributory    SOCIAL HISTORY:  Social History   Single  Social History     Substance and Sexual Activity   Alcohol Use Never     Social History     Substance and Sexual Activity   Drug Use Never     Social History     Tobacco Use   Smoking Status Never Smoker   Smokeless Tobacco Never Used       ALLERGIES:  Allergies   Allergen Reactions    Cefaclor Hives    Sulfamethoxazole-Trimethoprim Hives       MEDICATIONS:  All current active medications have been reviewed        PHYSICAL EXAM:  Temp:  [97 6 °F (36 4 °C)-98 8 °F (37 1 °C)] 97 6 °F (36 4 °C)  HR:  [] 106  Resp:  [14-30] 16  BP: ()/(43-99) 115/57  SpO2:  [95 %-100 %] 98 %  Temp (24hrs), Av 5 °F (36 9 °C), Min:97 6 °F (36 4 °C), Max:98 8 °F (37 1 °C)  Current: Temperature: 97 6 °F (36 4 °C)    Intake/Output Summary (Last 24 hours) at 2/26/2022 1757  Last data filed at 2/26/2022 1340  Gross per 24 hour   Intake 2400 ml   Output 5625 ml   Net -3225 ml       General Appearance:  Appearing well but lethargic and minimally verbal with EEG leads in place, nontoxic, and in no distress, appears stated age   Head:  EEG leads in place, atraumatic   Eyes:  PERRL, conjunctiva pink and sclera anicteric, both eyes   Nose: Nares normal, mucosa normal, no drainage   Throat: Oropharynx moist without lesions; lips, mucosa, and tongue normal; teeth and gums normal   Neck: Supple, symmetrical, trachea midline, no adenopathy, no tenderness/mass/nodules   Back:   Symmetric, no curvature, ROM normal, no CVA tenderness   Lungs:   Clear to auscultation bilaterally, no audible wheezes, rhonchi and rales, respirations unlabored   Chest Wall:  No tenderness or deformity   Heart:  Regular rate and rhythm, S1, S2 normal, no murmur, rub or gallop, left subclavian ICD with incisional scar   Abdomen:   Soft, non-tender, non-distended, positive bowel sounds, no masses, no organomegaly    No CVA tenderness, external urinary catheter   Extremities: Extremities normal, atraumatic, no cyanosis, clubbing or edema   Skin: As above   Lymph nodes: Cervical, supraclavicular, and axillary nodes normal   Neurologic: Alert and oriented times 3, extremity strength 5/5 and symmetric           Invasive Devices:   Peripheral IV 02/25/22 Right Forearm (Active)   Site Assessment WDL; Clean 02/25/22 2100   Dressing Type Transparent 02/25/22 2100   Line Status Flushed; Infusing 02/25/22 2100   Dressing Status Clean;Dry; Intact 02/25/22 2100       Peripheral IV 02/25/22 Right Hand (Active)   Site Assessment WDL; Clean;Dry; Intact 02/25/22 2100   Dressing Type Transparent 02/25/22 2100   Line Status Blood return noted; Flushed; Infusing 02/25/22 2100   Dressing Status Clean;Dry; Intact 02/25/22 2100       External Urinary Catheter Small (Active)   Output (mL) 950 mL 02/26/22 1340       LABS, IMAGING, & OTHER STUDIES:  Lab Results:      I have personally reviewed pertinent labs  Results from last 7 days   Lab Units 02/26/22  0520 02/25/22  1748 02/25/22  0957   WBC Thousand/uL 8 45 13 20* 16 90*   HEMOGLOBIN g/dL 14 8 13 0 14 9   PLATELETS Thousands/uL 169 104* 162     Results from last 7 days   Lab Units 02/26/22  1236 02/26/22  0520 02/26/22  0125 02/26/22  0125 02/25/22  1455 02/25/22  1455 02/25/22  0957 02/25/22  0957   SODIUM mmol/L 135*  --   --  136  --  126*   < > 127*   POTASSIUM mmol/L 4 3  --    < > 3 9   < > 3 4*   < > 3 5   CHLORIDE mmol/L 112*  --    < > 107   < > 95*   < > 95*   CO2 mmol/L 18*  --    < > 22   < > 25   < > 24   BUN mg/dL 9  --    < > 9   < > 11   < > 12   CREATININE mg/dL 0 78  --    < > 0 69   < > 0 62   < > 0 74   EGFR ml/min/1 73sq m 121  --    < > 128   < > 134   < > 124   CALCIUM mg/dL 8 9  --    < > 9 0   < > 8 4   < > 8 6   AST U/L  --  38  --   --   --   --   --  34   ALT U/L  --  33  --   --   --   --    < > 32   ALK PHOS U/L  --  62  --   --   --   --    < > 67    < > = values in this interval not displayed  Results from last 7 days   Lab Units 02/25/22  1915 02/25/22  1908   BLOOD CULTURE   --  Received in Microbiology Lab  Culture in Progress  Received in Microbiology Lab  Culture in Progress  GRAM STAIN RESULT  Rare Polys  No organisms seen  --        Imaging Studies:   I have personally reviewed pertinent imaging study reports and images in PACS  EKG, Pathology, and Other Studies:   I have personally reviewed pertinent reports

## 2022-02-26 NOTE — ED PROCEDURE NOTE
Procedure  Lumbar puncture    Date/Time: 2/25/2022 7:06 PM  Performed by: Freddy Gallo MD  Authorized by: Freddy Gallo MD   Universal Protocol:  Consent: Verbal consent obtained  Written consent obtained  Risks and benefits: risks, benefits and alternatives were discussed  Consent given by: parent  Time out: Immediately prior to procedure a "time out" was called to verify the correct patient, procedure, equipment, support staff and site/side marked as required  Patient understanding: patient states understanding of the procedure being performed (Mother understands)  Patient consent: the patient's understanding of the procedure matches consent given  Relevant documents: relevant documents present and verified  Site marked: the operative site was marked  Patient identity confirmed: verbally with patient      Patient location:  ED  Pre-procedure details:     Preparation: Patient was prepped and draped in usual sterile fashion    Indications:     Indications: evaluation for infection    Sedation:     Sedation type: Moderate (conscious) sedation (See separate Procedural Sedation form)  Anesthesia (see MAR for exact dosages): Anesthesia method:  Local infiltration    Local anesthetic:  Lidocaine 1% w/o epi  Procedure details:     Lumbar space:  L3-L4 interspace    Patient position:  L lateral decubitus    Equipment: Lumbar puncture kit used      Needle gauge:  20G x 3 5in    Needle type:  Spinal needle - Quincke tip    Ultrasound guidance: no      Number of attempts:  1    Fluid appearance:  Blood-tinged then clearing    Tubes of fluid:  4    Total volume (ml):  8  Post-procedure:     Puncture site:  Adhesive bandage applied    Patient tolerance of procedure:   Tolerated well, no immediate complications    Patient instructed to lie flat for one(1) hour post lumbar puncture : Yes    Pre-Procedural Sedation  Performed by: Freddy Gallo MD  Authorized by: Freddy Gallo MD     Consent:     Consent obtained:  Verbal and written    Consent given by:  Parent    Risks discussed:  Prolonged sedation necessitating reversal, respiratory compromise necessitating ventilatory assistance and intubation, inadequate sedation, vomiting and nausea  Universal protocol:     Procedure explained and questions answered to patient or proxy's satisfaction: yes      Site/side marked: yes      Immediately prior to procedure a time out was called: yes      Patient identity confirmation method:  Arm band  Indications:     Sedation purpose:  Lumbar puncture    Procedure necessitating sedation performed by:  Physician performing sedation    Intended level of sedation:  Moderate (conscious sedation)  Pre-sedation assessment:     Pre-sedation assessments completed and reviewed: airway patency, hydration status, mental status, nausea/vomiting, respiratory function and temperature    Procedural Sedation    Date/Time: 2/25/2022 7:09 PM  Performed by: Freddy Gallo MD  Authorized by: Freddy Gallo MD     Procedure details (see MAR for exact dosages):     Sedation start time:  2/25/2022 6:27 PM    Preoxygenation:  Nasal cannula    Sedation: Ketamine and propofol  Intra-procedure monitoring:  Blood pressure monitoring, cardiac monitor, continuous pulse oximetry, continuous capnometry, frequent LOC assessments and frequent vital sign checks    Intra-procedure events: none      Sedation end time:  2/25/2022 7:10 PM    Total sedation time (minutes):  43  Post-procedure details:     Post-sedation assessment completed:  2/25/2022 7:10 PM    Attendance: Constant attendance by certified staff until patient recovered      Post-sedation assessments completed and reviewed: airway patency, cardiovascular function, mental status, nausea/vomiting, respiratory function and temperature      Patient is stable for discharge or admission: yes      Patient tolerance:   Tolerated well, no immediate complications                     Freddy Gallo MD  02/25/22 9352

## 2022-02-26 NOTE — PROGRESS NOTES
Vancomycin IV Pharmacy-to-Dose Consultation    Brittany Boland is a 36 y o  male who is currently receiving Vancomycin IV with management by the Pharmacy Consult service  Relevant clinical data and objective history reviewed:  Temp Readings from Last 3 Encounters:   02/25/22 98 7 °F (37 1 °C) (Rectal)   02/14/22 (!) 97 4 °F (36 3 °C) (Temporal)   02/10/22 (!) 97 3 °F (36 3 °C) (Tympanic)     /73   Pulse 82   Temp 98 7 °F (37 1 °C) (Rectal)   Resp 18   Ht 5' 11 5" (1 816 m)   Wt 79 4 kg (175 lb 0 7 oz)   SpO2 99%   BMI 24 07 kg/m²     I/O last 3 completed shifts: In: 1350 [IV Piggyback:1350]  Out: -     Lab Results   Component Value Date/Time    BUN 11 02/25/2022 02:55 PM    WBC 16 90 (H) 02/25/2022 09:57 AM    HGB 14 9 02/25/2022 09:57 AM    HCT 41 1 02/25/2022 09:57 AM    MCV 83 02/25/2022 09:57 AM     02/25/2022 09:57 AM     Creatinine   Date Value Ref Range Status   02/25/2022 0 62 0 60 - 1 30 mg/dL Final     Comment:     Standardized to IDMS reference method   02/25/2022 0 74 0 60 - 1 30 mg/dL Final     Comment:     Standardized to IDMS reference method         Vancomycin Assessment:  Indication: CNS infection    Status: Critical  Micro: Pending Gram Stain CSF and spinal fluid culture  Procalcitonin: 0 3 ng/ml (2/25 @ 1908)  Renal Function: Scr 0 62 mg/dL; CrCl > 100 mL/min  Potential Nephrotoxicity Factors:  Medications: None  Patient-Factors: None  Days of Therapy: 1  Current Dose: 2000 mg IV once   Goal Trough: 15-20 (appropriate for most indications)   Goal AUC(24h): 400-600         Vancomycin Plan:  New Dosing: change to 1250 mg q8h (next dose: 2/26 @ 0500)  Predicted Trough / AUC:   Next Level: 2/26 @ 2030  Renal Function Monitoring: Daily BMP       Pharmacy will continue to follow closely for s/sx of nephrotoxicity, infusion reactions and appropriateness of therapy  BMP and CBC will be ordered per protocol   We will continue to follow the patient's culture results and clinical progress daily      Vicente Tapia, [facility]

## 2022-02-26 NOTE — UTILIZATION REVIEW
Initial Clinical Review    Admission: Date/Time/Statement:   Admission Orders (From admission, onward)     Ordered        02/25/22 1959  Inpatient Admission  Once                      Orders Placed This Encounter   Procedures    Inpatient Admission     Standing Status:   Standing     Number of Occurrences:   1     Order Specific Question:   Level of Care     Answer:   Critical Care [15]     Order Specific Question:   Estimated length of stay     Answer:   More than 2 Midnights     Order Specific Question:   Certification     Answer:   I certify that inpatient services are medically necessary for this patient for a duration of greater than two midnights  See H&P and MD Progress Notes for additional information about the patient's course of treatment  ED Arrival Information     Expected Arrival Acuity    - 2/25/2022 09:22 Emergent         Means of arrival Escorted by Service Admission type    Wheelchair Family Member Critical Care/ICU Emergency         Arrival complaint    seizure        Chief Complaint   Patient presents with    Seizure - Prior Hx Of     per pt mother this morning she found pt foaming at the mouth   pt has a hx of seizure  Initial Presentation: 36 y o  male who presented to [address] [place] [address] ED  Inpatient admission for evaluation and treatment of   Breakthrough seizure, along with  Altered mental status, Elevated blood pressure reading, Hyponatremia, Suppurative otitis media of left ear, and Hematemesis  H ehas a past medical history of Brugada syndrome, Seizure-like activity ([address] ), Skin lesion, Skin rash, and Staph aureus infection  Presented  To the ED due to concern for altered mental status and seizure like activity  His mother reports he is normally awake before everyone in the house,  today he was not  She found him in his room grunting, breathing abnormally with abdominal movements  He had a 10 minute period of unresponsiveness prior to regaining alertness  On arrival at the Ed he is not at baseline mental status  But he follows basic commands  He was  being treated for a left  ear infection  He completed a course of  Antibiotics  On exam, he has mental status change,  He is following simple commands but his verbal responses are greatly limited  He is spontaneously rotating his neck to the left and right during the H & P exam without difficulty or apparent discomfort  He is in ICU due to concern for ongoing seizures  VEEG monitoring  Neurology consult - 2/25 - 37 yo autistic man with brugada syndrome s/p ICD and epilepsy  Presents with recurrent seizures  He was found postictal at home  With urinary incontinence and tongue biting  He had additional seizures in the ER    he receive keppra and lorazepam, started on Keppra iv, He remains obtunded , EEG captured a brief electrographic seizure emanating from the R hemisphere with secondary generalization  Then given lacosamide bolus and  Scheduled q12, Keppra increased, He was found ot be hyponatremia  , Mom reports significant free water intake at home  Plan slow correction of NA, AED requirements may decrease as it is corrected  MRI brain V EEG monitoring  ENT consult - 2/25 -  Ofloxacin ear drops  B/l   Hx recurrent otitis media as a child  CT scan relatively benign corresponding to a mild left otitis media, no findings to suspect severe otomastoiditis with intracranial compromise  S/p treatment with azithromax no additional  Retention cyst in the sphenoid sinus appears to be relatively recent, it was not present on a CT scan 1 year ago  The lack of associated mucosal thickening on   sphenoid sinus suggest a retention cyst without infection       Date: 2/26/2022   Day 2:  Admitted after post ictal event  Witnessed seizure in the Ed  Recent otitis media  Hyponatremia and hypokalemia   R/o meningitis, Leukocytosis, VEEG monitoring, Keppra and Vimpat, Ocuflox gtts bid  Continue to follow CSF studies, Cultures, plan ABx and antiviral per critical care team       Infectious Disease consult - 2/26 -  R/o meningitis, encephalitis  Seizure disorder with post ictal state, r/o underlying infection component  No overt evidence of underlying infection  Agree with discontinuing acyclovir, MRI when RRG leads removed  If cultures remain negative over the next 48 hours would d/c antibiotics       ED Triage Vitals   Temperature Pulse Respirations Blood Pressure SpO2   02/25/22 0945 02/25/22 0945 02/25/22 0945 02/25/22 0945 02/25/22 0945   98 °F (36 7 °C) 94 20 133/82 98 %      Temp Source Heart Rate Source Patient Position - Orthostatic VS BP Location FiO2 (%)   02/25/22 1801 02/25/22 0945 02/25/22 1845 02/25/22 1845 --   Rectal Monitor Lying Right arm       Pain Score       02/25/22 1850       No Pain          Wt Readings from Last 1 Encounters:   02/26/22 84 kg (185 lb 3 oz)     Additional Vital Signs:   Date/Time Temp Pulse Resp BP MAP (mmHg) SpO2 Calculated FIO2 (%) - Nasal Cannula Nasal Cannula O2 Flow Rate (L/min) O2 Device Patient Position - Orthostatic VS   02/26/22 1100 -- 106 Abnormal  19 126/78 95 98 % -- -- -- --   02/26/22 1030 -- 104 17 117/73 88 97 % -- -- -- --   02/26/22 1000 -- 96 15 107/64 86 97 % -- -- -- --   02/26/22 0930 -- 98 16 112/64 81 97 % -- -- -- --   02/26/22 0918 -- 88 15 109/60 75 97 % -- -- -- --   02/26/22 0900 -- 90 14 109/61 74 97 % -- -- -- --   02/26/22 0800 -- 86 16 92/52 66 96 % -- -- -- --   02/26/22 0747 -- 100 19 101/56 75 -- -- -- -- --   02/26/22 0716 -- 88 16 100/43 Abnormal  58 -- -- -- -- --   02/26/22 0700 -- 92 18 87/46 Abnormal  58 -- -- -- -- --   02/26/22 0600 -- 84 19 85/47 Abnormal  61 -- -- -- -- --   02/26/22 0500 -- 84 19 93/54 72 98 % -- -- -- --   02/26/22 0400 98 5 °F (36 9 °C) 84 19 99/56 73 97 % -- -- None (Room air) Lying   02/26/22 0300 -- 86 18 95/57 74 96 % -- -- -- --   02/26/22 0200 -- 92 19 98/56 75 97 % -- -- -- --   02/26/22 0100 -- 116 Abnormal  30 Abnormal  125/94 108 96 % -- -- -- --   02/26/22 0000 98 7 °F (37 1 °C) 92 19 112/76 88 96 % -- -- None (Room air) Lying   02/25/22 2300 -- 98 24 Abnormal  151/99 118 98 % -- -- -- --   02/25/22 2245 -- 112 Abnormal  22 147/81 106 98 % -- -- -- --   02/25/22 2230 -- 90 19 124/71 93 97 % -- -- -- --   02/25/22 2215 -- 84 15 123/78 93 97 % -- -- -- --   02/25/22 2200 -- 90 21 106/59 72 96 % -- -- -- --   02/25/22 2145 -- 86 19 92/53 66 96 % -- -- -- --   02/25/22 2130 -- 82 18 123/78 93 96 % -- -- -- --   02/25/22 2100 98 8 °F (37 1 °C) 82 18 93/55 67 95 % -- -- None (Room air) Lying   02/25/22 2000 -- 84 16 122/73 94 99 % 28 2 L/min Nasal cannula Lying   02/25/22 1915 -- 82 18 119/73 90 99 % 28 2 L/min Nasal cannula --   02/25/22 1910 -- 82 16 122/75 93 99 % 28 2 L/min Nasal cannula --   02/25/22 1905 -- 82 21 102/57 77 97 % 28 2 L/min Nasal cannula --   02/25/22 19:00:59 -- 84 21 102/57 -- 98 % 28 2 L/min Nasal cannula --   02/25/22 1900 -- 84 20 -- -- 98 % -- -- -- --   02/25/22 1855 -- 84 19 102/56 76 98 % 28 2 L/min Nasal cannula --   02/25/22 18:50:33 -- 84 20 127/61 86 98 % 28 2 L/min Nasal cannula --   02/25/22 1845 -- 94 20 164/77 105 98 % 28 2 L/min Nasal cannula Lying   02/25/22 1840 -- 104 22 164/77 -- 98 % 28 2 L/min Nasal cannula --   02/25/22 1835 -- 92 22 141/79 103 99 % 28 2 L/min Nasal cannula --   02/25/22 1833 -- 86 19 141/79 -- 99 % 28 2 L/min Nasal cannula --   02/25/22 1830 -- 86 20 115/63 85 100 % -- -- None (Room air) --   02/25/22 18:28:08 -- -- -- -- -- -- -- -- Nasal cannula --   02/25/22 1801 98 7 °F (37 1 °C) -- -- -- -- -- -- -- -- --   02/25/22 1800 -- 94 19 -- -- 96 % -- -- -- --   02/25/22 1700 -- 76 22 209/96 Abnormal  136 94 % -- -- -- --   02/25/22 1400 -- 108 Abnormal  17 148/91 109 95 % -- -- -- --   02/25/22 1300 -- 102 22 123/79 97 96 % -- -- -- --   02/25/22 1230 -- 100 24 Abnormal  127/82 99 95 % -- -- -- --   02/25/22 1130 -- 100 18 134/86 103 92 % -- -- None (Room air) --           Pertinent Labs/Diagnostic Test Results:   XR chest 1 view portable   Final Result by Valarie Acuña DO (02/25 1432)      Borderline cardiomegaly  No acute pulmonary disease  Workstation performed: [id number]         CT head without contrast   Final Result by Aramis Peralta MD (02/25 1138)      No acute intracranial abnormality                    Workstation performed: [id number]         MRI inpatient order    (Results Pending)         Results from last 7 days   Lab Units 02/26/22  0520 02/25/22  1748 02/25/22  0957   WBC Thousand/uL 8 45 13 20* 16 90*   HEMOGLOBIN g/dL 14 8 13 0 14 9   HEMATOCRIT % 41 5 34 2* 41 1   PLATELETS Thousands/uL 169 104* 162   NEUTROS ABS Thousands/µL  --   --  15 50*   BANDS PCT %  --  16*  --          Results from last 7 days   Lab Units 02/26/22  0520 02/26/22  0125 02/25/22  1455 02/25/22  0957   SODIUM mmol/L  --  136 126* 127*   POTASSIUM mmol/L  --  3 9 3 4* 3 5   CHLORIDE mmol/L  --  107 95* 95*   CO2 mmol/L  --  22 25 24   ANION GAP mmol/L  --  7 6 8   BUN mg/dL  --  9 11 12   CREATININE mg/dL  --  0 69 0 62 0 74   EGFR ml/min/1 73sq m  --  128 134 124   CALCIUM mg/dL  --  9 0 8 4 8 6   MAGNESIUM mg/dL 2 2  --   --   --    PHOSPHORUS mg/dL 2 9  --   --   --      Results from last 7 days   Lab Units 02/26/22  0520 02/25/22  0957   AST U/L 38 34   ALT U/L 33 32   ALK PHOS U/L 62 67   TOTAL PROTEIN g/dL 6 9 7 0   ALBUMIN g/dL 3 4* 3 7   TOTAL BILIRUBIN mg/dL 0 53 0 64   BILIRUBIN DIRECT mg/dL 0 11  --      Results from last 7 days   Lab Units 02/25/22  1011   POC GLUCOSE mg/dl 106     Results from last 7 days   Lab Units 02/26/22  0125 02/25/22  1455 02/25/22  0957   GLUCOSE RANDOM mg/dL 120 107 116     Results from last 7 days   Lab Units 02/25/22  1455   OSMOLALITY, SERUM mmol/*       Results from last 7 days   Lab Units 02/25/22  1455 02/25/22  1213 02/25/22  0957   HS TNI 0HR ng/L  --   --  5   HS TNI 2HR ng/L 4  --   -- HSTNI D2 ng/L -1  --   --    HS TNI 4HR ng/L  --  5  --    HSTNI D4 ng/L  --  0  --          Results from last 7 days   Lab Units 02/25/22  1908   PROTIME seconds 13 1   INR  1 03   PTT seconds 31         Results from last 7 days   Lab Units 02/26/22  0521 02/25/22  1908   PROCALCITONIN ng/ml 0 23 0 30*     Results from last 7 days   Lab Units 02/25/22  1455 02/25/22  1213   LACTIC ACID mmol/L 1 1 1 9     Results from last 7 days   Lab Units 02/25/22  1455 02/25/22  1213   PROLACTIN ng/mL 18 0* 23 3*       Results from last 7 days   Lab Units 02/25/22  0957   CRP mg/L 3 7*   SED RATE mm/hour 4         Results from last 7 days   Lab Units 02/25/22 2147 02/25/22  1455   OSMOLALITY, SERUM mmol/KG  --  259*   OSMO UR mmol/*  --      Results from last 7 days   Lab Units 02/25/22 2150 02/25/22 2147   CLARITY UA   --  Clear   COLOR UA   --  Yellow   SPEC GRAV UA   --  1 010   PH UA   --  7 5   GLUCOSE UA mg/dl  --  Negative   KETONES UA mg/dl  --  Negative   BLOOD UA   --  Negative   PROTEIN UA mg/dl  --  Negative   NITRITE UA   --  Negative   BILIRUBIN UA   --  Negative   UROBILINOGEN UA E U /dl  --  0 2   LEUKOCYTES UA   --  Negative   WBC UA /hpf  --  None Seen   RBC UA /hpf  --  None Seen   BACTERIA UA /hpf  --  None Seen   EPITHELIAL CELLS WET PREP /hpf  --  None Seen   SODIUM UR  66  --      Results from last 7 days   Lab Units 02/25/22 2148   STREP PNEUMONIAE ANTIGEN, URINE  Negative     Results from last 7 days   Lab Units 02/25/22 1915 02/25/22 1908   BLOOD CULTURE   --  Received in Microbiology Lab  Culture in Progress  Received in Microbiology Lab  Culture in Progress     GRAM STAIN RESULT  Rare Polys  No organisms seen  --      Results from last 7 days   Lab Units 02/25/22 1915   TOTAL COUNTED  100     Results from last 7 days   Lab Units 02/25/22 1915   APPEARANCE CSF  Clear, Pink   TUBE NUM CSF  4   WBC CSF /uL 1   XANTHOCHROMIA  No   NEUTROPHILS % (CSF) % 58   LYMPHS % (CSF) % 23 MONOCYTES % (CSF) % 15   GLUCOSE CSF mg/dL 67   PROTEIN CSF mg/dL 63*   RBC CSF uL 404*  1,400*     CXR  2/25  -Borderline cardiomegaly   No acute pulmonary disease  CT Head - 2/25 - No acute intracranial abnormality  EEG - 2/25 - EEG Interpretation: This Routine EEG recorded during wakefulness, drowsiness, and sleep is   abnormal      The study captures a 50 second focal electrographic seizure arising from   the right mid temporal region without definite clinical correlate other   than the possibility of prominent swallowing noted by the tech  Excessive beta activities are likely a medication effect related to   benzodiazepine administration  MRI Brain  When VEEG discontinued       V EEG monitoring           ED Treatment:   Medication Administration from 02/25/2022 0922 to 02/25/2022 2042       Date/Time Order Dose Route Action Comments     02/25/2022 1033 LORazepam (ATIVAN) 2 mg/mL injection ADS Override Pull 2 mg  Given      02/25/2022 1044 levETIRAcetam (KEPPRA) 4,000 mg in sodium chloride 0 9 % 250 mL IVPB 4,000 mg Intravenous New Bag Made by pharmacist RL in ED no barcode available     02/25/2022 1033 LORazepam (ATIVAN) 2 mg/mL injection ADS Override Pull 2 mg  Given      02/25/2022 1044 LORazepam (ATIVAN) injection 2 mg 2 mg Intravenous Given      02/25/2022 1143 sodium chloride 0 9 % bolus 1,000 mL 1,000 mL Intravenous New Bag      02/25/2022 2019 levETIRAcetam (KEPPRA) 1,500 mg in sodium chloride 0 9 % 100 mL IVPB 1,500 mg Intravenous New Bag      02/25/2022 1738 lacosamide (VIMPAT) 400 mg in sodium chloride 0 9 % 100 mL IVPB 400 mg Intravenous New Bag      02/25/2022 2031 lacosamide (VIMPAT) 150 mg in sodium chloride 0 9 % 100 mL IVPB 150 mg Intravenous New Bag      02/25/2022 1737 ondansetron (ZOFRAN) 4 mg/2 mL injection *ADS Override Pul 4 mg  Given      02/25/2022 1746 ondansetron (ZOFRAN) injection 4 mg 0 mg Intravenous Hold      02/25/2022 1737 pantoprazole (PROTONIX) injection 40 mg 40 mg Intravenous Given      02/25/2022 1853 ketamine (KETALAR) 160 mg 160 mg Intravenous Given by Other      02/25/2022 1901 propofol (DIPRIVAN) 200 MG/20ML bolus injection 100 mg 100 mg Intravenous Given      02/25/2022 1944 dexamethasone (DECADRON) injection 10 mg 10 mg Intravenous Given      02/25/2022 1915 sodium chloride 0 9 % bolus 1,000 mL 1,000 mL Intravenous New Bag         Past Medical History:   Diagnosis Date    Brugada syndrome     Seizure-like activity ([facility] )     last assessed: 7/11/16    Skin lesion     last assessed: 12/30/13    Skin rash     last assessed: 3/1/13    Staph aureus infection     last assessed: 8/20/13     Present on Admission:   Breakthrough seizure ([facility] )   Active autistic disorder   Suppurative otitis media of left ear   Hyponatremia      Admitting Diagnosis: Hematemesis [K92 0]  Hyponatremia [E87 1]  Seizure ([facility] [address] ) [R56 9]  Altered mental status [R41 82]  Elevated blood pressure reading [R03 0]  Seizure disorder ([facility]) [G40 909]  Suppurative otitis media of left ear [H66 42]  Breakthrough seizure ([facility] [address] ) [G40 919]  Age/Sex: 36 y o  male         Admission Orders:  Scheduled Medications:  acyclovir, 10 mg/kg (Ideal), Intravenous, Q8H  cefTRIAXone, 2,000 mg, Intravenous, Q12H  dexamethasone, 5 mg, Intravenous, Q6H HAZEL  enoxaparin, 40 mg, Subcutaneous, Q24H HAZEL  lacosamide (VIMPAT) IVPB, 150 mg, Intravenous, Q12H  levETIRAcetam, 1,500 mg, Intravenous, BID  ofloxacin, 4 drop, Otic, BID  ondansetron, 4 mg, Intravenous, Once  pantoprazole, 40 mg, Intravenous, Q12H [address]  sodium chloride, 1 g, Oral, TID With Meals  vancomycin, 15 mg/kg, Intravenous, Q8H      Continuous IV Infusions:     PRN Meds:  acetaminophen, 650 mg, Oral, Q6H PRN  LORazepam, 2 mg, Intravenous, Q6H PRN      Nursing Orders - VS - Neuro checks q1 - Seizure precautions - daily weights - fall precautions - Turn q 2 - I & O q 2 - Neuro checks q 4 - up with assistance -Diet NPO       Network Utilization Review Department  ATTENTION: Please call with any questions or concerns to [phone] and carefully listen to the prompts so that you are directed to the right person  All voicemails are confidential   Renee Rahman all requests for admission clinical reviews, approved or denied determinations and any other requests to dedicated fax number below belonging to the campus where the patient is receiving treatment   List of dedicated fax numbers for the Facilities:  [address] DENIALS (Administrative/Medical Necessity) [phone]   [address] (Maternity/NICU/Pediatrics) [phone]   [address]  [address] [facility] [place] [address] [address] [place] [address] Anisa Garvin [address] [facility] [place] [address] [phone]

## 2022-02-26 NOTE — PROGRESS NOTES
Daily Progress Note - Critical Care   Karena Box 36 y o  male MRN: [id number]  Unit/Bed#: ICU 05 Encounter: [id number]    Patient admitted yesterday with altered mental status concerning for status epilepticus versus CNS infectious etiology with elevated white blood cell count and recent otitis media  Lumbar puncture performed  Patient did have a significant amount RBCs  At this time unclear if the lab completed testing in the order placed for RBCs as lumbar puncture was a traumatic tap  Acyclovir initiated for the possibility of HSV with 1400 RBCs in tube 4 and 400 in tube 1  Visit Vitals  BP (!) 85/47   Pulse 84   Temp 98 5 °F (36 9 °C) (Axillary)   Resp 19   Ht 6' (1 829 m)   Wt 84 kg (185 lb 3 oz)   SpO2 98%   BMI 25 12 kg/m²   Smoking Status Never Smoker   BSA 2 06 m²     GEN: NAD, awake and alert  HEENT: EOMI, PERRLA, dry membranes, tongue laceration/ bite injury  CV: RRR, no Murmur  Resp:  CTA, no R/R/W  GI: soft,NT/ND  :  External urinary catheter  Neuro: Moves all extremities, follows commands, starting to speak 1-2 words, awake, not at baseline mental status yet     Skin: warm, dry, no rash    Seizure possible status epilepticus  Acute encephalopathy  Left OM and externa with history of myringotomy tubes  Hyponatremia  Dehydration/ intravascular volume depletion-ICD interrogation without events 2/26/22  Brugada syndrome s/p ICD placement   Left sphenoid retention cyst   Autism   Hypokalemia- replete and recheck  Tongue laceration/ bite wound- mouth care    IO -125ml    Antiepileptic regimen:   Keppra 1500 mg IV q 12 hours   Vimpat 150 mg IV q 12 hours  Video EEG initiated 02/25/2022     Infectious management:  02/25/2022-CSF culture-pending  02/25/2022-blood culture pending   02/25/2022-urine culture-pending  02/25/2022 HSV PCR CSF-pending  02/25/2022 strep pneumoniae urine antigen-negative  Ceftriaxone 2 g IV q 12 hours initiated 02/25/2022   Vancomycin 1250 mg IV q 8 hours initiated 02/25/2022 after loading dose  Acyclovir 10 milligrams/kilogram IV q 8 hours  Ofloxacin solution 4 drops 2 times daily to left ear initiated 02/25/2022    Patient remained afebrile overnight  He did have lower blood pressures this morning while sleeping  He did have significant intravascular volume depletion likely requires more intravascular volume  Plan to administer albumin 250 mL x1 now  Continue with empiric antibiotics and acyclovir for possible CNS infection  Continue with Decadron  Infectious Disease consultation today  Patient's hyponatremia is improved with IV fluid resuscitation  Likely hyponatremia is related to intravascular volume depletion and dehydration  Maintain on Keppra and Vimpat  Awaiting EEG report this morning regarding ongoing seizure events from overnight  Patient will need brain MRI  Update:  No seizure overnight    Change neurochecks q 2 day and q 4 HS  ICD interrogation without events    Start po diet pending nursing swallow eval    Discontinue salt tabs   Change labs to q am    CC time 38min,

## 2022-02-26 NOTE — PHYSICAL THERAPY NOTE
Physical Therapy Cancellation Note    PT orders received chart review completed  Mother currently requesting any mobility be held until off EEG monitoring 2* to pt impulsivity, speed of mobility, and difficulty with redirection already attempting to move OOB  PT will follow and eval as medically appropriate  02/26/22 1500   Note Type   Note type Evaluation; Cancelled Session   Cancel Reasons Other     Loree Montiel, PT

## 2022-02-26 NOTE — PLAN OF CARE
Problem: Potential for Falls  Goal: Patient will remain free of falls  Description: INTERVENTIONS:  - Educate patient/family on patient safety including physical limitations  - Instruct patient to call for assistance with activity   - Consult OT/PT to assist with strengthening/mobility   - Keep Call bell within reach  - Keep bed low and locked with side rails adjusted as appropriate  - Keep care items and personal belongings within reach  - Initiate and maintain comfort rounds  - Make Fall Risk Sign visible to staff  - Offer Toileting every 2 Hours, in advance of need  - Initiate/Maintain bedalarm  - Obtain necessary fall risk management equipment:   - Apply yellow socks and bracelet for high fall risk patients  - Consider moving patient to room near nurses station  2/25/2022 2301 by Clarissa Kellogg RN  Outcome: Progressing  2/25/2022 2301 by Clarissa Kellogg RN  Outcome: Progressing     Problem: PAIN - ADULT  Goal: Verbalizes/displays adequate comfort level or baseline comfort level  Description: Interventions:  - Encourage patient to monitor pain and request assistance  - Assess pain using appropriate pain scale  - Administer analgesics based on type and severity of pain and evaluate response  - Implement non-pharmacological measures as appropriate and evaluate response  - Consider cultural and social influences on pain and pain management  - Notify physician/advanced practitioner if interventions unsuccessful or patient reports new pain  2/25/2022 2301 by Clarissa Kellogg RN  Outcome: Progressing  2/25/2022 2301 by Clarissa Kellogg RN  Outcome: Progressing     Problem: INFECTION - ADULT  Goal: Absence or prevention of progression during hospitalization  Description: INTERVENTIONS:  - Assess and monitor for signs and symptoms of infection  - Monitor lab/diagnostic results  - Monitor all insertion sites, i e  indwelling lines, tubes, and drains  - Monitor endotracheal if appropriate and nasal secretions for changes in amount and color  - [place] appropriate cooling/warming therapies per order  - Administer medications as ordered  - Instruct and encourage patient and family to use good hand hygiene technique  - Identify and instruct in appropriate isolation precautions for identified infection/condition  2/25/2022 2301 by Yordan Salcedo RN  Outcome: Progressing  2/25/2022 2301 by Yordan Salcedo RN  Outcome: Progressing  Goal: Absence of fever/infection during neutropenic period  Description: INTERVENTIONS:  - Monitor WBC    2/25/2022 2301 by Yordan Salcedo RN  Outcome: Progressing  2/25/2022 2301 by Yordan Salcedo RN  Outcome: Progressing     Problem: SAFETY ADULT  Goal: Patient will remain free of falls  Description: INTERVENTIONS:  - Educate patient/family on patient safety including physical limitations  - Instruct patient to call for assistance with activity   - Consult OT/PT to assist with strengthening/mobility   - Keep Call bell within reach  - Keep bed low and locked with side rails adjusted as appropriate  - Keep care items and personal belongings within reach  - Initiate and maintain comfort rounds  - Make Fall Risk Sign visible to staff  - Offer Toileting every 2 Hours, in advance of need  - Initiate/Maintain bedalarm  - Obtain necessary fall risk management equipment:   - Apply yellow socks and bracelet for high fall risk patients  - Consider moving patient to room near nurses station  2/25/2022 2301 by Yordan Salcedo RN  Outcome: Progressing  2/25/2022 2301 by Yordan Salcedo RN  Outcome: Progressing  Goal: Maintain or return to baseline ADL function  Description: INTERVENTIONS:  -  Assess patient's ability to carry out ADLs; assess patient's baseline for ADL function and identify physical deficits which impact ability to perform ADLs (bathing, care of mouth/teeth, toileting, grooming, dressing, etc )  - Assess/evaluate cause of self-care deficits   - Assess range of motion  - Assess patient's mobility; develop plan if impaired  - Assess patient's need for assistive devices and provide as appropriate  - Encourage maximum independence but intervene and supervise when necessary  - Involve family in performance of ADLs  - Assess for home care needs following discharge   - Consider OT consult to assist with ADL evaluation and planning for discharge  - Provide patient education as appropriate  2/25/2022 2301 by Vamsi Phillips RN  Outcome: Progressing  2/25/2022 2301 by Vamsi Phillips RN  Outcome: Progressing  Goal: Maintains/Returns to pre admission functional level  Description: INTERVENTIONS:  - Perform BMAT or MOVE assessment daily    - Set and communicate daily mobility goal to care team and patient/family/caregiver  - Collaborate with rehabilitation services on mobility goals if consulted  - independant Range of Motion 6 times a day  - Reposition patient every 2 hours    - Dangle patient 2 times a day  - Stand patient 2 times a day  - Ambulate patient 0 times a day  - Out of bed to chair 2 times a day   - Out of bed for meals 3 times a day  - Out of bed for toileting  - Record patient progress and toleration of activity level   2/25/2022 2301 by Vamsi Phillips RN  Outcome: Progressing  2/25/2022 2301 by Vamsi Phillips RN  Outcome: Progressing     Problem: DISCHARGE PLANNING  Goal: Discharge to home or other facility with appropriate resources  Description: INTERVENTIONS:  - Identify barriers to discharge w/patient and caregiver  - Arrange for needed discharge resources and transportation as appropriate  - Identify discharge learning needs (meds, wound care, etc )  - Arrange for interpretive services to assist at discharge as needed  - Refer to Case Management Department for coordinating discharge planning if the patient needs post-hospital services based on physician/advanced practitioner order or complex needs related to functional status, cognitive ability, or social support system  2/25/2022 2301 by Meghan Lynne RN  Outcome: Progressing  2/25/2022 2301 by Meghan Lynne RN  Outcome: Progressing     Problem: Knowledge Deficit  Goal: Patient/family/caregiver demonstrates understanding of disease process, treatment plan, medications, and discharge instructions  Description: Complete learning assessment and assess knowledge base    Interventions:  - Provide teaching at level of understanding  - Provide teaching via preferred learning methods  2/25/2022 2301 by Meghan Lynne RN  Outcome: Progressing  2/25/2022 2301 by Meghan Lynne RN  Outcome: Progressing

## 2022-02-26 NOTE — PROGRESS NOTES
[facility]  Attending Transfer Note    Blanco Sinclair 1990, 36 y o  male MRN: [id number]  Unit/Bed#: ED 23 Encounter: [id number]  Primary Care Provider: Jerome Sibley DO   Date and time admitted to hospital: 2/25/2022  5:30 PM    Patient seen as requested by [facility] due to seizure event and concern for ongoing seizures  Patient was seen in the emergency department earlier today due to altered mental status with concern for seizure  History obtained as per patient's mother  She reports that typically her son wakes up in the morning get himself ready and knocks on her bedroom door  He has a history of autism but is highly functional and maintains a job  She did not come to her door this morning, she checked on him in his bedroom and found him unresponsive with abnormal respiratory pattern and gurgling breath sounds  Patient has not fully awaken to his baseline mental status which is awake and alert and conversive  She did note that he had incontinence  Patient's mother states that he has a history of seizure disorder  His last seizure was last March 20 had an ICD placed for murali got a syndrome  At that time patient had a passing out event which his mother did not believe was a true seizure as he return to baseline mental status very quickly unlike when he has a typical seizure  He has been on Keppra for his seizure disorder  Patient recently was diagnosed with otitis media as per his mother  He completed a Zithromax antibiotic course  She had stated that he had clear drainage from his left ear which had transitioned in to purulent-appearing with foul odor  She reports that patient never complained of discomfort or pain and had not noticed that he had worsening pain in his ear nor had he complained of ear pain  In the emergency department patient was seen by ENT and diagnosed with otitis externa and initiated on ofloxacin ear drops      Patient was also seen by Neurology had a spot EEG which did not show any active seizure events  He was initiated on Vimpat in addition to his Keppra  Visit Vitals  BP 93/55 (BP Location: Right arm)   Pulse 82   Temp 98 8 °F (37 1 °C) (Rectal)   Resp 18   Ht 6' (1 829 m)   Wt 83 6 kg (184 lb 4 9 oz)   SpO2 95%   BMI 25 00 kg/m²   Smoking Status Never Smoker   BSA 2 06 m²     GEN: sleepy, NAD  HEENT: EOMI, PERRLA, dry membranes  CV: RRR, no Murmur  Resp:  CTA, no R/R/W  GI: soft,NT/ND  : no urinary catheter  Neuro: YIP, non verbal, not following commands   Skin: warm, dry, no rashes     Seizure possible status epilepticus  Acute encephalopathy  Left OM and externa with history of myringotomy tubes  Hyponatremia  Dehydration/ intravascular volume depletion  Brugada syndrome s/p ICD placement   Left sphenoid retention cyst   Autism   Hypokalemia- replete and recheck    Antiepileptic regimen:   Keppra 1500 mg IV q 12 hours   Vimpat 150 mg IV q 12 hours    Infectious management:  02/25/2022-CSF culture-pending  02/25/2022-blood culture pending   02/25/2022-urine culture-pending  Ceftriaxone 2 g IV q 12 hours initiated 02/25/2022   Vancomycin 1250 mg IV q 8 hours initiated 02/25/2022 after loading dose  Ofloxacin solution 4 drops 2 times daily to left ear initiated 02/25/2022    Monitor patient's neurologic checks q 2 hours  Monitor on video EEG  Continue Vimpat and Keppra  Patient's altered mental status with recent otitis media as well as findings of effusion and left mastoid with elevated WBC count leads concern for possible infectious etiology  Lumbar puncture performed in the emergency department  Awaiting CSF studies  Empiric antibiotics initiated for possible meningitis  Acyclovir will also be administered  Lumbar puncture was a traumatic tap with clearing of CSF by tube 4  Check RBC count  If HSV PCR negative discontinue acyclovir  Patient has a sphenoid retention cyst on the left    Plan to obtain MRI to further evaluate  CT findings are not consistent with infection but with patient's altered mental status and elevated WBC count cannot exclude as possible etiology for infection  Chest x-ray reviewed no pneumonia process identified  Awaiting urine studies  Patient has hyponatremia which is likely secondary to intravascular volume depletion and dehydration  Continue with IV fluid hydration  Upon performing patient's lumbar puncture fluid was very slow to return  Patient also may have hyponatremia he has an underlying CNS infectious etiology  Continue to trend patient's sodium  Continue otic drops for otitis externa  Appreciate ENT recommendations  Interrogate patient's ICD with history of regard of syndrome  Patient has not had any arrhythmias while being monitored in the emergency department  Continue with consultations with ENT and Neurology  Consider Infectious Disease consultation in a [facility] Patient's mother updated in regards to plan of care  Critical care time 52 minutes, critical care time does not include procedures or family update  Preston Luis

## 2022-02-26 NOTE — PROGRESS NOTES
Progress Note - Neurology   Adam Arciniega 36 y o  male MRN: [id number]  Unit/Bed#: ICU 05 Encounter: [id number]      Assessment/Plan     * Breakthrough seizure [facility])  Assessment & Plan  36 y o  left handed male with autism, Brugada syndrome s/p ICD (2021), and possible epilepsy due to unknown cause that manifests as apparent generalized tonic clonic seizures that began in 2016 currently on Keppra 500 mg BID who presented to [facility] on 2/25/2022 due to possible breakthrough seizure at home and another seizure while in the ED  · Patient's mother found the patient lying in bed unresponsive, making grunting noises, and incontinent of urine  Upon arrival to the ED, patient was lethargic, but answering a few questions  However, while in the ED, patient suddenly made grunting noises, eyes were roving, and his body "stiffened "  Patient then began to have "twitching" all over his body and had left lateral tongue bite  Patient received a total of 4 mg of Ativan, but continued to twitch so then was loaded with Keppra 4 g with resolution of seizure-like activity  · CT head negative for acute intracranial abnormalities  · Labs revealed sodium 127, WBC 16 9, and Prolactin 23 3  Lactic acid WNL (1 9)  · Patient has been drinking a lot more water than usual over the last few days    Routine EEG completed 2/25/2022 demonstrated subclinical seizure  Patient was given Vimpat 400 mg x 1 and started on 150 mg Q12H and Keppra dose was increased to 1500 mg BID  Patient was placed on vEEG monitoring  LP completed and CSF results thus far: protein 63, glucose 67, WBCs 1, RBCs 1400, gram stain with rare polys and no organisms  Patient evaluated on 2/26/2022, exam improved per patient's mother at bedside, but not back to baseline  Patient following simple commands, awakens easily to voice  Etiology for breakthrough seizures likely due to hyponatremia    However, will continue to evaluate for infectious etiology, intracranial pathology  Plan:  - Continue on vEEG monitoring  Will discuss overnight results with epileptology   - MRI brain w/wo contrast pending    - Continue to follow CSF studies  Culture, HSV 1,2 DNA PCR and VZV DNA PCR pending   - Continue antibiotics and antiviral as per critical care team  Per discussion with critical care team this AM, to consult ID   - S/p Ativan 4 mg, Keppra 4 g load, Vimpat 400 mg load  - Continue on Keppra 1500 mg BID and Vimpat 150 mg Q12H   - Continue to monitor sodium closely  - Levetiracetam level pending  - Seizure precautions  - PRN ativan for prolonged seizure-like activity  - Telemtery  - Patient does not drive  - Frequent neuro checks  Continue to monitor and notify neurology with any changes  - Medical management and supportive care per primary team  Correction of any metabolic or infectious disturbances  Active autistic disorder  Assessment & Plan  - At baseline, patient is independent with ADLs (can bathe, dress, and feed himself)  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  He is able to communicate and say a few words, but cannot hold a full conversation  He can follow simple commands  Brugada syndrome  Assessment & Plan  - S/p ICD; interrogation pending    Suppurative otitis media of left ear  Assessment & Plan  - Recently treated with Z-aura  - ENT consulted    Hyponatremia  Assessment & Plan  - Sodium 136 this AM   - Continue to monitor BMP  Dana Kavya will need follow up in in 6 weeks with epilepsy attending or advance practitioner  He will not require outpatient neurological testing  Subjective:   Per patient's mother at bedside, patient improved overnight  He was restless and trying to get out of bed but was able to be redirected  She notes he is improved this morning and able to follow simple commands, but not back to baseline   She notes that patient does have history of skin eruptions on his head that previously were determined to be staphylococcus  ROS:  Unable to reliably assess secondary to mental status  Medications  Scheduled Meds:  Current Facility-Administered Medications   Medication Dose Route Frequency Provider Last Rate    acetaminophen  650 mg Oral Q6H PRN Marcos Banai, DO      acyclovir  10 mg/kg (Ideal) Intravenous Q8H Prerna Cifuentes PA-C 775 mg (02/26/22 0607)    cefTRIAXone  2,000 mg Intravenous Q12H Marcos Banai, DO Stopped (02/25/22 2122)    enoxaparin  40 mg Subcutaneous Q24H [address] Prerna Cifuentes PA-C      lacosamide (VIMPAT) IVPB  150 mg Intravenous Q12H Darreld TERESA Joshi Stopped (02/25/22 2122)    levETIRAcetam  1,500 mg Intravenous BID Darreld LexaTERESA lerma Stopped (02/25/22 2122)    LORazepam  2 mg Intravenous Q6H PRN Marcos Banai, DO      ofloxacin  4 drop Otic BID Dania Spence MD      ondansetron  4 mg Intravenous Once Lonny River MD      pantoprazole  40 mg Intravenous Q12H [address] Marcos Palmaai, DO      potassium phosphate  9 mmol Intravenous Once Joey Vicente MD      sodium chloride  1 g Oral TID With Meals Marcos Palmaai, DO      vancomycin  15 mg/kg Intravenous Q8H Marcos Casey, DO 1,250 mg (02/26/22 0609)     Continuous Infusions:   PRN Meds:   acetaminophen    LORazepam    Vitals: Blood pressure (!) 100/43, pulse 88, temperature 98 5 °F (36 9 °C), temperature source Axillary, resp  rate 16, height 6' (1 829 m), weight 84 kg (185 lb 3 oz), SpO2 98 %  ,Body mass index is 25 12 kg/m²  Physical Exam: /61   Pulse 90   Temp 98 5 °F (36 9 °C) (Axillary)   Resp 14   Ht 6' (1 829 m)   Wt 84 kg (185 lb 3 oz)   SpO2 97%   BMI 25 12 kg/m²   General appearance: Awakens easily to voice and responds to his name  Able to state "good" when asked how he is  Per patient's mother, he will primarily respond "yes" to all questions  Head: Normocephalic, without obvious abnormality, EEG leads in place, B/L tongue bite noted, L>R    Eyes: negative findings: lids and lashes normal, conjunctivae and sclerae normal and pupils equal, round, reactive to light and accomodation  Lungs: clear to auscultation bilaterally  Heart: regular rate and rhythm  Abdomen: Soft, not distended  Extremities: extremities normal, warm and well-perfused; no cyanosis, clubbing, or edema  Skin: Skin color, texture, turgor normal  No rashes or lesions  Neurologic: Mental status: Awakens easily to voice, able to respond "good" when asked how he is but unable to answer additional questions  Able to follow simple commands  Cranial nerves: II: pupils equal, round, reactive to light and accommodation, III,VII: ptosis no ptosis noted, III,IV,VI: extraocular muscles extra-ocular motions intact, VII: upper facial muscle function normal bilaterally, VII: lower facial muscle function normal bilaterally, XII: tongue strength normal  Sensory: Unable to reliably assess secondary to mental status  Motor: Moving all extremities and able to follow simple commands with no focal motor asymmetry noted  Unable to formally assess motor strength secondary to mental status      Labs  Recent Results (from the past 24 hour(s))   ECG 12 lead    Collection Time: 02/25/22  9:38 AM   Result Value Ref Range    Ventricular Rate 97 BPM    Atrial Rate 97 BPM    NV Interval 160 ms    QRSD Interval 100 ms    QT Interval 342 ms    QTC Interval 434 ms    P [place] 22 degrees    QRS Axis 8 degrees    T Wave Axis 4 degrees   CBC and differential    Collection Time: 02/25/22  9:57 AM   Result Value Ref Range    WBC 16 90 (H) 4 31 - 10 16 Thousand/uL    RBC 4 96 3 88 - 5 62 Million/uL    Hemoglobin 14 9 12 0 - 17 0 g/dL    Hematocrit 41 1 36 5 - 49 3 %    MCV 83 82 - 98 fL    MCH 30 0 26 8 - 34 3 pg    MCHC 36 3 31 4 - 37 4 g/dL    RDW 12 0 11 6 - 15 1 %    MPV 9 0 8 9 - 12 7 fL    Platelets 842 358 - 918 Thousands/uL    nRBC 0 /100 WBCs    Neutrophils Relative 92 (H) 43 - 75 %    Immat GRANS % 0 0 - 2 %    Lymphocytes Relative 3 (L) 14 - 44 %    Monocytes Relative 5 4 - 12 % Eosinophils Relative 0 0 - 6 %    Basophils Relative 0 0 - 1 %    Neutrophils Absolute 15 50 (H) 1 85 - 7 62 Thousands/µL    Immature Grans Absolute 0 05 0 00 - 0 20 Thousand/uL    Lymphocytes Absolute 0 54 (L) 0 60 - 4 47 Thousands/µL    Monocytes Absolute 0 78 0 17 - 1 22 Thousand/µL    Eosinophils Absolute 0 01 0 00 - 0 61 Thousand/µL    Basophils Absolute 0 02 0 00 - 0 10 Thousands/µL   Comprehensive metabolic panel    Collection Time: 02/25/22  9:57 AM   Result Value Ref Range    Sodium 127 (L) 136 - 145 mmol/L    Potassium 3 5 3 5 - 5 3 mmol/L    Chloride 95 (L) 100 - 108 mmol/L    CO2 24 21 - 32 mmol/L    ANION GAP 8 4 - 13 mmol/L    BUN 12 5 - 25 mg/dL    Creatinine 0 74 0 60 - 1 30 mg/dL    Glucose 116 65 - 140 mg/dL    Calcium 8 6 8 3 - 10 1 mg/dL    AST 34 5 - 45 U/L    ALT 32 12 - 78 U/L    Alkaline Phosphatase 67 46 - 116 U/L    Total Protein 7 0 6 4 - 8 2 g/dL    Albumin 3 7 3 5 - 5 0 g/dL    Total Bilirubin 0 64 0 20 - 1 00 mg/dL    eGFR 124 ml/min/1 73sq m   HS Troponin 0hr (reflex protocol)    Collection Time: 02/25/22  9:57 AM   Result Value Ref Range    hs TnI 0hr 5 "Refer to ACS Flowchart"- see link ng/L   Sedimentation rate, automated    Collection Time: 02/25/22  9:57 AM   Result Value Ref Range    Sed Rate 4 0 - 14 mm/hour   C-reactive protein    Collection Time: 02/25/22  9:57 AM   Result Value Ref Range    CRP 3 7 (H) <3 0 mg/L   Fingerstick Glucose (POCT)    Collection Time: 02/25/22 10:11 AM   Result Value Ref Range    POC Glucose 106 65 - 140 mg/dl   HS Troponin I 4hr    Collection Time: 02/25/22 12:13 PM   Result Value Ref Range    hs TnI 4hr 5 "Refer to ACS Flowchart"- see link ng/L    Delta 4hr hsTnI 0 <20 ng/L   Lactic acid, plasma    Collection Time: 02/25/22 12:13 PM   Result Value Ref Range    LACTIC ACID 1 9 0 5 - 2 0 mmol/L   Prolactin    Collection Time: 02/25/22 12:13 PM   Result Value Ref Range    Prolactin 23 3 (H) 2 5 - 17 4 ng/mL   HS Troponin I 2hr    Collection Time: 02/25/22  2:55 PM   Result Value Ref Range    hs TnI 2hr 4 "Refer to ACS Flowchart"- see link ng/L    Delta 2hr hsTnI -1 <20 ng/L   Prolactin    Collection Time: 02/25/22  2:55 PM   Result Value Ref Range    Prolactin 18 0 (H) 2 5 - 17 4 ng/mL   Lactic acid, plasma    Collection Time: 02/25/22  2:55 PM   Result Value Ref Range    LACTIC ACID 1 1 0 5 - 2 0 mmol/L   Osmolality-"If this is regarding a toxic alcohol, STOP  Test is not routinely indicated   Please consult medical  on call for further guidance "    Collection Time: 02/25/22  2:55 PM   Result Value Ref Range    Osmolality Serum 259 (L) 282 - 298 mmol/KG   Basic metabolic panel    Collection Time: 02/25/22  2:55 PM   Result Value Ref Range    Sodium 126 (L) 136 - 145 mmol/L    Potassium 3 4 (L) 3 5 - 5 3 mmol/L    Chloride 95 (L) 100 - 108 mmol/L    CO2 25 21 - 32 mmol/L    ANION GAP 6 4 - 13 mmol/L    BUN 11 5 - 25 mg/dL    Creatinine 0 62 0 60 - 1 30 mg/dL    Glucose 107 65 - 140 mg/dL    Calcium 8 4 8 3 - 10 1 mg/dL    eGFR 134 ml/min/1 73sq m   CBC and differential    Collection Time: 02/25/22  5:48 PM   Result Value Ref Range    WBC 13 20 (H) 4 31 - 10 16 Thousand/uL    RBC 4 23 3 88 - 5 62 Million/uL    Hemoglobin 13 0 12 0 - 17 0 g/dL    Hematocrit 34 2 (L) 36 5 - 49 3 %    MCV 81 (L) 82 - 98 fL    MCH 30 7 26 8 - 34 3 pg    MCHC 37 7 (H) 31 4 - 37 4 g/dL    RDW 12 3 11 6 - 15 1 %    MPV 10 1 8 9 - 12 7 fL    Platelets 001 (L) 497 - 390 Thousands/uL   Manual Differential(PHLEBS Do Not Order)    Collection Time: 02/25/22  5:48 PM   Result Value Ref Range    Segmented % 75 43 - 75 %    Bands % 16 (H) 0 - 8 %    Lymphocytes % 3 (L) 14 - 44 %    Monocytes % 2 (L) 4 - 12 %    Eosinophils, % 0 0 - 6 %    Basophils % 0 0 - 1 %    Atypical Lymphocytes % 4 (H) <=0 %    Absolute Neutrophils 12 01 (H) 1 85 - 7 62 Thousand/uL    Lymphocytes Absolute 0 40 (L) 0 60 - 4 47 Thousand/uL    Monocytes Absolute 0 26 0 00 - 1 22 Thousand/uL    Eosinophils Absolute 0 00 0 00 - 0 40 Thousand/uL    Basophils Absolute 0 00 0 00 - 0 10 Thousand/uL    Total Counted      Anisocytosis Present     Microcytes Present     Platelet Estimate Decreased (A) Adequate   APTT    Collection Time: 02/25/22  7:08 PM   Result Value Ref Range    PTT 31 23 - 37 seconds   Protime-INR    Collection Time: 02/25/22  7:08 PM   Result Value Ref Range    Protime 13 1 11 6 - 14 5 seconds    INR 1 03 0 84 - 1 19   Blood culture    Collection Time: 02/25/22  7:08 PM    Specimen: Arm, Right; Blood   Result Value Ref Range    Blood Culture Received in Microbiology Lab  Culture in Progress  Blood culture    Collection Time: 02/25/22  7:08 PM    Specimen: Arm, Left; Blood   Result Value Ref Range    Blood Culture Received in Microbiology Lab  Culture in Progress      Procalcitonin    Collection Time: 02/25/22  7:08 PM   Result Value Ref Range    Procalcitonin 0 30 (H) <=0 25 ng/ml   Glucose CSF    Collection Time: 02/25/22  7:15 PM   Result Value Ref Range    Glucose, CSF 67 50 - 80 mg/dL   Protein CSF    Collection Time: 02/25/22  7:15 PM   Result Value Ref Range    Protein, CSF 63 (H) 15 - 45 mg/dL   Gram stain CSF    Collection Time: 02/25/22  7:15 PM    Specimen: Lumbar Puncture; Cerebrospinal Fluid   Result Value Ref Range    Gram Stain Result Rare Polys     Gram Stain Result No organisms seen    RBC count,CSF    Collection Time: 02/25/22  7:15 PM   Result Value Ref Range    RBC, CSF 1,400 (H) 0 - 10 uL   CSF white cell count with differential    Collection Time: 02/25/22  7:15 PM   Result Value Ref Range    Appearance, CSF Clear, Pink     Tube Number, CSF 4     WBC, CSF 1 0 - 5 /uL    Xanthochromia No No   CSF Diff    Collection Time: 02/25/22  7:15 PM   Result Value Ref Range    Total Counted 100     Neutrophils % (CSF) 58 %    Bands % (CSF) 4 %    Lymphs % CSF 23 %    Monocytes % (CSF) 15 %   RBC count,CSF    Collection Time: 02/25/22  7:15 PM   Result Value Ref Range    RBC,  (H) 0 - 10 uL   Urinalysis with microscopic    Collection Time: 02/25/22  9:47 PM   Result Value Ref Range    Clarity, UA Clear     Color, UA Yellow     Specific [place], UA 1 010 1 003 - 1 030    pH, UA 7 5 4 5, 5 0, 5 5, 6 0, 6 5, 7 0, 7 5, 8 0    Glucose, UA Negative Negative mg/dl    Ketones, UA Negative Negative mg/dl    Blood, UA Negative     Protein, UA Negative Negative mg/dl    Nitrite, UA Negative Negative    Bilirubin, UA Negative Negative    Urobilinogen, UA 0 2 0 2, 1 0 E U /dl E U /dl    Leukocytes, UA Negative Negative    WBC, UA None Seen None Seen, 2-4, 5-60 /hpf    RBC, UA None Seen None Seen, 2-4 /hpf    Hyaline Casts, UA None Seen None Seen /lpf    Bacteria, UA None Seen None Seen, Occasional /hpf    Epithelial Cells None Seen None Seen, Occasional /hpf   Osmolality, urine    Collection Time: 02/25/22  9:47 PM   Result Value Ref Range    Osmolality, Ur 217 (L) 250 - 900 mmol/KG   Strep Pneumoniae, Urine    Collection Time: 02/25/22  9:48 PM    Specimen: Urine, Clean Catch   Result Value Ref Range    Strep pneumoniae antigen, urine Negative Negative   Sodium, urine, random    Collection Time: 02/25/22  9:50 PM   Result Value Ref Range    Sodium, Ur 66    Basic metabolic panel    Collection Time: 02/26/22  1:25 AM   Result Value Ref Range    Sodium 136 136 - 145 mmol/L    Potassium 3 9 3 5 - 5 3 mmol/L    Chloride 107 100 - 108 mmol/L    CO2 22 21 - 32 mmol/L    ANION GAP 7 4 - 13 mmol/L    BUN 9 5 - 25 mg/dL    Creatinine 0 69 0 60 - 1 30 mg/dL    Glucose 120 65 - 140 mg/dL    Calcium 9 0 8 3 - 10 1 mg/dL    eGFR 128 ml/min/1 73sq m   Magnesium    Collection Time: 02/26/22  5:20 AM   Result Value Ref Range    Magnesium 2 2 1 6 - 2 6 mg/dL   Phosphorus    Collection Time: 02/26/22  5:20 AM   Result Value Ref Range    Phosphorus 2 9 2 7 - 4 5 mg/dL   CBC and differential    Collection Time: 02/26/22  5:20 AM   Result Value Ref Range    WBC 8 45 4 31 - 10 16 Thousand/uL    RBC 4 89 3 88 - 5 62 Million/uL Hemoglobin 14 8 12 0 - 17 0 g/dL    Hematocrit 41 5 36 5 - 49 3 %    MCV 85 82 - 98 fL    MCH 30 3 26 8 - 34 3 pg    MCHC 35 7 31 4 - 37 4 g/dL    RDW 12 2 11 6 - 15 1 %    MPV 9 2 8 9 - 12 7 fL    Platelets 442 611 - 047 Thousands/uL   Hepatic function panel    Collection Time: 02/26/22  5:20 AM   Result Value Ref Range    Total Bilirubin 0 53 0 20 - 1 00 mg/dL    Bilirubin, Direct 0 11 0 00 - 0 20 mg/dL    Alkaline Phosphatase 62 46 - 116 U/L    AST 38 5 - 45 U/L    ALT 33 12 - 78 U/L    Total Protein 6 9 6 4 - 8 2 g/dL    Albumin 3 4 (L) 3 5 - 5 0 g/dL     Imaging   No new neuro imaging available for review  VTE Prophylaxis: Enoxaparin (Lovenox)    Counseling / Coordination of Care  Total time spent today 30 minutes  Greater than 50% of total time was spent with the patient and / or family counseling and / or coordination of care  A description of the counseling / coordination of care: Time spent reviewing plan of care with critical care team, discussing overnight exam with bedside RN as well as discussing plan of care with patient's mother at bedside

## 2022-02-26 NOTE — PLAN OF CARE
Problem: Potential for Falls  Goal: Patient will remain free of falls  Description: INTERVENTIONS:  - Educate patient/family on patient safety including physical limitations  - Instruct patient to call for assistance with activity   - Consult OT/PT to assist with strengthening/mobility   - Keep Call bell within reach  - Keep bed low and locked with side rails adjusted as appropriate  - Keep care items and personal belongings within reach  - Initiate and maintain comfort rounds  - Make Fall Risk Sign visible to staff  - Offer Toileting every  Hours, in advance of need  - Initiate/Maintain alarm  - Obtain necessary fall risk management equipment:   - Apply yellow socks and bracelet for high fall risk patients  - Consider moving patient to room near nurses station  Outcome: Progressing     Problem: PAIN - ADULT  Goal: Verbalizes/displays adequate comfort level or baseline comfort level  Description: Interventions:  - Encourage patient to monitor pain and request assistance  - Assess pain using appropriate pain scale  - Administer analgesics based on type and severity of pain and evaluate response  - Implement non-pharmacological measures as appropriate and evaluate response  - Consider cultural and social influences on pain and pain management  - Notify physician/advanced practitioner if interventions unsuccessful or patient reports new pain  Outcome: Progressing     Problem: INFECTION - ADULT  Goal: Absence or prevention of progression during hospitalization  Description: INTERVENTIONS:  - Assess and monitor for signs and symptoms of infection  - Monitor lab/diagnostic results  - Monitor all insertion sites, i e  indwelling lines, tubes, and drains  - Monitor endotracheal if appropriate and nasal secretions for changes in amount and color  - Lena appropriate cooling/warming therapies per order  - Administer medications as ordered  - Instruct and encourage patient and family to use good hand hygiene technique  - Identify and instruct in appropriate isolation precautions for identified infection/condition  Outcome: Progressing  Goal: Absence of fever/infection during neutropenic period  Description: INTERVENTIONS:  - Monitor WBC    Outcome: Progressing     Problem: SAFETY ADULT  Goal: Patient will remain free of falls  Description: INTERVENTIONS:  - Educate patient/family on patient safety including physical limitations  - Instruct patient to call for assistance with activity   - Consult OT/PT to assist with strengthening/mobility   - Keep Call bell within reach  - Keep bed low and locked with side rails adjusted as appropriate  - Keep care items and personal belongings within reach  - Initiate and maintain comfort rounds  - Make Fall Risk Sign visible to staff  - Offer Toileting every  Hours, in advance of need  - Initiate/Maintain alarm  - Obtain necessary fall risk management equipment:   - Apply yellow socks and bracelet for high fall risk patients  - Consider moving patient to room near nurses station  Outcome: Progressing  Goal: Maintain or return to baseline ADL function  Description: INTERVENTIONS:  -  Assess patient's ability to carry out ADLs; assess patient's baseline for ADL function and identify physical deficits which impact ability to perform ADLs (bathing, care of mouth/teeth, toileting, grooming, dressing, etc )  - Assess/evaluate cause of self-care deficits   - Assess range of motion  - Assess patient's mobility; develop plan if impaired  - Assess patient's need for assistive devices and provide as appropriate  - Encourage maximum independence but intervene and supervise when necessary  - Involve family in performance of ADLs  - Assess for home care needs following discharge   - Consider OT consult to assist with ADL evaluation and planning for discharge  - Provide patient education as appropriate  Outcome: Progressing  Goal: Maintains/Returns to pre admission functional level  Description: INTERVENTIONS:  - Perform BMAT or MOVE assessment daily    - Set and communicate daily mobility goal to care team and patient/family/caregiver  - Collaborate with rehabilitation services on mobility goals if consulted  - Perform Range of Motion  times a day  - Reposition patient every  hours  - Dangle patient  times a day  - Stand patient  times a day  - Ambulate patient  times a day  - Out of bed to chair  times a day   - Out of bed for meals  times a day  - Out of bed for toileting  - Record patient progress and toleration of activity level   Outcome: Progressing     Problem: MOBILITY - ADULT  Goal: Maintain or return to baseline ADL function  Description: INTERVENTIONS:  -  Assess patient's ability to carry out ADLs; assess patient's baseline for ADL function and identify physical deficits which impact ability to perform ADLs (bathing, care of mouth/teeth, toileting, grooming, dressing, etc )  - Assess/evaluate cause of self-care deficits   - Assess range of motion  - Assess patient's mobility; develop plan if impaired  - Assess patient's need for assistive devices and provide as appropriate  - Encourage maximum independence but intervene and supervise when necessary  - Involve family in performance of ADLs  - Assess for home care needs following discharge   - Consider OT consult to assist with ADL evaluation and planning for discharge  - Provide patient education as appropriate  Outcome: Progressing  Goal: Maintains/Returns to pre admission functional level  Description: INTERVENTIONS:  - Perform BMAT or MOVE assessment daily    - Set and communicate daily mobility goal to care team and patient/family/caregiver  - Collaborate with rehabilitation services on mobility goals if consulted  - Perform Range of Motion  times a day  - Reposition patient every  hours    - Dangle patient  times a day  - Stand patient  times a day  - Ambulate patient  times a day  - Out of bed to chair  times a day   - Out of bed for meals times a day  - Out of bed for toileting  - Record patient progress and toleration of activity level   Outcome: Progressing     Problem: Knowledge Deficit  Goal: Patient/family/caregiver demonstrates understanding of disease process, treatment plan, medications, and discharge instructions  Description: Complete learning assessment and assess knowledge base    Interventions:  - Provide teaching at level of understanding  - Provide teaching via preferred learning methods  Outcome: Progressing     Problem: DISCHARGE PLANNING  Goal: Discharge to home or other facility with appropriate resources  Description: INTERVENTIONS:  - Identify barriers to discharge w/patient and caregiver  - Arrange for needed discharge resources and transportation as appropriate  - Identify discharge learning needs (meds, wound care, etc )  - Arrange for interpretive services to assist at discharge as needed  - Refer to Case Management Department for coordinating discharge planning if the patient needs post-hospital services based on physician/advanced practitioner order or complex needs related to functional status, cognitive ability, or social support system  Outcome: Progressing

## 2022-02-26 NOTE — PROGRESS NOTES
Daily Progress Note - 1600 No Name Kel 36 y o  male MRN: [id number]  Unit/Bed#: ICU 05 Encounter: [id number]    ----------------------------------------------------------------------------------------  HPI/24hr events: Admitted after post-ictal event  Witnessed seizure in ED  Recent otitis media      ---------------------------------------------------------------------------------------  SUBJECTIVE  n/a    Review of Systems   Unable to perform ROS: Mental status change   ---------------------------------------------------------------------------------------  Assessment and Plan:    Neuro diagnosis:  Seizure disorder with concern for ongoing seizures  Acute encephalopathy  Autism disorder  Recent otitis media, now diagnosed with otitis externa  · Plan:  · Pain controlled with:  · PRN: tylenol   · Regulate sleep/wake cycle as able  · Delirium monitoring and precautions  · CAM-ICU daily  · Trend neuro exam  · Appreciate neurology input  · cEEG  · Keppra 1500 BID and vimpat 150 q12 hours  · Appreciate ENT input  · Ocuflox drops BID  · LP studies pending  Cardiac diagnosis:  Brugada syndrome status post ICD implantation  · Plan:  · MAP goal > 65  · Follow rhythm on telemetry  · ICD interrogation  Pulmonary diagnosis: None  · Plan:  · SpO2 goal >90%  · Pulmonary toileting as able  GI diagnosis: None  · Plan:  · Bowel regimen: PRN  · PPI  FEN:  Hyponatremia  Hypokalemia  · Plan:  · Nutrition/diet plan: NPO  · IVF resuscitation  · Replenish electrolytes with goals: K >4 0, Mag >2 0, and Phos >3 0  Renal/ diagnosis:  None  · Plan:  · Indwelling Fonseca present: no   · Trend UOP and BUN/creat  · Strict I and O  ID diagnosis:  Rule out meningitis  Leukocytosis  · Plan:  · Abx ordered: vancomycin, ceftriaxone and acyclovir  · Day # 2   · Pharmacy consult for vancomycin dosing  · If HSV PCR negative, discontinue acyclovir     · Trend temps and WBC count  · Maintain normothermia  · Cultures: LP studies pending  · Blood cultures: pending  Heme/onc diagnosis: None  · Plan:  · Trend hgb and plts  Endo diagnosis: None  · Plan:  · Glycemic control plan: n/a  MSK/Skin diagnosis: None  · Plan:  · Mobility goal: OOB when able  · PT consult: yes  · OT consult: yes  · Frequent turning and pressure off-loading  · Local wound care as needed    Patient appropriate for transfer out of the ICU today?: No  Disposition: Continue Critical Care   Code Status: Level 1 - Full Code  ---------------------------------------------------------------------------------------  ICU CORE MEASURES    Prophylaxis   VTE Pharmacologic Prophylaxis: Enoxaparin (Lovenox)  VTE Mechanical Prophylaxis: sequential compression device  Stress Ulcer Prophylaxis: Prophylaxis Not Indicated     Invasive Devices Review  Invasive Devices  Report    Peripheral Intravenous Line            Peripheral IV 22 Right Forearm <1 day    Peripheral IV 22 Right Hand <1 day              Can any invasive devices be discontinued today? No  ---------------------------------------------------------------------------------------  OBJECTIVE    Vitals   Vitals:    22 2300 22 0000 22 0100 22 0200   BP: 151/99 112/76 125/94 98/56   BP Location:  Right arm     Pulse: 98 92 (!) 116 92   Resp: (!) 24 19 (!) 30 19   Temp:  98 7 °F (37 1 °C)     TempSrc:  Axillary     SpO2: 98% 96% 96% 97%   Weight:       Height:         Temp (24hrs), Av 6 °F (37 °C), Min:98 °F (36 7 °C), Max:98 8 °F (37 1 °C)  Current: Temperature: 98 7 °F (37 1 °C)    Respiratory:  SpO2: SpO2: 97 %, SpO2 Activity: SpO2 Activity: At Rest, SpO2 Device: O2 Device: None (Room air)    Invasive/non-invasive ventilation settings   Respiratory  Report   Lab Data (Last 4 hours)    None         O2/Vent Data (Last 4 hours)    None              Physical Exam  Vitals reviewed  Constitutional:       General: He is sleeping  He is not in acute distress       Appearance: He is well-developed and normal weight  Cardiovascular:      Rate and Rhythm: Normal rate and regular rhythm  Heart sounds: No murmur heard  No friction rub  Pulmonary:      Effort: Pulmonary effort is normal       Breath sounds: Normal breath sounds  No wheezing, rhonchi or rales  Skin:     General: Skin is warm and dry  Neurological:      Mental Status: He is lethargic  Comments: Moves all 4 extremities with no focal deficits  Laboratory and Diagnostics:  Results from last 7 days   Lab Units 22  1748 22  0957   WBC Thousand/uL 13 20* 16 90*   HEMOGLOBIN g/dL 13 0 14 9   HEMATOCRIT % 34 2* 41 1   PLATELETS Thousands/uL 104* 162   NEUTROS PCT %  --  92*   BANDS PCT % 16*  --    MONOS PCT %  --  5   MONO PCT % 2*  --      Results from last 7 days   Lab Units 22  0125 22  1455 22  0957   SODIUM mmol/L 136 126* 127*   POTASSIUM mmol/L 3 9 3 4* 3 5   CHLORIDE mmol/L 107 95* 95*   CO2 mmol/L 22 25 24   ANION GAP mmol/L 7 6 8   BUN mg/dL 9 11 12   CREATININE mg/dL 0 69 0 62 0 74   CALCIUM mg/dL 9 0 8 4 8 6   GLUCOSE RANDOM mg/dL 120 107 116   ALT U/L  --   --  32   AST U/L  --   --  34   ALK PHOS U/L  --   --  67   ALBUMIN g/dL  --   --  3 7   TOTAL BILIRUBIN mg/dL  --   --  0 64          Results from last 7 days   Lab Units 22  1908   INR  1 03   PTT seconds 31          Results from last 7 days   Lab Units 22  1455 22  1213   LACTIC ACID mmol/L 1 1 1 9     ABG:    VBG:    Results from last 7 days   Lab Units 22  1908   PROCALCITONIN ng/ml 0 30*       Micro  Results from last 7 days   Lab Units 22  2148 22  1915 22  1908   BLOOD CULTURE   --   --  Received in Microbiology Lab  Culture in Progress  Received in Microbiology Lab  Culture in Progress  GRAM STAIN RESULT   --  Rare Polys  No organisms seen  --    STREP PNEUMONIAE ANTIGEN, URINE  Negative  --   --      Imagin/25 CT head: No acute intracranial abnormality       MRI brain: pending    I have personally reviewed pertinent reports  Intake and Output  I/O       02/24 0701 02/25 0700 02/25 0701 02/26 0700    IV Piggyback  2100    Total Intake(mL/kg)  2100 (25 1)    Urine (mL/kg/hr)  1925    Total Output  1925    Net  +175              UOP: 200-300 ml/hr     Height and Weights   Height: 6' (182 9 cm)  IBW (Ideal Body Weight): 77 6 kg  Body mass index is 25 kg/m²  Weight (last 2 days)     Date/Time Weight    02/25/22 2100 83 6 (184 3)    02/25/22 2002 84 (185 19)    02/25/22 18:50:33 79 4 (175 05)        Nutrition       Diet Orders   (From admission, onward)             Start     Ordered    02/25/22 2000  Diet NPO  Diet effective now        References:    Nutrtion Support Algorithm Enteral vs  Parenteral   Question Answer Comment   Diet Type NPO    RD to adjust diet per protocol?  Yes        02/25/22 2001              Active Medications  Scheduled Meds:  Current Facility-Administered Medications   Medication Dose Route Frequency Provider Last Rate    acetaminophen  650 mg Oral Q6H PRN Marcos Palmaai, DO      acyclovir  10 mg/kg (Ideal) Intravenous Q8H Prerna Cifuentes PA-C 775 mg (02/26/22 0013)    cefTRIAXone  2,000 mg Intravenous Q12H Marcos Peña DO Stopped (02/25/22 2122)    enoxaparin  40 mg Subcutaneous Q24H [address] Prerna Cifuentes PA-C      lacosamide (VIMPAT) IVPB  150 mg Intravenous Q12H Licha Ledezma PA-C Stopped (02/25/22 2122)    levETIRAcetam  1,500 mg Intravenous BID Licha Ledezma PA-C Stopped (02/25/22 2122)    LORazepam  2 mg Intravenous Q6H PRN Marcos Palmaai, DO      ofloxacin  4 drop Otic BID Laura Nielsen MD      ondansetron  4 mg Intravenous Once Cally Ball MD      pantoprazole  40 mg Intravenous Q12H [address] Marcos Palmaai, DO      propofol           sodium chloride  1 g Oral TID With Meals Marcos Palmaai, DO      vancomycin  15 mg/kg Intravenous Q8H Marcos Peña, DO       Continuous Infusions:     PRN Meds:   acetaminophen, 650 mg, Q6H PRN  LORazepam, 2 mg, Q6H PRN      Allergies   Allergies   Allergen Reactions    Cefaclor Hives    Sulfamethoxazole-Trimethoprim Hives     ---------------------------------------------------------------------------------------  Care Time Delivered:   No Critical Care time spent     Raphael Rebolledo PA-C    Portions of the record may have been created with voice recognition software  Occasional wrong word or "sound a like" substitutions may have occurred due to the inherent limitations of voice recognition software    Read the chart carefully and recognize, using context, where substitutions have occurred

## 2022-02-27 PROBLEM — H60.92 LEFT OTITIS EXTERNA: Status: ACTIVE | Noted: 2022-02-27

## 2022-02-27 PROBLEM — H66.90 RECURRENT OTITIS MEDIA: Status: ACTIVE | Noted: 2022-02-27

## 2022-02-27 LAB
ANION GAP SERPL CALCULATED.3IONS-SCNC: 4 MMOL/L (ref 4–13)
BUN SERPL-MCNC: 12 MG/DL (ref 5–25)
CALCIUM SERPL-MCNC: 9.3 MG/DL (ref 8.3–10.1)
CHLORIDE SERPL-SCNC: 110 MMOL/L (ref 100–108)
CO2 SERPL-SCNC: 26 MMOL/L (ref 21–32)
CREAT SERPL-MCNC: 0.78 MG/DL (ref 0.6–1.3)
ERYTHROCYTE [DISTWIDTH] IN BLOOD BY AUTOMATED COUNT: 12.7 % (ref 11.6–15.1)
GFR SERPL CREATININE-BSD FRML MDRD: 121 ML/MIN/1.73SQ M
GLUCOSE SERPL-MCNC: 129 MG/DL (ref 65–140)
HCT VFR BLD AUTO: 41.7 % (ref 36.5–49.3)
HGB BLD-MCNC: 14.5 G/DL (ref 12–17)
MCH RBC QN AUTO: 30.3 PG (ref 26.8–34.3)
MCHC RBC AUTO-ENTMCNC: 34.8 G/DL (ref 31.4–37.4)
MCV RBC AUTO: 87 FL (ref 82–98)
PLATELET # BLD AUTO: 183 THOUSANDS/UL (ref 149–390)
PMV BLD AUTO: 9.7 FL (ref 8.9–12.7)
POTASSIUM SERPL-SCNC: 4 MMOL/L (ref 3.5–5.3)
RBC # BLD AUTO: 4.79 MILLION/UL (ref 3.88–5.62)
SODIUM SERPL-SCNC: 140 MMOL/L (ref 136–145)
WBC # BLD AUTO: 13.24 THOUSAND/UL (ref 4.31–10.16)

## 2022-02-27 PROCEDURE — 97167 OT EVAL HIGH COMPLEX 60 MIN: CPT

## 2022-02-27 PROCEDURE — C9113 INJ PANTOPRAZOLE SODIUM, VIA: HCPCS | Performed by: INTERNAL MEDICINE

## 2022-02-27 PROCEDURE — 85027 COMPLETE CBC AUTOMATED: CPT

## 2022-02-27 PROCEDURE — 95720 EEG PHY/QHP EA INCR W/VEEG: CPT | Performed by: PSYCHIATRY & NEUROLOGY

## 2022-02-27 PROCEDURE — NC001 PR NO CHARGE: Performed by: EMERGENCY MEDICINE

## 2022-02-27 PROCEDURE — 97163 PT EVAL HIGH COMPLEX 45 MIN: CPT

## 2022-02-27 PROCEDURE — 99233 SBSQ HOSP IP/OBS HIGH 50: CPT | Performed by: EMERGENCY MEDICINE

## 2022-02-27 PROCEDURE — 80048 BASIC METABOLIC PNL TOTAL CA: CPT

## 2022-02-27 PROCEDURE — 99232 SBSQ HOSP IP/OBS MODERATE 35: CPT | Performed by: INTERNAL MEDICINE

## 2022-02-27 PROCEDURE — 99232 SBSQ HOSP IP/OBS MODERATE 35: CPT | Performed by: PSYCHIATRY & NEUROLOGY

## 2022-02-27 RX ORDER — LACOSAMIDE 150 MG/1
150 TABLET ORAL EVERY 12 HOURS SCHEDULED
Status: DISCONTINUED | OUTPATIENT
Start: 2022-02-27 | End: 2022-03-02 | Stop reason: HOSPADM

## 2022-02-27 RX ORDER — LEVETIRACETAM 750 MG/1
1500 TABLET ORAL EVERY 12 HOURS SCHEDULED
Status: DISCONTINUED | OUTPATIENT
Start: 2022-02-27 | End: 2022-03-02 | Stop reason: HOSPADM

## 2022-02-27 RX ADMIN — VANCOMYCIN HYDROCHLORIDE 1250 MG: 10 INJECTION, POWDER, LYOPHILIZED, FOR SOLUTION INTRAVENOUS at 05:12

## 2022-02-27 RX ADMIN — CEFTRIAXONE 2000 MG: 2 INJECTION, POWDER, FOR SOLUTION INTRAMUSCULAR; INTRAVENOUS at 08:30

## 2022-02-27 RX ADMIN — OFLOXACIN 4 DROP: 3 SOLUTION OPHTHALMIC at 18:02

## 2022-02-27 RX ADMIN — LEVETIRACETAM 1500 MG: 100 INJECTION INTRAVENOUS at 10:47

## 2022-02-27 RX ADMIN — OFLOXACIN 4 DROP: 3 SOLUTION OPHTHALMIC at 09:00

## 2022-02-27 RX ADMIN — DEXAMETHASONE SODIUM PHOSPHATE 5 MG: 4 INJECTION INTRA-ARTICULAR; INTRALESIONAL; INTRAMUSCULAR; INTRAVENOUS; SOFT TISSUE at 00:17

## 2022-02-27 RX ADMIN — LACOSAMIDE 150 MG: 150 TABLET, FILM COATED ORAL at 20:41

## 2022-02-27 RX ADMIN — DEXAMETHASONE SODIUM PHOSPHATE 5 MG: 4 INJECTION INTRA-ARTICULAR; INTRALESIONAL; INTRAMUSCULAR; INTRAVENOUS; SOFT TISSUE at 22:23

## 2022-02-27 RX ADMIN — DEXAMETHASONE SODIUM PHOSPHATE 5 MG: 4 INJECTION INTRA-ARTICULAR; INTRALESIONAL; INTRAMUSCULAR; INTRAVENOUS; SOFT TISSUE at 06:40

## 2022-02-27 RX ADMIN — CEFTRIAXONE 2000 MG: 2 INJECTION, POWDER, FOR SOLUTION INTRAMUSCULAR; INTRAVENOUS at 20:45

## 2022-02-27 RX ADMIN — DEXAMETHASONE SODIUM PHOSPHATE 5 MG: 4 INJECTION INTRA-ARTICULAR; INTRALESIONAL; INTRAMUSCULAR; INTRAVENOUS; SOFT TISSUE at 18:03

## 2022-02-27 RX ADMIN — VANCOMYCIN HYDROCHLORIDE 1250 MG: 10 INJECTION, POWDER, LYOPHILIZED, FOR SOLUTION INTRAVENOUS at 13:21

## 2022-02-27 RX ADMIN — PANTOPRAZOLE SODIUM 40 MG: 40 INJECTION, POWDER, FOR SOLUTION INTRAVENOUS at 10:28

## 2022-02-27 RX ADMIN — VANCOMYCIN HYDROCHLORIDE 1250 MG: 10 INJECTION, POWDER, LYOPHILIZED, FOR SOLUTION INTRAVENOUS at 22:23

## 2022-02-27 RX ADMIN — ENOXAPARIN SODIUM 40 MG: 40 INJECTION SUBCUTANEOUS at 10:28

## 2022-02-27 RX ADMIN — DEXAMETHASONE SODIUM PHOSPHATE 5 MG: 4 INJECTION INTRA-ARTICULAR; INTRALESIONAL; INTRAMUSCULAR; INTRAVENOUS; SOFT TISSUE at 12:22

## 2022-02-27 RX ADMIN — LEVETIRACETAM 1500 MG: 750 TABLET, FILM COATED ORAL at 20:40

## 2022-02-27 RX ADMIN — LACOSAMIDE 150 MG: 150 TABLET, FILM COATED ORAL at 12:22

## 2022-02-27 NOTE — ASSESSMENT & PLAN NOTE
Patient demonstrates left otitis externa on exam  ENT consulted  Four drops of ofloxacin in each ear twice a day

## 2022-02-27 NOTE — CONSULTS
Please see medicine H&P by Dr Fatoumata Delgadillo on 2/25  SLIM will follow on consult  Will follow with progress note on 2/28  Please call with questions in the interim

## 2022-02-27 NOTE — ASSESSMENT & PLAN NOTE
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

## 2022-02-27 NOTE — PROGRESS NOTES
Progress Note - Critical Care   Betty Martínez 36 y o  male MRN: [id number]  Unit/Bed#: ICU 05 Encounter: [id number]  HPI:  This is a 73-year-old male with a past medical history of Brugada syndrome s/p ICD in , likely epilepsy, autism, and recurrent otitis media  He presented for breakthrough seizure on Keppra 500mg BID and had a witnessed seizure in the ED with tongue biting  He received 4mg Ativan and 4g Keppra, and his seizures have resolved  The etiology of his seizure was likely hyponatremia, which is now resolved, but infectious workup is still underway  24HR Events:  NAEON, no seizure activity overnight  SUBJECTIVE:  Patient does not engage in conversation  ROS  Unable to perform ROS, patient does not participate  Invasive lines and devices: Invasive Devices  Report    Peripheral Intravenous Line            Peripheral IV 22 Right Forearm 1 day    Peripheral IV 22 Right Hand 1 day                   Physical exam  General:  Resting comfortably in bed in no acute distress  Neuro: GCS 15 (Eye 4, Verbal 5, Motor 6), moves all 4 extremities, no focal deficits  HENT:  Normocephalic and atraumatic, PERRL  Heart:  RRR, pulses intact  Lungs: Clear to auscultation bilaterally   Bilateral breath sounds present  Abdomen: Nontender to palpation Bowel sounds present, soft  Skin:   Warm, dry skin    Vitals  Temperature:   Temp (24hrs), Av 1 °F (36 7 °C), Min:97 6 °F (36 4 °C), Max:98 6 °F (37 °C)    Current: Temperature: 98 6 °F (37 °C)    Vitals:    22 0000 22 0100 22 0200 22 0400   BP: 107/62 100/61 97/59 114/69   Pulse: 80 74 76 68   Resp: 18 15 17 16   Temp:       TempSrc:       SpO2: 97% 97% 96% 97%   Weight:       Height:                 Labs:   Results from last 7 days   Lab Units 22  0512 22  0520 22  1748 22  0957 22  0957   WBC Thousand/uL 13 24* 8 45 13 20*   < > 16 90*   HEMOGLOBIN g/dL 14 5 14 8 13 0   < > 14 9   HEMATOCRIT % 41 7 41 5 34 2*   < > 41 1   PLATELETS Thousands/uL 183 169 104*   < > 162   NEUTROS PCT %  --   --   --   --  92*   MONOS PCT %  --   --   --   --  5   MONO PCT %  --   --  2*  --   --     < > = values in this interval not displayed  Results from last 7 days   Lab Units 02/26/22  1236 02/26/22  0520 02/26/22  0125 02/25/22  1455 02/25/22  0957 02/25/22  0957   SODIUM mmol/L 135*  --  136 126*   < > 127*   POTASSIUM mmol/L 4 3  --  3 9 3 4*   < > 3 5   CHLORIDE mmol/L 112*  --  107 95*   < > 95*   CO2 mmol/L 18*  --  22 25   < > 24   BUN mg/dL 9  --  9 11   < > 12   CREATININE mg/dL 0 78  --  0 69 0 62   < > 0 74   CALCIUM mg/dL 8 9  --  9 0 8 4   < > 8 6   ALK PHOS U/L  --  62  --   --   --  67   ALT U/L  --  33  --   --   --  32   AST U/L  --  38  --   --   --  34    < > = values in this interval not displayed  Results from last 7 days   Lab Units 02/26/22  0520   MAGNESIUM mg/dL 2 2     Results from last 7 days   Lab Units 02/26/22  0520   PHOSPHORUS mg/dL 2 9      Results from last 7 days   Lab Units 02/25/22  1908   INR  1 03   PTT seconds 31     Results from last 7 days   Lab Units 02/25/22  1455   LACTIC ACID mmol/L 1 1       Other Labs    Intake and Outputs:  I/O       02/25 0701  02/26 0700 02/26 0701  02/27 0700    IV Piggyback 2900 1250    Total Intake(mL/kg) 2900 (34 5) 1250 (14 9)    Urine (mL/kg/hr) 3025 3350 (1 7)    Total Output 3025 3350    Net -125 -2100                Recent Imaging Studies  2/25 borderline cardiomegaly, no acute pulmonary disease    Assessment & Plan:   -Neuro:   - Analgesia:  P r n  Tylenol  - Sedation - none  RASS goal 0  - Delirium ppx: CAM-ICU, sleep hygiene  Diagnosis:  Seizure disorder with concern for ongoing seizures  Acute encephalopathy  Autism disorder  Recent otitis media, now diagnosed with otitis externa     - Seizure like activity likely secondary to hyponatremia (Na 129 on presentation)  - Infectious workup pending, all results thus far are negative for infection  Plan  - trend neuro exam  - neurology consulted, recommendations appreciated      - Keppra 1 5g b i d       - Vimpat 150 mg q 12 hours      - continuous video EEG      - p r n  Ativan      - Decadron 5 mg q 6 hours      - brain MRI ordered      CV:   - Hemodynamic support:  None  - Goal MAP greater than 65  Diagnosis:  Brugada syndrome status post ICD implantation  - no arrhythmia on telemetry up to this point  Plan:  - ICD interrogation       Lung:   No acute issues  Plan:  - SpO2 goal greater than 90%    GI:   No acute issues  - Stress ulcer ppx:  Protonix  - Bowel regimen:  None required at this point      FEN:   - Fluids: no maintenance fluid at this time  - Electrolytes: trend and replete as needed  - Goals: K >4, Mg > 2, Phos > 3   - Nutrition:  Surgical soft diet  Diagnosis:  Hyponatremia  - initial sodium 129  - status post IV fluid resuscitation  - serum sodium today 140  Plan:  - continue to monitor serum sodium daily       :   - no acute issues  - Cr 0 78 this AM from 0 74 yesterday  - I/O last 24 hours: In: 2800 [IV Piggyback:2800]  Out: 2928 [Urine:6375]  - no urinary catheter      ID:   - WBC:  13 2 from 8 45 yesterday    16 9 on presentation   - Temp:  Afebrile since admission 24 hour T-max 98 8°  - Abx:      - ceftriaxone day 3      - vancomycin day 3      - completed 1 day acyclovir 2/26  - Cultures:       - 2/25 blood culture no growth today      - 2/25 meningitis panel completely negative      - 2/25 CSF culture no growth       - 2/25 HSV PCR pending  Diagnosis:  Leukocytosis, meningitis rule out  - CSF with elevated protein at 63, HSV PCR pending  - infectious disease consulted, appreciate recommendations      - acyclovir discontinued  - at this point, meningitis is unlikely  - new leukocytosis today likely secondary to Decadron  Plan:  - f/u CSF HSV PCR  - brain MRI pending removal of EEG leads  - continue ceftriaxone and vancomycin for now  - pending cultures negative at 48 hours, discontinue ceftriaxone and vancomycin   - Monitor WBC/temp curve  Diagnosis:  Otitis externa   - ENT consulted, appreciate recommendations  Plan:  - continue daily ofloxacin 4 drops in each ear BID      Heme:   - no acute issues  - Hb:  14 5 from 14 9 on admission  -  from 162 on admission  - DVT ppx:  Lovenox, SCDs     Endo:   - Goal -180    Msk/Skin:   - PT/OT  - Out of bed and ambulate as tolerated    Prophylaxis   VTE Pharmacologic Prophylaxis:Lovenox  VTE Mechanical Prophylaxis: sequential compression device  Stress Ulcer Prophylaxis: Protonix    ABCDE Protocol (if indicated)  Plan to perform spontaneous awakening trial today? Not applicable  Plan to perform spontaneous breathing trial today? Not applicable  Obvious barriers to extubation? Not applicable  CAM-ICU: Negative    Disposition: Appropriate for transition to step down 2        Non-Invasive/Invasive Ventilation Settings:  Respiratory  Report   Lab Data (Last 4 hours)    None         O2/Vent Data (Last 4 hours)    None              No results found for: PHART, HAB4DJX, PO2ART, EVN6OOX, B7JYTIEF, BEART, SOURCE  SpO2: SpO2: 96 %    Imaging:   XR chest 1 view portable   Final Result by Efra Segura DO (02/25 1432)      Borderline cardiomegaly  No acute pulmonary disease  Workstation performed: [id number]         CT head without contrast   Final Result by Heidi Hunt MD (02/25 1138)      No acute intracranial abnormality  Workstation performed: [id number]         MRI inpatient order    (Results Pending)     I have personally reviewed pertinent reports  and I have personally reviewed pertinent films in PACS    Micro:  Lab Results   Component Value Date    BLOODCX No Growth at 24 hrs  02/25/2022    BLOODCX No Growth at 24 hrs  02/25/2022         Allergies:    Allergies   Allergen Reactions    Cefaclor Hives    Sulfamethoxazole-Trimethoprim Hives         Medications: Scheduled Meds:  Current Facility-Administered Medications   Medication Dose Route Frequency Provider Last Rate    acetaminophen  650 mg Oral Q6H PRN Marcos Banai, DO      cefTRIAXone  2,000 mg Intravenous Q12H Marcos Banai, DO Stopped (02/27/22 0000)    dexamethasone  5 mg Intravenous Q6H [address] Jazmyn R Axelband, DO      enoxaparin  40 mg Subcutaneous Q24H [address] Prerna Cifuentes PA-C      lacosamide (VIMPAT) IVPB  150 mg Intravenous Q12H Ephraim Raymundo PA-C Stopped (02/27/22 0000)    levETIRAcetam  1,500 mg Intravenous BID Ephraim Raymundo PA-C Stopped (02/27/22 0000)    LORazepam  2 mg Intravenous Q6H PRN Marcos Banai, DO      ofloxacin  4 drop Otic BID Bronwyn Landon MD      ondansetron  4 mg Intravenous Once Prince Maty MD      pantoprazole  40 mg Intravenous Q12H [address] Marcos Banai, DO      vancomycin  15 mg/kg Intravenous Q8H Marcos Banai, DO 1,250 mg (02/27/22 0512)     Continuous Infusions:   PRN Meds:  acetaminophen, 650 mg, Q6H PRN  LORazepam, 2 mg, Q6H PRN        Counseling / Coordination of Care  Total Critical Care time spent 20 minutes excluding procedures, teaching and family updates  Code Status: Level 1 - Full Code     Portions of the record may have been created with voice recognition software  Occasional wrong word or "sound a like" substitutions may have occurred due to the inherent limitations of voice recognition software  Read the chart carefully and recognize, using context, where substitutions have occurred  Angelina Bergeron MD  Transitional Year Resident, PGY1  5:46 AM

## 2022-02-27 NOTE — PROGRESS NOTES
Transfer Note - 9400 No Name Kel 36 y o  male MRN: [id number]  Unit/Bed#: ICU 05 Encounter: [id number]    Code Status: Level 1 - Full Code  POA:    POLST:      Reason for ICU admission: Close neurological monitoring for seizures, vEEG    Active problems:   Principal Problem:    Breakthrough seizure ([address] )  Active Problems:    Suppurative otitis media of left ear    Brugada syndrome    Active autistic disorder    Hyponatremia    Recurrent otitis media    Left otitis externa  Resolved Problems:    * No resolved hospital problems  *      * Breakthrough seizure [facility])  Assessment & Plan  Patient presenting with breakthrough seizure on Keppra 500 mg b i d  Also had a witnessed seizure in the emergency department with tongue biting  Seizures terminated with 4 mg Ativan and 4 g Keppra  No further seizure activity seen on video EEG  Etiology of seizure is unclear at this point, hyponatremia may have lowered the seizure threshold  Completion of infectious workup is pending at this point, completely negative thus far  - f/u result of HSV CSF PCR  Empiric antibiotics are on board in case of meningitis  Plan  Transfer from ICU to Neurology Primary Service  If cultures remain negative at 48 hours, discontinue antibiotics  - today is day 2 of vancomycin and ceftriaxone  Antiepileptic regimen with Keppra 1 5g q 12 hours and lacosamide 150 mg q 12  Video EEG can be discontinued  MRI brain seizure protocol  Neurochecks      Brugada syndrome  Assessment & Plan  Status post ICD interrogation, no arrhythmias found  No arrhythmias seen on telemetry  Can discontinue telemetry    Active autistic disorder  Assessment & Plan  Patient is independent with ADLs  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  He is able to communicate and say a few words, but cannot hold a full conversation  He can follow simple commands  Hyponatremia  Assessment & Plan  Initial sodium 127    Improved with IV fluids  Sodium improved to 140 today  Continue to monitor    Left otitis externa  Assessment & Plan  Patient demonstrates left otitis externa on exam  ENT consulted  Four drops of ofloxacin in each ear twice a day    Recurrent otitis media  Assessment & Plan  Status post azithromycin therapy  ENT consulted  No active otitis media at this time      Consultants:   Neurology  Infectious Disease    History of Present Illness: This is a 74-year-old male with a past medical history of Brugada syndrome s/p ICD in 2021, likely epilepsy, autism, and recurrent otitis media  He presented for breakthrough seizure on Keppra 500mg BID and had a witnessed seizure in the ED with tongue biting  Summary of clinical course:   He received 4mg Ativan and 4g Keppra, and his seizures have resolved  He was placed on continuous EEG  Neurology was consulted, He is maintained on Keppra 1 5g q12h and Vimpat 150mg q12  CT head was within normal limits  ENT was consulted for their input regarding possible source of infection given this patient has history of recurrent otitis media and input regarding a retention cyst of the sphenoid sinus that was noted on CT  Per their assessment, he was diagnosed with left otitis externa and prescribed ofloxacin ear drops  His hyponatremia was corrected with IV fluids  His ICD was interrogated and reported no arrhythmia  He was started on empiric antibiotics for presumed meningitis  Lumbar puncture and CSF studies post heart have been negative for meningitis with only CSF HSV PCR pending  Infectious Disease was consulted, they recommended discontinuing empiric antibiotics once cultures are negative for 48 hours  Video EEG his demonstrated no further seizure activity  The etiology of his seizure this point is unclear  Hyponatremia may have contributed to lower seizure threshold however infection has not yet been ruled out  He will still require MRI brain after EEG leads are removed      Recent or scheduled procedures:   Video EEG to be concluded    Outstanding/pending diagnostics:   MRI pending removal of EEG leads  CSF HSV PCR    Cultures:   2/25 blood cultures no growth today  2/25 CSF cultures no growth       Mobilization Plan:   PT OT    Nutrition Plan:   Surgical soft diet    Invasive Devices Review  Invasive Devices  Report    Peripheral Intravenous Line            Peripheral IV 02/25/22 Right Forearm 2 days    Peripheral IV 02/25/22 Right Hand 2 days                Rationale for remaining devices:  Peripheral IV for vascular access    VTE Pharmacologic Prophylaxis: Enoxaparin (Lovenox)  VTE Mechanical Prophylaxis: sequential compression device    Discharge Plan:   Consider discharge pending demonstration of seizure depression on new antiepileptic regimen, and completion of infectious workup    Initial Physical Therapy Recommendations:  Pending  Initial Occupational Therapy Recommendations:  Pending  Initial /Plan:  Pending    Home medications that are not reordered and reason why:   Keppra dose increased    Spoke with Dr Riana Rosales regarding transfer  Please contact critical care via [employer] with any questions or concerns  Portions of the record may have been created with voice recognition software  Occasional wrong word or "sound a like" substitutions may have occurred due to the inherent limitations of voice recognition software  Read the chart carefully and recognize, using context, where substitutions have occurred    Jerry Dan MD  Transitional Year Resident, PGY1  1:35 PM

## 2022-02-27 NOTE — ASSESSMENT & PLAN NOTE
Patient presenting with breakthrough seizure on Keppra 500 mg b i d  Also had a witnessed seizure in the emergency department with tongue biting  Seizures terminated with 4 mg Ativan and 4 g Keppra  No further seizure activity seen on video EEG  Etiology of seizure is unclear at this point, hyponatremia may have lowered the seizure threshold  Completion of infectious workup is pending at this point, completely negative thus far    - f/u result of HSV CSF PCR  Empiric antibiotics are on board in case of meningitis  Plan  Transfer from ICU to Neurology Primary Service  If cultures remain negative at 48 hours, discontinue antibiotics  - today is day 2 of vancomycin and ceftriaxone  Antiepileptic regimen with Keppra 1 5g q 12 hours and lacosamide 150 mg q 12  Video EEG can be discontinued  MRI brain seizure protocol  Neurochecks

## 2022-02-27 NOTE — PROGRESS NOTES
Vancomycin IV Pharmacy-to-Dose Consultation    Gigi Melissa is a 36 y o  male who is currently receiving Vancomycin IV with management by the Pharmacy Consult service  Assessment/Plan:  The patient was reviewed  Renal function is stable and no signs or symptoms of nephrotoxicity and/or infusion reactions were documented in the chart  Based on todays assessment, continue current vancomycin (day # 3) dosing of 1250mg IV q8h , with a plan for trough to be drawn at 1230 on 3/3  Per ID, if cultures remain negative for 48 hours consider discontinuing antibiotics  Cultures are no growth 24 hours today  If clinically stable, potential antibiotic discontinue for Monday  We will continue to follow the patients culture results and clinical progress daily      Alex Ocasio, Pharmacist

## 2022-02-27 NOTE — PROGRESS NOTES
Vancomycin Assessment    Shirl Nissen is a 36 y o  male who is currently receiving vancomycin 1250mg IV Q8H for CNS infection   Relevant clinical data and objective history reviewed:  Creatinine   Date Value Ref Range Status   02/26/2022 0 78 0 60 - 1 30 mg/dL Final     Comment:     Standardized to IDMS reference method   02/26/2022 0 69 0 60 - 1 30 mg/dL Final     Comment:     Standardized to IDMS reference method   02/25/2022 0 62 0 60 - 1 30 mg/dL Final     Comment:     Standardized to IDMS reference method     /65   Pulse 94   Temp 98 6 °F (37 °C) (Oral)   Resp 20   Ht 6' (1 829 m)   Wt 84 kg (185 lb 3 oz)   SpO2 97%   BMI 25 12 kg/m²   I/O last 3 completed shifts: In: 3650 [IV Piggyback:3650]  Out: 5625 [Urine:5625]  Lab Results   Component Value Date/Time    BUN 9 02/26/2022 12:36 PM    WBC 8 45 02/26/2022 05:20 AM    HGB 14 8 02/26/2022 05:20 AM    HCT 41 5 02/26/2022 05:20 AM    MCV 85 02/26/2022 05:20 AM     02/26/2022 05:20 AM     Temp Readings from Last 3 Encounters:   02/26/22 98 6 °F (37 °C) (Oral)   02/14/22 (!) 97 4 °F (36 3 °C) (Temporal)   02/10/22 (!) 97 3 °F (36 3 °C) (Tympanic)     Vancomycin Days of Therapy: 2    Assessment/Plan  The patient is currently on vancomycin utilizing scheduled dosing  The patient is receiving 1250mg IV Q8H with the most recent vancomycin level being at steady-state and therapeutic based on a goal of 15-20 (appropriate for most indications) ; therefore, is clinically appropriate and dose will be continued   Pharmacy will continue to follow closely for s/sx of nephrotoxicity, infusion reactions, and appropriateness of therapy  BMP and CBC will be ordered per protocol  Plan for trough at approximately 1230 on 3/3  Pharmacy will continue to follow the patients culture results and clinical progress daily      Jennifer Patches  Staff Pharmacist

## 2022-02-27 NOTE — PROGRESS NOTES
Progress Note - Neurology   Nikko Callejas 36 y o  male MRN: [id number]  Unit/Bed#: ICU 05 Encounter: [id number]      Assessment/Plan     * Breakthrough seizure [facility])  Assessment & Plan  36 y o  left handed male with autism, Brugada syndrome s/p ICD (2021), and possible epilepsy due to unknown cause that manifests as apparent generalized tonic clonic seizures that began in 2016 currently on Keppra 500 mg BID who presented to [facility] on 2/25/2022 due to possible breakthrough seizure at home and another seizure while in the ED  · Patient's mother found the patient lying in bed unresponsive, making grunting noises, and incontinent of urine  Upon arrival to the ED, patient was lethargic, but answering a few questions  However, while in the ED, patient suddenly made grunting noises, eyes were roving, and his body "stiffened "  Patient then began to have "twitching" all over his body and had left lateral tongue bite  Patient received a total of 4 mg of Ativan, but continued to twitch so then was loaded with Keppra 4 g with resolution of seizure-like activity  · CT head negative for acute intracranial abnormalities  · Labs revealed sodium 127, WBC 16 9, and Prolactin 23 3  Lactic acid WNL (1 9)  · Patient has been drinking a lot more water than usual over the last few days    Routine EEG completed 2/25/2022 demonstrated subclinical seizure  Patient was given Vimpat 400 mg x 1 and started on 150 mg Q12H and Keppra dose was increased to 1500 mg BID  Patient was placed on vEEG monitoring  LP completed and CSF results thus far: protein 63, glucose 67, WBCs 1, RBCs 1400, gram stain with rare polys and no organisms, culture with no growth, ME panel negative  Patient evaluated on 2/27/2022, patient continues to be more alert and interactive, but not quite back to baseline per patient's mother at bedside  Etiology for breakthrough seizures likely due to hyponatremia    However, will continue to evaluate for infectious etiology, intracranial pathology  Plan:  - Continue on vEEG monitoring  Will discuss overnight results with epileptology  If no seizures overnight, will plan to d/c vEEG today  - MRI brain w/wo contrast pending    - Continue to follow CSF studies  Culture, HSV 1,2 DNA PCR and VZV DNA PCR pending   - Appreciate ID inpute  Continue antibiotics as per critical care team   - S/p Ativan 4 mg, Keppra 4 g load, Vimpat 400 mg load  - Continue on Keppra 1500 mg BID and Vimpat 150 mg Q12H   - Continue to monitor sodium closely  - Levetiracetam level pending  - Seizure precautions  - PRN ativan for prolonged seizure-like activity  - Telemtery  - Patient does not drive  - Frequent neuro checks  Continue to monitor and notify neurology with any changes  - Medical management and supportive care per primary team  Correction of any metabolic or infectious disturbances  Active autistic disorder  Assessment & Plan  - At baseline, patient is independent with ADLs (can bathe, dress, and feed himself)  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  He is able to communicate and say a few words, but cannot hold a full conversation  He can follow simple commands  Brugada syndrome  Assessment & Plan  - S/p ICD; interrogation pending    Suppurative otitis media of left ear  Assessment & Plan  - Recently treated with Z-aura  - ENT consulted and being treated for otitis externa  Hyponatremia  Assessment & Plan  - Sodium 140 this AM   - Continue to monitor BMP  Saulo Dougherty will need follow up in in 6 weeks with epilepsy attending or advance practitioner  He will not require outpatient neurological testing  Subjective:   Per patient's mother at bedside, patient is more awake and alert than yesterday and is able to make his needs known  He is observed eating breakfast this morning  He responds "good" when asked how he is doing  No witnessed seizure activity overnight   Per patient's mother, patient not quite at baseline and continues to appear somewhat groggy but is improved compared to 2/26/2022  ROS:  Unable to reliably assess secondary to mental status    Medications  Scheduled Meds:  Current Facility-Administered Medications   Medication Dose Route Frequency Provider Last Rate    acetaminophen  650 mg Oral Q6H PRN Marcos Banai, DO      cefTRIAXone  2,000 mg Intravenous Q12H Marcos Banai, DO Stopped (02/27/22 0000)    dexamethasone  5 mg Intravenous Q6H [address] Jazmyn Trujillo,       enoxaparin  40 mg Subcutaneous Q24H [address] Prerna Cifuentes PA-C      lacosamide (VIMPAT) IVPB  150 mg Intravenous Q12H Hillary Prado PA-C Stopped (02/27/22 0000)    levETIRAcetam  1,500 mg Intravenous BID Hillary Prado PA-C Stopped (02/27/22 0000)    LORazepam  2 mg Intravenous Q6H PRN Marcos Banai, DO      ofloxacin  4 drop Otic BID Vikki Bradley MD      ondansetron  4 mg Intravenous Once Magnolia Bah MD      pantoprazole  40 mg Intravenous Q12H [address] Marcos Banai, DO      vancomycin  15 mg/kg Intravenous Q8H Marcos Banai, DO 1,250 mg (02/27/22 0512)     Continuous Infusions:   PRN Meds:   acetaminophen    LORazepam    Vitals: Blood pressure 114/73, pulse 78, temperature 98 3 °F (36 8 °C), temperature source Oral, resp  rate (!) 25, height 6' (1 829 m), weight 84 kg (185 lb 3 oz), SpO2 97 %  ,Body mass index is 25 12 kg/m²  Physical Exam: /73   Pulse 78   Temp 98 3 °F (36 8 °C) (Oral)   Resp (!) 25   Ht 6' (1 829 m)   Wt 84 kg (185 lb 3 oz)   SpO2 97%   BMI 25 12 kg/m²   General appearance: Awake and alert, watching TV and eating breakfast upon examiner entering room  Responds "good" when asked how he is doing and able to state his name,  Head: Normocephalic, without obvious abnormality, atraumatic, EEG leads in place, tongue bite noted L>R     Eyes: negative findings: lids and lashes normal, conjunctivae and sclerae normal and pupils equal, round, reactive to light and accomodation  Lungs: clear to auscultation bilaterally  Heart: regular rate and rhythm  Abdomen: Soft, not distended  Extremities: extremities normal, warm and well-perfused; no cyanosis, clubbing, or edema  Skin: Skin color, texture, turgor normal  No rashes or lesions  Neurologic: Mental status: Awakens easily to voice, able to tell examiner his name, able to follow simple commands  Cranial nerves: II: pupils equal, round, reactive to light and accommodation, III,VII: ptosis no ptosis noted, III,IV,VI: extraocular muscles extra-ocular motions intact, VII: upper facial muscle function normal bilaterally, VII: lower facial muscle function normal bilaterally, XII: tongue strength normal  Sensory: Unable to reliably assess secondary to mental status  Motor: Moving all extremities and able to follow simple commands with no focal motor asymmetry noted  Unable to formally assess motor strength secondary to mental status  Labs   Recent Results (from the past 24 hour(s))   Basic metabolic panel    Collection Time: 02/26/22 12:36 PM   Result Value Ref Range    Sodium 135 (L) 136 - 145 mmol/L    Potassium 4 3 3 5 - 5 3 mmol/L    Chloride 112 (H) 100 - 108 mmol/L    CO2 18 (L) 21 - 32 mmol/L    ANION GAP 5 4 - 13 mmol/L    BUN 9 5 - 25 mg/dL    Creatinine 0 78 0 60 - 1 30 mg/dL    Glucose 116 65 - 140 mg/dL    Calcium 8 9 8 3 - 10 1 mg/dL    eGFR 121 ml/min/1 73sq m   Vancomycin, trough Draw level peripherally  Give dose immediately after trough (do NOT hold dose)  Call Pharmacy with any questions/concerns (Pharm Consult)      Collection Time: 02/26/22  8:23 PM   Result Value Ref Range    Vancomycin Tr 18 3 10 0 - 20 0 ug/mL   Basic metabolic panel    Collection Time: 02/27/22  5:12 AM   Result Value Ref Range    Sodium 140 136 - 145 mmol/L    Potassium 4 0 3 5 - 5 3 mmol/L    Chloride 110 (H) 100 - 108 mmol/L    CO2 26 21 - 32 mmol/L    ANION GAP 4 4 - 13 mmol/L    BUN 12 5 - 25 mg/dL    Creatinine 0 78 0 60 - 1 30 mg/dL    Glucose 129 65 - 140 mg/dL    Calcium 9 3 8 3 - 10 1 mg/dL    eGFR 121 ml/min/1 73sq m   CBC and differential    Collection Time: 02/27/22  5:12 AM   Result Value Ref Range    WBC 13 24 (H) 4 31 - 10 16 Thousand/uL    RBC 4 79 3 88 - 5 62 Million/uL    Hemoglobin 14 5 12 0 - 17 0 g/dL    Hematocrit 41 7 36 5 - 49 3 %    MCV 87 82 - 98 fL    MCH 30 3 26 8 - 34 3 pg    MCHC 34 8 31 4 - 37 4 g/dL    RDW 12 7 11 6 - 15 1 %    MPV 9 7 8 9 - 12 7 fL    Platelets 544 832 - 295 Thousands/uL     Imaging   No new neuro imaging available for review  VTE Prophylaxis: Enoxaparin (Lovenox)    Counseling / Coordination of Care  Total time spent today 30 minutes  Greater than 50% of total time was spent with the patient and / or family counseling and / or coordination of care  A description of the counseling / coordination of care: Time spent reviewing plan of care with patient's mother at bedside as well as critical care team  If no seizures overnight, will plan to d/c vEEG today  Awaiting MRI brain with and without contrast  Will plan to continue with AEDs at current dosing and can consider decreased dosing as an outpatient

## 2022-02-27 NOTE — ASSESSMENT & PLAN NOTE
Status post ICD interrogation, no arrhythmias found  No arrhythmias seen on telemetry  Can discontinue telemetry

## 2022-02-27 NOTE — PLAN OF CARE
Problem: OCCUPATIONAL THERAPY ADULT  Goal: Performs self-care activities at highest level of function for planned discharge setting  See evaluation for individualized goals  Description: Treatment Interventions: ADL retraining,Functional transfer training,UE strengthening/ROM,Endurance training,Patient/family training,Equipment evaluation/education,Compensatory technique education,Continued evaluation,Energy conservation,Activityengagement          See flowsheet documentation for full assessment, interventions and recommendations  Note: Limitation: Decreased ADL status,Decreased UE strength,Decreased endurance,Decreased high-level ADLs  Prognosis: Good  Assessment: Pt is a 36 y o  male admitted to [facility]"" on 2/25/2022 w/ Breakthrough seizure ([facility] [address] )  has a past medical history of Brugada syndrome, Seizure-like activity, autism, Skin lesion, Skin rash, and Staph aureus infection  Pt with active OT orders  Pt seen as a co-evaluation with PT due to the patient's co-morbidities, clinically unstable presentation/clinical complexity, and present impairments  Pt with limited verbalizations at baseline, information provided from parents  PTA, pt resides with parents in [place], 1+3STE  Pt was I with ADLs and fnxl mobility, family A with IADLs  Upon evaluation, pt currently requires S for transfers and mobility  Pt currently requires S eating, S grooming, S UB ADLs, MIN A LB ADLs, and S toileting  Pt is limited at this time 2*: endurance, activity tolerance, functional mobility, functional standing tolerance, decreased I w/ ADLS/IADLS and strength  The following Occupational Performance Areas to address include: grooming, bathing/shower, toilet hygiene, dressing, health maintenance, functional mobility, community mobility and clothing management  Pt to benefit from immediate acute skilled OT to address above deficits, improve overall functional independence, maximize fnxl mobility and reduce caregiver burden   From OT standpoint, recommendation at time of d/c would be home with increased social support  Pt was left after session with all current needs met  The patient's raw score on the AM-PAC Daily Activity inpatient short form is 18, standardized score is 38 66, less than 39 4  Patients at this level are likely to benefit from discharge to post-acute rehabilitation services  Please refer to the recommendation of the Occupational Therapist for safe discharge planning       OT Discharge Recommendation: No rehabilitation needs (home with social support)  OT - OK to Discharge: Yes (when medically stable)

## 2022-02-27 NOTE — PHYSICAL THERAPY NOTE
PHYSICAL THERAPY Evaluation NOTE    Patient Name: Saulo Dougherty  BCLNQ'J Date: 2/27/2022     AGE:   36 y o   Mrn:   [id number]  ADMIT DX:  Hematemesis [K92 0]  Hyponatremia [E87 1]  Seizure ([facility] [address] ) [R56 9]  Altered mental status [R41 82]  Elevated blood pressure reading [R03 0]  Seizure disorder ([facility] [address] ) [G40 909]  Suppurative otitis media of left ear [H66 42]  Breakthrough seizure ([facility] [address] ) [G40 919]    Past Medical History:   Diagnosis Date    Brugada syndrome     Seizure-like activity ([facility] [address] )     last assessed: 7/11/16    Skin lesion     last assessed: 12/30/13    Skin rash     last assessed: 3/1/13    Staph aureus infection     last assessed: 8/20/13     Length Of Stay: 2  PHYSICAL THERAPY EVALUATION :    02/27/22 1117   PT Last Visit   PT Visit Date 02/27/22   Note Type   Note type Evaluation   Pain Assessment   Pain Assessment Tool FLACC   Pain Rating: FLACC (Rest) - Face 0   Pain Rating: FLACC (Rest) - Legs 0   Pain Rating: FLACC (Rest) - Activity 0   Pain Rating: FLACC (Rest) - Cry 0   Pain Rating: FLACC (Rest) - Consolability 0   Score: FLACC (Rest) 0   Pain Rating: FLACC (Activity) - Face 0   Pain Rating: FLACC (Activity) - Legs 0   Pain Rating: FLACC (Activity) - Activity 0   Pain Rating: FLACC (Activity) - Cry 0   Pain Rating: FLACC (Activity) - Consolability 0   Score: FLACC (Activity) 0   Restrictions/Precautions   Weight Bearing Precautions Per Order No   Other Precautions Cognitive;Multiple lines; Fall Risk  (EMU monitoring, patient with Hx of Austim, mostly non-verbal)   Home Living   Type of [address] [place] [address] Two level;1/2 bath on main level  (1+ 3STE)   Bathroom Shower/Tub Tub/shower unit   Bathroom Toilet [address]   Prior Function   Level of [place] Independent with ADLs and functional mobility   Lives With Family  (parents )   Receives Help From Family ADL Assistance Independent   IADLs Needs assistance   Falls in the last 6 months 0   General   Additional Pertinent History Pt  is a 35 yo M who presents with Breakthrough seizure  Family/Caregiver Present Yes   Cognition   Overall Cognitive Status WFL   Arousal/Participation Cooperative   Attention Attends with cues to redirect   Orientation Level Oriented to person   Memory Unable to assess   Following Commands Follows one step commands with increased time or repetition   Comments Pt  was identified with full name and birthdate   Subjective   Subjective Pt  agreeable to PT session   RLE Assessment   RLE Assessment   (grossly 4/5)   LLE Assessment   LLE Assessment   (grossly 4/5)   Coordination   Movements are Fluid and Coordinated 0   Coordination and Movement Description impulsive mobility, minor gait instability   Bed Mobility   Supine to Sit 5  Supervision   Additional items HOB elevated; Bedrails   Sit to Supine 5  Supervision   Additional items HOB elevated; Bedrails   Transfers   Sit to Stand 5  Supervision   Additional items Verbal cues   Stand to Sit 5  Supervision   Additional items Verbal cues   Ambulation/Elevation   Gait pattern Narrow CATRACHITA; Decreased foot clearance;Shuffling; Inconsistent tonia; Redundant gait at times  (impulsive)   Gait Assistance 4  Minimal assist   Additional items Assist x 1;Verbal cues  (for sequencing and safety with mechanics)   Assistive Device Other (Comment)  (HHA)   Distance 15'x2   Balance   Static Sitting Fair   Dynamic Sitting Fair   Static Standing Fair   Dynamic Standing Fair   Ambulatory Fair -   Endurance Deficit   Endurance Deficit Yes   Endurance Deficit Description Postural and gait stability degradation noted with fatigue   Activity Tolerance   Activity Tolerance Patient tolerated treatment well   Medical Staff Gemma Zamora 81, OT   Nurse Made Aware spoke to RN, cleared for PT session   Assessment   Prognosis Good   Problem List Decreased strength;Decreased range of motion;Decreased endurance; Impaired balance;Decreased mobility; Decreased coordination;Decreased safety awareness;Pain   Assessment Pt  is a 37 yo M who presents with Breakthrough seizure  order placed for PT eval and tx, w/ activity order of up in chair  pt presents w/ comorbidities of Breakthrough seizure, active autistic disorder, brugada syndrome, hyponatremia, syncope and collapse, Generalized tonic-clonic seizure and personal factors of living in 2 story house, mobilizing w/ assistive device, stair(s) to enter home, unable to perform dynamic tasks in community, impulsivity and inability to perform IADLs  pt presents w/ weakness, decreased ROM, decreased endurance, impaired balance, gait deviations, impaired coordination, decreased safety awareness, impaired judgment and fall risk  these impairments are evident in findings from physical examination (weakness, decreased ROM and impaired coordination), mobility assessment (need for Min assist w/ all phases of mobility when usually mobilizing independently, tolerance to only 15'x2 feet of ambulation and need for cueing for mobility technique), and AM-PAC 6-Clicks: 55/54  pt needed input for task focus and mobility technique  pt is at risk for falls due to physical and safety awareness deficits  pt's clinical presentation is unstable/unpredictable (evident in need for assist w/ all phases of mobility when usually mobilizing independently, tolerance to only 15'x2 feet of ambulation, need for input for mobility technique, need for input for task focus and mobility technique and need for input for mobility technique/safety)  pt needs inpatient PT tx to improve mobility deficits  discharge recommendation is for outpatient PT and home w/ family support pending patient progression to reduce fall risk and maximize level of functional independence      Barriers to Discharge Comments increased gait distances and stair trial when EMU D/C'd   Goals   Patient Goals to get underwear on   STG Expiration Date 03/09/22   Short Term Goal #1 pt will: Increase bilateral LE strength 1/2 grade to facilitate independent mobility, Perform all bed mobility tasks independently to decrease fall risk factors, Perform all transfers independently to improve independence, Ambulate 250 ft  with least restrictive assistive device independently w/o LOB, Navigate 15 stairs w/ supervision with unilateral handrail to facilitate return to previous living environment and Increase all balance 1/2 grade to decrease risk for falls   PT Treatment Day 0   Plan   Treatment/Interventions Functional transfer training;LE strengthening/ROM; Therapeutic exercise; Endurance training;Cognitive reorientation;Patient/family training;Equipment eval/education; Bed mobility;Gait training;Spoke to nursing   PT Frequency 3-5x/wk   Recommendation   PT Discharge Recommendation Home with outpatient rehabilitation  (pending patient progression, potientally no needs)   AM-PAC Basic Mobility Inpatient   Turning in Bed Without Bedrails 4   Lying on Back to Sitting on Edge of Flat Bed 4   Moving Bed to Chair 3   Standing Up From Chair 3   Walk in Room 3   Climb 3-5 Stairs 2   Basic Mobility Inpatient Raw Score 19   Basic Mobility Standardized Score 42 48   Highest Level Of Mobility   JH-HLM Goal 6: Walk 10 steps or more   JH-HLM Highest Level of Mobility 6: Walk 10 steps or more   JH-HLM Goal Achieved Yes   End of Consult   Patient Position at End of Consult All needs within reach; Supine     The patient's AM-PAC Basic Mobility Inpatient Short Form Raw Score is 19  A Raw score of greater than 16 suggests the patient may benefit from discharge to home  Please also refer to the recommendation of the Physical Therapist for safe discharge planning  Skilled PT recommended while in hospital and upon DC to progress pt toward treatment goals       Dameon Loo, PT, DPT 2/27/2022

## 2022-02-27 NOTE — OCCUPATIONAL THERAPY NOTE
Occupational Therapy Evaluation     Patient Name: Saulo MUSE Date: 2/27/2022  Problem List  Principal Problem:    Breakthrough seizure [facility])  Active Problems:    Active autistic disorder    Brugada syndrome    Suppurative otitis media of left ear    Hyponatremia    Recurrent otitis media    Left otitis externa    Past Medical History  Past Medical History:   Diagnosis Date    Brugada syndrome     Seizure-like activity ([address] )     last assessed: 7/11/16    Skin lesion     last assessed: 12/30/13    Skin rash     last assessed: 3/1/13    Staph aureus infection     last assessed: 8/20/13     Past Surgical History  Past Surgical History:   Procedure Laterality Date    ADENOIDECTOMY      CARDIAC ICD GENERATOR CHANGE  03/29/2021    DENTAL SURGERY      wisdom teeth    MYRINGOTOMY W/ TUBES      with ventilating tube insertion; x6           02/27/22 1116   OT Last Visit   OT Visit Date 02/27/22   Note Type   Note type Evaluation   Restrictions/Precautions   Weight Bearing Precautions Per Order No   Other Precautions Cognitive;Multiple lines; Fall Risk  (EMU, pt with hx of autism - limited verbalizations at [facility])   Pain Assessment   Pain Assessment Tool FLACC   Pain Rating: FLACC (Rest) - Face 0   Pain Rating: FLACC (Rest) - Legs 0   Pain Rating: FLACC (Rest) - Activity 0   Pain Rating: FLACC (Rest) - Cry 0   Pain Rating: FLACC (Rest) - Consolability 0   Score: FLACC (Rest) 0   Pain Rating: FLACC (Activity) - Face 0   Pain Rating: FLACC (Activity) - Legs 0   Pain Rating: FLACC (Activity) - Activity 0   Pain Rating: FLACC (Activity) - Cry 0   Pain Rating: FLACC (Activity) - Consolability 0   Score: FLACC (Activity) 0   Home Living   Type of Home House   Home Layout Two level;1/2 bath on main level  (1+ 3STE)   Bathroom Shower/Tub Tub/shower unit   Bathroom Toilet Standard   Bathroom Equipment Commode   Home Equipment Walker  (did not use PTA)   Additional Comments At baseline, pt with limited verbalizations  Information obtained from parents who were present during session    Prior Function   Level of [place] Independent with ADLs and functional mobility   Lives With Family  (parents )   Receives Help From Family   ADL Assistance Independent   IADLs Needs assistance   Falls in the last 6 months 0   Comments At baseline, pt with limited verbalizations  Information obtained from parents who were present during session    Lifestyle   Autonomy PTA, per parents, pt was I with ADLs, family A with IADLs, I fnxl mobility   Reciprocal Relationships Supportive parents who report at least one of them is always home with him   Intrinsic Gratification Pt enjoys writing/tracing letters, art, puzzles   Psychosocial   Psychosocial (WDL) WDL   ADL   Where Assessed Edge of bed   Eating Assistance 5  Supervision/Setup   Grooming Assistance 5  Supervision/Setup   UB Bathing Assistance 5  Supervision/Setup   LB Bathing Assistance 4  Minimal Assistance   [address] 5  Supervision/Setup   LB Dressing Assistance 4  [address]  5  Supervision/Setup   Bed Mobility   Supine to Sit 5  Supervision   Additional items HOB elevated; Bedrails   Sit to Supine 5  Supervision   Additional items HOB elevated; Bedrails   Transfers   Sit to Stand 5  Supervision   Additional items Verbal cues   Stand to Sit 5  Supervision   Additional items Verbal cues   Additional Comments Transfers w/o AD    Functional Mobility   Functional Mobility 4  Minimal assistance   Additional Comments Ax1   Balance   Static Sitting Fair   Dynamic Sitting Fair   Static Standing Fair   Dynamic Standing Fair   Ambulatory Fair -   Activity Tolerance   Activity Tolerance Patient tolerated treatment well   Medical Staff Made Aware PT Felipa Montoya   Nurse Made Aware RN clearance for session    RUE Assessment   RUE Assessment   (grossly WFL)   LUE Assessment   LUE Assessment   (grossly WFL)   Vision - Complex Assessment   Additional Comments Pt with difficulty attending to visual assessment  Difficulty following commands at baseline    Cognition   Arousal/Participation Responsive; Cooperative   Attention Attends with cues to redirect   Orientation Level Oriented to person   Memory Unable to assess   Following Commands Follows one step commands with increased time or repetition   Comments Pt pleasant and cooperative t/o session  Flat affect  At baseline, pt with simple verbalizations only and follows simple commands  Pt appears near baseline cognition at this time    Assessment   Limitation Decreased ADL status; Decreased UE strength;Decreased endurance;Decreased high-level ADLs   Prognosis Good   Assessment Pt is a 36 y o  male admitted to [facility] on 2/25/2022 w/ Breakthrough seizure ([facility] [address] )  has a past medical history of Brugada syndrome, Seizure-like activity, autism, Skin lesion, Skin rash, and Staph aureus infection  Pt with active OT orders  Pt seen as a co-evaluation with PT due to the patient's co-morbidities, clinically unstable presentation/clinical complexity, and present impairments  Pt with limited verbalizations at baseline, information provided from parents  PTA, pt resides with parents in [place], 1+3STE  Pt was I with ADLs and fnxl mobility, family A with IADLs  Upon evaluation, pt currently requires S for transfers and mobility  Pt currently requires S eating, S grooming, S UB ADLs, MIN A LB ADLs, and S toileting  Pt is limited at this time 2*: endurance, activity tolerance, functional mobility, functional standing tolerance, decreased I w/ ADLS/IADLS and strength  The following Occupational Performance Areas to address include: grooming, bathing/shower, toilet hygiene, dressing, health maintenance, functional mobility, community mobility and clothing management  Pt to benefit from immediate acute skilled OT to address above deficits, improve overall functional independence, maximize fnxl mobility and reduce caregiver burden   From OT standpoint, recommendation at time of d/c would be home with increased social support  Pt was left after session with all current needs met  The patient's raw score on the AM-PAC Daily Activity inpatient short form is 18, standardized score is 38 66, less than 39 4  Patients at this level are likely to benefit from discharge to post-acute rehabilitation services  Please refer to the recommendation of the Occupational Therapist for safe discharge planning  Goals   Patient Goals To have his underwear   LTG Time Frame 7-10   Plan   Treatment Interventions ADL retraining;Functional transfer training;UE strengthening/ROM; Endurance training;Patient/family training;Equipment evaluation/education; Compensatory technique education;Continued evaluation; Energy conservation; Activityengagement   Goal Expiration Date 03/09/22   OT Frequency 1-2x/wk   Recommendation   OT Discharge Recommendation No rehabilitation needs  (home with social support)   OT - OK to Discharge Yes  (when medically stable)   AM-PAC Daily Activity Inpatient   Lower Body Dressing 3   Bathing 3   Toileting 3   Upper Body Dressing 3   Grooming 3   Eating 3   Daily Activity Raw Score 18   Daily Activity Standardized Score (Calc for Raw Score >=11) 38 66   -[facility] Applied Cognition Inpatient   Following a Speech/Presentation 3   Understanding Ordinary Conversation 4   Taking Medications 3   Remembering Where Things Are Placed or Put Away 3   Remembering List of 4-5 Errands 2   Taking Care of Complicated Tasks 2   Applied Cognition Raw Score 17   Applied Cognition Standardized Score 36 52       GOALS    - Pt will complete UB dressing/self care/bathing w/ mod I using adaptive device and DME as needed    - Pt will complete LB dressing/self care/bathing w/ mod I using adaptive device and DME as needed    - Pt will complete toileting w/ mod I w/ G hygiene/thoroughness using DME as needed    - Pt will improve functional transfers to Mod I on/off all surfaces using DME as needed w/ G balance/safety     - Pt will improve functional mobility during ADL/IADL/leisure tasks to Mod I using DME as needed w/ G balance/safety     - Pt will demonstrate G carryover of pt/caregiver education and training as appropriate            Anayeli Carter MS, OTR/L

## 2022-02-27 NOTE — PLAN OF CARE
Problem: PHYSICAL THERAPY ADULT  Goal: Performs mobility at highest level of function for planned discharge setting  See evaluation for individualized goals  Description: Treatment/Interventions: Functional transfer training,LE strengthening/ROM,Therapeutic exercise,Endurance training,Cognitive reorientation,Patient/family training,Equipment eval/education,Bed mobility,Gait training,Spoke to nursing          See flowsheet documentation for full assessment, interventions and recommendations  2/27/2022 1450 by Derek Wilde PT  Outcome: Progressing  Note: Prognosis: Good  Problem List: Decreased strength,Decreased range of motion,Decreased endurance,Impaired balance,Decreased mobility,Decreased coordination,Decreased safety awareness,Pain  Assessment: Pt  is a 37 yo M who presents with Breakthrough seizure  order placed for PT eval and tx, w/ activity order of up in chair  pt presents w/ comorbidities of Breakthrough seizure, active autistic disorder, brugada syndrome, hyponatremia, syncope and collapse, Generalized tonic-clonic seizure and personal factors of living in 2 story house, mobilizing w/ assistive device, stair(s) to enter home, unable to perform dynamic tasks in community, impulsivity and inability to perform IADLs  pt presents w/ weakness, decreased ROM, decreased endurance, impaired balance, gait deviations, impaired coordination, decreased safety awareness, impaired judgment and fall risk  these impairments are evident in findings from physical examination (weakness, decreased ROM and impaired coordination), mobility assessment (need for Min assist w/ all phases of mobility when usually mobilizing independently, tolerance to only 15'x2 feet of ambulation and need for cueing for mobility technique), and AM-PAC 6-Clicks: 97/69  pt needed input for task focus and mobility technique  pt is at risk for falls due to physical and safety awareness deficits   pt's clinical presentation is unstable/unpredictable (evident in need for assist w/ all phases of mobility when usually mobilizing independently, tolerance to only 15'x2 feet of ambulation, need for input for mobility technique, need for input for task focus and mobility technique and need for input for mobility technique/safety)  pt needs inpatient PT tx to improve mobility deficits  discharge recommendation is for outpatient PT and home w/ family support pending patient progression to reduce fall risk and maximize level of functional independence  Barriers to Discharge Comments: increased gait distances and stair trial when EMU D/C'd     PT Discharge Recommendation: Home with outpatient rehabilitation (pending patient progression, potientally no needs)          See flowsheet documentation for full assessment  2/27/2022 1450 by Derek Wilde PT  Note: Prognosis: Good  Problem List: Decreased strength,Decreased range of motion,Decreased endurance,Impaired balance,Decreased mobility,Decreased coordination,Decreased safety awareness,Pain  Assessment: Pt  is a 37 yo M who presents with Breakthrough seizure  order placed for PT eval and tx, w/ activity order of up in chair  pt presents w/ comorbidities of Breakthrough seizure, active autistic disorder, brugada syndrome, hyponatremia, syncope and collapse, Generalized tonic-clonic seizure and personal factors of living in 2 story house, mobilizing w/ assistive device, stair(s) to enter home, unable to perform dynamic tasks in community, impulsivity and inability to perform IADLs  pt presents w/ weakness, decreased ROM, decreased endurance, impaired balance, gait deviations, impaired coordination, decreased safety awareness, impaired judgment and fall risk   these impairments are evident in findings from physical examination (weakness, decreased ROM and impaired coordination), mobility assessment (need for Min assist w/ all phases of mobility when usually mobilizing independently, tolerance to only 15'x2 feet of ambulation and need for cueing for mobility technique), and AM-PAC 6-Clicks: 92/83  pt needed input for task focus and mobility technique  pt is at risk for falls due to physical and safety awareness deficits  pt's clinical presentation is unstable/unpredictable (evident in need for assist w/ all phases of mobility when usually mobilizing independently, tolerance to only 15'x2 feet of ambulation, need for input for mobility technique, need for input for task focus and mobility technique and need for input for mobility technique/safety)  pt needs inpatient PT tx to improve mobility deficits  discharge recommendation is for outpatient PT and home w/ family support pending patient progression to reduce fall risk and maximize level of functional independence  Barriers to Discharge Comments: increased gait distances and stair trial when EMU D/C'd     PT Discharge Recommendation: Home with outpatient rehabilitation (pending patient progression, potientally no needs)          See flowsheet documentation for full assessment

## 2022-02-28 ENCOUNTER — TELEPHONE (OUTPATIENT)
Dept: RADIOLOGY | Facility: HOSPITAL | Age: 32
End: 2022-02-28

## 2022-02-28 LAB
ANION GAP SERPL CALCULATED.3IONS-SCNC: 7 MMOL/L (ref 4–13)
BACTERIA CSF CULT: NO GROWTH
BUN SERPL-MCNC: 14 MG/DL (ref 5–25)
CALCIUM SERPL-MCNC: 9.4 MG/DL (ref 8.3–10.1)
CHLORIDE SERPL-SCNC: 108 MMOL/L (ref 100–108)
CO2 SERPL-SCNC: 25 MMOL/L (ref 21–32)
CREAT SERPL-MCNC: 0.84 MG/DL (ref 0.6–1.3)
GFR SERPL CREATININE-BSD FRML MDRD: 118 ML/MIN/1.73SQ M
GLUCOSE SERPL-MCNC: 121 MG/DL (ref 65–140)
HSV1 DNA CSF QL NAA+PROBE: NEGATIVE
HSV2 DNA CSF QL NAA+PROBE: NEGATIVE
POTASSIUM SERPL-SCNC: 4 MMOL/L (ref 3.5–5.3)
SODIUM SERPL-SCNC: 140 MMOL/L (ref 136–145)

## 2022-02-28 PROCEDURE — 99231 SBSQ HOSP IP/OBS SF/LOW 25: CPT | Performed by: INTERNAL MEDICINE

## 2022-02-28 PROCEDURE — 99232 SBSQ HOSP IP/OBS MODERATE 35: CPT | Performed by: STUDENT IN AN ORGANIZED HEALTH CARE EDUCATION/TRAINING PROGRAM

## 2022-02-28 PROCEDURE — 95720 EEG PHY/QHP EA INCR W/VEEG: CPT | Performed by: PSYCHIATRY & NEUROLOGY

## 2022-02-28 PROCEDURE — 80048 BASIC METABOLIC PNL TOTAL CA: CPT | Performed by: STUDENT IN AN ORGANIZED HEALTH CARE EDUCATION/TRAINING PROGRAM

## 2022-02-28 PROCEDURE — 99232 SBSQ HOSP IP/OBS MODERATE 35: CPT | Performed by: PHYSICIAN ASSISTANT

## 2022-02-28 RX ADMIN — LEVETIRACETAM 1500 MG: 750 TABLET, FILM COATED ORAL at 09:26

## 2022-02-28 RX ADMIN — LEVETIRACETAM 1500 MG: 750 TABLET, FILM COATED ORAL at 20:02

## 2022-02-28 RX ADMIN — OFLOXACIN 4 DROP: 3 SOLUTION OPHTHALMIC at 17:47

## 2022-02-28 RX ADMIN — VANCOMYCIN HYDROCHLORIDE 1250 MG: 10 INJECTION, POWDER, LYOPHILIZED, FOR SOLUTION INTRAVENOUS at 05:06

## 2022-02-28 RX ADMIN — ENOXAPARIN SODIUM 40 MG: 40 INJECTION SUBCUTANEOUS at 09:26

## 2022-02-28 RX ADMIN — LACOSAMIDE 150 MG: 150 TABLET, FILM COATED ORAL at 20:01

## 2022-02-28 RX ADMIN — DEXAMETHASONE SODIUM PHOSPHATE 5 MG: 4 INJECTION INTRA-ARTICULAR; INTRALESIONAL; INTRAMUSCULAR; INTRAVENOUS; SOFT TISSUE at 05:06

## 2022-02-28 RX ADMIN — CEFTRIAXONE 2000 MG: 2 INJECTION, POWDER, FOR SOLUTION INTRAMUSCULAR; INTRAVENOUS at 11:25

## 2022-02-28 RX ADMIN — CEFTRIAXONE 2000 MG: 2 INJECTION, POWDER, FOR SOLUTION INTRAMUSCULAR; INTRAVENOUS at 20:02

## 2022-02-28 RX ADMIN — OFLOXACIN 4 DROP: 3 SOLUTION OPHTHALMIC at 09:27

## 2022-02-28 RX ADMIN — LACOSAMIDE 150 MG: 150 TABLET, FILM COATED ORAL at 09:26

## 2022-02-28 RX ADMIN — VANCOMYCIN HYDROCHLORIDE 1250 MG: 10 INJECTION, POWDER, LYOPHILIZED, FOR SOLUTION INTRAVENOUS at 12:44

## 2022-02-28 NOTE — CASE MANAGEMENT
Case Management Assessment & Discharge Planning Note    Patient name Anayeli Bangura  [facility]N [id number]  : 1990 Date 2022       Current Admission Date: 2022  Current Admission Diagnosis:Breakthrough seizure [facility])   Patient Active Problem List    Diagnosis Date Noted    Recurrent otitis media 2022    Left otitis externa 2022    Suppurative otitis media of left ear 2022    Hyponatremia 2022    Abnormal neurological finding suggestive of lumbar-level spinal disorder 2021    Abnormal EKG 2021    Brugada syndrome 2021    Syncope and collapse 2021    Other insomnia 2019    Breakthrough seizure ([facility] [address] ) 2018    Generalized tonic-clonic seizure ([facility] [address] ) 10/13/2016    Active autistic disorder 10/10/2012      LOS (days): 3  Geometric Mean LOS (GMLOS) (days):   Days to GMLOS:     OBJECTIVE:    Risk of Unplanned Readmission Score: 12     Current admission status: Inpatient    Preferred Pharmacy:   [address] [facility],[address], [place] [address]  Phone: [phone] Fax: [phone]    Primary Care Provider: Vita Stone DO    Primary Insurance: [address] [place] [address]  Secondary Insurance:     ASSESSMENT:  Active Health Care Agents    There are no active Health Care Agents on file         Readmission Root Cause  30 Day Readmission: No    Patient Information  Admitted from[de-identified] Home  Mental Status: Alert (Pt is Autistic and verbal skills are limited)  During Assessment patient was accompanied by: Parent  Assessment information provided by[de-identified] Parent  Primary Caregiver: Family  Caregiver's Name[de-identified] Toshialetty Bustos Relationship to Patient[de-identified] Family Member (Parent)  Caregiver's Telephone Number[de-identified] [phone]  Support Systems: Senia Duncan manager/  [place] of Residence: [address] [place] [address] do you live in?: [address] entry access options  Select all that apply : Stairs  Number of steps to enter home : 4  Type of Current Residence: 2 story home  Upon entering residence, is there a bedroom on the main floor (no further steps)?: No  A bedroom is located on the following floor levels of residence (select all that apply):: 2nd Floor  Upon entering residence, is there a bathroom on the main floor (no further steps)?: No  Indicate which floors of current residence have a bathroom (select all the apply):: 2nd Floor  Number of steps to 2nd floor from main floor: One Flight  In the last 12 months, was there a time when you were not able to pay the mortgage or rent on time?: No  In the last 12 months, how many places have you lived?: 1  In the last 12 months, was there a time when you did not have a steady place to sleep or slept in a shelter (including now)?: No  Homeless/housing insecurity resource given?: No  Living Arrangements: Lives w/ Parent(s)  Is patient a ?: No    Activities of Daily Living Prior to Admission  Functional Status: Independent  Completes ADLs independently?: Yes  Ambulates independently?: Yes  Does patient use assisted devices?: Yes  Assisted Devices (DME) used:  Other (Comment) (communication book developed by mother to aid in his communication)  Does patient have a history of Outpatient Therapy (PT/OT)?: No  Does the patient have a history of Short-Term Rehab?: No  Does patient have a history of HHC?: No  Does patient currently have [address]?: No    Patient Information Continued  Income Source: SSI/SSD  Does patient have prescription coverage?: Yes  Within the past 12 months, you worried that your food would run out before you got the money to buy more : Never true  Within the past 12 months, the food you bought just didnt last and you didnt have money to get more : Never true  Food insecurity resource given?: No  Does patient receive dialysis treatments?: No  Does patient have a history of substance abuse?: No  Does patient have a history of Mental Health Diagnosis?: No (Pt is autistic)  Has patient received inpatient treatment related to mental health in the last 2 years?: No    Means of Transportation  Means of Transport to Appts[de-identified] Family transport  In the past 12 months, has lack of transportation kept you from medical appointments or from getting medications?: No  In the past 12 months, has lack of transportation kept you from meetings, work, or from getting things needed for daily living?: No  Was application for public transport provided?: No    DISCHARGE DETAILS:    Discharge planning discussed with[de-identified] pt mother Anika Lerner  [place] of Choice: No  Comments - [place] of Choice: REccoemndations for OP PT  Ashly Shipman would like to have pt reevaluated  Pt is ambulatory and although a bit sluggish is able to walk   At baseline pt was completely independent  CM contacted family/caregiver?: Yes  Were Treatment Team discharge recommendations reviewed with patient/caregiver?: Yes  Did patient/caregiver verbalize understanding of patient care needs?: Yes  Were patient/caregiver advised of the risks associated with not following Treatment Team discharge recommendations?: No- see comments (Reccomendations are for OP, pt parent is questioning this reccomendation)    Contacts  Patient Contacts: Anika Lerner  Relationship to Patient[de-identified] Family (Mother)  Contact Method: Phone,In Person  Reason/Outcome: Discharge Planning,Continuity of [address]         Is the patient interested in Uriah Mcintosh at discharge?: No    DME Referral Provided  Referral made for DME?: No    Additional Comments: Pt mother would like to speak with PT to determine if OP PT is needed and how he was evaluated

## 2022-02-28 NOTE — PLAN OF CARE
Problem: Potential for Falls  Goal: Patient will remain free of falls  Description: INTERVENTIONS:  - Educate patient/family on patient safety including physical limitations  - Instruct patient to call for assistance with activity   - Consult OT/PT to assist with strengthening/mobility   - Keep Call bell within reach  - Keep bed low and locked with side rails adjusted as appropriate  - Keep care items and personal belongings within reach  - Initiate and maintain comfort rounds  - Make Fall Risk Sign visible to staff  - Offer Toileting every  Hours, in advance of need  - Initiate/Maintain alarm  - Obtain necessary fall risk management equipment:  - Apply yellow socks and bracelet for high fall risk patients  - Consider moving patient to room near nurses station  Outcome: Progressing     Problem: PAIN - ADULT  Goal: Verbalizes/displays adequate comfort level or baseline comfort level  Description: Interventions:  - Encourage patient to monitor pain and request assistance  - Assess pain using appropriate pain scale  - Administer analgesics based on type and severity of pain and evaluate response  - Implement non-pharmacological measures as appropriate and evaluate response  - Consider cultural and social influences on pain and pain management  - Notify physician/advanced practitioner if interventions unsuccessful or patient reports new pain  Outcome: Progressing     Problem: INFECTION - ADULT  Goal: Absence or prevention of progression during hospitalization  Description: INTERVENTIONS:  - Assess and monitor for signs and symptoms of infection  - Monitor lab/diagnostic results  - Monitor all insertion sites, i e  indwelling lines, tubes, and drains  - Monitor endotracheal if appropriate and nasal secretions for changes in amount and color  - Neville appropriate cooling/warming therapies per order  - Administer medications as ordered  - Instruct and encourage patient and family to use good hand hygiene technique  - Identify and instruct in appropriate isolation precautions for identified infection/condition  Outcome: Progressing  Goal: Absence of fever/infection during neutropenic period  Description: INTERVENTIONS:  - Monitor WBC    Outcome: Progressing     Problem: SAFETY ADULT  Goal: Patient will remain free of falls  Description: INTERVENTIONS:  - Educate patient/family on patient safety including physical limitations  - Instruct patient to call for assistance with activity   - Consult OT/PT to assist with strengthening/mobility   - Keep Call bell within reach  - Keep bed low and locked with side rails adjusted as appropriate  - Keep care items and personal belongings within reach  - Initiate and maintain comfort rounds  - Make Fall Risk Sign visible to staff  - Offer Toileting every  Hours, in advance of need  - Initiate/Maintain alarm  - Obtain necessary fall risk management equipment:   - Apply yellow socks and bracelet for high fall risk patients  - Consider moving patient to room near nurses station  Outcome: Progressing  Goal: Maintain or return to baseline ADL function  Description: INTERVENTIONS:  -  Assess patient's ability to carry out ADLs; assess patient's baseline for ADL function and identify physical deficits which impact ability to perform ADLs (bathing, care of mouth/teeth, toileting, grooming, dressing, etc )  - Assess/evaluate cause of self-care deficits   - Assess range of motion  - Assess patient's mobility; develop plan if impaired  - Assess patient's need for assistive devices and provide as appropriate  - Encourage maximum independence but intervene and supervise when necessary  - Involve family in performance of ADLs  - Assess for home care needs following discharge   - Consider OT consult to assist with ADL evaluation and planning for discharge  - Provide patient education as appropriate  Outcome: Progressing  Goal: Maintains/Returns to pre admission functional level  Description: INTERVENTIONS:  - Perform BMAT or MOVE assessment daily    - Set and communicate daily mobility goal to care team and patient/family/caregiver  - Collaborate with rehabilitation services on mobility goals if consulted  - Perform Range of Motion times a day  - Reposition patient every hours  - Dangle patient  times a day  - Stand patient  times a day  - Ambulate patient  times a day  - Out of bed to chair  times a day   - Out of bed for meals  times a day  - Out of bed for toileting  - Record patient progress and toleration of activity level   Outcome: Progressing     Problem: DISCHARGE PLANNING  Goal: Discharge to home or other facility with appropriate resources  Description: INTERVENTIONS:  - Identify barriers to discharge w/patient and caregiver  - Arrange for needed discharge resources and transportation as appropriate  - Identify discharge learning needs (meds, wound care, etc )  - Arrange for interpretive services to assist at discharge as needed  - Refer to Case Management Department for coordinating discharge planning if the patient needs post-hospital services based on physician/advanced practitioner order or complex needs related to functional status, cognitive ability, or social support system  Outcome: Progressing     Problem: Knowledge Deficit  Goal: Patient/family/caregiver demonstrates understanding of disease process, treatment plan, medications, and discharge instructions  Description: Complete learning assessment and assess knowledge base    Interventions:  - Provide teaching at level of understanding  - Provide teaching via preferred learning methods  Outcome: Progressing     Problem: MOBILITY - ADULT  Goal: Maintain or return to baseline ADL function  Description: INTERVENTIONS:  -  Assess patient's ability to carry out ADLs; assess patient's baseline for ADL function and identify physical deficits which impact ability to perform ADLs (bathing, care of mouth/teeth, toileting, grooming, dressing, etc )  - Assess/evaluate cause of self-care deficits   - Assess range of motion  - Assess patient's mobility; develop plan if impaired  - Assess patient's need for assistive devices and provide as appropriate  - Encourage maximum independence but intervene and supervise when necessary  - Involve family in performance of ADLs  - Assess for home care needs following discharge   - Consider OT consult to assist with ADL evaluation and planning for discharge  - Provide patient education as appropriate  Outcome: Progressing  Goal: Maintains/Returns to pre admission functional level  Description: INTERVENTIONS:  - Perform BMAT or MOVE assessment daily    - Set and communicate daily mobility goal to care team and patient/family/caregiver  - Collaborate with rehabilitation services on mobility goals if consulted  - Perform Range of Motion  times a day  - Reposition patient every  hours    - Dangle patient  times a day  - Stand patient3 times a day  - Ambulate patient  times a day  - Out of bed to chair times a day   - Out of bed for meals  times a day  - Out of bed for toileting  - Record patient progress and toleration of activity level   Outcome: Progressing

## 2022-02-28 NOTE — PROGRESS NOTES
NEUROLOGY RESIDENCY PROGRESS NOTE     Name: Gia Browne   Age & Sex: 36 y o  male   MRN: [id number]  [facility]   Encounter: [id number]    Gia Browne will need follow up in in 4 weeks with epilepsy Attending  He will not require outpatient neurological testing  Pending for discharge: MRI brain with sedation    ASSESSMENT & PLAN     Hyponatremia  Assessment & Plan  - Sodium continues to be normal range  - Continue to monitor BMP  Suppurative otitis media of left ear  Assessment & Plan  - Recently treated with Z-aura  - ENT consulted and being treated for otitis externa  Brugada syndrome  Assessment & Plan  - S/p ICD    Active autistic disorder  Assessment & Plan  - At baseline, patient is independent with ADLs (can bathe, dress, and feed himself)  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  He is able to communicate and say a few words, but cannot hold a full conversation  He can follow simple commands  * Breakthrough seizure [facility])  Assessment & Plan  36 y o  left handed male with autism, Brugada syndrome s/p ICD (2021), and possible epilepsy due to unknown cause that manifests as apparent generalized tonic clonic seizures that began in 2016 currently on Keppra 500 mg BID who [facility] to [facility] on [facility] due to possible breakthrough seizure at home and another seizure while in the ED  Patient's mother found the patient lying in bed unresponsive, making grunting noises, and incontinent of urine  Upon arrival to the ED, patient was lethargic, but answering a few questions  However, while in the ED, patient suddenly made grunting noises, eyes were roving, and his body "stiffened "  Patient then began to have "twitching" all over his body and had left lateral tongue bite  Patient received a total of 4 mg of Ativan, but continued to twitch so then was loaded with Keppra 4 g with resolution of seizure-like activity  CT head negative for acute intracranial abnormalities      Labs revealed sodium 127, WBC 16 9, and Prolactin 23 3  Lactic acid WNL (1 9)  Patient has been drinking a lot more water than usual over the last few days    Routine EEG completed 2/25/2022 demonstrated subclinical seizure  Patient was given Vimpat 400 mg x 1 and started on 150 mg Q12H and Keppra dose was increased to 1500 mg BID  Patient was placed on vEEG monitoring  LP completed and CSF results thus far: protein 63, glucose 67, WBCs 1, RBCs 1400, gram stain with rare polys and no organisms, culture with no growth, ME panel negative  Patient evaluated on 2/27/2022, patient continues to be more alert and interactive, but not quite back to baseline per patient's mother at bedside  2/28/2022: Patient not quite at baseline mentally according to mother due to latency of speech but otherwise mentally baseline    Etiology for breakthrough seizures likely due to hyponatremia  However, will continue to evaluate for infectious etiology, intracranial pathology  Plan:  - vEEG discontinued  - MRI brain w/wo contrast pending    - Continue to follow CSF studies  Culture, HSV 1,2 DNA PCR and VZV DNA PCR pending   - If blood cultures come back negative for 2/28 will discontinue abx  - S/p Ativan 4 mg, Keppra 4 g load, Vimpat 400 mg load  - Continue on Keppra 1500 mg BID and Vimpat 150 mg Q12H  -Will monitor again tomorrow/to Wednesday, if no change in mental status, consider decreasing keppra to 750mg BID  - Continue to monitor sodium closely  - Seizure precautions  - PRN ativan for prolonged seizure-like activity  - Telemtery  - Patient does not drive  - Frequent neuro checks  Continue to monitor and notify neurology with any changes  - Medical management and supportive care per primary team  Correction of any metabolic or infectious disturbances  SUBJECTIVE     Patient was seen and examined  No acute events overnight  Patient seen with mother   Patient is verbal but is saying 1-2 word responses which seems baseline  According to mother, patient continues to have slow responses and slow cognition  Review of Systems   Unable to perform ROS: Other   Autism    OBJECTIVE     Patient ID: Daved Cockayne is a 36 y o  male  Vitals:    22 0600 22 0734 22 1503 22 1610   BP:  133/91 121/75    Pulse:  101 95 91   Resp:       Temp:    98 9 °F (37 2 °C)   TempSrc:       SpO2:  95% 95% 96%   Weight: 84 kg (185 lb 3 oz)      Height:          Temperature:   Temp (24hrs), Av 2 °F (36 8 °C), Min:97 5 °F (36 4 °C), Max:98 9 °F (37 2 °C)    Temperature: 98 9 °F (37 2 °C)      Physical Exam  Eyes:      Extraocular Movements: EOM normal       Pupils: Pupils are equal, round, and reactive to light  Neurological:      Deep Tendon Reflexes:      Reflex Scores:       Bicep reflexes are 2+ on the right side and 2+ on the left side  Brachioradialis reflexes are 2+ on the right side and 2+ on the left side  Patellar reflexes are 3+ on the right side and 3+ on the left side  Achilles reflexes are 2+ on the right side and 2+ on the left side  Neurologic Exam     Mental Status   Oriented to person  Oriented to place  Attention: decreased  Concentration: decreased  Level of consciousness: alert  Able to name object  States 1-2 word responses     Cranial Nerves     CN II   Visual fields full to confrontation  CN III, IV, VI   Pupils are equal, round, and reactive to light  Extraocular motions are normal      CN V   Facial sensation intact  CN VII   Facial expression full, symmetric       CN XII   CN XII normal      Motor Exam Patient's bilateral upper and lower extremities antigravity and have spontaneous movements    Deferred formal motor exam because difficulty of patient following commands     Sensory Exam   Light touch normal      Gait, Coordination, and Reflexes     Reflexes   Right brachioradialis: 2+  Left brachioradialis: 2+  Right biceps: 2+  Left biceps: 2+  Right patellar: 3+  Left patellar: 3+  Right achilles: 2+  Left achilles: 2+  Right plantar: normal  Left plantar: normalDeferred finger to nose testing because patient's difficulty with following commands            LABORATORY DATA     Labs: I have personally reviewed pertinent reports  Results from last 7 days   Lab Units 02/27/22  0512 02/26/22  0520 02/25/22  1748 02/25/22  0957 02/25/22  0957   WBC Thousand/uL 13 24* 8 45 13 20*   < > 16 90*   HEMOGLOBIN g/dL 14 5 14 8 13 0   < > 14 9   HEMATOCRIT % 41 7 41 5 34 2*   < > 41 1   PLATELETS Thousands/uL 183 169 104*   < > 162   NEUTROS PCT %  --   --   --   --  92*   MONOS PCT %  --   --   --   --  5   MONO PCT %  --   --  2*  --   --     < > = values in this interval not displayed  Results from last 7 days   Lab Units 02/28/22  0529 02/27/22  0512 02/26/22  1236 02/26/22  0520 02/26/22  0125 02/25/22  1455 02/25/22  0957   SODIUM mmol/L 140 140 135*  --    < >   < > 127*   POTASSIUM mmol/L 4 0 4 0 4 3  --    < >   < > 3 5   CHLORIDE mmol/L 108 110* 112*  --    < >   < > 95*   CO2 mmol/L 25 26 18*  --    < >   < > 24   BUN mg/dL 14 12 9  --    < >   < > 12   CREATININE mg/dL 0 84 0 78 0 78  --    < >   < > 0 74   CALCIUM mg/dL 9 4 9 3 8 9  --    < >   < > 8 6   ALK PHOS U/L  --   --   --  62  --   --  67   ALT U/L  --   --   --  33  --   --  32   AST U/L  --   --   --  38  --   --  34    < > = values in this interval not displayed  Results from last 7 days   Lab Units 02/26/22  0520   MAGNESIUM mg/dL 2 2     Results from last 7 days   Lab Units 02/26/22  0520   PHOSPHORUS mg/dL 2 9      Results from last 7 days   Lab Units 02/25/22  1908   INR  1 03   PTT seconds 31     Results from last 7 days   Lab Units 02/25/22  1455   LACTIC ACID mmol/L 1 1           IMAGING & DIAGNOSTIC TESTING     Radiology Results: I have personally reviewed pertinent reports        XR chest 1 view portable   Final Result by Sue Floyd DO (02/25 2357) Borderline cardiomegaly  No acute pulmonary disease  Workstation performed: [id number]         CT head without contrast   Final Result by Orlando Navarro MD (02/25 1138)      No acute intracranial abnormality  Workstation performed: [id number]         MRI inpatient order    (Results Pending)       Other Diagnostic Testing: I have personally reviewed pertinent reports  ACTIVE MEDICATIONS     Current Facility-Administered Medications   Medication Dose Route Frequency    acetaminophen (TYLENOL) tablet 650 mg  650 mg Oral Q6H PRN    cefTRIAXone (ROCEPHIN) 2,000 mg in dextrose 5 % 50 mL IVPB  2,000 mg Intravenous Q12H    enoxaparin (LOVENOX) subcutaneous injection 40 mg  40 mg Subcutaneous Q24H HAZEL    lacosamide (VIMPAT) tablet 150 mg  150 mg Oral Q12H HAZEL    levETIRAcetam (KEPPRA) tablet 1,500 mg  1,500 mg Oral Q12H HAZEL    LORazepam (ATIVAN) injection 2 mg  2 mg Intravenous Q6H PRN    ofloxacin (OCUFLOX) 0 3 % ophthalmic solution 4 drop  4 drop Otic BID    ondansetron (ZOFRAN) injection 4 mg  4 mg Intravenous Once    vancomycin (VANCOCIN) 1,250 mg in sodium chloride 0 9 % 250 mL IVPB  15 mg/kg Intravenous Q8H       Prior to Admission medications    Medication Sig Start Date End Date Taking?  Authorizing Provider   acetaminophen (TYLENOL) 325 mg tablet Take 3 tablets (975 mg total) by mouth every 6 (six) hours as needed for mild pain, headaches or fever 3/31/21   Eric Stephens MD   levETIRAcetam (KEPPRA) 500 mg tablet Take 1 tablet (500 mg total) by mouth every 12 (twelve) hours 2/10/22   LIAM Lopez   Loratadine (CLARITIN) 10 MG CAPS Take 10 mg by mouth as needed      Historical Provider, MD   mupirocin (BACTROBAN) 2 % ointment Apply topically 3 (three) times a day 2/60/15   Jarocho Calvin, DO         VTE Pharmacologic Prophylaxis: Enoxaparin (Lovenox)  VTE Mechanical Prophylaxis: sequential compression device    ==  Cameron Ruth MD [facility] Residency, PGY-2

## 2022-02-28 NOTE — UTILIZATION REVIEW
Inpatient Admission Authorization Request   NOTIFICATION OF INPATIENT ADMISSION/INPATIENT AUTHORIZATION REQUEST   SERVICING FACILITY:   [facility]  Address: [address] [place] [address], [address] [place] [address]  Tax ID: [id number]  NPI: [id number]  Place of Service: Inpatient [address] [facility] Code: 24     ATTENDING PROVIDER:  Attending Name and NPI#: Emery Reese [[id number]]  Address: [address] [place] [address], [address] [place] [id number]  Phone: [phone]     UTILIZATION REVIEW CONTACT:  [facility] Utilization   Network Utilization Review Department  Phone: [phone]  Fax: [phone]  Email: [email or web address]     PHYSICIAN ADVISORY SERVICES:  FOR [id number]-[facility] REVIEW - MEDICAL NECESSITY DENIAL  Phone: [phone]  Fax: [phone]  Email: [email or web address]     TYPE OF REQUEST:  Inpatient Status     ADMISSION INFORMATION:  ADMISSION DATE/TIME: 2/25/22 12:36 PM  PATIENT DIAGNOSIS CODE/DESCRIPTION:  Hematemesis [K92 0]  Hyponatremia [E87 1]  Seizure ([address] ) [R56 9]  Altered mental status [R41 82]  Elevated blood pressure reading [R03 0]  Seizure disorder ([address] ) [G40 909]  Suppurative otitis media of left ear [H66 42]  Breakthrough seizure ([facility] ) [G40 919]  DISCHARGE DATE/TIME: No discharge date for patient encounter  DISCHARGE DISPOSITION (IF DISCHARGED): Home/Self Care     IMPORTANT INFORMATION:  Please contact the [facility] directly with any questions or concerns regarding this request  Department voicemails are confidential     Send requests for admission clinical reviews, concurrent reviews, approvals, and administrative denials due to lack of clinical to fax [phone]

## 2022-02-28 NOTE — PROGRESS NOTES
[address] [facility]  Progress Note Kendall Edmonds [facility] 1990, 36 y o  male MRN: [id number]  Unit/Bed#: [facility] 723-01 Encounter: [id number]  Primary Care Provider: Sobeida Guzman DO   Date and time admitted to hospital: 2/25/2022  9:30 AM    * Breakthrough seizure [facility])  Assessment & Plan  · Suspecting breakthrough seizure secondary to hyponatremia, possibly abx/infection  Infectious work-up negative, if cultures remain negative today, d/c abx per ID   · Management as per primary team, neurology  S/p Keppra load and IV Ativan   · Was on vEEG, no further activity seen, now discontinued   · Follow up CSF studies   · Continue Keppra 1500 mg BID and Vimpat 150 mg Q12hr     Left otitis externa  Assessment & Plan  · ENT evaluated -- will need outpt f/u   · Continue ofloxacin drops, four drops each ear BID     Hyponatremia  Assessment & Plan  · POA, Na 127  Possible etiology of breakthrough seizure -- see related plan  · Now resolved with IV fluids, Na 140  · Monitor BMP     Brugada syndrome  Assessment & Plan  · S/p ICD interrogation, no arrhythmias found     Active autistic disorder  Assessment & Plan  Per mother patient is independent at baseline          VTE Pharmacologic Prophylaxis: VTE Score: 2 Low Risk (Score 0-2) - Encourage Ambulation  On Lovenox     Patient Centered Rounds: I performed bedside rounds with nursing staff today  Discussions with Specialists or Other Care Team Provider: RN     Education and Discussions with Family / Patient: Updated  (mother) at bedside  Time Spent for Care: 30 minutes  More than 50% of total time spent on counseling and coordination of care as described above      Current Length of Stay: 3 day(s)  Current Patient Status: Inpatient   Certification Statement: The patient will continue to require additional inpatient hospital stay due to per primary   Discharge Plan: per primary team    Code Status: Level 1 - Full Code    Subjective: Patient occasionally speaks but mom provides most of the history  No acute complaints other than "sluggishness" and wants to know if seizure meds can be reduced  Objective:     Vitals:   Temp (24hrs), Av 9 °F (36 6 °C), Min:97 5 °F (36 4 °C), Max:98 5 °F (36 9 °C)    Temp:  [97 5 °F (36 4 °C)-98 5 °F (36 9 °C)] 97 5 °F (36 4 °C)  HR:  [] 101  Resp:  [18-20] 18  BP: (114-133)/(71-91) 133/91  SpO2:  [95 %-98 %] 95 %  Body mass index is 25 12 kg/m²  Input and Output Summary (last 24 hours): Intake/Output Summary (Last 24 hours) at 2022 1132  Last data filed at 2022 0734  Gross per 24 hour   Intake 360 ml   Output --   Net 360 ml       Physical Exam:   Physical Exam  Vitals reviewed  Constitutional:       General: He is not in acute distress  Appearance: He is not toxic-appearing  HENT:      Head: Normocephalic and atraumatic  Eyes:      Extraocular Movements: Extraocular movements intact  Cardiovascular:      Rate and Rhythm: Normal rate and regular rhythm  Pulmonary:      Effort: Pulmonary effort is normal  No respiratory distress  Breath sounds: Normal breath sounds  Abdominal:      General: Bowel sounds are normal  There is no distension  Palpations: Abdomen is soft  Tenderness: There is no abdominal tenderness  Musculoskeletal:         General: Normal range of motion  Cervical back: Normal range of motion  Neurological:      General: No focal deficit present  Mental Status: He is alert and oriented to person, place, and time  Comments: Occasionally speaks when prompted by mom who is at bedside     Psychiatric:         Behavior: Behavior normal            Additional Data:     Labs:  Results from last 7 days   Lab Units 22  0512 22  0520 22  1748 22  0957 22  0957   WBC Thousand/uL 13 24*   < > 13 20*   < > 16 90*   HEMOGLOBIN g/dL 14 5   < > 13 0   < > 14 9   HEMATOCRIT % 41 7   < > 34 2*   < > 41 1 PLATELETS Thousands/uL 183   < > 104*   < > 162   BANDS PCT %  --   --  16*  --   --    NEUTROS PCT %  --   --   --   --  92*   LYMPHS PCT %  --   --   --   --  3*   LYMPHO PCT %  --   --  3*  --   --    MONOS PCT %  --   --   --   --  5   MONO PCT %  --   --  2*  --   --    EOS PCT %  --   --  0  --  0    < > = values in this interval not displayed  Results from last 7 days   Lab Units 02/28/22  0529 02/26/22  1236 02/26/22  0520   SODIUM mmol/L 140   < >  --    POTASSIUM mmol/L 4 0   < >  --    CHLORIDE mmol/L 108   < >  --    CO2 mmol/L 25   < >  --    BUN mg/dL 14   < >  --    CREATININE mg/dL 0 84   < >  --    ANION GAP mmol/L 7   < >  --    CALCIUM mg/dL 9 4   < >  --    ALBUMIN g/dL  --   --  3 4*   TOTAL BILIRUBIN mg/dL  --   --  0 53   ALK PHOS U/L  --   --  62   ALT U/L  --   --  33   AST U/L  --   --  38   GLUCOSE RANDOM mg/dL 121   < >  --     < > = values in this interval not displayed  Results from last 7 days   Lab Units 02/25/22  1908   INR  1 03     Results from last 7 days   Lab Units 02/25/22  1011   POC GLUCOSE mg/dl 106         Results from last 7 days   Lab Units 02/26/22  0521 02/25/22  1908 02/25/22  1455 02/25/22  1213   LACTIC ACID mmol/L  --   --  1 1 1 9   PROCALCITONIN ng/ml 0 23 0 30*  --   --        Lines/Drains:  Invasive Devices  Report    Peripheral Intravenous Line            Peripheral IV 02/25/22 Right Forearm 3 days                      Imaging: No pertinent imaging reviewed  Recent Cultures (last 7 days):   Results from last 7 days   Lab Units 02/25/22  1915 02/25/22  1908   BLOOD CULTURE   --  No Growth at 48 hrs  No Growth at 48 hrs     GRAM STAIN RESULT  Rare Polys  No organisms seen  --        Last 24 Hours Medication List:   Current Facility-Administered Medications   Medication Dose Route Frequency Provider Last Rate    acetaminophen  650 mg Oral Q6H PRN Zac Jurado PA-C      cefTRIAXone  2,000 mg Intravenous Q12H Jumana Garnett PA-C 2,000 mg (02/28/22 1125)    enoxaparin  40 mg Subcutaneous Q24H [address] [facility] TERESA Walsh      lacosamide  150 mg Oral Q12H [address] [place], [place]      levETIRAcetam  1,500 mg Oral Q12H [address] [place], [place]      LORazepam  2 mg Intravenous Q6H PRN Rain Scott PA-C      ofloxacin  4 drop Otic BID [place], [place]      ondansetron  4 mg Intravenous Once Kymberly Ybarra      vancomycin  15 mg/kg Intravenous [address] 1,250 mg (02/28/22 2163)        Today, Patient Was Seen By: Vitaliy Sethi PA-C    **Please Note: This note may have been constructed using a voice recognition system  **

## 2022-02-28 NOTE — NURSING NOTE
Pt will require MRI with anesthesia  Spoke to Zina in MRI about [employer] and needing anesthesia for pt  MRI is to be done on Wednesday, March 2 @ 1100  NPO after midnight on Tuesday, March 1

## 2022-02-28 NOTE — PROGRESS NOTES
Progress Note - Infectious Disease   Gia Browne 36 y o  male MRN: [id number]  Unit/Bed#: [facility] 723-01 Encounter: [id number]      Impression:  1  Seizure disorder with postictal state  R/0 underlying infection component  2  Autism  3  Brugada syndrome s/p AICD insertion     Recommendations:  Patient is afebrile, ambulatory and nearly back to his baseline status  Discussed with the patient's mother who is at bedside  WBC count is elevated at 13,240  No evidence of infectious component  2  If cultures remain negative tomorrow would discontinue ceftriaxone and vancomycin  3  MRI pending     Antibiotics:  1  Ceftriaxone 2 g q 12 hours IV, day 3 Rx  2  Vancomycin 1250 mg q 8 hours IV, day 3 Rx    Subjective:  He is nonverbal      Objective:  Vitals:  Temp:  [97 5 °F (36 4 °C)-98 5 °F (36 9 °C)] 97 5 °F (36 4 °C)  HR:  [68-96] 88  Resp:  [15-25] 18  BP: ()/(58-81) 114/71  SpO2:  [96 %-98 %] 97 %  Temp (24hrs), Av °F (36 7 °C), Min:97 5 °F (36 4 °C), Max:98 5 °F (36 9 °C)  Current: Temperature: 97 5 °F (36 4 °C)    Physical Exam:     General Appearance:  Alert, nontoxic, nonverbal, no acute distress  Throat: Oropharynx moist without lesions  Lips, mucosa, and tongue normal   Neck: Supple, symmetrical, trachea midline, no adenopathy,  no tenderness/mass/nodules   Lungs:   Clear to auscultation bilaterally, no audible wheezes, rhonchi or rales; respirations unlabored   Heart:  Regular rate and rhythm, S1, S2 normal, no murmur, rub or gallop  Left subclavian ICD with incisional scar  Abdomen:   Soft, non-tender, non-distended, positive bowel sounds  No masses, no organomegaly    No CVA tenderness   Extremities: Extremities normal, atraumatic, no clubbing, cyanosis or edema   Skin: As above           Invasive Devices  Report    Peripheral Intravenous Line            Peripheral IV 22 Right Forearm 2 days                Labs, Imaging, & Other studies:   All pertinent labs were personally reviewed  Results from last 7 days   Lab Units 02/27/22  0512 02/26/22  0520 02/25/22  1748   WBC Thousand/uL 13 24* 8 45 13 20*   HEMOGLOBIN g/dL 14 5 14 8 13 0   PLATELETS Thousands/uL 183 169 104*     Results from last 7 days   Lab Units 02/27/22  0512 02/26/22  1236 02/26/22  1236 02/26/22  0520 02/26/22  0125 02/26/22  0125 02/25/22  1455 02/25/22  0957 02/25/22  0957   SODIUM mmol/L 140  --  135*  --   --  136   < >  --  127*   POTASSIUM mmol/L 4 0   < > 4 3  --    < > 3 9   < >  --  3 5   CHLORIDE mmol/L 110*   < > 112*  --    < > 107   < >  --  95*   CO2 mmol/L 26   < > 18*  --    < > 22   < >  --  24   BUN mg/dL 12   < > 9  --    < > 9   < >  --  12   CREATININE mg/dL 0 78   < > 0 78  --    < > 0 69   < >  --  0 74   EGFR ml/min/1 73sq m 121   < > 121  --    < > 128   < >  --  124   CALCIUM mg/dL 9 3   < > 8 9  --    < > 9 0   < >  --  8 6   AST U/L  --   --   --  38  --   --   --   --  34   ALT U/L  --   --   --  33  --   --   --    < > 32   ALK PHOS U/L  --   --   --  62  --   --   --    < > 67    < > = values in this interval not displayed  Results from last 7 days   Lab Units 02/25/22  1915 02/25/22  1908   BLOOD CULTURE   --  No Growth at 24 hrs  No Growth at 24 hrs     GRAM STAIN RESULT  Rare Polys  No organisms seen  --

## 2022-02-28 NOTE — PLAN OF CARE
Problem: Potential for Falls  Goal: Patient will remain free of falls  Description: INTERVENTIONS:  - Educate patient/family on patient safety including physical limitations  - Instruct patient to call for assistance with activity   - Consult OT/PT to assist with strengthening/mobility   - Keep Call bell within reach  - Keep bed low and locked with side rails adjusted as appropriate  - Keep care items and personal belongings within reach  - Initiate and maintain comfort rounds  - Make Fall Risk Sign visible to staff  - Obtain necessary fall risk management equipment    - Apply yellow socks and bracelet for high fall risk patients  - Consider moving patient to room near nurses station  Outcome: Progressing     Problem: PAIN - ADULT  Goal: Verbalizes/displays adequate comfort level or baseline comfort level  Description: Interventions:  - Encourage patient to monitor pain and request assistance  - Assess pain using appropriate pain scale  - Administer analgesics based on type and severity of pain and evaluate response  - Implement non-pharmacological measures as appropriate and evaluate response  - Notify physician/advanced practitioner if interventions unsuccessful or patient reports new pain  Outcome: Progressing     Problem: INFECTION - ADULT  Goal: Absence or prevention of progression during hospitalization  Description: INTERVENTIONS:  - Assess and monitor for signs and symptoms of infection  - Monitor lab/diagnostic results  - Monitor all insertion sites, i e  indwelling lines, tubes, and drains  - [place] appropriate cooling/warming therapies per order  - Administer medications as ordered  - Instruct and encourage patient and family to use good hand hygiene technique  - Identify and instruct in appropriate isolation precautions for identified infection/condition  Outcome: Progressing     Problem: SAFETY ADULT  Goal: Patient will remain free of falls  Description: INTERVENTIONS:  - Educate patient/family on patient safety including physical limitations  - Instruct patient to call for assistance with activity   - Consult OT/PT to assist with strengthening/mobility   - Keep Call bell within reach  - Keep bed low and locked with side rails adjusted as appropriate  - Keep care items and personal belongings within reach  - Initiate and maintain comfort rounds  - Make Fall Risk Sign visible to staff  - Obtain necessary fall risk management equipment  - Apply yellow socks and bracelet for high fall risk patients  - Consider moving patient to room near nurses station  Outcome: Progressing  Goal: Maintain or return to baseline ADL function  Description: INTERVENTIONS:  -  Assess patient's ability to carry out ADLs; assess patient's baseline for ADL function and identify physical deficits which impact ability to perform ADLs (bathing, care of mouth/teeth, toileting, grooming, dressing, etc )  - Assess/evaluate cause of self-care deficits   - Assess range of motion  - Assess patient's mobility; develop plan if impaired  - Assess patient's need for assistive devices and provide as appropriate  - Encourage maximum independence but intervene and supervise when necessary  - Involve family in performance of ADLs  - Assess for home care needs following discharge   - Consider OT consult to assist with ADL evaluation and planning for discharge  - Provide patient education as appropriate  Outcome: Progressing  Goal: Maintains/Returns to pre admission functional level  Description: INTERVENTIONS:  - Perform BMAT or MOVE assessment daily    - Set and communicate daily mobility goal to care team and patient/family/caregiver     - Collaborate with rehabilitation services on mobility goals if consulted  - Ambulate patient 3 times a day  - Out of bed for toileting  - Record patient progress and toleration of activity level   Outcome: Progressing     Problem: DISCHARGE PLANNING  Goal: Discharge to home or other facility with appropriate resources  Description: INTERVENTIONS:  - Identify barriers to discharge w/patient and caregiver  - Arrange for needed discharge resources and transportation as appropriate  - Identify discharge learning needs (meds, wound care, etc )  - Refer to Case Management Department for coordinating discharge planning if the patient needs post-hospital services based on physician/advanced practitioner order or complex needs related to functional status, cognitive ability, or social support system  Outcome: Progressing     Problem: Knowledge Deficit  Goal: Patient/family/caregiver demonstrates understanding of disease process, treatment plan, medications, and discharge instructions  Description: Complete learning assessment and assess knowledge base    Interventions:  - Provide teaching at level of understanding  - Provide teaching via preferred learning methods  Outcome: Progressing     Problem: MOBILITY - ADULT  Goal: Maintain or return to baseline ADL function  Description: INTERVENTIONS:  -  Assess patient's ability to carry out ADLs; assess patient's baseline for ADL function and identify physical deficits which impact ability to perform ADLs (bathing, care of mouth/teeth, toileting, grooming, dressing, etc )  - Assess/evaluate cause of self-care deficits   - Assess range of motion  - Assess patient's mobility; develop plan if impaired  - Assess patient's need for assistive devices and provide as appropriate  - Encourage maximum independence but intervene and supervise when necessary  - Involve family in performance of ADLs  - Assess for home care needs following discharge   - Consider OT consult to assist with ADL evaluation and planning for discharge  - Provide patient education as appropriate  Outcome: Progressing  Goal: Maintains/Returns to pre admission functional level  Description: INTERVENTIONS:  - Perform BMAT or MOVE assessment daily    - Set and communicate daily mobility goal to care team and patient/family/caregiver     - Collaborate with rehabilitation services on mobility goals if consulted  - Ambulate patient 3 times a day  - Out of bed for toileting  - Record patient progress and toleration of activity level   Outcome: Progressing

## 2022-03-01 ENCOUNTER — ANESTHESIA (OUTPATIENT)
Dept: ANESTHESIOLOGY | Facility: HOSPITAL | Age: 32
End: 2022-03-01

## 2022-03-01 ENCOUNTER — ANESTHESIA EVENT (OUTPATIENT)
Dept: ANESTHESIOLOGY | Facility: HOSPITAL | Age: 32
End: 2022-03-01

## 2022-03-01 LAB
ANION GAP SERPL CALCULATED.3IONS-SCNC: 5 MMOL/L (ref 4–13)
BUN SERPL-MCNC: 15 MG/DL (ref 5–25)
CALCIUM SERPL-MCNC: 9.4 MG/DL (ref 8.3–10.1)
CHLORIDE SERPL-SCNC: 107 MMOL/L (ref 100–108)
CO2 SERPL-SCNC: 29 MMOL/L (ref 21–32)
CREAT SERPL-MCNC: 0.86 MG/DL (ref 0.6–1.3)
GFR SERPL CREATININE-BSD FRML MDRD: 117 ML/MIN/1.73SQ M
GLUCOSE SERPL-MCNC: 129 MG/DL (ref 65–140)
GLUCOSE SERPL-MCNC: 93 MG/DL (ref 65–140)
LEVETIRACETAM SERPL-MCNC: 4.6 UG/ML (ref 10–40)
POTASSIUM SERPL-SCNC: 3.9 MMOL/L (ref 3.5–5.3)
SODIUM SERPL-SCNC: 141 MMOL/L (ref 136–145)

## 2022-03-01 PROCEDURE — 99232 SBSQ HOSP IP/OBS MODERATE 35: CPT | Performed by: PHYSICIAN ASSISTANT

## 2022-03-01 PROCEDURE — 80048 BASIC METABOLIC PNL TOTAL CA: CPT | Performed by: PHYSICIAN ASSISTANT

## 2022-03-01 PROCEDURE — 82948 REAGENT STRIP/BLOOD GLUCOSE: CPT

## 2022-03-01 PROCEDURE — 99231 SBSQ HOSP IP/OBS SF/LOW 25: CPT | Performed by: INTERNAL MEDICINE

## 2022-03-01 PROCEDURE — 99233 SBSQ HOSP IP/OBS HIGH 50: CPT | Performed by: STUDENT IN AN ORGANIZED HEALTH CARE EDUCATION/TRAINING PROGRAM

## 2022-03-01 RX ADMIN — LEVETIRACETAM 1500 MG: 750 TABLET, FILM COATED ORAL at 08:27

## 2022-03-01 RX ADMIN — OFLOXACIN 4 DROP: 3 SOLUTION OPHTHALMIC at 08:32

## 2022-03-01 RX ADMIN — LACOSAMIDE 150 MG: 150 TABLET, FILM COATED ORAL at 08:28

## 2022-03-01 RX ADMIN — LACOSAMIDE 150 MG: 150 TABLET, FILM COATED ORAL at 20:06

## 2022-03-01 RX ADMIN — ENOXAPARIN SODIUM 40 MG: 40 INJECTION SUBCUTANEOUS at 08:28

## 2022-03-01 RX ADMIN — LEVETIRACETAM 1500 MG: 750 TABLET, FILM COATED ORAL at 20:06

## 2022-03-01 RX ADMIN — OFLOXACIN 4 DROP: 3 SOLUTION OPHTHALMIC at 16:44

## 2022-03-01 NOTE — PROGRESS NOTES
Progress Note - Infectious Disease   Ambika Monsalve 36 y o  male MRN: [id number]  Unit/Bed#: [facility] 723-01 Encounter: [id number]      Impression:  1  Seizure disorder with postictal state  R/0 underlying infection component  2  Autism  3  Brugada syndrome s/p AICD insertion     Recommendations:  Patient is afebrile, ambulatory and nearly back to his baseline status  Discussed with the patient's mother who is at bedside  WBC count is elevated at 13,240 when last taken  No evidence of infectious component  2  Will discontinue ceftriaxone and vancomycin  3  MRI pending possibly on Wed with sedation    Antibiotics:  1  None    Subjective:  He is nonverbal      Objective:  Vitals:  Temp:  [97 5 °F (36 4 °C)-98 9 °F (37 2 °C)] 98 9 °F (37 2 °C)  HR:  [] 91  BP: (114-133)/(71-91) 121/75  SpO2:  [95 %-96 %] 96 %  Temp (24hrs), Av 2 °F (36 8 °C), Min:97 5 °F (36 4 °C), Max:98 9 °F (37 2 °C)  Current: Temperature: 98 9 °F (37 2 °C)    Physical Exam:     General Appearance:  Alert, nontoxic, nonverbal, no acute distress  Throat: Oropharynx moist without lesions  Lips, mucosa, and tongue normal   Neck: Supple, symmetrical, trachea midline, no adenopathy,  no tenderness/mass/nodules   Lungs:   Clear to auscultation bilaterally, no audible wheezes, rhonchi or rales; respirations unlabored   Heart:  Regular rate and rhythm, S1, S2 normal, no murmur, rub or gallop  Left subclavian ICD with incisional scar  Abdomen:   Soft, non-tender, non-distended, positive bowel sounds  No masses, no organomegaly    No CVA tenderness   Extremities: Extremities normal, atraumatic, no clubbing, cyanosis or edema   Skin: As above           Invasive Devices  Report    Peripheral Intravenous Line            Peripheral IV 22 Right Forearm 3 days                Labs, Imaging, & Other studies:   All pertinent labs were personally reviewed  Results from last 7 days   Lab Units 22  0512 22  0520 22  6008   WBC Thousand/uL 13 24* 8 45 13 20*   HEMOGLOBIN g/dL 14 5 14 8 13 0   PLATELETS Thousands/uL 183 169 104*     Results from last 7 days   Lab Units 02/28/22  0529 02/27/22  0512 02/27/22  0512 02/26/22  1236 02/26/22  1236 02/26/22  0520 02/26/22  0125 02/25/22  1455 02/25/22  0957   SODIUM mmol/L 140  --  140  --  135*  --    < >   < > 127*   POTASSIUM mmol/L 4 0   < > 4 0   < > 4 3  --    < >   < > 3 5   CHLORIDE mmol/L 108   < > 110*   < > 112*  --    < >   < > 95*   CO2 mmol/L 25   < > 26   < > 18*  --    < >   < > 24   BUN mg/dL 14   < > 12   < > 9  --    < >   < > 12   CREATININE mg/dL 0 84   < > 0 78   < > 0 78  --    < >   < > 0 74   EGFR ml/min/1 73sq m 118   < > 121   < > 121  --    < >   < > 124   CALCIUM mg/dL 9 4   < > 9 3   < > 8 9  --    < >   < > 8 6   AST U/L  --   --   --   --   --  38  --   --  34   ALT U/L  --   --   --   --   --  33  --    < > 32   ALK PHOS U/L  --   --   --   --   --  62  --    < > 67    < > = values in this interval not displayed  Results from last 7 days   Lab Units 02/25/22 1915 02/25/22  1908   BLOOD CULTURE   --  No Growth at 48 hrs  No Growth at 48 hrs     GRAM STAIN RESULT  Rare Polys  No organisms seen  --

## 2022-03-01 NOTE — PROGRESS NOTES
NEUROLOGY RESIDENCY PROGRESS NOTE     Name: Adam Arciniega   Age & Sex: 36 y o  male   MRN: [id number]  [facility]   Encounter: [id number]    Adam Arciniega will need follow up in in 4 weeks with epilepsy AP or attending  He will not require outpatient neurological testing  Pending for discharge: MRI brain with sedation    ASSESSMENT & PLAN     Hyponatremia  Assessment & Plan  - Sodium continues to be normal range  - Continue to monitor BMP  Suppurative otitis media of left ear  Assessment & Plan  - Recently treated with Z-aura  - ENT consulted and being treated for otitis externa  Brugada syndrome  Assessment & Plan  - S/p ICD    Active autistic disorder  Assessment & Plan  - At baseline, patient is independent with ADLs (can bathe, dress, and feed himself)  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  He is able to communicate and say a few words, but cannot hold a full conversation  He can follow simple commands  * Breakthrough seizure [facility])  Assessment & Plan  36 y o  left handed male with autism, Brugada syndrome s/p ICD (2021), and possible epilepsy due to unknown cause that manifests as apparent generalized tonic clonic seizures that began in 2016 currently on Keppra 500 mg BID who presented to [facility] on 2/25/2022 due to possible breakthrough seizure at home and another seizure while in the ED  Patient's mother found the patient lying in bed unresponsive, making grunting noises, and incontinent of urine  Upon arrival to the ED, patient was lethargic, but answering a few questions  However, while in the ED, patient suddenly made grunting noises, eyes were roving, and his body "stiffened "  Patient then began to have "twitching" all over his body and had left lateral tongue bite  Patient received a total of 4 mg of Ativan, but continued to twitch so then was loaded with Keppra 4 g with resolution of seizure-like activity  CT head negative for acute intracranial abnormalities  Labs revealed sodium 127, WBC 16 9, and Prolactin 23 3  Lactic acid WNL (1 9)  Patient has been drinking a lot more water than usual over the last few days    Routine EEG completed 2/25/2022 demonstrated subclinical seizure  Patient was given Vimpat 400 mg x 1 and started on 150 mg Q12H and Keppra dose was increased to 1500 mg BID  Patient was placed on vEEG monitoring  LP completed and CSF results thus far: protein 63, glucose 67, WBCs 1, RBCs 1400, gram stain with rare polys and no organisms, culture with no growth, ME panel negative  Patient evaluated on 2/27/2022, patient continues to be more alert and interactive, but not quite back to baseline per patient's mother at bedside  2/28/2022: Patient not quite at baseline mentally according to mother due to latency of speech but otherwise mentally baseline    Etiology for breakthrough seizures likely unprovoked but with added factor of hyponatremia  Not likely infectious  Will check MRI brain w and wo contrast for structural etiologies of breakthrough seizures    Plan:  - vEEG discontinued  - MRI brain w/wo contrast pending    - Continue to follow CSF studies  Culture, HSV 1,2 DNA PCR and VZV DNA PCR pending   - Abx discontinued due to negative blood cultures  - S/p Ativan 4 mg, Keppra 4 g load, Vimpat 400 mg load  - Continue on Keppra 1500 mg BID and Vimpat 150 mg Q12H    to 750mg BID  - Continue to monitor sodium closely  - Seizure precautions  - PRN ativan for prolonged seizure-like activity  - [place]lem[place]  - Patient does not drive  - Frequent neuro checks  Continue to monitor and notify neurology with any changes  - Medical management and supportive care per primary team  Correction of any metabolic or infectious disturbances  SUBJECTIVE     Patient was seen and examined  No acute events overnight  Patient looks alert and active, comfortable in bed   Per mother, patient has been more active and cognitively more baseline today after going to the restroom and mother is happier with patient's improvement mentally  Patient has no complaints and according to mother has no further complaints  Mother is aware of the MRI brain being done tomorrow  Review of Systems   Unable to perform ROS: Other   Patient has autism    OBJECTIVE     Patient ID: Socorro Lord is a 36 y o  male  Vitals:    22 0533 22 0709 22 1512   BP: 122/80  129/83 116/80   BP Location:   Left arm    Pulse: 79  80 96   Resp:   18    Temp:   97 5 °F (36 4 °C)    TempSrc:   Axillary    SpO2: 96%  95% 96%   Weight:  80 9 kg (178 lb 5 6 oz)     Height:          Temperature:   Temp (24hrs), Av 5 °F (36 4 °C), Min:97 5 °F (36 4 °C), Max:97 5 °F (36 4 °C)    Temperature: 97 5 °F (36 4 °C)      Physical Exam  Vitals and nursing note reviewed  Constitutional:       Appearance: He is well-developed  HENT:      Head: Normocephalic and atraumatic  Eyes:      Extraocular Movements: EOM normal       Conjunctiva/sclera: Conjunctivae normal       Pupils: Pupils are equal, round, and reactive to light  Cardiovascular:      Rate and Rhythm: Normal rate and regular rhythm  Heart sounds: No murmur heard  Pulmonary:      Effort: Pulmonary effort is normal  No respiratory distress  Breath sounds: Normal breath sounds  Abdominal:      Palpations: Abdomen is soft  Tenderness: There is no abdominal tenderness  Musculoskeletal:      Cervical back: Neck supple  Skin:     General: Skin is warm and dry  Neurological:      Mental Status: He is alert  Deep Tendon Reflexes:      Reflex Scores:       Bicep reflexes are 2+ on the right side and 2+ on the left side  Brachioradialis reflexes are 2+ on the right side and 2+ on the left side  Patellar reflexes are 3+ on the right side and 3+ on the left side  Achilles reflexes are 2+ on the right side and 2+ on the left side           Neurologic Exam     Mental Status Oriented to person  Oriented to place  Attention: decreased  Concentration: decreased  Level of consciousness: alert  Able to name object  States 1-2 word responses     Cranial Nerves     CN II   Visual fields full to confrontation  CN III, IV, VI   Pupils are equal, round, and reactive to light  Extraocular motions are normal      CN V   Facial sensation intact  CN VII   Facial expression full, symmetric  CN XII   CN XII normal      Motor Exam Patient's bilateral upper and lower extremities antigravity and have spontaneous movements    Deferred formal motor exam because difficulty of patient following commands     Sensory Exam   Light touch normal      Gait, Coordination, and Reflexes     Reflexes   Right brachioradialis: 2+  Left brachioradialis: 2+  Right biceps: 2+  Left biceps: 2+  Right patellar: 3+  Left patellar: 3+  Right achilles: 2+  Left achilles: 2+  Right plantar: normal  Left plantar: normalDeferred finger to nose testing because patient's difficulty with following commands        LABORATORY DATA     Labs: I have personally reviewed pertinent reports  Results from last 7 days   Lab Units 02/27/22  0512 02/26/22  0520 02/25/22  1748 02/25/22  0957 02/25/22  0957   WBC Thousand/uL 13 24* 8 45 13 20*   < > 16 90*   HEMOGLOBIN g/dL 14 5 14 8 13 0   < > 14 9   HEMATOCRIT % 41 7 41 5 34 2*   < > 41 1   PLATELETS Thousands/uL 183 169 104*   < > 162   NEUTROS PCT %  --   --   --   --  92*   MONOS PCT %  --   --   --   --  5   MONO PCT %  --   --  2*  --   --     < > = values in this interval not displayed        Results from last 7 days   Lab Units 03/01/22  0647 02/28/22  0529 02/27/22  0512 02/26/22  1236 02/26/22  0520 02/25/22  1455 02/25/22  0957   SODIUM mmol/L 141 140 140   < >  --    < > 127*   POTASSIUM mmol/L 3 9 4 0 4 0   < >  --    < > 3 5   CHLORIDE mmol/L 107 108 110*   < >  --    < > 95*   CO2 mmol/L 29 25 26   < >  --    < > 24   BUN mg/dL 15 14 12   < >  --    < > 12 CREATININE mg/dL 0 86 0 84 0 78   < >  --    < > 0 74   CALCIUM mg/dL 9 4 9 4 9 3   < >  --    < > 8 6   ALK PHOS U/L  --   --   --   --  62  --  67   ALT U/L  --   --   --   --  33  --  32   AST U/L  --   --   --   --  38  --  34    < > = values in this interval not displayed  Results from last 7 days   Lab Units 02/26/22  0520   MAGNESIUM mg/dL 2 2     Results from last 7 days   Lab Units 02/26/22  0520   PHOSPHORUS mg/dL 2 9      Results from last 7 days   Lab Units 02/25/22  1908   INR  1 03   PTT seconds 31     Results from last 7 days   Lab Units 02/25/22  1455   LACTIC ACID mmol/L 1 1           IMAGING & DIAGNOSTIC TESTING     Radiology Results: I have personally reviewed pertinent reports  XR chest 1 view portable   Final Result by Shirley Becker DO (02/25 1432)      Borderline cardiomegaly  No acute pulmonary disease  Workstation performed: [id number]         CT head without contrast   Final Result by Abby Chandra MD (02/25 1138)      No acute intracranial abnormality  Workstation performed: [id number]         MRI inpatient order    (Results Pending)       Other Diagnostic Testing: I have personally reviewed pertinent reports  ACTIVE MEDICATIONS     Current Facility-Administered Medications   Medication Dose Route Frequency    acetaminophen (TYLENOL) tablet 650 mg  650 mg Oral Q6H PRN    enoxaparin (LOVENOX) subcutaneous injection 40 mg  40 mg Subcutaneous Q24H HAZEL    lacosamide (VIMPAT) tablet 150 mg  150 mg Oral Q12H HAZEL    levETIRAcetam (KEPPRA) tablet 1,500 mg  1,500 mg Oral Q12H HAZEL    LORazepam (ATIVAN) injection 2 mg  2 mg Intravenous Q6H PRN    ofloxacin (OCUFLOX) 0 3 % ophthalmic solution 4 drop  4 drop Otic BID    ondansetron (ZOFRAN) injection 4 mg  4 mg Intravenous Once       Prior to Admission medications    Medication Sig Start Date End Date Taking?  Authorizing Provider   acetaminophen (TYLENOL) 325 mg tablet Take 3 tablets (975 mg total) by mouth every 6 (six) hours as needed for mild pain, headaches or fever 3/31/21   Abbie Earl MD   levETIRAcetam (KEPPRA) 500 mg tablet Take 1 tablet (500 mg total) by mouth every 12 (twelve) hours 2/10/22   LIAM Lopez   Loratadine (CLARITIN) 10 MG CAPS Take 10 mg by mouth as needed      Historical Provider, MD   mupirocin (BACTROBAN) 2 % ointment Apply topically 3 (three) times a day 5/70/31   Kia Mccoy, DO         VTE Pharmacologic Prophylaxis: Enoxaparin (Lovenox)  VTE Mechanical Prophylaxis: sequential compression device    ==  MD [facility] Res[facility], PGY-2

## 2022-03-01 NOTE — ASSESSMENT & PLAN NOTE
· ENT evaluated -- will need outpt f/u   · Continue ofloxacin drops, four drops each ear BID   · Complete total of seven days   · Continue abx drop through 3/3

## 2022-03-01 NOTE — ASSESSMENT & PLAN NOTE
· POA, Na 127    Possible etiology of breakthrough seizure -- see related plan  · Now resolved with IV fluids  · Monitor BMP

## 2022-03-01 NOTE — PHYSICAL THERAPY NOTE
Physical Therapy Cancellation Note       03/01/22 1330   PT Last Visit   PT Visit Date 03/01/22   Note Type   Note Type Cancelled Session   Cancel Reasons Other     Orders received and chart reviewed  Attempted to see pt  Pt's family member reported pt recently returned to bed and would like to rest  Will continue to follow and attempt to see pt as able      Radha Reeves, PT, DPT

## 2022-03-01 NOTE — PLAN OF CARE
Problem: PAIN - ADULT  Goal: Verbalizes/displays adequate comfort level or baseline comfort level  Description: Interventions:  - Encourage patient to monitor pain and request assistance  - Assess pain using appropriate pain scale  - Administer analgesics based on type and severity of pain and evaluate response  - Implement non-pharmacological measures as appropriate and evaluate response  - Notify physician/advanced practitioner if interventions unsuccessful or patient reports new pain  Outcome: Progressing     Problem: INFECTION - ADULT  Goal: Absence or prevention of progression during hospitalization  Description: INTERVENTIONS:  - Assess and monitor for signs and symptoms of infection  - Monitor lab/diagnostic results  - Monitor all insertion sites, i e  indwelling lines, tubes, and drains  - [place] appropriate cooling/warming therapies per order  - Administer medications as ordered  - Instruct and encourage patient and family to use good hand hygiene technique  - Identify and instruct in appropriate isolation precautions for identified infection/condition  Outcome: Progressing     Problem: SAFETY ADULT  Goal: Patient will remain free of falls  Description: INTERVENTIONS:  - Educate patient/family on patient safety including physical limitations  - Instruct patient to call for assistance with activity   - Consult OT/PT to assist with strengthening/mobility   - Keep Call bell within reach  - Keep bed low and locked with side rails adjusted as appropriate  - Keep care items and personal belongings within reach  - Initiate and maintain comfort rounds  - Make Fall Risk Sign visible to staff  - Apply yellow socks and bracelet for high fall risk patients  - Consider moving patient to room near nurses station  Outcome: Progressing

## 2022-03-01 NOTE — ASSESSMENT & PLAN NOTE
· Suspecting breakthrough seizure secondary to hyponatremia, possibly abx/infection  Infectious work-up negative, if cultures remain negative today, d/c abx per ID   · Management as per primary team, neurology  S/p Keppra load and IV Ativan   · Was on vEEG, no further activity seen, now discontinued   · CSF studies with elevated RBC's   · No growth or evidence of infection at this time  · Continue Keppra 1500 mg BID and Vimpat 150 mg Q12hr   · Pending MRI with anesthesia Wednesday     · Npo after midnight tonight

## 2022-03-01 NOTE — PLAN OF CARE
Problem: Potential for Falls  Goal: Patient will remain free of falls  Description: INTERVENTIONS:  - Educate patient/family on patient safety including physical limitations  - Instruct patient to call for assistance with activity   - Consult OT/PT to assist with strengthening/mobility   - Keep Call bell within reach  - Keep bed low and locked with side rails adjusted as appropriate  - Keep care items and personal belongings within reach  - Initiate and maintain comfort rounds  - Make Fall Risk Sign visible to staff  - Offer Toileting every 4 Hours, in advance of need  - Initiate/Maintain bed alarm  - Obtain necessary fall risk management equipment: bed   - Apply yellow socks and bracelet for high fall risk patients  - Consider moving patient to room near nurses station  Outcome: Progressing     Problem: PAIN - ADULT  Goal: Verbalizes/displays adequate comfort level or baseline comfort level  Description: Interventions:  - Encourage patient to monitor pain and request assistance  - Assess pain using appropriate pain scale  - Administer analgesics based on type and severity of pain and evaluate response  - Implement non-pharmacological measures as appropriate and evaluate response  - Notify physician/advanced practitioner if interventions unsuccessful or patient reports new pain  Outcome: Progressing     Problem: INFECTION - ADULT  Goal: Absence or prevention of progression during hospitalization  Description: INTERVENTIONS:  - Assess and monitor for signs and symptoms of infection  - Monitor lab/diagnostic results  - Monitor all insertion sites, i e  indwelling lines, tubes, and drains  - [place] appropriate cooling/warming therapies per order  - Administer medications as ordered  - Instruct and encourage patient and family to use good hand hygiene technique  - Identify and instruct in appropriate isolation precautions for identified infection/condition  Outcome: Progressing     Problem: SAFETY ADULT  Goal: Patient will remain free of falls  Description: INTERVENTIONS:  - Educate patient/family on patient safety including physical limitations  - Instruct patient to call for assistance with activity   - Consult OT/PT to assist with strengthening/mobility   - Keep Call bell within reach  - Keep bed low and locked with side rails adjusted as appropriate  - Keep care items and personal belongings within reach  - Initiate and maintain comfort rounds  - Make Fall Risk Sign visible to staff  - Offer Toileting every 4 Hours, in advance of need  - Initiate/Maintain bed alarm  - Obtain necessary fall risk management equipment: bed alarm  - Apply yellow socks and bracelet for high fall risk patients  - Consider moving patient to room near nurses station  Outcome: Progressing  Goal: Maintain or return to baseline ADL function  Description: INTERVENTIONS:  -  Assess patient's ability to carry out ADLs; assess patient's baseline for ADL function and identify physical deficits which impact ability to perform ADLs (bathing, care of mouth/teeth, toileting, grooming, dressing, etc )  - Assess/evaluate cause of self-care deficits   - Assess range of motion  - Assess patient's mobility; develop plan if impaired  - Assess patient's need for assistive devices and provide as appropriate  - Encourage maximum independence but intervene and supervise when necessary  - Involve family in performance of ADLs  - Assess for home care needs following discharge   - Consider OT consult to assist with ADL evaluation and planning for discharge  - Provide patient education as appropriate  Outcome: Progressing  Goal: Maintains/Returns to pre admission functional level  Description: INTERVENTIONS:  - Perform BMAT or MOVE assessment daily    - Set and communicate daily mobility goal to care team and patient/family/caregiver  - Collaborate with rehabilitation services on mobility goals if consulted  - Perform Range of Motion 4 times a day    - Reposition patient every 2 hours  - Dangle patient 4 times a day  - Stand patient 4 times a day  - Ambulate patient 4 times a day  - Out of bed to chair 4 times a day   - Out of bed for meals 4 times a day  - Out of bed for toileting  - Record patient progress and toleration of activity level   Outcome: Progressing     Problem: DISCHARGE PLANNING  Goal: Discharge to home or other facility with appropriate resources  Description: INTERVENTIONS:  - Identify barriers to discharge w/patient and caregiver  - Arrange for needed discharge resources and transportation as appropriate  - Identify discharge learning needs (meds, wound care, etc )  - Refer to Case Management Department for coordinating discharge planning if the patient needs post-hospital services based on physician/advanced practitioner order or complex needs related to functional status, cognitive ability, or social support system  Outcome: Progressing     Problem: Knowledge Deficit  Goal: Patient/family/caregiver demonstrates understanding of disease process, treatment plan, medications, and discharge instructions  Description: Complete learning assessment and assess knowledge base    Interventions:  - Provide teaching at level of understanding  - Provide teaching via preferred learning methods  Outcome: Progressing     Problem: MOBILITY - ADULT  Goal: Maintain or return to baseline ADL function  Description: INTERVENTIONS:  -  Assess patient's ability to carry out ADLs; assess patient's baseline for ADL function and identify physical deficits which impact ability to perform ADLs (bathing, care of mouth/teeth, toileting, grooming, dressing, etc )  - Assess/evaluate cause of self-care deficits   - Assess range of motion  - Assess patient's mobility; develop plan if impaired  - Assess patient's need for assistive devices and provide as appropriate  - Encourage maximum independence but intervene and supervise when necessary  - Involve family in performance of ADLs  - Assess for home care needs following discharge   - Consider OT consult to assist with ADL evaluation and planning for discharge  - Provide patient education as appropriate  Outcome: Progressing  Goal: Maintains/Returns to pre admission functional level  Description: INTERVENTIONS:  - Perform BMAT or MOVE assessment daily    - Set and communicate daily mobility goal to care team and patient/family/caregiver  - Collaborate with rehabilitation services on mobility goals if consulted  - Perform Range of Motion 4 times a day  - Reposition patient every 2 hours    - Dangle patient 4 times a day  - Stand patient 4 times a day  - Ambulate patient 4 times a day  - Out of bed to chair 4 times a day   - Out of bed for meals 4 times a day  - Out of bed for toileting  - Record patient progress and toleration of activity level   Outcome: Progressing

## 2022-03-01 NOTE — PROGRESS NOTES
[facility] [facility]  Progress Note Aleeedward Baez [facility] 1990, 36 y o  male MRN: [id number]  [facility] Encounter: [id number]  Primary Care Provider: Elihue Essex, DO   Date and time admitted to hospital: 2/25/2022  9:30 AM    * Breakthrough seizure [facility])  Assessment & Plan  · Suspecting breakthrough seizure secondary to hyponatremia, possibly abx/infection  Infectious work-up negative, if cultures remain negative today, d/c abx per ID   · Management as per primary team, neurology  S/p Keppra load and IV Ativan   · Was on vEEG, no further activity seen, now discontinued   · CSF studies with elevated RBC's   · No growth or evidence of infection at this time  · Continue Keppra 1500 mg BID and Vimpat 150 mg Q12hr   · Pending MRI with anesthesia Wednesday  · Npo after midnight tonight     Left otitis externa  Assessment & Plan  · ENT evaluated -- will need outpt f/u   · Continue ofloxacin drops, four drops each ear BID   · Complete total of seven days   · Continue abx drop through 3/3    Brugada syndrome  Assessment & Plan  · S/p ICD interrogation, no arrhythmias found     Active autistic disorder  Assessment & Plan  · Per mother patient is independent at baseline    Hyponatremia  Assessment & Plan  · POA, Na 127  Possible etiology of breakthrough seizure -- see related plan  · Now resolved with IV fluids  · Monitor BMP     Suppurative otitis media of left ear  Assessment & Plan  -with erythema, discharge, chronic hx, ent consult pending  -no meningeal signs on exam, afebrile no neck stiffness        VTE Pharmacologic Prophylaxis: VTE Score: 2 Moderate Risk (Score 3-4) - Pharmacological DVT Prophylaxis Ordered: enoxaparin (Lovenox)  Patient Centered Rounds: I performed bedside rounds with nursing staff today    Discussions with Specialists or Other Care Team Provider: neurology, CM     Education and Discussions with Family / Patient: Updated  (mother) at bedside  and grandmother     Time Spent for Care: 45 minutes  More than 50% of total time spent on counseling and coordination of care as described above  Current Length of Stay: 4 day(s)  Current Patient Status: Inpatient   Certification Statement: The patient will continue to require additional inpatient hospital stay due to pending MRI under anesthesia on wednesday   Discharge Plan: [facility] is following this patient on consult  They are medically stable for discharge when deemed appropriate by primary service  Code Status: Level 1 - Full Code    Subjective:   No acute complaints from patient  Mother states that his baseline has improved some today  She reports that he is not "staring off as much"  He is not quite back to baseline yet however  Objective:     Vitals:   Temp (24hrs), Av 2 °F (36 8 °C), Min:97 5 °F (36 4 °C), Max:98 9 °F (37 2 °C)    Temp:  [97 5 °F (36 4 °C)-98 9 °F (37 2 °C)] 97 5 °F (36 4 °C)  HR:  [79-95] 80  Resp:  [18] 18  BP: (121-129)/(75-83) 129/83  SpO2:  [95 %-96 %] 95 %  Body mass index is 24 19 kg/m²  Input and Output Summary (last 24 hours): Intake/Output Summary (Last 24 hours) at 3/1/2022 1149  Last data filed at 2022 1416  Gross per 24 hour   Intake 300 ml   Output --   Net 300 ml       Physical Exam:   Physical Exam  Constitutional:       General: He is not in acute distress  HENT:      Head: Normocephalic and atraumatic  Mouth/Throat:      Mouth: Mucous membranes are moist       Pharynx: Oropharynx is clear  Eyes:      General:         Right eye: No discharge  Left eye: No discharge  Conjunctiva/sclera: Conjunctivae normal       Pupils: Pupils are equal, round, and reactive to light  Cardiovascular:      Rate and Rhythm: Normal rate and regular rhythm  Pulses: Normal pulses  Heart sounds: Normal heart sounds  No murmur heard  Pulmonary:      Effort: Pulmonary effort is normal       Breath sounds: Normal breath sounds   No wheezing  Abdominal:      General: Abdomen is flat  There is no distension  Palpations: Abdomen is soft  Musculoskeletal:      Cervical back: No muscular tenderness  Right lower leg: No edema  Left lower leg: No edema  Skin:     General: Skin is warm and dry  Capillary Refill: Capillary refill takes less than 2 seconds  Coloration: Skin is not jaundiced  Neurological:      General: No focal deficit present  Mental Status: He is alert and oriented to person, place, and time  Cranial Nerves: No cranial nerve deficit  Comments: Makes eye contact  Can follow some commands when prompted  Will talk with mom at bedside    Psychiatric:         Mood and Affect: Mood normal          Additional Data:     Labs:  Results from last 7 days   Lab Units 02/27/22  0512 02/26/22  0520 02/25/22  1748 02/25/22  0957 02/25/22  0957   WBC Thousand/uL 13 24*   < > 13 20*   < > 16 90*   HEMOGLOBIN g/dL 14 5   < > 13 0   < > 14 9   HEMATOCRIT % 41 7   < > 34 2*   < > 41 1   PLATELETS Thousands/uL 183   < > 104*   < > 162   BANDS PCT %  --   --  16*  --   --    NEUTROS PCT %  --   --   --   --  92*   LYMPHS PCT %  --   --   --   --  3*   LYMPHO PCT %  --   --  3*  --   --    MONOS PCT %  --   --   --   --  5   MONO PCT %  --   --  2*  --   --    EOS PCT %  --   --  0  --  0    < > = values in this interval not displayed  Results from last 7 days   Lab Units 03/01/22  0647 02/26/22  1236 02/26/22  0520   SODIUM mmol/L 141   < >  --    POTASSIUM mmol/L 3 9   < >  --    CHLORIDE mmol/L 107   < >  --    CO2 mmol/L 29   < >  --    BUN mg/dL 15   < >  --    CREATININE mg/dL 0 86   < >  --    ANION GAP mmol/L 5   < >  --    CALCIUM mg/dL 9 4   < >  --    ALBUMIN g/dL  --   --  3 4*   TOTAL BILIRUBIN mg/dL  --   --  0 53   ALK PHOS U/L  --   --  62   ALT U/L  --   --  33   AST U/L  --   --  38   GLUCOSE RANDOM mg/dL 93   < >  --     < > = values in this interval not displayed       Results from last 7 days   Lab Units 02/25/22  1908   INR  1 03     Results from last 7 days   Lab Units 02/25/22  1011   POC GLUCOSE mg/dl 106         Results from last 7 days   Lab Units 02/26/22  0521 02/25/22  1908 02/25/22  1455 02/25/22  1213   LACTIC ACID mmol/L  --   --  1 1 1 9   PROCALCITONIN ng/ml 0 23 0 30*  --   --        Lines/Drains:  Invasive Devices  Report    Peripheral Intravenous Line            Peripheral IV 02/25/22 Right Forearm 4 days                      Imaging: No pertinent imaging reviewed  Recent Cultures (last 7 days):   Results from last 7 days   Lab Units 02/25/22  1915 02/25/22  1908   BLOOD CULTURE   --  No Growth at 72 hrs  No Growth at 72 hrs  GRAM STAIN RESULT  Rare Polys  No organisms seen  --        Last 24 Hours Medication List:   Current Facility-Administered Medications   Medication Dose Route Frequency Provider Last Rate    acetaminophen  650 mg Oral Q6H PRN Rain Scott PA-C      enoxaparin  40 mg Subcutaneous Q24H [facility] Robert Walsh PA-C      lacosamide  150 mg Oral Q12H [place], [place]      levETIRAcetam  1,500 mg Oral Q12H [facility] Divina Gandara [place], [place]      LORazepam  2 mg Intravenous Q6H PRN Rain Scott PA-C      ofloxacin  4 drop Otic BID Robert Garnett PA-C      ondansetron  4 mg Intravenous Once Rain Scott PA-C          Today, Patient Was Seen By: Ruby Tracy PA-C    **Please Note: This note may have been constructed using a voice recognition system  **

## 2022-03-01 NOTE — PROGRESS NOTES
Vancomycin IV Pharmacy-to-Dose Consultation    Winifred Gonzalez is a 36 y o  male who was receiving Vancomycin IV with management by the Pharmacy Consult service for treatment of CNS infection  The patients Vancomycin therapy has been discontinued  Thank you for allowing us to take part in this patient's care  Pharmacy will sign-off now; please call or re-consult if there are any questions      [facility]  Staff Pharmacist

## 2022-03-02 ENCOUNTER — ANESTHESIA EVENT (INPATIENT)
Dept: RADIOLOGY | Facility: HOSPITAL | Age: 32
DRG: 053 | End: 2022-03-02
Payer: COMMERCIAL

## 2022-03-02 ENCOUNTER — TELEPHONE (OUTPATIENT)
Dept: FAMILY MEDICINE CLINIC | Facility: CLINIC | Age: 32
End: 2022-03-02

## 2022-03-02 ENCOUNTER — ANESTHESIA (INPATIENT)
Dept: RADIOLOGY | Facility: HOSPITAL | Age: 32
DRG: 053 | End: 2022-03-02
Payer: COMMERCIAL

## 2022-03-02 ENCOUNTER — APPOINTMENT (OUTPATIENT)
Dept: RADIOLOGY | Facility: HOSPITAL | Age: 32
DRG: 053 | End: 2022-03-02
Payer: COMMERCIAL

## 2022-03-02 VITALS
WEIGHT: 177.25 LBS | DIASTOLIC BLOOD PRESSURE: 77 MMHG | HEIGHT: 72 IN | SYSTOLIC BLOOD PRESSURE: 115 MMHG | OXYGEN SATURATION: 96 % | RESPIRATION RATE: 19 BRPM | TEMPERATURE: 97.7 F | BODY MASS INDEX: 24.01 KG/M2 | HEART RATE: 101 BPM

## 2022-03-02 LAB
ANION GAP SERPL CALCULATED.3IONS-SCNC: 4 MMOL/L (ref 4–13)
BACTERIA BLD CULT: NORMAL
BACTERIA BLD CULT: NORMAL
BUN SERPL-MCNC: 15 MG/DL (ref 5–25)
CALCIUM SERPL-MCNC: 9.1 MG/DL (ref 8.3–10.1)
CHLORIDE SERPL-SCNC: 108 MMOL/L (ref 100–108)
CO2 SERPL-SCNC: 29 MMOL/L (ref 21–32)
CREAT SERPL-MCNC: 0.88 MG/DL (ref 0.6–1.3)
ERYTHROCYTE [DISTWIDTH] IN BLOOD BY AUTOMATED COUNT: 12.7 % (ref 11.6–15.1)
GFR SERPL CREATININE-BSD FRML MDRD: 116 ML/MIN/1.73SQ M
GLUCOSE SERPL-MCNC: 83 MG/DL (ref 65–140)
HCT VFR BLD AUTO: 43.9 % (ref 36.5–49.3)
HGB BLD-MCNC: 15.3 G/DL (ref 12–17)
MCH RBC QN AUTO: 29.9 PG (ref 26.8–34.3)
MCHC RBC AUTO-ENTMCNC: 34.9 G/DL (ref 31.4–37.4)
MCV RBC AUTO: 86 FL (ref 82–98)
PLATELET # BLD AUTO: 153 THOUSANDS/UL (ref 149–390)
PMV BLD AUTO: 10 FL (ref 8.9–12.7)
POTASSIUM SERPL-SCNC: 4 MMOL/L (ref 3.5–5.3)
RBC # BLD AUTO: 5.11 MILLION/UL (ref 3.88–5.62)
SODIUM SERPL-SCNC: 141 MMOL/L (ref 136–145)
WBC # BLD AUTO: 5.82 THOUSAND/UL (ref 4.31–10.16)

## 2022-03-02 PROCEDURE — 99232 SBSQ HOSP IP/OBS MODERATE 35: CPT | Performed by: PHYSICIAN ASSISTANT

## 2022-03-02 PROCEDURE — 85027 COMPLETE CBC AUTOMATED: CPT | Performed by: PHYSICIAN ASSISTANT

## 2022-03-02 PROCEDURE — A9585 GADOBUTROL INJECTION: HCPCS | Performed by: EMERGENCY MEDICINE

## 2022-03-02 PROCEDURE — G1004 CDSM NDSC: HCPCS

## 2022-03-02 PROCEDURE — 80048 BASIC METABOLIC PNL TOTAL CA: CPT | Performed by: PHYSICIAN ASSISTANT

## 2022-03-02 PROCEDURE — 70553 MRI BRAIN STEM W/O & W/DYE: CPT

## 2022-03-02 PROCEDURE — 99238 HOSP IP/OBS DSCHRG MGMT 30/<: CPT | Performed by: STUDENT IN AN ORGANIZED HEALTH CARE EDUCATION/TRAINING PROGRAM

## 2022-03-02 RX ORDER — LACOSAMIDE 150 MG/1
150 TABLET ORAL EVERY 12 HOURS SCHEDULED
Qty: 60 TABLET | Refills: 5 | Status: SHIPPED | OUTPATIENT
Start: 2022-03-02 | End: 2022-04-06

## 2022-03-02 RX ORDER — LACOSAMIDE 150 MG/1
150 TABLET ORAL EVERY 12 HOURS SCHEDULED
Qty: 60 TABLET | Refills: 5 | Status: CANCELLED | OUTPATIENT
Start: 2022-03-02

## 2022-03-02 RX ORDER — LEVETIRACETAM 500 MG/1
1000 TABLET ORAL EVERY 12 HOURS SCHEDULED
Qty: 180 TABLET | Refills: 3 | Status: SHIPPED | OUTPATIENT
Start: 2022-03-02 | End: 2022-04-06

## 2022-03-02 RX ORDER — PROPOFOL 10 MG/ML
INJECTION, EMULSION INTRAVENOUS AS NEEDED
Status: DISCONTINUED | OUTPATIENT
Start: 2022-03-02 | End: 2022-03-02

## 2022-03-02 RX ORDER — LEVETIRACETAM 500 MG/1
1000 TABLET ORAL EVERY 12 HOURS SCHEDULED
Qty: 180 TABLET | Refills: 3 | Status: SHIPPED | OUTPATIENT
Start: 2022-03-02 | End: 2022-03-02 | Stop reason: SDUPTHER

## 2022-03-02 RX ORDER — MIDAZOLAM HYDROCHLORIDE 2 MG/2ML
INJECTION, SOLUTION INTRAMUSCULAR; INTRAVENOUS AS NEEDED
Status: DISCONTINUED | OUTPATIENT
Start: 2022-03-02 | End: 2022-03-02

## 2022-03-02 RX ORDER — PROPOFOL 10 MG/ML
INJECTION, EMULSION INTRAVENOUS CONTINUOUS PRN
Status: DISCONTINUED | OUTPATIENT
Start: 2022-03-02 | End: 2022-03-02

## 2022-03-02 RX ORDER — SODIUM CHLORIDE 9 MG/ML
INJECTION, SOLUTION INTRAVENOUS CONTINUOUS PRN
Status: DISCONTINUED | OUTPATIENT
Start: 2022-03-02 | End: 2022-03-02

## 2022-03-02 RX ADMIN — GADOBUTROL 8 ML: 604.72 INJECTION INTRAVENOUS at 09:52

## 2022-03-02 RX ADMIN — PROPOFOL 15 MG: 10 INJECTION, EMULSION INTRAVENOUS at 09:05

## 2022-03-02 RX ADMIN — LEVETIRACETAM 1500 MG: 750 TABLET, FILM COATED ORAL at 08:04

## 2022-03-02 RX ADMIN — PROPOFOL 50 MG: 10 INJECTION, EMULSION INTRAVENOUS at 08:58

## 2022-03-02 RX ADMIN — OFLOXACIN 4 DROP: 3 SOLUTION OPHTHALMIC at 08:04

## 2022-03-02 RX ADMIN — MIDAZOLAM 2 MG: 1 INJECTION INTRAMUSCULAR; INTRAVENOUS at 08:56

## 2022-03-02 RX ADMIN — LACOSAMIDE 150 MG: 150 TABLET, FILM COATED ORAL at 08:04

## 2022-03-02 RX ADMIN — SODIUM CHLORIDE: 0.9 INJECTION, SOLUTION INTRAVENOUS at 08:51

## 2022-03-02 NOTE — UTILIZATION REVIEW
Continued Stay Review    Date:  3-2-22                          Current Patient Class:  Inpatient Current Level of Care:  Med surg    HPI:31 y o  male initially admitted on 2-25-22 for breakthrough seizure  · Suspecting breakthrough seizure secondary to hyponatremia, possibly abx/infection  Infectious work-up negative, if cultures remain negative today, d/c abx per ID   · Management as per primary team, neurology  S/p Keppra load and IV Ativan   · Was on vEEG, no further activity seen, now discontinued   · CSF studies with elevated RBC's   · No growth or evidence of infection at this time  · Continue Keppra 1500 mg BID and Vimpat 150 mg Q12hr    Assessment/Plan:     Patient with waxing and waning lethargy  Anesthesia MRI of brain without acute finding  Vimpat added q12 hours  Per physical therapy, no rehab needs identified  Vital Signs:       Date/Time Temp Pulse Resp BP MAP (mmHg) SpO2 O2 Device   03/02/22 1025 -- 96 19 119/77 -- 97 % None (Room air)   03/02/22 1020 -- 74 17 114/70 -- 96 % None (Room air)   03/02/22 1015 -- 83 15 114/72 -- 97 % None (Room air)   03/02/22 1010 -- 82 19 109/69 -- 96 % None (Room air)   03/02/22 1005 -- 77 18 112/69 -- 95 % None (Room air)   03/02/22 1000 -- 75 20 112/72 -- 96 % None (Room air)   03/02/22 0955 -- 80 20 109/69 -- 96 % None (Room air)   03/02/22 0950 98 6 °F (37 °C) 84 21 104/62 -- 95 % None (Room air)   03/02/22 08:05:25 -- 91 -- 119/82 94 95 % --   03/02/22 0800 -- -- -- -- -- -- None (Room          Pertinent Labs/Diagnostic Results:     MRI brain: No acute intracranial abnormality  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, asymmetrically thinner left fornix, and possibly smaller left mamillary body  A few white matter changes, suggestive of minimal chronic microangiopathy    Severe left sphenoid sinus disease with inspissated material           Results from last 7 days   Lab Units 03/02/22  0503 02/27/22  3272 02/26/22  0520 02/25/22  1748 02/25/22  1748 02/25/22  0957 02/25/22  0957   WBC Thousand/uL 5 82 13 24* 8 45   < > 13 20*   < > 16 90*   HEMOGLOBIN g/dL 15 3 14 5 14 8  --  13 0  --  14 9   HEMATOCRIT % 43 9 41 7 41 5  --  34 2*  --  41 1   PLATELETS Thousands/uL 153 183 169   < > 104*   < > 162   NEUTROS ABS Thousands/µL  --   --   --   --   --   --  15 50*   BANDS PCT %  --   --   --   --  16*  --   --     < > = values in this interval not displayed  Results from last 7 days   Lab Units 03/02/22  0503 03/01/22  9784 02/28/22  0529 02/27/22  0512 02/26/22  1236 02/26/22  0520 02/26/22  0125   SODIUM mmol/L 141 141 140 140 135*  --    < >   POTASSIUM mmol/L 4 0 3 9 4 0 4 0 4 3  --    < >   CHLORIDE mmol/L 108 107 108 110* 112*  --    < >   CO2 mmol/L 29 29 25 26 18*  --    < >   ANION GAP mmol/L 4 5 7 4 5  --    < >   BUN mg/dL 15 15 14 12 9  --    < >   CREATININE mg/dL 0 88 0 86 0 84 0 78 0 78  --    < >   EGFR ml/min/1 73sq m 116 117 118 121 121  --    < >   CALCIUM mg/dL 9 1 9 4 9 4 9 3 8 9  --    < >   MAGNESIUM mg/dL  --   --   --   --   --  2 2  --    PHOSPHORUS mg/dL  --   --   --   --   --  2 9  --     < > = values in this interval not displayed       Results from last 7 days   Lab Units 02/26/22  0520 02/25/22  0957   AST U/L 38 34   ALT U/L 33 32   ALK PHOS U/L 62 67   TOTAL PROTEIN g/dL 6 9 7 0   ALBUMIN g/dL 3 4* 3 7   TOTAL BILIRUBIN mg/dL 0 53 0 64   BILIRUBIN DIRECT mg/dL 0 11  --      Results from last 7 days   Lab Units 03/01/22  1821 02/25/22  1011   POC GLUCOSE mg/dl 129 106     Results from last 7 days   Lab Units 03/02/22  0503 03/01/22  0647 02/28/22  0529 02/27/22  0512 02/26/22  1236 02/26/22  0125 02/25/22  1455 02/25/22  0957   GLUCOSE RANDOM mg/dL 83 93 121 129 116 120 107 116     Results from last 7 days   Lab Units 02/25/22  1455   OSMOLALITY, SERUM mmol/*       Results from last 7 days   Lab Units 02/25/22  1455 02/25/22  1213 02/25/22  0957   HS TNI 0HR ng/L  --   --  5 HS TNI 2HR ng/L 4  --   --    HSTNI D2 ng/L -1  --   --    HS TNI 4HR ng/L  --  5  --    HSTNI D4 ng/L  --  0  --          Results from last 7 days   Lab Units 02/25/22  1908   PROTIME seconds 13 1   INR  1 03   PTT seconds 31         Results from last 7 days   Lab Units 02/26/22  0521 02/25/22  1908   PROCALCITONIN ng/ml 0 23 0 30*     Results from last 7 days   Lab Units 02/25/22  1455 02/25/22  1213   LACTIC ACID mmol/L 1 1 1 9     Results from last 7 days   Lab Units 02/25/22  1455 02/25/22  1213   PROLACTIN ng/mL 18 0* 23 3*       Results from last 7 days   Lab Units 02/25/22  0957   CRP mg/L 3 7*   SED RATE mm/hour 4         Results from last 7 days   Lab Units 02/25/22  2147 02/25/22  1455   OSMOLALITY, SERUM mmol/KG  --  259*   OSMO UR mmol/*  --      Results from last 7 days   Lab Units 02/25/22  2150 02/25/22 2147   CLARITY UA   --  Clear   COLOR UA   --  Yellow   SPEC GRAV UA   --  1 010   PH UA   --  7 5   GLUCOSE UA mg/dl  --  Negative   KETONES UA mg/dl  --  Negative   BLOOD UA   --  Negative   PROTEIN UA mg/dl  --  Negative   NITRITE UA   --  Negative   BILIRUBIN UA   --  Negative   UROBILINOGEN UA E U /dl  --  0 2   LEUKOCYTES UA   --  Negative   WBC UA /hpf  --  None Seen   RBC UA /hpf  --  None Seen   BACTERIA UA /hpf  --  None Seen   EPITHELIAL CELLS WET PREP /hpf  --  None Seen   SODIUM UR  66  --      Results from last 7 days   Lab Units 02/25/22 2148   STREP PNEUMONIAE ANTIGEN, URINE  Negative       Results from last 7 days   Lab Units 02/25/22 1915 02/25/22 1908   BLOOD CULTURE   --  No Growth After 4 Days  No Growth After 4 Days     GRAM STAIN RESULT  Rare Polys  No organisms seen  --      Results from last 7 days   Lab Units 02/25/22 1915   TOTAL COUNTED  100     Results from last 7 days   Lab Units 02/25/22 1915   APPEARANCE CSF  Clear, Pink   TUBE NUM CSF  4   WBC CSF /uL 1   XANTHOCHROMIA  No   NEUTROPHILS % (CSF) % 58   LYMPHS % (CSF) % 23   MONOCYTES % (CSF) % 15 GLUCOSE CSF mg/dL 67   PROTEIN CSF mg/dL 63*   RBC CSF uL 404*  1,400*   CSF CULTURE  No growth     Scheduled Medications:  enoxaparin, 40 mg, Subcutaneous, Q24H [facility]  lacosamide, 150 mg, Oral, Q12H [facility]  levETIRAcetam, 1,500 mg, Oral, Q12H [facility]  ofloxacin, 4 drop, Otic, BID  ondansetron, 4 mg, Intravenous, Once      Continuous IV Infusions:     PRN Meds:  acetaminophen, 650 mg, Oral, Q6H PRN  LORazepam, 2 mg, Intravenous, Q6H PRN        Discharge Plan: to be determined     Network Utilization Review Department  ATTENTION: Please call with any questions or concerns to [phone] and carefully listen to the prompts so that you are directed to the right person  All voicemails are confidential   Zakiya Pino all requests for admission clinical reviews, approved or denied determinations and any other requests to dedicated fax number below belonging to the campus where the patient is receiving treatment   List of dedicated fax numbers for the Facilities:  [address] DENIALS (Administrative/Medical Necessity) [phone]   [address] (Maternity/NICU/Pediatrics) [phone]   [address]  [address] [place] [address] [id number] [place] [address] Anisa Garvin [address] [facility] [facility] [phone]

## 2022-03-02 NOTE — ASSESSMENT & PLAN NOTE
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

## 2022-03-02 NOTE — PLAN OF CARE
Problem: Potential for Falls  Goal: Patient will remain free of falls  Description: INTERVENTIONS:  - Educate patient/family on patient safety including physical limitations  - Instruct patient to call for assistance with activity   - Consult OT/PT to assist with strengthening/mobility   - Keep Call bell within reach  - Keep bed low and locked with side rails adjusted as appropriate  - Keep care items and personal belongings within reach  - Initiate and maintain comfort rounds  - Make Fall Risk Sign visible to staff  - Offer Toileting every four Hours, in advance of need  - Initiate/Maintain bed alarm  - Obtain necessary fall risk management equipment: bed alarm  - Apply yellow socks and bracelet for high fall risk patients  - Consider moving patient to room near nurses station  Outcome: Progressing     Problem: PAIN - ADULT  Goal: Verbalizes/displays adequate comfort level or baseline comfort level  Description: Interventions:  - Encourage patient to monitor pain and request assistance  - Assess pain using appropriate pain scale  - Administer analgesics based on type and severity of pain and evaluate response  - Implement non-pharmacological measures as appropriate and evaluate response  - Notify physician/advanced practitioner if interventions unsuccessful or patient reports new pain  Outcome: Progressing     Problem: INFECTION - ADULT  Goal: Absence or prevention of progression during hospitalization  Description: INTERVENTIONS:  - Assess and monitor for signs and symptoms of infection  - Monitor lab/diagnostic results  - Monitor all insertion sites, i e  indwelling lines, tubes, and drains  - [place] appropriate cooling/warming therapies per order  - Administer medications as ordered  - Instruct and encourage patient and family to use good hand hygiene technique  - Identify and instruct in appropriate isolation precautions for identified infection/condition  Outcome: Progressing     Problem: SAFETY ADULT  Goal: Patient will remain free of falls  Description: INTERVENTIONS:  - Educate patient/family on patient safety including physical limitations  - Instruct patient to call for assistance with activity   - Consult OT/PT to assist with strengthening/mobility   - Keep Call bell within reach  - Keep bed low and locked with side rails adjusted as appropriate  - Keep care items and personal belongings within reach  - Initiate and maintain comfort rounds  - Make Fall Risk Sign visible to staff  - Offer Toileting every four  Hours, in advance of need  - Initiate/Maintain bed alarm  - Obtain necessary fall risk management equipment: bed alarm  - Apply yellow socks and bracelet for high fall risk patients  - Consider moving patient to room near nurses station  Outcome: Progressing  Goal: Maintain or return to baseline ADL function  Description: INTERVENTIONS:  -  Assess patient's ability to carry out ADLs; assess patient's baseline for ADL function and identify physical deficits which impact ability to perform ADLs (bathing, care of mouth/teeth, toileting, grooming, dressing, etc )  - Assess/evaluate cause of self-care deficits   - Assess range of motion  - Assess patient's mobility; develop plan if impaired  - Assess patient's need for assistive devices and provide as appropriate  - Encourage maximum independence but intervene and supervise when necessary  - Involve family in performance of ADLs  - Assess for home care needs following discharge   - Consider OT consult to assist with ADL evaluation and planning for discharge  - Provide patient education as appropriate  Outcome: Progressing  Goal: Maintains/Returns to pre admission functional level  Description: INTERVENTIONS:  - Perform BMAT or MOVE assessment daily    - Set and communicate daily mobility goal to care team and patient/family/caregiver     - Collaborate with rehabilitation services on mobility goals if consulted  - Perform Range of Motion four  times a day   - Reposition patient every 2  hours  - Dangle patient four  times a day  - Stand patient four times a day  - Ambulate patient four times a day  - Out of bed to chair four times a day   - Out of bed for meals four times a day  - Out of bed for toileting  - Record patient progress and toleration of activity level   Outcome: Progressing     Problem: DISCHARGE PLANNING  Goal: Discharge to home or other facility with appropriate resources  Description: INTERVENTIONS:  - Identify barriers to discharge w/patient and caregiver  - Arrange for needed discharge resources and transportation as appropriate  - Identify discharge learning needs (meds, wound care, etc )  - Refer to Case Management Department for coordinating discharge planning if the patient needs post-hospital services based on physician/advanced practitioner order or complex needs related to functional status, cognitive ability, or social support system  Outcome: Progressing     Problem: Knowledge Deficit  Goal: Patient/family/caregiver demonstrates understanding of disease process, treatment plan, medications, and discharge instructions  Description: Complete learning assessment and assess knowledge base    Interventions:  - Provide teaching at level of understanding  - Provide teaching via preferred learning methods  Outcome: Progressing     Problem: MOBILITY - ADULT  Goal: Maintain or return to baseline ADL function  Description: INTERVENTIONS:  -  Assess patient's ability to carry out ADLs; assess patient's baseline for ADL function and identify physical deficits which impact ability to perform ADLs (bathing, care of mouth/teeth, toileting, grooming, dressing, etc )  - Assess/evaluate cause of self-care deficits   - Assess range of motion  - Assess patient's mobility; develop plan if impaired  - Assess patient's need for assistive devices and provide as appropriate  - Encourage maximum independence but intervene and supervise when necessary  - Involve family in performance of ADLs  - Assess for home care needs following discharge   - Consider OT consult to assist with ADL evaluation and planning for discharge  - Provide patient education as appropriate  Outcome: Progressing  Goal: Maintains/Returns to pre admission functional level  Description: INTERVENTIONS:  - Perform BMAT or MOVE assessment daily    - Set and communicate daily mobility goal to care team and patient/family/caregiver  - Collaborate with rehabilitation services on mobility goals if consulted  - Perform Range of Motion four times a day  - Reposition patient every 2 hours    - Dangle patient four times a day  - Stand patient four times a day  - Ambulate patient four times a day  - Out of bed to chair four times a day   - Out of bed for meals four times a day  - Out of bed for toileting  - Record patient progress and toleration of activity level   Outcome: Progressing

## 2022-03-02 NOTE — PHYSICAL THERAPY NOTE
Physical Therapy Cancellation Note       03/02/22 1427   PT Last Visit   PT Visit Date 03/02/22   Note Type   Note Type Cancelled Session   Cancel Reasons Other     Orders received and chart reviewed  Pt observed ambulating hallways independently w/ mother present  No reported mobility concerns  Will D/C PT orders at this time  No rehab needs identified      Jonathon Danielleers, PT, DPT

## 2022-03-02 NOTE — CASE MANAGEMENT
Case Management Discharge Planning Note    Patient name Ambika Monsalve  [facility]N [id number]  : 1990 Date 3/2/2022       Current Admission Date: 2022  Current Admission Diagnosis:Breakthrough seizure [facility])   Patient Active Problem List    Diagnosis Date Noted    Recurrent otitis media 2022    Left otitis externa 2022    Suppurative otitis media of left ear 2022    Hyponatremia 2022    Abnormal neurological finding suggestive of lumbar-level spinal disorder 2021    Abnormal EKG 2021    Brugada syndrome 2021    Syncope and collapse 2021    Other insomnia 2019    Breakthrough seizure ([facility] [address] ) 2018    Generalized tonic-clonic seizure ([facility] [address] ) 10/13/2016    Active autistic disorder 10/10/2012      LOS (days): 5  Geometric Mean LOS (GMLOS) (days):   Days to GMLOS:     OBJECTIVE:  Risk of Unplanned Readmission Score: 10         Current admission status: Inpatient   Preferred Pharmacy:   [address], [place] [address]  Phone: [phone] Fax: Linda Atwood [address] [place] [address]  Phone: [phone] Fax: [phone]    Primary Care Provider: Francisco Mercado DO    Primary Insurance: Jaja Chase  Secondary Insurance:     DISCHARGE DETAILS:    Comments - Freedom of Choice: No reccoemndations  CM contacted family/caregiver?: Yes  Were Treatment Team discharge recommendations reviewed with patient/caregiver?: Yes  Did patient/caregiver verbalize understanding of patient care needs?: Yes  Were patient/caregiver advised of the risks associated with not following Treatment Team discharge recommendations?: Yes    Treatment Team Recommendation: Home  Discharge Destination Plan[de-identified] Home  Transport at Discharge : Family    ETA of Transport (Date): 22  ETA of Transport (Time): 1700    Accompanied by: Family member (mother)

## 2022-03-02 NOTE — CASE MANAGEMENT
Case Management Discharge Planning Note    Patient name Saulo Dougherty  Location [address] [place] [address] 723/[facility] 723-01 MRN [id number]  : 1990 Date 3/2/2022       Current Admission Date: 2022  Current Admission Diagnosis:Breakthrough seizure [facility])   Patient Active Problem List    Diagnosis Date Noted    Recurrent otitis media 2022    Left otitis externa 2022    Suppurative otitis media of left ear 2022    Hyponatremia 2022    Abnormal neurological finding suggestive of lumbar-level spinal disorder 2021    Abnormal EKG 2021    Brugada syndrome 2021    Syncope and collapse 2021    Other insomnia 2019    Breakthrough seizure ([facility] [address] ) 2018    Generalized tonic-clonic seizure ([facility] [address] ) 10/13/2016    Active autistic disorder 10/10/2012      LOS (days): 5  Geometric Mean LOS (GMLOS) (days):   Days to GMLOS:     OBJECTIVE:  Risk of Unplanned Readmission Score: 10         Current admission status: Inpatient   Preferred Pharmacy:   [address], [place] [address]  Phone: [phone] Fax: [facility] 69 [facility] 94 [facility] 38 [facility]  Phone: [phone] Fax: [phone]    Primary Care Provider: Trevor Pierre DO    Primary Insurance: Belle Law  Secondary Insurance:     DISCHARGE DETAILS:    Additional Comments: CM received a message from Nursing to return a call to [address] [place] [address]  @ [facility] re: pt and dcp  CM spoke with Chitra Fierro who reported she was assigned pt case and was trying to reach out to pt but not getting an answer in his room  CM advsied that Meenakshi Taylor is not verbal  Chitra Fierro stated she saw the diagnosis of Autism but wasnt sure   CM provided MO information to follow up

## 2022-03-02 NOTE — DISCHARGE INSTR - AVS FIRST PAGE
Dear Johnny Bonilla,     It was our pleasure to care for you here at [facility]  It is our hope that we were always able to exceed the expected standards for your care during your stay  You were hospitalized due to Seizure  You were cared for on the 7th floor by Asad Flores MD under the service of Britni So MD with the [facility] who covers for your primary care physician (PCP), Maddie Hoyt DO, while you were hospitalized  If you have any questions or concerns related to this hospitalization, you may contact us at [phone]  For follow up as well as any medication refills, we recommend that you follow up with your primary care physician  A registered nurse will reach out to you by phone within a few days after your discharge to answer any additional questions that you may have after going home  However, at this time we provide for you here, the most important instructions / recommendations at discharge:     Notable Medication Adjustments -   Take Keppra 1000mg two times daily and continue to next appointment  Take vimpat 150mg two times daily and continue to next appointment  Testing Required after Discharge -   None  Important follow up information -   Followup with ENT for ear infection and sinus infection on MRI  Followup with Epilepsy in 4 weeks for seizures  Followup with Dr Matilde Peter in 1 week for hospital followup  Other Instructions -   If there are any alteration in mental state, increased drowsiness away from normal, give a call to the neurology office  Please review this entire after visit summary as additional general instructions including medication list, appointments, activity, diet, any pertinent wound care, and other additional recommendations from your care team that may be provided for you        Sincerely,     Asad Flores MD

## 2022-03-02 NOTE — DISCHARGE SUMMARY
NEUROLOGY RESIDENCY - DISCHARGE SUMMARY     Name: Daved Cockayne   Age & Sex: 36 y o  male   MRN: [id number]  Unit/Bed#: [facility] 723-01   Encounter: [id number]    Discharging Resident Physician: Marie Bailey MD  Attending: No att  providers found  PCP: Gilmer Mills DO  Admission Date: 2/25/2022  Discharge Date: 03/02/22    Daved Cockayne will need follow up in in 4 weeks with epilepsy AP or Attending (Shaquille Crook)  He will not require outpatient neurological testing  ASSESSMENT & PLAN     Hyponatremia  Assessment & Plan  - Sodium continues to be normal range  - Continue to monitor BMP  Suppurative otitis media of left ear  Assessment & Plan  - Recently treated with Z-aura  - ENT consulted and being treated for otitis externa  Followup with ENT on discharge    Brugada syndrome  Assessment & Plan  - S/p ICD    Active autistic disorder  Assessment & Plan  - At baseline, patient is independent with ADLs (can bathe, dress, and feed himself)  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  He is able to communicate and say a few words, but cannot hold a full conversation  He can follow simple commands  * Breakthrough seizure [facility])  Assessment & Plan  36 y o  left handed male with autism, Brugada syndrome s/p ICD (2021), and possible epilepsy due to unknown cause that manifests as apparent generalized tonic clonic seizures that began in 2016 currently on Keppra 500 mg BID who presented to [facility] on 2/25/2022 due to possible breakthrough seizure at home and another seizure while in the ED  Patient's mother found the patient lying in bed unresponsive, making grunting noises, and incontinent of urine  Upon arrival to the ED, patient was lethargic, but answering a few questions  However, while in the ED, patient suddenly made grunting noises, eyes were roving, and his body "stiffened "  Patient then began to have "twitching" all over his body and had left lateral tongue bite    Patient received a total of 4 mg of Ativan, but continued to twitch so then was loaded with Keppra 4 g with resolution of seizure-like activity  CT head negative for acute intracranial abnormalities  Labs revealed sodium 127, WBC 16 9, and Prolactin 23 3  Lactic acid WNL (1 9)  Patient has been drinking a lot more water than usual over the last few days    Routine EEG completed 2/25/2022 demonstrated subclinical seizure  Patient was given Vimpat 400 mg x 1 and started on 150 mg Q12H and Keppra dose was increased to 1500 mg BID  Patient was placed on vEEG monitoring  LP completed and CSF results thus far: protein 63, glucose 67, WBCs 1, RBCs 1400, gram stain with rare polys and no organisms, culture with no growth, ME panel negative  Patient evaluated on 2/27/2022, patient continues to be more alert and interactive, but not quite back to baseline per patient's mother at bedside  2/28/2022: Patient not quite at baseline mentally according to mother due to latency of speech but otherwise mentally baseline    MRI brain w and wo contrast 3/2/2022: "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, asymmetrically thinner left fornix, and possibly smaller left mammillary body       A few white matter changes, suggestive of minimal chronic microangiopathy      Severe left sphenoid sinus disease with inspissated material "    Etiology for breakthrough seizures likely unprovoked but with added factor of hyponatremia  Not likely infectious  MRI brain w and wo contrast shows possibly smaller left hippocampal body compared to the right, which may be a seizure focus although on EEG, focus of higher epileptogenic potential is on the right  There may be a connection between the two areas of the mesial temporal lobe  Perhaps there is a smaller focus on the right hippocampal body not captured on vEEG causing unprovoked seizures  Plan:  - vEEG discontinued   - Continue to follow CSF studies   Culture, HSV 1,2 DNA PCR and VZV DNA PCR pending   - Abx discontinued due to negative blood cultures  - S/p Ativan 4 mg, Keppra 4 g load, Vimpat 400 mg load  - For discharge, patient on keppra 500mg BID and vimpat 150mg BID  - Sphenoid disease on MRI brain, patient to followup with ENT after discharge  - Continue to monitor sodium closely  - Seizure precautions  - PRN ativan for prolonged seizure-like activity  - Patient does not drive  - Frequent neuro checks  Continue to monitor and notify neurology with any changes  - Medical management and supportive care per primary team  Correction of any metabolic or infectious disturbances  Disposition:     Home    Reason for Admission: Seizures    Consultations During Hospital Stay:  ID  Medicine  ENT    Procedures Performed:     vEEG    Significant Findings / Test Results:     CT head without contrast    Result Date: 2/25/2022  Impression: No acute intracranial abnormality  Workstation performed: [id number]     MRI brain w wo contrast    Result Date: 3/2/2022  Impression: No acute intracranial abnormality  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, asymmetrically thinner left fornix, and possibly smaller left mammillary body  A few white matter changes, suggestive of minimal chronic microangiopathy  Severe left sphenoid sinus disease with inspissated material  The study was marked in EPIC for immediate notification  Workstation performed: [id number]       vEEG 2/25/2022-2/27/2022: Diffuse theta frequency slowing and intermittent generalized rhythmic delta activity suggest moderate nonspecific diffuse cerebral dysfunction  Also, intermittent low amplitude right temporal polymorphic delta slowing suggesting focal neuronal dysfunction   Also right temporal spikes in sleep raising possibility of underlying focal epileptogenic potential       Incidental Findings:   MRI brain w and wo contrast: severe left sphenoid sinus disease    Test Results Pending at Discharge (will require follow up): None     Outpatient Tests Requested:  none    Complications:  None    Hospital Course:     Denise Hess is a 36 y o  male patient who originally presented to the hospital on 2/25/2022 due to seizures in setting of hyponatremia  HPI per Niraj Better provided here:  "Denise Hess is a 36 y o  left handed male with autism, Brugada syndrome s/p ICD (2021), and possible epilepsy due to unknown cause that manifests as apparent generalized tonic clonic seizures that began in 2016 currently on Keppra 500 mg BID who presented to [facility] on 2/25/2022 due to breakthrough seizure      Patient was in his normal state last night, but this morning his mother found him in bed making "grunting" noises and was not responsive  She also noted drooling and patient was incontinent of urine  Upon arrival to the ED, vitals stable  Patient was lethargic, but answering a few questions  However, while in the ED, patient suddenly made grunting noises, eyes were roving, and his body "stiffened "  Patient then began to have "twitching" all over his body and had left lateral tongue bite  Patient received a total of 4 mg of Ativan, but continued to twitch so then was loaded with Keppra 4 g  Neurology was urgently consulted  By the time Neurology arrived, the twitching had resolved, but patient was clenching/grinding his teeth, he was internally/externally rotating is legs, occasionally his head would move back and forth, and he did not arouse to repeated verbal and tactile stimuli  Patient appeared postictal   CT head negative for acute intracranial abnormalities  Labs revealed sodium 127 and WBC 16 9      Last week patient had an ear infection and was treated with a Z-aura  No other recent illnesses  No other changes to his medications  He has not missed any Keppra doses    Of note, patient's mother states that patient has been drinking a lot more water the last few days than is usual      Prior history:  Per chart review and patient's mother at bedside, patient had his first generalized tonic clonic seizure on 2016  No identifiable precipitants for the event  Patient appeared to stop breathing during the seizure  CT head unremarkable  Routine EEG was normal   Patient remained seizure free off of antiepileptics until 2018  Patient was observed to have "fits" with generalized tonic clonic seizure  CT head unremarkable and EEG was normal   Patient was started on Keppra 500 mg BID  In 2020, patient had been seizure free for 2 years, so Keppra was slowly tapered off  In 2021, patient presented to [facility] after collapsing while working with the vacuum  No definite convulsion, but he appeared stay with slight trembling and unusual breathing pattern  He was unresponsive for approximately 15 minutes and then woke up suddenly  He was able to walk to the ambulance when EMS arrived  While in the ED, patient had been lysed tonic-clonic seizure with postictal state  He was given Ativan and Keppra  EKG revealed regarded type 2 syndrome  ICD was placed and patient was discharged on Keppra 500 mg BID  Since then, patient had been seizure free        Birth history:   Patient was born via  without any complications     Developmental history: Patient was diagnosed with autism at 21 months  He went to special education and behavioral classes  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      Illnesses/head trauma: No history of febrile seizures, meningitis, or head trauma      At baseline, patient is fairly independent with his ADLs  He is able to dress, feed, and bathe himself  He in the morning he will occasionally make his own breakfast   He is able to communicate with 1-2 word answers with his mom, but cannot hold a conversation    He can follow commands "    Patient initially obtunded in the ED and was given keppra 4g IV and lorazepam 2mg IV X3 and then keppra 750mg q12hrs was started  Routine EEG captured a brief electrographic seizure with no clinical correlation with some jaw clenching in swallowing  Patient then received vimpat 400mg IV load and started on vimpat 150mg q 12hrs  Patient was found to be hyponatremic with sodium 127 which was controlled  ENT saw patient due to known history of recurrent otitis media and found to have retention cyst which ENT wanted followup for  After the routine EEG captured the seizure from right temporal lobe, Keppra was increased to 1500mg BID and vimpat was continued at 150mg BID  Patient was transferred to ICU  LP was done with bland results besides the RBCs 1400 which is most likely due to traumatic tap  ID was consulted for possible infectious component of infection in setting of previous otitis media and was started on empiric abx for ceftriaxone and vancomycin but was discontinued as there was low probability of an infection for the patient especially in setting of negative blood cultures  Patient's hyponatremia resolved with IV hydration and patient was no longer found to be having seizures and thus vEEG was removed  Patient was transferred back to the floors and patient was stable but had greater latency in responses and inappropriate behaviors according to the mother that seems to fluctuate  MRI brain was done on the last day and was found to be have an asymmetrically smaller left hippocampal body, which would be an unusual seizure focus  After discussion with mother, mother was agreeable with discharge of patient on reduced dosing of keppra 1000mg BID and vimpat 150mg BID as the hospital environment may be a factor in patient's atypical behaviors and latency  Patient is to followup with Epilepsy in 4 weeks and followup with ENT for the MRI finding fo the left sphenoid sinus disease      Mother will keep in contact with the epilepsy outpatient team especially in fluctuation mentation by adjusting the keppra dosing  Condition at Discharge: good     Discharge Day Visit / Exam:     Subjective:  Patient according to mother had waxing and waning confusion and inappropriate behaviors since yesterday  Discussed with mother about this and mother is agreeable for discharge for patient as a home environment in terms of mentation for patient and having contact with Neurology outpatient in terms of adjusting keppra dosing to help  Vitals: Blood Pressure: 115/77 (03/02/22 1603)  Pulse: 101 (03/02/22 1603)  Temperature: 97 7 °F (36 5 °C) (03/02/22 1603)  Temp Source: Tympanic (03/02/22 0950)  Respirations: 19 (03/02/22 1025)  Height: 6' (182 9 cm) (02/25/22 2100)  Weight - Scale: 80 4 kg (177 lb 4 oz) (03/02/22 0540)  SpO2: 96 % (03/02/22 1603)    Exam:     Physical Exam  Eyes:      Extraocular Movements: EOM normal       Pupils: Pupils are equal, round, and reactive to light  Neurological:      Deep Tendon Reflexes:      Reflex Scores:       Bicep reflexes are 2+ on the right side and 2+ on the left side  Brachioradialis reflexes are 2+ on the right side and 2+ on the left side  Patellar reflexes are 3+ on the right side and 3+ on the left side  Achilles reflexes are 2+ on the right side and 2+ on the left side  Neurologic Exam     Mental Status   Oriented to person  Oriented to place  Attention: decreased  Concentration: decreased  Level of consciousness: alert  Able to name object  States 1-2 word responses     Cranial Nerves     CN II   Visual fields full to confrontation  CN III, IV, VI   Pupils are equal, round, and reactive to light  Extraocular motions are normal      CN V   Facial sensation intact  CN VII   Facial expression full, symmetric       CN XII   CN XII normal      Motor Exam Patient's bilateral upper and lower extremities antigravity and have spontaneous movements    Deferred formal motor exam because difficulty of patient following commands     Sensory Exam   Light touch normal      Gait, Coordination, and Reflexes     Reflexes   Right brachioradialis: 2+  Left brachioradialis: 2+  Right biceps: 2+  Left biceps: 2+  Right patellar: 3+  Left patellar: 3+  Right achilles: 2+  Left achilles: 2+  Right plantar: normal  Left plantar: normalDeferred finger to nose testing because patient's difficulty with following commands         Discussion with Family: Discussed with Mother    Discharge instructions/Information to patient and family:   See after visit summary for information provided to patient and family  Provisions for Follow-Up Care:  See after visit summary for information related to follow-up care and any pertinent home health orders  Planned Readmission: None    Discharge Statement:  I spent 30 minutes discharging the patient  This time was spent on the day of discharge  I had direct contact with the patient on the day of discharge  Greater than 50% of the total time was spent examining patient, answering all patient questions, arranging and discussing plan of care with patient as well as directly providing post-discharge instructions  Additional time then spent on discharge activities  Discharge Medications:  See after visit summary for reconciled discharge medications provided to patient and family        ** Please Note: This note has been constructed using a voice recognition system **      ==  MD Chris [facility]'s Neurology Residency, PGY-2

## 2022-03-02 NOTE — ANESTHESIA POSTPROCEDURE EVALUATION
Post-Op Assessment Note    CV Status:  Stable  Pain Score: 0    Pain management: adequate     Mental Status:  Awake and sleepy   Hydration Status:  Stable   PONV Controlled:  None   Airway Patency:  Patent      Post Op Vitals Reviewed: Yes      Staff: Anesthesiologist, CRNA         No complications documented      BP      Temp      Pulse     Resp      SpO2      REPORT TO MRI RN, VSS

## 2022-03-02 NOTE — PLAN OF CARE
Problem: Potential for Falls  Goal: Patient will remain free of falls  Description: INTERVENTIONS:  - Educate patient/family on patient safety including physical limitations  - Instruct patient to call for assistance with activity   - Consult OT/PT to assist with strengthening/mobility   - Keep Call bell within reach  - Keep bed low and locked with side rails adjusted as appropriate  - Keep care items and personal belongings within reach  - Initiate and maintain comfort rounds  - Make Fall Risk Sign visible to staff  - Offer Toileting every 4 Hours, in advance of need  - Initiate/Maintain bed alarm  - Obtain necessary fall risk management equipment: bed alarm  - Apply yellow socks and bracelet for high fall risk patients  - Consider moving patient to room near nurses station  3/2/2022 1549 by Patrice Cleary  Outcome: Adequate for Discharge  3/2/2022 9108 by Patrice Cleary  Outcome: Progressing     Problem: PAIN - ADULT  Goal: Verbalizes/displays adequate comfort level or baseline comfort level  Description: Interventions:  - Encourage patient to monitor pain and request assistance  - Assess pain using appropriate pain scale  - Administer analgesics based on type and severity of pain and evaluate response  - Implement non-pharmacological measures as appropriate and evaluate response  - Notify physician/advanced practitioner if interventions unsuccessful or patient reports new pain  3/2/2022 1549 by Patrice Cleary  Outcome: Adequate for Discharge  3/2/2022 0721 by Patrice Cleary  Outcome: Progressing     Problem: INFECTION - ADULT  Goal: Absence or prevention of progression during hospitalization  Description: INTERVENTIONS:  - Assess and monitor for signs and symptoms of infection  - Monitor lab/diagnostic results  - Monitor all insertion sites, i e  indwelling lines, tubes, and drains  - Schneider appropriate cooling/warming therapies per order  - Administer medications as ordered  - Instruct and encourage patient and family to use good hand hygiene technique  - Identify and instruct in appropriate isolation precautions for identified infection/condition  3/2/2022 1549 by Eneida Patterson  Outcome: Adequate for Discharge  3/2/2022 5091 by Eneida Patterson  Outcome: Progressing     Problem: SAFETY ADULT  Goal: Patient will remain free of falls  Description: INTERVENTIONS:  - Educate patient/family on patient safety including physical limitations  - Instruct patient to call for assistance with activity   - Consult OT/PT to assist with strengthening/mobility   - Keep Call bell within reach  - Keep bed low and locked with side rails adjusted as appropriate  - Keep care items and personal belongings within reach  - Initiate and maintain comfort rounds  - Make Fall Risk Sign visible to staff  - Offer Toileting every 4 Hours, in advance of need  - Initiate/Maintain bed alarm  - Obtain necessary fall risk management equipment: bed alarm  - Apply yellow socks and bracelet for high fall risk patients  - Consider moving patient to room near nurses station  3/2/2022 1549 by Eneida Patterson  Outcome: Adequate for Discharge  3/2/2022 2663 by Eneida Patterson  Outcome: Progressing  Goal: Maintain or return to baseline ADL function  Description: INTERVENTIONS:  -  Assess patient's ability to carry out ADLs; assess patient's baseline for ADL function and identify physical deficits which impact ability to perform ADLs (bathing, care of mouth/teeth, toileting, grooming, dressing, etc )  - Assess/evaluate cause of self-care deficits   - Assess range of motion  - Assess patient's mobility; develop plan if impaired  - Assess patient's need for assistive devices and provide as appropriate  - Encourage maximum independence but intervene and supervise when necessary  - Involve family in performance of ADLs  - Assess for home care needs following discharge   - Consider OT consult to assist with ADL evaluation and planning for discharge  - Provide patient education as appropriate  3/2/2022 1549 by Elvera Homans  Outcome: Adequate for Discharge  3/2/2022 6611 by Elvera Homans  Outcome: Progressing  Goal: Maintains/Returns to pre admission functional level  Description: INTERVENTIONS:  - Perform BMAT or MOVE assessment daily    - Set and communicate daily mobility goal to care team and patient/family/caregiver  - Collaborate with rehabilitation services on mobility goals if consulted  - Perform Range of Motion 4 times a day  - Reposition patient every 2 hours  - Dangle patient 4 times a day  - Stand patient 4 times a day  - Ambulate patient 4 times a day  - Out of bed to chair 4 times a day   - Out of bed for meals 4 times a day  - Out of bed for toileting  - Record patient progress and toleration of activity level   3/2/2022 1549 by Elvera Homans  Outcome: Adequate for Discharge  3/2/2022 0003 by Elvera Homans  Outcome: Progressing     Problem: DISCHARGE PLANNING  Goal: Discharge to home or other facility with appropriate resources  Description: INTERVENTIONS:  - Identify barriers to discharge w/patient and caregiver  - Arrange for needed discharge resources and transportation as appropriate  - Identify discharge learning needs (meds, wound care, etc )  - Refer to Case Management Department for coordinating discharge planning if the patient needs post-hospital services based on physician/advanced practitioner order or complex needs related to functional status, cognitive ability, or social support system  3/2/2022 1549 by Elvera Homans  Outcome: Adequate for Discharge  3/2/2022 0721 by Elvera Homans  Outcome: Progressing     Problem: Knowledge Deficit  Goal: Patient/family/caregiver demonstrates understanding of disease process, treatment plan, medications, and discharge instructions  Description: Complete learning assessment and assess knowledge base    Interventions:  - Provide teaching at level of understanding  - Provide teaching via preferred learning methods  3/2/2022 1549 by Akin Samuel  Outcome: Adequate for Discharge  3/2/2022 0830 by Akin Samuel  Outcome: Progressing     Problem: MOBILITY - ADULT  Goal: Maintain or return to baseline ADL function  Description: INTERVENTIONS:  -  Assess patient's ability to carry out ADLs; assess patient's baseline for ADL function and identify physical deficits which impact ability to perform ADLs (bathing, care of mouth/teeth, toileting, grooming, dressing, etc )  - Assess/evaluate cause of self-care deficits   - Assess range of motion  - Assess patient's mobility; develop plan if impaired  - Assess patient's need for assistive devices and provide as appropriate  - Encourage maximum independence but intervene and supervise when necessary  - Involve family in performance of ADLs  - Assess for home care needs following discharge   - Consider OT consult to assist with ADL evaluation and planning for discharge  - Provide patient education as appropriate  3/2/2022 1549 by Akin Samuel  Outcome: Adequate for Discharge  3/2/2022 8766 by Akin Samuel  Outcome: Progressing  Goal: Maintains/Returns to pre admission functional level  Description: INTERVENTIONS:  - Perform BMAT or MOVE assessment daily    - Set and communicate daily mobility goal to care team and patient/family/caregiver  - Collaborate with rehabilitation services on mobility goals if consulted  - Perform Range of Motion 4 times a day  - Reposition patient every 2 hours    - Dangle patient 4 times a day  - Stand patient 4 times a day  - Ambulate patient 4 times a day  - Out of bed to chair 4 times a day   - Out of bed for meals 4 times a day  - Out of bed for toileting  - Record patient progress and toleration of activity level   3/2/2022 1549 by Akin Samuel  Outcome: Adequate for Discharge  3/2/2022 9106 by Akin Samuel  Outcome: Progressing

## 2022-03-02 NOTE — PROGRESS NOTES
[address] [facility]  Progress Note Marcia Arteaga [facility] 1990, 36 y o  male MRN: [id number]  Unit/Bed#: [facility] 723-01 Encounter: [id number]  Primary Care Provider: Rosamaria Connell DO   Date and time admitted to hospital: 2/25/2022  9:30 AM    * Breakthrough seizure [facility])  Assessment & Plan  · Suspecting breakthrough seizure secondary to hyponatremia, possibly abx/infection  Infectious work-up negative, if cultures remain negative today, d/c abx per ID   · Management as per primary team, neurology  S/p Keppra load and IV Ativan   · Was on vEEG, no further activity seen, now discontinued   · CSF studies with elevated RBC's   · No growth or evidence of infection at this time  · Continue Keppra 1500 mg BID and Vimpat 150 mg Q12hr  · MRI brain: No acute intracranial abnormality  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, asymmetrically thinner left fornix, and possibly smaller left mamillary body  A few white matter changes, suggestive of minimal chronic microangiopathy  Severe left sphenoid sinus disease with inspissated material     Left otitis externa  Assessment & Plan  · ENT evaluated -- will need outpt f/u   · Continue ofloxacin drops, four drops each ear BID   · Complete total of seven days   · Continue abx drop through 3/3    Hyponatremia  Assessment & Plan  · POA, Na 127    Possible etiology of breakthrough seizure -- see related plan  · Now resolved with IV fluids  · Monitor BMP     Brugada syndrome  Assessment & Plan  · S/p ICD interrogation, no arrhythmias found     Active autistic disorder  Assessment & Plan  · Per mother patient is independent at baseline          VTE Pharmacologic Prophylaxis: VTE Score: 2 lovenox    Patient Centered Rounds: I evaluated the patient without nursing staff present due to w/ other pt  Discussions with Specialists or Other Care Team Provider:     Education and Discussions with Family / Patient: Updated  (mother) at bedside  Time Spent for Care: 30 minutes  More than 50% of total time spent on counseling and coordination of care as described above  Current Length of Stay: 5 day(s)  Current Patient Status: Inpatient   Certification Statement: The patient will continue to require additional inpatient hospital stay due to per primary team  Discharge Plan: [facility] is following this patient on consult  They are medically stable for discharge when deemed appropriate by primary service  Code Status: Level 1 - Full Code    Subjective:   Pt with minimal verbal responses, mother at bedside provides hx  Waxing and waning in terms of lethargy  Objective:     Vitals:   Temp (24hrs), Av 6 °F (37 °C), Min:98 6 °F (37 °C), Max:98 6 °F (37 °C)    Temp:  [98 6 °F (37 °C)] 98 6 °F (37 °C)  HR:  [74-96] 96  Resp:  [15-21] 19  BP: (104-119)/(62-82) 119/77  SpO2:  [95 %-97 %] 97 %  Body mass index is 24 04 kg/m²  Input and Output Summary (last 24 hours): Intake/Output Summary (Last 24 hours) at 3/2/2022 1420  Last data filed at 3/2/2022 0953  Gross per 24 hour   Intake 500 ml   Output --   Net 500 ml       Physical Exam:   Physical Exam  Vitals reviewed  Constitutional:       General: He is not in acute distress  Appearance: He is not toxic-appearing  HENT:      Head: Normocephalic and atraumatic  Eyes:      Extraocular Movements: Extraocular movements intact  Pulmonary:      Effort: Pulmonary effort is normal  No respiratory distress  Musculoskeletal:         General: Normal range of motion  Cervical back: Normal range of motion  Skin:     General: Skin is warm  Neurological:      General: No focal deficit present  Mental Status: He is alert  Comments: Minimally verbal   Psychiatric:         Mood and Affect: Mood normal          Behavior: Behavior normal          Thought Content:  Thought content normal           Additional Data:     Labs:  Results from last 7 days   Lab Units 03/02/22  0503 02/26/22  0520 02/25/22  1748 02/25/22  0957 02/25/22  0957   WBC Thousand/uL 5 82   < > 13 20*   < > 16 90*   HEMOGLOBIN g/dL 15 3   < > 13 0   < > 14 9   HEMATOCRIT % 43 9   < > 34 2*   < > 41 1   PLATELETS Thousands/uL 153   < > 104*   < > 162   BANDS PCT %  --   --  16*  --   --    NEUTROS PCT %  --   --   --   --  92*   LYMPHS PCT %  --   --   --   --  3*   LYMPHO PCT %  --   --  3*  --   --    MONOS PCT %  --   --   --   --  5   MONO PCT %  --   --  2*  --   --    EOS PCT %  --   --  0  --  0    < > = values in this interval not displayed  Results from last 7 days   Lab Units 03/02/22  0503 02/26/22  1236 02/26/22  0520   SODIUM mmol/L 141   < >  --    POTASSIUM mmol/L 4 0   < >  --    CHLORIDE mmol/L 108   < >  --    CO2 mmol/L 29   < >  --    BUN mg/dL 15   < >  --    CREATININE mg/dL 0 88   < >  --    ANION GAP mmol/L 4   < >  --    CALCIUM mg/dL 9 1   < >  --    ALBUMIN g/dL  --   --  3 4*   TOTAL BILIRUBIN mg/dL  --   --  0 53   ALK PHOS U/L  --   --  62   ALT U/L  --   --  33   AST U/L  --   --  38   GLUCOSE RANDOM mg/dL 83   < >  --     < > = values in this interval not displayed  Results from last 7 days   Lab Units 02/25/22  1908   INR  1 03     Results from last 7 days   Lab Units 03/01/22  1821 02/25/22  1011   POC GLUCOSE mg/dl 129 106         Results from last 7 days   Lab Units 02/26/22  0521 02/25/22  1908 02/25/22  1455 02/25/22  1213   LACTIC ACID mmol/L  --   --  1 1 1 9   PROCALCITONIN ng/ml 0 23 0 30*  --   --        Lines/Drains:  Invasive Devices  Report    Peripheral Intravenous Line            Peripheral IV 02/25/22 Right Forearm 5 days                      Imaging: Reviewed radiology reports from this admission including: MRI brain    Recent Cultures (last 7 days):   Results from last 7 days   Lab Units 02/25/22 1915 02/25/22  1908   BLOOD CULTURE   --  No Growth After 4 Days  No Growth After 4 Days     GRAM STAIN RESULT  Rare Polys  No organisms seen  -- Last 24 Hours Medication List:   Current Facility-Administered Medications   Medication Dose Route Frequency Provider Last Rate    acetaminophen  650 mg Oral Q6H PRN Ayanna Aguiar PA-C      enoxaparin  40 mg Subcutaneous Q24H [address] Robert [facility] [place], [place]      lacosamide  150 mg Oral Q12H [address] [facility] [place], [place]      levETIRAcetam  1,500 mg Oral Q12H [address] Autumn Mariana [place], [place]      LORazepam  2 mg Intravenous Q6H PRN Ayanna Aguiar PA-C      ofloxacin  4 drop Otic BID Robert Garnett PA-C      ondansetron  4 mg Intravenous Once Ayanna Aguiar PA-C          Today, Patient Was Seen By: Efe Calderón PA-C    **Please Note: This note may have been constructed using a voice recognition system  **

## 2022-03-02 NOTE — ANESTHESIA PREPROCEDURE EVALUATION
Procedure:  MRI BRAIN W WO CONTRAST    Relevant Problems   CARDIO   (+) Brugada syndrome      NEURO/PSYCH   (+) Breakthrough seizure ([address] )   (+) Generalized tonic-clonic seizure ([address] )        Physical Exam    Airway       Dental   No notable dental hx     Cardiovascular  Cardiovascular exam normal    Pulmonary  Pulmonary exam normal     Other Findings        Anesthesia Plan  ASA Score- 3     Anesthesia Type- general with ASA Monitors  Additional Monitors:   Airway Plan: LMA  Plan Factors-    Chart reviewed  Patient summary reviewed  Patient is not a current smoker  Induction- intravenous  Postoperative Plan-     Informed Consent- Anesthetic plan and risks discussed with mother  I personally reviewed this patient with the CRNA  Discussed and agreed on the Anesthesia Plan with the CRNA  Shade Flores

## 2022-03-02 NOTE — NURSING NOTE
MRI completed, patient tolerated procedure  Austisc with minimal verbal response  Post vital signs taken and recorded  Report given to Floor nurse Toan Cartagena  Patient placed in transport to be taken back to room 723  Patient with mother offers no complaints or verbalizing any issues upon leaving MRI

## 2022-03-02 NOTE — PROGRESS NOTES
Progress Note - Infectious Disease   Saulo Dougherty 36 y o  male MRN: [id number]  Unit/Bed#: [facility] 723-01 Encounter: [id number]      Impression:  1  Seizure disorder with postictal state  2  Autism  3  Brugada syndrome s/p AICD insertion     Recommendations: Mother feels that patient has markedly altered behavior  He is much more eratic than his baseline  Discussed with the patient's mother who is at bedside  WBC count is elevated at 13,240 when last taken  No evidence of infectious component  2  Observe off antibiotics  3  MRI pending possibly on Wed with sedation  4  There was a question whether his behavioral changes secondary to a change in his medication  Neurology to evaluate  Antibiotics:  1  None    Subjective:  He is nonverbal      Objective:  Vitals:  Temp:  [97 5 °F (36 4 °C)] 97 5 °F (36 4 °C)  HR:  [79-96] 96  Resp:  [18] 18  BP: (116-129)/(80-83) 116/80  SpO2:  [95 %-96 %] 96 %  Temp (24hrs), Av 5 °F (36 4 °C), Min:97 5 °F (36 4 °C), Max:97 5 °F (36 4 °C)  Current: Temperature: 97 5 °F (36 4 °C)    Physical Exam:     General Appearance:  Alert, nontoxic, nonverbal, no acute distress  Throat: Oropharynx moist without lesions  Lips, mucosa, and tongue normal   Neck: Supple, symmetrical, trachea midline, no adenopathy,  no tenderness/mass/nodules   Lungs:   Clear to auscultation bilaterally, no audible wheezes, rhonchi or rales; respirations unlabored   Heart:  Regular rate and rhythm, S1, S2 normal, no murmur, rub or gallop  Left subclavian ICD with incisional scar  Abdomen:   Soft, non-tender, non-distended, positive bowel sounds  No masses, no organomegaly    No CVA tenderness   Extremities: Extremities normal, atraumatic, no clubbing, cyanosis or edema   Skin: As above           Invasive Devices  Report    Peripheral Intravenous Line            Peripheral IV 22 Right Forearm 4 days                Labs, Imaging, & Other studies:   All pertinent labs were personally reviewed  Results from last 7 days   Lab Units 02/27/22  0512 02/26/22  0520 02/25/22  1748   WBC Thousand/uL 13 24* 8 45 13 20*   HEMOGLOBIN g/dL 14 5 14 8 13 0   PLATELETS Thousands/uL 183 169 104*     Results from last 7 days   Lab Units 03/01/22  0647 02/28/22  0529 02/28/22  0529 02/27/22  0512 02/27/22  0512 02/26/22  1236 02/26/22  0520 02/25/22  1455 02/25/22  0957   SODIUM mmol/L 141  --  140  --  140   < >  --    < > 127*   POTASSIUM mmol/L 3 9   < > 4 0   < > 4 0   < >  --    < > 3 5   CHLORIDE mmol/L 107   < > 108   < > 110*   < >  --    < > 95*   CO2 mmol/L 29   < > 25   < > 26   < >  --    < > 24   BUN mg/dL 15   < > 14   < > 12   < >  --    < > 12   CREATININE mg/dL 0 86   < > 0 84   < > 0 78   < >  --    < > 0 74   EGFR ml/min/1 73sq m 117   < > 118   < > 121   < >  --    < > 124   CALCIUM mg/dL 9 4   < > 9 4   < > 9 3   < >  --    < > 8 6   AST U/L  --   --   --   --   --   --  38  --  34   ALT U/L  --   --   --   --   --   --  33   < > 32   ALK PHOS U/L  --   --   --   --   --   --  62   < > 67    < > = values in this interval not displayed  Results from last 7 days   Lab Units 02/25/22 1915 02/25/22  1908   BLOOD CULTURE   --  No Growth at 72 hrs  No Growth at 72 hrs     GRAM STAIN RESULT  Rare Polys  No organisms seen  --

## 2022-03-02 NOTE — DISCHARGE INSTRUCTIONS
Epilepsy   WHAT YOU NEED TO KNOW:   Epilepsy is a brain disorder that causes seizures  It is also called a seizure disorder  A seizure means an abnormal area in your brain sometimes sends bursts of electrical activity  A seizure may start in one part of your brain, or both sides may be affected  Depending on the type of seizure, you may have movements you cannot control, lose consciousness, or stare straight ahead  You may be confused or tired after the seizure  A seizure may last a few seconds or longer than 5 minutes  A birth defect, tumor, stroke, dementia, injury, or infection may cause epilepsy  The cause of your epilepsy may not be known  If your seizures are not controlled, epilepsy may become life-threatening  DISCHARGE INSTRUCTIONS:   Call your local emergency number (911 in the [address] [place] [address],[address]), or have someone else call, for any of the following:   · Your seizure lasts longer than 5 minutes  · You have trouble breathing after a seizure  · You have diabetes or are pregnant and have a seizure  · You have a seizure in water, such as a swimming pool or bathtub  Call your doctor if:   · You have a second seizure within 24 hours of the first      · You are injured during a seizure  · You feel you are not able to cope with your condition  · Your seizures start to happen more often  · You are confused longer than usual after a seizure  · You are planning to get pregnant or are currently pregnant  · You have questions or concerns about your condition or care  Medicines:   · Antiseizure medicine  may control or prevent another seizure  Do not stop taking this medicine  Another person may need to give you rescue medicine to stop a seizure at home  Ask your healthcare provider for more information about rescue medicine  · Take your medicine as directed  Contact your healthcare provider if you think your medicine is not helping or if you have side effects   Tell him of her if you are allergic to any medicine  Keep a list of the medicines, vitamins, and herbs you take  Include the amounts, and when and why you take them  Bring the list or the pill bottles to follow-up visits  Carry your medicine list with you in case of an emergency  Follow up with your neurologist as directed: You may need tests to check the level of antiseizure medicine in your blood  Your neurologist may need to change or adjust your medicine  Write down your questions so you remember to ask them during your visits  Prevent a complication of epilepsy:   · Sudden unexplained death in epilepsy (SUDEP) is a rare complication of epilepsy  In 1 year, 1 adult in 1,000 adults with epilepsy will have this complication  The risk of SUDEP increases if you have 3 or more generalized tonic-clonic seizures in 1 year  Your risk also increases if you have nocturnal seizures (seizures during sleep)  After a nocturnal seizure, your breathing can become shallow  · Your healthcare provider may recommend a change in medicine to decrease the number of seizures  For nocturnal seizures, he or she may recommend that someone sleep near you  The person must be older than 10 years  The person must also be close enough to know that you are having a seizure  What you can do to prevent a seizure: You may not be able to prevent every seizure  The following can help you manage triggers that may make a seizure start:  · Take your medicine every day at the same time  This will also help prevent medicine side effects  Set an alarm to help remind you to take your medicine every day  · Manage stress  Stress can be a trigger for epilepsy  Exercise can help you reduce stress  Talk to your healthcare provider about exercise that is safe for you  Illness can be a form of stress  Eat a variety of healthy foods and drink plenty of liquids during an illness  Talk to your healthcare provider about other ways to manage stress      · Set a regular sleep schedule  A lack of sleep can trigger a seizure  Try to go to sleep and wake up at the same time every day  Keep your bedroom quiet and dark  Talk to your healthcare provider if you are having trouble sleeping  · Limit or do not drink alcohol as directed  Alcohol can trigger a seizure, especially if you drink a large amount at one time  A drink of alcohol is 12 ounces of beer, 1½ ounces of liquor, or 5 ounces of wine  Talk to your healthcare provider about a safe amount of alcohol for you  Your provider may recommend that you do not drink any alcohol  Tell him or her if you need help to quit drinking  What you can do to manage epilepsy:   · Keep a seizure diary  This can help you find your triggers and avoid them  Write down the dates of your seizures, where you were, and what you were doing  Include how you felt before and after  Possible triggers include illness, lack of sleep, hormonal changes, alcohol, drugs, lights, or stress  · Record any auras you have before a seizure  An aura is a sign that you are about to have a seizure  Auras happen before certain types of seizures that are in only 1 part of the brain  The aura may happen seconds before a seizure, or up to an hour before  You may feel, see, hear, or smell something  Examples include part of your body becoming hot  You may see a flash of light or hear something  You may have anxiety or déjà vu  If you have an aura, include it in your seizure diary  · Create a care plan  Tell family, friends, and coworkers about your epilepsy  Give them instructions that tell them how they can keep you safe if you have a seizure  · Find support  You may be referred to a psychologist or   Ask your healthcare provider about support groups for people with epilepsy  · Ask what safety precautions you should take  Talk with your healthcare provider about driving  You may not be able to drive until you are seizure-free for a period of time  You will need to check the law where you live  Also talk to your healthcare provider about swimming and bathing  You may drown or develop life-threatening heart or lung damage if you have a seizure in water  · Carry medical alert identification  Wear medical alert jewelry or carry a card that says you have epilepsy  Ask your healthcare provider where to get these items  How others can keep you safe during a seizure:  Give the following instructions to family, friends, and coworkers:  · Do not panic  · Do not hold me down or put anything in my mouth  · Gently guide me to the floor or a soft surface  · Place me on my side to help prevent me from swallowing saliva or vomit  · Protect me from injury  Remove sharp or hard objects from the area surrounding me, or cushion my head  · Loosen the clothing around my head and neck  · Time how long my seizure lasts  Call 911 if my seizure lasts longer than 5 minutes or if I have a second seizure  · Stay with me until my seizure ends  Let me rest until I am fully awake  · Perform CPR if I stop breathing or you cannot feel my pulse  · Do not give me anything to eat or drink until I am fully awake  © Copyright [employer] 2022 Information is for End User's use only and may not be sold, redistributed or otherwise used for commercial purposes  All illustrations and images included in CareNotes® are the copyrighted property of [employer]  or [employer]  The above information is an  only  It is not intended as medical advice for individual conditions or treatments  Talk to your doctor, nurse or pharmacist before following any medical regimen to see if it is safe and effective for you

## 2022-03-02 NOTE — TELEPHONE ENCOUNTER
Spoke with Mom-patient is still inpatient  She will follow up appropriately when patient is discharged

## 2022-03-03 ENCOUNTER — TELEPHONE (OUTPATIENT)
Dept: NEUROLOGY | Facility: CLINIC | Age: 32
End: 2022-03-03

## 2022-03-03 ENCOUNTER — TRANSITIONAL CARE MANAGEMENT (OUTPATIENT)
Dept: FAMILY MEDICINE CLINIC | Facility: CLINIC | Age: 32
End: 2022-03-03

## 2022-03-03 ENCOUNTER — TELEPHONE (OUTPATIENT)
Dept: FAMILY MEDICINE CLINIC | Facility: CLINIC | Age: 32
End: 2022-03-03

## 2022-03-03 LAB
HSV1 DNA CSF QL NAA+PROBE: NEGATIVE
HSV2 DNA CSF QL NAA+PROBE: NEGATIVE

## 2022-03-03 NOTE — TELEPHONE ENCOUNTER
I called Matilde Paulette mom that works for her and Eugenio  I did schedule pt for Monday 3/07/2 2at 3:45 pm for TCM  per Dr Anderson

## 2022-03-03 NOTE — TELEPHONE ENCOUNTER
Pt's mom Ayah Santana called the office pt was discharged from [facility] 03/02/22  Pt is to follow up in 1 week  Pt's mom is aware Dr Murcia is out of the office  on medial leave  Dr Anderson no availability with you or Jossie  Anyway you could look for na opening to see pt for a TCM?    Ayah Santana pt's mom's number is [phone]

## 2022-03-03 NOTE — TELEPHONE ENCOUNTER
Spoke with Pt's Mother sched HFU appt with Gera Swanson on 4/11/22 at 3:00 pm        HFU/SLBE/ATT/AP/Breakthrough seizure/DC3/2  Note from Chart:        Denise Hess will need follow up in in 4 weeks with epilepsy AP or attending  He will not require outpatient neurological testing

## 2022-03-03 NOTE — UTILIZATION REVIEW
Notification of Discharge   This is a Notification of Discharge from our facility [address]  Please be advised that this patient has been discharge from our facility  Below you will find the admission and discharge date and time including the patients disposition  UTILIZATION REVIEW CONTACT:  Rajiv Torres  Utilization   Network Utilization Review Department  Phone: [phone] x carefully listen to the prompts  All voicemails are confidential   Email: [email or web address]     PHYSICIAN ADVISORY SERVICES:  FOR CIBV-YJ-AYII REVIEW - MEDICAL NECESSITY DENIAL  Phone: [phone]  Fax: [phone]  Email: [email or web address]     PRESENTATION DATE: 2/25/2022  9:30 AM  OBERVATION ADMISSION DATE:   INPATIENT ADMISSION DATE: 2/25/22 12:36 PM   DISCHARGE DATE: 3/2/2022  6:11 PM  DISPOSITION: Home/Self Care Home/Self Care      IMPORTANT INFORMATION:  Send all requests for admission clinical reviews, approved or denied determinations and any other requests to dedicated fax number below belonging to the campus where the patient is receiving treatment   List of dedicated fax numbers:  [address] DENIALS (Administrative/Medical Necessity) [phone]   [address] (Maternity/NICU/Pediatrics) [phone]   [facility] [phone] [address] [facility] [phone]   [address] [place] [address] [phone]   [address] [place] [address] [phone]   [facility]  [phone]   [address] [facility]  [address] [phone]

## 2022-03-04 LAB — VZV DNA SPEC QL NAA+PROBE: NEGATIVE

## 2022-03-07 ENCOUNTER — OFFICE VISIT (OUTPATIENT)
Dept: FAMILY MEDICINE CLINIC | Facility: CLINIC | Age: 32
End: 2022-03-07
Payer: COMMERCIAL

## 2022-03-07 VITALS
SYSTOLIC BLOOD PRESSURE: 100 MMHG | DIASTOLIC BLOOD PRESSURE: 60 MMHG | BODY MASS INDEX: 23.7 KG/M2 | OXYGEN SATURATION: 97 % | TEMPERATURE: 97.1 F | HEART RATE: 118 BPM | WEIGHT: 175 LBS | HEIGHT: 72 IN

## 2022-03-07 DIAGNOSIS — F84.0 ACTIVE AUTISTIC DISORDER: ICD-10-CM

## 2022-03-07 DIAGNOSIS — I49.8 BRUGADA SYNDROME: ICD-10-CM
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
## 2022-03-07 DIAGNOSIS — G40.919 BREAKTHROUGH SEIZURE (HCC): Primary | ICD-10-CM

## 2022-03-07 DIAGNOSIS — T07.XXXA MULTIPLE ABRASIONS: ICD-10-CM
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
## 2022-03-07 PROCEDURE — 99496 TRANSJ CARE MGMT HIGH F2F 7D: CPT | Performed by: FAMILY MEDICINE

## 2022-03-07 NOTE — PROGRESS NOTES
Assessment/Plan:     Diagnoses and all orders for this visit:    Breakthrough seizure ([facility] )    Brugada syndrome    Active autistic disorder    Fall in home, initial encounter    Multiple abrasions-left forehead and above eyebrow          Subjective:   Chief Complaint   Patient presents with    Transition of Care Management     D/C 3/2 seizures         Patient ID: Blanco Sinclair is a 36 y o  male  Vera Sal is a 61-year-old complex male with history of autism, but got is syndrome status post ICD and epilepsy who had a breakthrough seizure at home  Mom states she found him in the morning postictal   Both mom and father took patient to ED  During his preparation for CT scan he had another grand mal type seizure  He was treated appropriately  Workup ensued with additional studies including spinal tap, MRI labs etc  Neurology consult  Medication adjustment with addition of Vimpat  Mom will be establishing a neurology consult out patient  She is interested in switching from Dr Melissa Villegas to Swapna Flores MD    Since he has been home he has had no further seizures  However he did trip and fall in their driveway and sustained a couple abrasions and superficial cuts on the left forehead  There was no loss of consciousness  He is acting normal/baseline  Mom thinks that the new seizure medication is making him a little bit more fidgety/minor agitation, but no overt aggression  Was also recommended that patient follow-up with ENT because his tympanic membranes still did not look completely normal      * Breakthrough seizure ([facility] ) [place] 2/25/2022  Assessment & Plan  36 y o  left handed male with autism, Brugada syndrome s/p ICD (2021), and possible epilepsy due to unknown cause that manifests as apparent generalized tonic clonic seizures that began in 2016 currently on Keppra 500 mg BID who presented to [facility] on 2/25/2022 due to possible breakthrough seizure at home and another seizure while in the ED     · Patient's mother found the patient lying in bed unresponsive, making grunting noises, and incontinent of urine  Upon arrival to the ED, patient was lethargic, but answering a few questions  However, while in the ED, patient suddenly made grunting noises, eyes were roving, and his body "stiffened "  Patient then began to have "twitching" all over his body and had left lateral tongue bite  Patient received a total of 4 mg of Ativan, but continued to twitch so then was loaded with Keppra 4 g with resolution of seizure-like activity      · CT head negative for acute intracranial abnormalities  · Labs revealed sodium 127, WBC 16 9, and Prolactin 23 3  Lactic acid WNL (1 9)  · Patient has been drinking a lot more water than usual over the last few days     Routine EEG completed 2/25/2022 demonstrated subclinical seizure  Patient was given Vimpat 400 mg x 1 and started on 150 mg Q12H and Keppra dose was increased to 1500 mg BID  Patient was placed on vEEG monitoring      LP completed and CSF results thus far: protein 63, glucose 67, WBCs 1, RBCs 1400, gram stain with rare polys and no organisms, culture with no growth, ME panel negative  The following portions of the patient's history were reviewed and updated as appropriate: allergies, current medications, past family history, past social history and problem list     Review of Systems   Constitutional: Negative for fatigue and fever  Fall as noted   Respiratory: Negative for shortness of breath  Cardiovascular: Negative for chest pain  Skin:        Abrasions and minor cuts face   Neurological:        No further seizures since at home   Psychiatric/Behavioral: Agitation: Slight           Objective:  TCM Call (since 2/5/2022)     Date and time call was made  3/3/2022 10:15 AM    Hospital care reviewed  Records reviewed        Patient was hospitialized at  [facility]        Date of Admission  02/25/22    Date of discharge  03/02/22 Diagnosis  breakthrough seziures     Disposition  Home    Were the patients medications reviewed and updated  No    Current Symptoms  None      TCM Call (since 2/5/2022)     Post hospital issues  None    Should patient be enrolled in anticoag monitoring? No    Scheduled for follow up? Yes    Patients specialists  Neurologist; Other (comment)    Other specialists names  ENT     Did you obtain your prescribed medications  Yes    Do you need help managing your prescriptions or medications  Yes    Why type of assitance do you need  Mom manages medicacations     Is transportation to your appointment needed  Yes    Specify why  Mom will transport     I have advised the patient to call PCP with any new or worsening symptoms  Pt does not drive     Living Arrangements  parents    Are you recieving any outpatient services  No        /60   Pulse (!) 118   Temp (!) 97 1 °F (36 2 °C) (Temporal)   Ht 5' 11 5" (1 816 m)   Wt 79 4 kg (175 lb)   SpO2 97%   BMI 24 07 kg/m²      Physical Exam  HENT:      Nose: Nose normal       Mouth/Throat:      Mouth: Mucous membranes are moist    Eyes:      Pupils: Pupils are equal, round, and reactive to light  Cardiovascular:      Rate and Rhythm: Normal rate and regular rhythm  Pulses: Normal pulses  Pulmonary:      Effort: Pulmonary effort is normal       Breath sounds: Normal breath sounds  Musculoskeletal:         General: Normal range of motion  Skin:     Comments: Superficial cuts left forehead and 1 over eyebrow that are clean and without secondary signs of infection  Neurological:      Mental Status: He is alert  Mental status is at baseline  Psychiatric:         Mood and Affect: Mood is anxious  Behavior: Behavior is cooperative                    Current Outpatient Medications:     acetaminophen (TYLENOL) 325 mg tablet, Take 3 tablets (975 mg total) by mouth every 6 (six) hours as needed for mild pain, headaches or fever, Disp: , Rfl: 0   lacosamide (VIMPAT) 150 mg tablet, Take 1 tablet (150 mg total) by mouth every 12 (twelve) hours for 10 days, Disp: 60 tablet, Rfl: 5    levETIRAcetam (KEPPRA) 500 mg tablet, Take 2 tablets (1,000 mg total) by mouth every 12 (twelve) hours, Disp: 180 tablet, Rfl: 3    Loratadine (CLARITIN) 10 MG CAPS, Take 10 mg by mouth as needed  , Disp: , Rfl:     mupirocin (BACTROBAN) 2 % ointment, Apply topically 3 (three) times a day, Disp: 22 g, Rfl: 0  Allergies   Allergen Reactions    Cefaclor Hives    Sulfamethoxazole-Trimethoprim Hives

## 2022-04-04 ENCOUNTER — TELEPHONE (OUTPATIENT)
Dept: NEUROLOGY | Facility: CLINIC | Age: 32
End: 2022-04-04

## 2022-04-04 NOTE — TELEPHONE ENCOUNTER
Patient's mom asked to move her son's appointment to the 8 or 845 am appointment instead  Rescheduled it to 845 am on 4/6 with Camila

## 2022-04-04 NOTE — TELEPHONE ENCOUNTER
mom calling to report shaking of hands and not himself  she is concerned this is related to vimpat  patient does have cardiac history as well as being autistic  behavioral issues, not remembering things, very irritable, changes in appetite, mood changes  Patient has f/u scheduled for Monday 4/11  She is looking to taper off medication  Looking to do so prior to Monday appt  Please advise  [phone]

## 2022-04-06 ENCOUNTER — OFFICE VISIT (OUTPATIENT)
Dept: NEUROLOGY | Facility: CLINIC | Age: 32
End: 2022-04-06
Payer: COMMERCIAL

## 2022-04-06 VITALS
HEART RATE: 82 BPM | WEIGHT: 181 LBS | TEMPERATURE: 97.4 F | BODY MASS INDEX: 24.89 KG/M2 | SYSTOLIC BLOOD PRESSURE: 108 MMHG | DIASTOLIC BLOOD PRESSURE: 62 MMHG

## 2022-04-06 DIAGNOSIS — G40.109 LOCALIZATION-RELATED EPILEPSY (HCC): ICD-10-CM

## 2022-04-06 DIAGNOSIS — G40.919 BREAKTHROUGH SEIZURE (HCC): ICD-10-CM

## 2022-04-06 DIAGNOSIS — G40.909 SEIZURE DISORDER (HCC): ICD-10-CM

## 2022-04-06 DIAGNOSIS — R56.9 SEIZURE (HCC): ICD-10-CM

## 2022-04-06 PROCEDURE — 99215 OFFICE O/P EST HI 40 MIN: CPT | Performed by: NURSE PRACTITIONER

## 2022-04-06 RX ORDER — LACOSAMIDE 150 MG/1
TABLET ORAL
Qty: 60 TABLET | Refills: 0
Start: 2022-04-06 | End: 2022-05-11 | Stop reason: ALTCHOICE

## 2022-04-06 RX ORDER — LEVETIRACETAM 500 MG/1
1500 TABLET ORAL EVERY 12 HOURS SCHEDULED
Qty: 270 TABLET | Refills: 3 | Status: SHIPPED | OUTPATIENT
Start: 2022-04-06

## 2022-04-06 NOTE — PATIENT INSTRUCTIONS
- Decrease vimpat to half a tablet twice per day for one week, then then half a tablet every morning for one week  - Increase levetiracetam to 1500 mg twice per day  - Call the office with any episodes concerning for seizures  - Follow up in 3 months with Dr Amanda Lund or Sonya Venegas

## 2022-04-06 NOTE — ASSESSMENT & PLAN NOTE
Patient with recent hospitalization due to breakthrough seizures prompting increase in levetiracetam and initiation of lacosamide  The was one seizure at home, one in the ER, and a subclinical seizure on EEG prior to increasing levetiracetam and starting lacosamide  Since hospitalization, he appears to have some side effects of lacosamide and family started to decrease his dose  Currently on 150 mg once per day  He is on levetiracetam as well 1000 mg BID  Routine EEG during hospitalization revealed a 50 second focal electrographic seizure arising from the right mid temporal region without definite clinical correlate other than the possibility of prominent swallowing noted by the tech  Continuous video EEG done after, revealed a few right frontotemporal spikes and one poorly formed sharp wave from the same region  Prior EEGs have been normal to this information obtained during hospitalization is helpful for classifying his epilepsy as focal onset  MRI brain during hospitalization with 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, asymmetrically thinner left fornix, and possibly smaller left mammillary body  Noted white matter changes as well as severe left sphenoid sinus disease  Patient awaiting visit with ENT  There has been an episode of unclear etiology where the patient was staring which could have represented seizure or behavioral  Either way, since he is not tolerating vimpat, will wean off over the next two weeks but also increase levetiracetam to 1500 mg BID to provide further coverage from seizures  This may be able to be decreased in the future if he continues to do well  Requested family call the office with any further seizures or concerns  Follow up in 3 months or sooner if needed

## 2022-04-06 NOTE — PROGRESS NOTES
Review of Systems   Constitutional: Negative  Negative for appetite change and fever  HENT: Negative  Negative for hearing loss, tinnitus, trouble swallowing and voice change  Eyes: Negative  Negative for photophobia and pain  Respiratory: Negative  Negative for shortness of breath  Cardiovascular: Negative  Negative for palpitations  Gastrointestinal: Negative  Negative for nausea and vomiting  Endocrine: Negative  Negative for cold intolerance  Genitourinary: Negative  Negative for dysuria, frequency and urgency  Musculoskeletal: Negative  Negative for myalgias and neck pain  Skin: Negative  Negative for rash  Neurological: Positive for tremors and seizures (Last month)  Negative for dizziness, syncope, facial asymmetry, speech difficulty, weakness, light-headedness, numbness and headaches  Facial Swelling   Hematological: Negative  Does not bruise/bleed easily  Psychiatric/Behavioral: Positive for confusion and sleep disturbance  Negative for hallucinations

## 2022-04-06 NOTE — PROGRESS NOTES
Patient ID: Jayna Petty is a 36 y o  male with a history of autism and recently diagnosed Brugada syndrome s/p ICD presenting for follow up regarding possible epilepsy due to unknown cause manifest as apparent generalized tonic clonic seizures that began in 2016  Assessment/Plan:    Localization-related epilepsy [facility])  Patient with recent hospitalization due to breakthrough seizures prompting increase in levetiracetam and initiation of lacosamide  The was one seizure at home, one in the ER, and a subclinical seizure on EEG prior to increasing levetiracetam and starting lacosamide  Since hospitalization, he appears to have some side effects of lacosamide and family started to decrease his dose  Currently on 150 mg once per day  He is on levetiracetam as well 1000 mg BID  Routine EEG during hospitalization revealed a 50 second focal electrographic seizure arising from the right mid temporal region without definite clinical correlate other than the possibility of prominent swallowing noted by the tech  Continuous video EEG done after, revealed a few right frontotemporal spikes and one poorly formed sharp wave from the same region  Prior EEGs have been normal to this information obtained during hospitalization is helpful for classifying his epilepsy as focal onset  MRI brain during hospitalization with 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, asymmetrically thinner left fornix, and possibly smaller left mammillary body  Noted white matter changes as well as severe left sphenoid sinus disease  Patient awaiting visit with ENT  There has been an episode of unclear etiology where the patient was staring which could have represented seizure or behavioral  Either way, since he is not tolerating vimpat, will wean off over the next two weeks but also increase levetiracetam to 1500 mg BID to provide further coverage from seizures   This may be able to be decreased in the future if he continues to do well  Requested family call the office with any further seizures or concerns  Follow up in 3 months or sooner if needed  He will Return in about 3 months (around 7/6/2022) for Follow up with Dr Carolyn Lincoln or Chico Gonzalez  Subjective:  Ev Avalos is a 36 y o  male with a history of autism and recently diagnosed Brugada syndrome s/p ICD (diagnosed in March 2021) presenting for follow up regarding possible epilepsy due to unknown cause manifest as apparent generalized tonic clonic seizures that began in 2016  To review his most recent visits, he was seen in the office by Dr Carolyn Lincoln on 4/20/2021  At that time, it was noted that it was possible that all events may be been syncope with convulsion related to Brugada syndrom ebut reports of convulsion and postictal state longer than typical for syncope did suggest epileptic seizures  He was on levetiracetam 500 mg BID at that time and was to continue medication and follow up in 6 month  Seen by myself on 2/10/22  At that time he was seizure free on levetiracetam 500 mg BID  Prior attempt to wean this medication in September 2020 did lead to breakthrough seizures so it was restarted  No changes were made to medication regimen and he was to follow up in 6 months or sooner if needed  The patient was unfortunately hospitalized 2/25-3/2/2022 after a possible breakthrough seizures at home and another seizure while in the ED  Per discharge summary, "Patient's mother found the patient lying in bed unresponsive, making grunting noises, and incontinent of urine  Upon arrival to the ED, patient was lethargic, but answering a few questions  However, while in the ED, patient suddenly made grunting noises, eyes were roving, and his body "stiffened "  Patient then began to have "twitching" all over his body and had left lateral tongue bite    Patient received a total of 4 mg of Ativan, but continued to twitch so then was loaded with Keppra 4 g with resolution of seizure-like activity " Labs revealed Na 127, WBC 16 9, and procal 23 3  He was drinking more water at home prior to hospitalization  EEG was completed and noted a subclinical seizure  At that time he was given vimpat and keppra dose was increased  Video EEG monitoring did not reveal further seizure activity  Mother reports tremors for the past two weeks, confusion, difficulty sleeping, worsening of mood, and facial swelling (resolved)  Feels that this could be related to his vimpat  They did decrease his vimpat to once per day due to side effects  Continues on levetiracetam 1000 mg BID  Denies any seizures since last visit  We reviewed that the worsening could be related to keppra but since he was on this in the past without issue, she feels that it may be the vimpat  He is also having other side effects such as tremor which may be from the vimpat  Ultimately she would like for him to come off of this medication but is agreeable to increase in keppra to decrease risk of further seizures  There was an episode recently where he was staring off for about one minute  When he stopped staring he picked right back up with what he was doing  He is seeing his PCP on 4/14  Awaiting ENT consult for recurrent otitis media  Current seizure medications:  - levetiracetam 1000 mg BID  - lacosamide 150 mg daily   Other medications as per Epic  Event/Seizure semiology:  Generalized tonic clonic seizure   Subclinical seizure - frequent swallowing noted on EEG       Special Features  Status epilepticus: no  Self Injury Seizures: fall in 3/2021 cause hit to head  Precipitating Factors: none     Epilepsy Risk Factors:  Autism     Prior AEDs:  None     Prior Evaluation:  EEGs normal (2016, 2020)  CT head 3/2021- no acute findings  MRI brain w wo contrast 3/2/22: IMPRESSION:  No acute intracranial abnormality    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, asymmetrically thinner left fornix, and possibly smaller left mammillary body  A few white matter changes, suggestive of minimal chronic microangiopathy  Severe left sphenoid sinus disease with inspissated material   The study was marked in EPIC for immediate notification  EEG 2/25/2022-    EEG Interpretation: This Routine EEG recorded during wakefulness, drowsiness, and sleep is abnormal   The study captures a 50 second focal electrographic seizure arising from the right mid temporal region without definite clinical correlate other than the possibility of prominent swallowing noted by the tech  Excessive beta activities are likely a medication effect related to benzodiazepine administration  cveeg 2/25-2/:  Day 1 Interpretation: This prolonged, continuous video-EEG recording is abnormal    Diffuse theta frequency slowing and intermittent generalized rhythmic delta activity suggest moderate nonspecific diffuse cerebral dysfunction  Intermittent low amplitude right temporal polymorphic delta slowing suggests underlying focal neuronal dysfunction that may be structural in etiology  2 right frontotemporal spikes in sleep raise the possibility of underlying focal epileptogenic potential    No seizures are present       Day 2 Interpretation: This prolonged, continuous video-EEG recording is abnormal    Diffuse theta frequency slowing and intermittent generalized rhythmic delta activity suggest moderate nonspecific diffuse cerebral dysfunction  Intermittent low amplitude right temporal polymorphic delta slowing suggests underlying focal neuronal dysfunction that may be structural in etiology  One poorly formed right frontotemporal low amplitude sharp wave observed in sleep raises the possibility of underlying focal epileptogenic potential    No seizures are present       Psychiatric History:  autism     Social History:   Driving: No  Lives Alone: No    His history was also obtained from his mother and father, who were present at today's visit  I reviewed prior neurology notes, recent inpatient documentation, EEG reports, MRI brain report, as documented in Epic/Visual Pro 360, and summarized above  Objective:    Blood pressure 108/62, pulse 82, temperature (!) 97 4 °F (36 3 °C), temperature source Temporal, weight 82 1 kg (181 lb)  Physical Exam  No apparent distress  Appears well nourished  Mood appropriate for situation     Neurologic Exam  Mental status- Alert  Verbalizes in single words  Follows simple one step commands  Limited attention and knowledge regarding medical situation  Cranial Nerves- blinks to stimuli, EOMS normal, facial muscles symmetric, hearing intact bilaterally to finger rubs, tongue midline, palate rise symmetrical, shoulder shrug symmetrical     Motor- Appropriate strength  Moves all extremities freely  No tremor  Sensory-  Intact distally in all extremities to light touch  DTRs- 2+ and symmetric in all extremities  Gait- wide based casual steady gait    Coordination- no ataxia noted with high fives      ROS:    Review of Systems   Constitutional: Negative  Negative for appetite change and fever  HENT: Negative  Negative for hearing loss, tinnitus, trouble swallowing and voice change  Eyes: Negative  Negative for photophobia and pain  Respiratory: Negative  Negative for shortness of breath  Cardiovascular: Negative  Negative for palpitations  Gastrointestinal: Negative  Negative for nausea and vomiting  Endocrine: Negative  Negative for cold intolerance  Genitourinary: Negative  Negative for dysuria, frequency and urgency  Musculoskeletal: Negative  Negative for myalgias and neck pain  Skin: Negative  Negative for rash  Neurological: Positive for tremors and seizures (Last month)  Negative for dizziness, syncope, facial asymmetry, speech difficulty, weakness, light-headedness, numbness and headaches          Facial Swelling Hematological: Negative  Does not bruise/bleed easily  Psychiatric/Behavioral: Positive for confusion and sleep disturbance  Negative for hallucinations       ROS obtained by MA and reviewed by myself  This note may have been created using voice recognition software  There may be unintentional errors such as grammatical errors, spelling errors, or pronoun errors

## 2022-04-14 ENCOUNTER — OFFICE VISIT (OUTPATIENT)
Dept: FAMILY MEDICINE CLINIC | Facility: CLINIC | Age: 32
End: 2022-04-14
Payer: COMMERCIAL

## 2022-04-14 VITALS
WEIGHT: 187.4 LBS | OXYGEN SATURATION: 97 % | HEIGHT: 72 IN | RESPIRATION RATE: 16 BRPM | SYSTOLIC BLOOD PRESSURE: 122 MMHG | BODY MASS INDEX: 25.38 KG/M2 | HEART RATE: 96 BPM | TEMPERATURE: 98.2 F | DIASTOLIC BLOOD PRESSURE: 80 MMHG

## 2022-04-14 DIAGNOSIS — G40.409 GENERALIZED TONIC-CLONIC SEIZURE (HCC): ICD-10-CM

## 2022-04-14 DIAGNOSIS — E87.1 HYPONATREMIA: ICD-10-CM

## 2022-04-14 DIAGNOSIS — I49.8 BRUGADA SYNDROME: ICD-10-CM

## 2022-04-14 DIAGNOSIS — F84.0 ACTIVE AUTISTIC DISORDER: ICD-10-CM

## 2022-04-14 DIAGNOSIS — Z00.00 HEALTHCARE MAINTENANCE: Primary | ICD-10-CM

## 2022-04-14 PROCEDURE — 99395 PREV VISIT EST AGE 18-39: CPT | Performed by: FAMILY MEDICINE

## 2022-04-16 NOTE — PROGRESS NOTES
Assessment/Plan:  Health maintenance completed  Recent hospitalization status post seizure reviewed  Up-to-date with Cardiology  Needs follow-up with Neurology  Seizure medications have been adjusted due to side effects  Has been seizure-free since hospital   See form completed for via  Recheck yearly  Health Maintenance  Lifestyle Advice: return for routine annual checkups  Diet: well balanced diet  Exercise: occasionally  Dental: regular dental visits  Vision: no vision problems  Preventative Health: Male Preventative: Patient does Testicular self exam - unable  No problem-specific Assessment & Plan notes found for this encounter  Diagnoses and all orders for this visit:    Healthcare maintenance    Active autistic disorder    Brugada syndrome    Generalized tonic-clonic seizure ([facility] [address] )    Hyponatremia                  Subjective:      Patient ID: Piter Guerrero is a 36 y o  male  PE        The following portions of the patient's history were reviewed and updated as appropriate: allergies, current medications, past family history, past medical history, past social history, past surgical history and problem list     Review of Systems   Constitutional: Negative for appetite change, fatigue, fever and unexpected weight change  HENT: Negative for congestion, ear pain, postnasal drip, rhinorrhea, sinus pressure, sinus pain and sore throat  Eyes: Negative for redness and visual disturbance  Respiratory: Negative for chest tightness and shortness of breath  Cardiovascular: Negative for chest pain, palpitations and leg swelling  Gastrointestinal: Negative for abdominal distention, abdominal pain and diarrhea  Genitourinary: Negative for dysuria and hematuria  Musculoskeletal: Negative for arthralgias, gait problem and myalgias  Skin: Negative for pallor and rash  Neurological: Negative for dizziness, tremors, weakness, light-headedness and headaches     Psychiatric/Behavioral: Negative for behavioral problems  Objective:    /80 (BP Location: Left arm, Patient Position: Sitting, Cuff Size: Adult)   Pulse 96   Temp 98 2 °F (36 8 °C) (Temporal)   Resp 16   Ht 5' 11 5" (1 816 m)   Wt 85 kg (187 lb 6 4 oz)   SpO2 97%   BMI 25 77 kg/m²          Physical Exam  Vitals and nursing note reviewed  Constitutional:       General: He is not in acute distress  Appearance: He is not diaphoretic  HENT:      Head: Normocephalic and atraumatic  Right Ear: There is impacted cerumen  Ears:      Comments: No wax but, TM not completely normal looking  History of recurrent infections  Eyes:      General: No scleral icterus  Conjunctiva/sclera: Conjunctivae normal       Pupils: Pupils are equal, round, and reactive to light  Neck:      Thyroid: No thyromegaly  Cardiovascular:      Rate and Rhythm: Normal rate and regular rhythm  Pulses:           Carotid pulses are 0 on the right side and 0 on the left side  Heart sounds: Normal heart sounds  No murmur heard  Pulmonary:      Effort: Pulmonary effort is normal  No respiratory distress  Breath sounds: Normal breath sounds  No wheezing  Abdominal:      General: There is no distension  Palpations: Abdomen is soft  Musculoskeletal:      Cervical back: Normal range of motion and neck supple  Right lower leg: No edema  Left lower leg: No edema  Lymphadenopathy:      Cervical: No cervical adenopathy  Skin:     General: Skin is warm  Coloration: Skin is not pale  Neurological:      Mental Status: He is alert and oriented to person, place, and time  Cranial Nerves: No cranial nerve deficit  Deep Tendon Reflexes: Reflexes are normal and symmetric  Psychiatric:         Behavior: Behavior normal          Thought Content:  Thought content normal          Judgment: Judgment normal              Cefaclor and Sulfamethoxazole-trimethoprim    Philippe lara no medications administered during this visit  Health Maintenance   Topic Date Due    COVID-19 Vaccine (1) Never done    BMI: Followup Plan  Never done    Annual Physical  03/12/2021    Influenza Vaccine (1) 06/30/2022 (Originally 9/1/2021)    Hepatitis C Screening  11/24/2022 (Originally 1990)    HIV Screening  05/18/2029 (Originally 12/2/2007)    Depression Screening  03/07/2023    BMI: Adult  04/14/2023    DTaP,Tdap,and Td Vaccines (7 - Td or Tdap) 10/12/2027    Hepatitis B Vaccine  Completed    IPV Vaccine  Completed    Pneumococcal Vaccine: Pediatrics (0 to 5 Years) and At-Risk Patients (6 to 59 Years)  Aged Out    HIB Vaccine  Aged Out    Hepatitis A Vaccine  Aged Out    Meningococcal ACWY Vaccine  Aged Out    HPV Vaccine  Aged Out      Social History     Socioeconomic History    Marital status: Single     Spouse name: Not on file    Number of children: Not on file    Years of education: Not on file    Highest education level: Not on file   Occupational History    Not on file   Tobacco Use    Smoking status: Never Smoker    Smokeless tobacco: Never Used   Vaping Use    Vaping Use: Never used   Substance and Sexual Activity    Alcohol use: Never    Drug use: Never    Sexual activity: Not Currently   Other Topics Concern    Not on file   Social History Narrative    Not on file     Social Determinants of Health     Financial Resource Strain: Not on file   Food Insecurity: No Food Insecurity    Worried About Running Out of Food in the Last Year: Never true    [place] of Food in the Last Year: Never true   Transportation Needs: No Transportation Needs    Lack of Transportation (Medical): No    Lack of Transportation (Non-Medical):  No   Physical Activity: Not on file   Stress: Not on file   Social Connections: Not on file   Intimate Partner Violence: Not on file   Housing Stability: Low Risk     Unable to Pay for Housing in the Last Year: No    Number of Places Lived in the Last Year: 1    Unstable Housing in the Last Year: No      Family History   Problem Relation Age of Onset    Breast cancer Maternal Grandmother     Cancer Maternal Grandfather         Salivary gland cancer    Other Paternal Grandmother         brain tumor    COPD Paternal Grandmother     Hypertension Paternal Grandmother     No Known Problems Mother     No Known Problems Father       Past Medical History:   Diagnosis Date    Brugada syndrome     Seizure-like activity ([facility] [address] )     last assessed: 7/11/16    Skin lesion     last assessed: 12/30/13    Skin rash     last assessed: 3/1/13      has a past surgical history that includes Adenoidectomy; Dental surgery; Myringotomy w/ tubes; and Cardiac icd generator change (03/29/2021)  Patient Active Problem List    Diagnosis Date Noted    Recurrent otitis media 02/27/2022    Left otitis externa 02/27/2022    Suppurative otitis media of left ear 02/25/2022    Hyponatremia 02/25/2022    Abnormal neurological finding suggestive of lumbar-level spinal disorder 04/18/2021    Abnormal EKG 03/28/2021    Brugada syndrome 03/28/2021    Syncope and collapse 03/28/2021    Other insomnia 08/28/2019    [facility] 08/21/2018    Generalized tonic-clonic seizure ([facility] ) 10/13/2016    Active autistic disorder 10/10/2012       Current Outpatient Medications:     acetaminophen (TYLENOL) 325 mg tablet, Take 3 tablets (975 mg total) by mouth every 6 (six) hours as needed for mild pain, headaches or fever, Disp: , Rfl: 0    lacosamide (VIMPAT) 150 mg tablet, Take half a tablet twice per day for one week   Then half a tablet once per day for one week and then stop, Disp: 60 tablet, Rfl: 0    levETIRAcetam (KEPPRA) 500 mg tablet, Take 3 tablets (1,500 mg total) by mouth every 12 (twelve) hours, Disp: 270 tablet, Rfl: 3    Loratadine (CLARITIN) 10 MG CAPS, Take 10 mg by mouth as needed  , Disp: , Rfl:     mupirocin (BACTROBAN) 2 % ointment, Apply topically 3 (three) times a day, Disp: 22 g, Rfl: 0              BMI Counseling: Body mass index is 25 77 kg/m²  The BMI is above normal  Nutrition recommendations include decreasing portion sizes, encouraging healthy choices of fruits and vegetables, decreasing fast food intake and consuming healthier snacks  Exercise recommendations include exercising 3-5 times per week  Rationale for BMI follow-up plan is due to patient being overweight or obese

## 2022-05-04 ENCOUNTER — TELEPHONE (OUTPATIENT)
Dept: NEUROLOGY | Facility: CLINIC | Age: 32
End: 2022-05-04

## 2022-05-04 NOTE — TELEPHONE ENCOUNTER
Mom left message on nursing line noting patient behaviors have been worsening since keppra increase  She is not sure if this is related to keppra or other med changes  Would like a call back from provider directly  Camila - please call mom when convenient

## 2022-05-11 RX ORDER — PYRIDOXINE HCL (VITAMIN B6) 100 MG
100 TABLET ORAL DAILY
COMMUNITY

## 2022-05-11 NOTE — TELEPHONE ENCOUNTER
He does seem better off of the vimpat but with the keppra he is slightly aggressive  Not listening and is having bad behaviors  We discussed options including transition to another medication such as Depakote or lamotrigine  Mother is concerned to change medications but is agreeable to try B6 supplements 100 mg daily due to mood issues with levetiracetam use  If he continues to have behavioral issues in 2 weeks she will call the office and notes she would be agreeable to lamotrigine

## 2022-05-24 ENCOUNTER — REMOTE DEVICE CLINIC VISIT (OUTPATIENT)
Dept: CARDIOLOGY CLINIC | Facility: CLINIC | Age: 32
End: 2022-05-24
Payer: COMMERCIAL

## 2022-05-24 DIAGNOSIS — Z95.810 AICD (AUTOMATIC CARDIOVERTER/DEFIBRILLATOR) PRESENT: Primary | ICD-10-CM

## 2022-05-24 PROCEDURE — 93296 REM INTERROG EVL PM/IDS: CPT | Performed by: INTERNAL MEDICINE

## 2022-05-24 PROCEDURE — 93295 DEV INTERROG REMOTE 1/2/MLT: CPT | Performed by: INTERNAL MEDICINE

## 2022-05-24 NOTE — PROGRESS NOTES
Results for orders placed or performed in visit on 05/24/22   Cardiac EP device report    Narrative    MDT VVI ICD - ACTIVE SYSTEM IS MRI CONDITIONAL  CARELINK TRANSMISSION: BATTERY VOLTAGE ADEQUATE (12 4 YRS)  <0 1%  ALL AVAILABLE LEAD PARAMETERS WITHIN NORMAL LIMITS  6 SVT-WAVELET EPISODES @ 167-176 BPM MAX DURATION 2 5 MINS  OPTI-VOL WITHIN NORMAL LIMITS  NORMAL DEVICE FUNCTION   GV

## 2022-07-05 ENCOUNTER — TELEPHONE (OUTPATIENT)
Dept: FAMILY MEDICINE CLINIC | Facility: CLINIC | Age: 32
End: 2022-07-05

## 2022-07-05 DIAGNOSIS — G40.409 CENTRENCEPHALIC EPILEPSY (HCC): ICD-10-CM

## 2022-07-05 DIAGNOSIS — R56.9 SEIZURE (HCC): Primary | ICD-10-CM

## 2022-07-05 DIAGNOSIS — F84.0 AUTISTIC DISORDER: ICD-10-CM

## 2022-07-05 DIAGNOSIS — F84.0 ACTIVE AUTISTIC DISORDER: ICD-10-CM

## 2022-07-05 DIAGNOSIS — G40.409 GENERALIZED TONIC-CLONIC SEIZURE (HCC): ICD-10-CM

## 2022-07-05 NOTE — TELEPHONE ENCOUNTER
[facility] neurology requested an ambulatory referral to [address] neurology please be placed in epic  Please use the following diagnosis codes R56 [id number] and [id number]   Pt's scheduled appointment is tomorrow 07/06/22  Are  you able  please advise since Dr Murcia is out of office today

## 2022-07-06 ENCOUNTER — OFFICE VISIT (OUTPATIENT)
Dept: NEUROLOGY | Facility: CLINIC | Age: 32
End: 2022-07-06
Payer: COMMERCIAL

## 2022-07-06 VITALS
SYSTOLIC BLOOD PRESSURE: 117 MMHG | DIASTOLIC BLOOD PRESSURE: 71 MMHG | BODY MASS INDEX: 26.1 KG/M2 | HEART RATE: 79 BPM | WEIGHT: 189.8 LBS

## 2022-07-06 DIAGNOSIS — G40.409 CENTRENCEPHALIC EPILEPSY (HCC): ICD-10-CM

## 2022-07-06 DIAGNOSIS — G40.109 LOCALIZATION-RELATED EPILEPSY (HCC): Primary | ICD-10-CM

## 2022-07-06 DIAGNOSIS — R56.9 SEIZURE (HCC): ICD-10-CM

## 2022-07-06 DIAGNOSIS — F84.0 AUTISTIC DISORDER: ICD-10-CM

## 2022-07-06 DIAGNOSIS — F84.0 ACTIVE AUTISTIC DISORDER: ICD-10-CM

## 2022-07-06 DIAGNOSIS — G40.409 GENERALIZED TONIC-CLONIC SEIZURE (HCC): ICD-10-CM

## 2022-07-06 PROCEDURE — 99215 OFFICE O/P EST HI 40 MIN: CPT | Performed by: NURSE PRACTITIONER

## 2022-07-06 RX ORDER — ZONISAMIDE 100 MG/1
CAPSULE ORAL
Qty: 90 CAPSULE | Refills: 2 | Status: SHIPPED | OUTPATIENT
Start: 2022-07-06 | End: 2022-09-19 | Stop reason: ALTCHOICE

## 2022-07-06 NOTE — PATIENT INSTRUCTIONS
- Continue keppra at current dose for now and B6 100 mg daily  - Start taking zonisamide    Start zonisamide by taking: zonisamide 100mg capsules- 1 capsule daily x 1 weeks, then increase to two capsules (200mg) daily x 1 week, then increase to  three capsules (300mg) daily- after one week on this dose have zonisamide level drawn  Make sure patient is drinking adequate water to prevent kidney stones  If the patient develops a rash, call the office immediately  Common reactions include somnolence, dizziness, anorexia/ weight loss, nausea, HA, irritability, fatigue, impaired concentration/memory, depression, speech disturbance, and constipation  Serious reactions include garvin-noy syndrome/ toxic epidermal necrolysis (rash), aplastic anemia, heat stroke, glaucoma, kidney stones, elevated ammonia level causing encephalopathy, pancreatitis, depression, suicidality, psychosis, rhabdomyolysis, and withdrawal seizures if abrupt discontinuation  Do not stop this medication without calling the office

## 2022-07-06 NOTE — PROGRESS NOTES
Review of Systems   Constitutional: Negative  Negative for appetite change and fever  HENT: Negative  Negative for hearing loss, tinnitus, trouble swallowing and voice change  Eyes: Negative  Negative for photophobia and pain  Respiratory: Negative  Negative for shortness of breath  Cardiovascular: Negative  Negative for palpitations  Gastrointestinal: Negative  Negative for nausea and vomiting  Endocrine: Negative  Negative for cold intolerance  Genitourinary: Negative  Negative for dysuria, frequency and urgency  Musculoskeletal: Negative  Negative for myalgias and neck pain  Skin: Negative  Negative for rash  Neurological: Negative  Negative for dizziness, tremors, seizures, syncope, facial asymmetry, speech difficulty, weakness, light-headedness, numbness and headaches  Twitching, zoning out    Hematological: Negative  Does not bruise/bleed easily  Psychiatric/Behavioral: Positive for agitation  Negative for confusion, hallucinations and sleep disturbance  All other systems reviewed and are negative

## 2022-07-06 NOTE — PROGRESS NOTES
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 Brugada syndrome (s/p ICD placement)  He is currently on levetiracetam 1500 mg BID with notable mood side effects  Taking B6 100 mg daily which was initially helpful but is no longer helpful for mood side effects of levetiracetam  While there have been no generalized tonic clonic seizures, there have been episodes of staring but it is unclear if these are related to seizure or simply behavioral      Routine EEG during recent hospitalization in February revealed a 50 second focal electrographic seizure arising from the right mid temporal region without definite clinical correlate other than the possibility of prominent swallowing noted by the tech  Continuous video EEG done after, revealed a few right frontotemporal spikes and one poorly formed sharp wave from the same region  Prior EEGs have been normal to this information obtained during hospitalization is helpful for classifying his epilepsy as focal onset  MRI brain during hospitalization with 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, asymmetrically thinner left fornix, and possibly smaller left mammillary body  Noted white matter changes  His neurologic exam at today's visit is at baseline  Since patient continues to experience side effects of levetiracetam, will transition to another medication  Options were reviewed including lamotrigine, Depakote, and zonisamide  Would like to avoid Depakote at this time due to possible long term side effects   Lamotrigine would likely be a good option but it does take several months to get to a therapeutic dose before being able to wean off of levetiracetam  There is also some concern with starting lamotrigine due to history of Brugada Syndrome  Discussed zonisamide as well given once daily dosing and mood neutral side effects  We did review the possibility of kidney stones and to ensure that he is drinking plenty of water, especially when it is hot outside  Plan to start zonisamide to goal dose of 300 mg at night and have blood work done  Once blood work is back can determine if higher dose is needed prior to weaning off of levetiracetam and B6  Possibility of breakthrough seizure during weaning reviewed  Mother will contact the office with any issues or concerns  Follow up in 3 months or sooner if needed  He will Return for follow up in 3 months with Dr Stacia Kumar or Shayy Richard  Subjective:  Dhaval Villa is a 36 y o  male with a history of autism and Brugada syndrome s/p ICD presenting for follow up, who is returning to Neurology office for follow up of his seizures  Last seen in the office on 2/10/22 after recent hospitalization due to breakthrough seizures  His levetiracetam dose was increased and he was started on lacosamide during hospitalization  Due to side effects of lacosamide after hospitalization, family did decrease dose to 150 mg daily  Since he continued to be intolerant of lacosamide at his office visit he was weaned off of vimpat and dose of levetiracetam was increased  Recommended 3 month follow up  Mother did call to report that he was better off of vimpat but more aggressive with higher dose of keppra on 5/4/22  Recommended B6 supplementation of 100 mg daily and if he were to continue to have behavioral issues in 2 weeks consider transition to Depakote or lamotrigine monotherapy  Since his last visit, he initially was doing better with the addition of B6 to his current regimen but more recently he has been getting agitated easily and has been been physically aggressive   Due to concern that this is related to levetiracetam, mother is interested in alternative medication  He has been having some head and hand twitching at work  His has had this at home as well but not as much as at work  There have been more episodes of blank staring recently as well but unclear if behavioral or seizure related  No generalized tonic clonic seizures  Current seizure medications:  - levetiracetam 1500 mg BID  - pyridoxine 100 mg daily  Other medications as per 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, asymmetrically thinner left fornix, and possibly smaller left mammillary body  A few white matter changes, suggestive of minimal chronic microangiopathy  Severe left sphenoid sinus disease with inspissated material   The study was marked in EPIC for immediate notification      EEG 2/25/2022-    EEG Interpretation: This Routine EEG recorded during wakefulness, drowsiness, and sleep is abnormal   The study captures a 50 second focal electrographic seizure arising from the right mid temporal region without definite clinical correlate other than the possibility of prominent swallowing noted by the tech  Excessive beta activities are likely a medication effect related to benzodiazepine administration       cveeg 2/25-2/:  Day 1 Interpretation: This prolonged, continuous video-EEG recording is abnormal    Diffuse theta frequency slowing and intermittent generalized rhythmic delta activity suggest moderate nonspecific diffuse cerebral dysfunction  Intermittent low amplitude right temporal polymorphic delta slowing suggests underlying focal neuronal dysfunction that may be structural in etiology  2 right frontotemporal spikes in sleep raise the possibility of underlying focal epileptogenic potential    No seizures are present       Day 2 Interpretation: This prolonged, continuous video-EEG recording is abnormal    Diffuse theta frequency slowing and intermittent generalized rhythmic delta activity suggest moderate nonspecific diffuse cerebral dysfunction  Intermittent low amplitude right temporal polymorphic delta slowing suggests underlying focal neuronal dysfunction that may be structural in etiology   One poorly formed right frontotemporal low amplitude sharp wave observed in sleep raises the possibility of underlying focal epileptogenic potential    No seizures are present       Psychiatric History:  autism     Social History:   Driving: No  Lives Alone: No    His history was also obtained from his mother, who was present at today's visit  I reviewed prior neurology notes, EEG reports, MRI brain reports, and most recent labs, as documented in Epic/[employer], and summarized above  Objective:    Blood pressure 117/71, pulse 79, weight 86 1 kg (189 lb 12 8 oz)  Physical Exam  No apparent distress  Appears well nourished  Mood appropriate for situation     Neurologic Exam  Mental status- Alert  Single word verbalization  Follows simple commands  Limited attention and understanding of medical situation  Cranial Nerves- blinks to stimuli, EOMS normal, facial muscles symmetric, hearing intact bilaterally to finger rubs, tongue midline, palate rise symmetrical, shoulder shrug symmetrical     Motor- Appropriate strength  Moves all extremities freely  No tremor  Sensory-  Intact distally in all extremities to light touch  DTRs- 2+ and symmetric in all extremities      Gait- wide based steady gait    Coordination- high fives without ataxia  ROS:    Review of Systems   Constitutional: Negative  Negative for appetite change and fever  HENT: Negative  Negative for hearing loss, tinnitus, trouble swallowing and voice change  Eyes: Negative  Negative for photophobia and pain  Respiratory: Negative  Negative for shortness of breath  Cardiovascular: Negative  Negative for palpitations  Gastrointestinal: Negative  Negative for nausea and vomiting  Endocrine: Negative  Negative for cold intolerance  Genitourinary: Negative  Negative for dysuria, frequency and urgency  Musculoskeletal: Negative  Negative for myalgias and neck pain  Skin: Negative  Negative for rash  Neurological: Negative  Negative for dizziness, tremors, seizures, syncope, facial asymmetry, speech difficulty, weakness, light-headedness, numbness and headaches  Twitching, zoning out    Hematological: Negative  Does not bruise/bleed easily  Psychiatric/Behavioral: Positive for agitation  Negative for confusion, hallucinations and sleep disturbance  All other systems reviewed and are negative      ROS obtained by MA and reviewed by myself  This note may have been created using voice recognition software  There may be unintentional errors such as grammatical errors, spelling errors, or pronoun errors

## 2022-07-08 NOTE — ASSESSMENT & PLAN NOTE
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 Brugada syndrome (s/p ICD placement)  He is currently on levetiracetam 1500 mg BID with notable mood side effects  Taking B6 100 mg daily which was initially helpful but is no longer helpful for mood side effects of levetiracetam  While there have been no generalized tonic clonic seizures, there have been episodes of staring but it is unclear if these are related to seizure or simply behavioral      Routine EEG during recent hospitalization in February revealed a 50 second focal electrographic seizure arising from the right mid temporal region without definite clinical correlate other than the possibility of prominent swallowing noted by the tech  Continuous video EEG done after, revealed a few right frontotemporal spikes and one poorly formed sharp wave from the same region  Prior EEGs have been normal to this information obtained during hospitalization is helpful for classifying his epilepsy as focal onset  MRI brain during hospitalization with 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, asymmetrically thinner left fornix, and possibly smaller left mammillary body  Noted white matter changes  His neurologic exam at today's visit is at baseline  Since patient continues to experience side effects of levetiracetam, will transition to another medication  Options were reviewed including lamotrigine, Depakote, and zonisamide  Would like to avoid Depakote at this time due to possible long term side effects  Lamotrigine would likely be a good option but it does take several months to get to a therapeutic dose before being able to wean off of levetiracetam  There is also some concern with starting lamotrigine due to history of Brugada Syndrome  Discussed zonisamide as well given once daily dosing and mood neutral side effects   We did review the possibility of kidney stones and to ensure that he is drinking plenty of water, especially when it is hot outside  Plan to start zonisamide to goal dose of 300 mg at night and have blood work done  Once blood work is back can determine if higher dose is needed prior to weaning off of levetiracetam and B6  Possibility of breakthrough seizure during weaning reviewed  Mother will contact the office with any issues or concerns  Follow up in 3 months or sooner if needed

## 2022-07-21 ENCOUNTER — TELEPHONE (OUTPATIENT)
Dept: NEUROLOGY | Facility: CLINIC | Age: 32
End: 2022-07-21

## 2022-08-19 ENCOUNTER — OFFICE VISIT (OUTPATIENT)
Dept: NEUROLOGY | Facility: CLINIC | Age: 32
End: 2022-08-19

## 2022-08-19 VITALS
HEART RATE: 95 BPM | HEIGHT: 73 IN | OXYGEN SATURATION: 98 % | SYSTOLIC BLOOD PRESSURE: 118 MMHG | TEMPERATURE: 96.5 F | RESPIRATION RATE: 18 BRPM | BODY MASS INDEX: 24.76 KG/M2 | WEIGHT: 186.8 LBS | DIASTOLIC BLOOD PRESSURE: 73 MMHG

## 2022-08-19 DIAGNOSIS — F84.0 AUTISTIC DISORDER: ICD-10-CM

## 2022-08-19 DIAGNOSIS — I49.8 BRUGADA SYNDROME: ICD-10-CM

## 2022-08-19 DIAGNOSIS — G40.109 LOCALIZATION-RELATED EPILEPSY (HCC): Primary | ICD-10-CM

## 2022-08-19 NOTE — PATIENT INSTRUCTIONS
My recommendation is a trial of replacing levetiracetam with divalproex (Depakote)  Keep a log of staring spells and behavioral arrest  Try to get his attention during the events and see if he can respond  Let us know if events increase in frequency or severity  Return in about 3 months (AP)

## 2022-08-19 NOTE — PROGRESS NOTES
[place] [address] [facility] [address] [place] [address]  Follow Up Visit    Impression/Plan    Mr Delmon Goldberg is a 29 y o  male with a history of autism and recently diagnosed Brugada syndrome s/p ICD presenting for follow up regarding possible epilepsy due to unknown cause manifest as apparent generalized tonic clonic seizures that began in 2016  Unclear if staring spells and behavioral arrest are seizures  Concerned that levetiracetam is contributing to mood/behavior problems  I recommended transitioning from levetiracetam to divalproex  This was discussed in detail today  They will let us know if they wish to proceed with the transition  Patient Instructions   1  My recommendation is a trial of replacing levetiracetam with divalproex (Depakote)  2  Keep a log of staring spells and behavioral arrest  Try to get his attention during the events and see if he can respond  Let us know if events increase in frequency or severity  3  Return in about 3 months (AP)  Diagnoses and all orders for this visit:    Localization-related epilepsy [facility])    Autistic disorder    Brugada syndrome        Subjective    Bridger Luque is returning to the [place] [address] [facility] for follow up  Interval Events:   Last seen 7/6/2022  Decided not to start zonisamide after the visit  Staring a few times per month, may have to say his name twice and then back at baseline  Sometimes behavioral arrest while talking, a handful of times, last occurred about a month ago         Current AEDs:  levetiracetam 1500 mg BID    Other medications include:  pyridoxine 100 mg daily     Event/Seizure semiology:  Generalized tonic clonic seizure   Subclinical seizure - frequent swallowing noted on EEG     Special Features  Status epilepticus: no  Self Injury Seizures: fall in 3/2021 cause hit to head  Precipitating Factors: none     Epilepsy Risk Factors:  Autism     Prior AEDs:  vimpat      Prior Evaluation:  EEGs normal (2016, 2020)  CT head 3/2021- no acute findings  MRI brain w wo contrast 3/2/22: IMPRESSION:  No acute intracranial abnormality  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, asymmetrically thinner left fornix, and possibly smaller left mammillary body  A few white matter changes, suggestive of minimal chronic microangiopathy  Severe left sphenoid sinus disease with inspissated material   The study was marked in EPIC for immediate notification      EEG 2/25/2022-    EEG Interpretation: This Routine EEG recorded during wakefulness, drowsiness, and sleep is abnormal   The study captures a 50 second focal electrographic seizure arising from the right mid temporal region without definite clinical correlate other than the possibility of prominent swallowing noted by the tech  Excessive beta activities are likely a medication effect related to benzodiazepine administration       cveeg 2/25-2/:  Day 1 Interpretation: This prolonged, continuous video-EEG recording is abnormal    Diffuse theta frequency slowing and intermittent generalized rhythmic delta activity suggest moderate nonspecific diffuse cerebral dysfunction  Intermittent low amplitude right temporal polymorphic delta slowing suggests underlying focal neuronal dysfunction that may be structural in etiology  2 right frontotemporal spikes in sleep raise the possibility of underlying focal epileptogenic potential    No seizures are present       Day 2 Interpretation: This prolonged, continuous video-EEG recording is abnormal    Diffuse theta frequency slowing and intermittent generalized rhythmic delta activity suggest moderate nonspecific diffuse cerebral dysfunction     Intermittent low amplitude right temporal polymorphic delta slowing suggests underlying focal neuronal dysfunction that may be structural in etiology   One poorly formed right frontotemporal low amplitude sharp wave observed in sleep raises the possibility of "underlying focal epileptogenic potential    No seizures are present       Psychiatric History:  autism     Social History:   Driving: No  Lives Alone: No      Objective    /73 (BP Location: Right arm, Patient Position: Sitting, Cuff Size: Standard)   Pulse 95   Temp (!) 96 5 °F (35 8 °C) (Tympanic)   Resp 18   Ht 6' 1\" (1 854 m)   Wt 84 7 kg (186 lb 12 8 oz)   SpO2 98%   BMI 24 65 kg/m²      General Exam  No acute distress  Neurologic Exam  Mental Status:  Alert  Single words, follows simple commands  Cranial Nerves: Eye movements full, attends to visual stimulation from the left and the right, face symmetric     Gait: Steady gait          ROS:    Review of Systems   Constitutional: Negative  Negative for appetite change and fever  HENT: Negative  Negative for hearing loss, tinnitus, trouble swallowing and voice change  Eyes: Negative  Negative for photophobia and pain  Respiratory: Negative  Negative for shortness of breath  Cardiovascular: Negative  Negative for palpitations  Gastrointestinal: Negative  Negative for nausea and vomiting  Endocrine: Negative  Negative for cold intolerance  Genitourinary: Negative  Negative for dysuria, frequency and urgency  Musculoskeletal: Negative  Negative for myalgias and neck pain  Skin: Negative  Negative for rash  Allergic/Immunologic: Negative  Neurological: Positive for speech difficulty  Negative for dizziness, tremors, seizures, syncope, facial asymmetry, weakness, light-headedness, numbness and headaches  Hematological: Negative  Does not bruise/bleed easily  Psychiatric/Behavioral: Positive for sleep disturbance  Negative for confusion and hallucinations  Mood swings   ROS reviewed and updated as appropriate                  "

## 2022-08-31 ENCOUNTER — REMOTE DEVICE CLINIC VISIT (OUTPATIENT)
Dept: CARDIOLOGY CLINIC | Facility: CLINIC | Age: 32
End: 2022-08-31
Payer: COMMERCIAL

## 2022-08-31 DIAGNOSIS — Z95.810 PRESENCE OF AUTOMATIC CARDIOVERTER/DEFIBRILLATOR (AICD): Primary | ICD-10-CM

## 2022-08-31 PROCEDURE — 93295 DEV INTERROG REMOTE 1/2/MLT: CPT | Performed by: INTERNAL MEDICINE

## 2022-08-31 PROCEDURE — 93296 REM INTERROG EVL PM/IDS: CPT | Performed by: INTERNAL MEDICINE

## 2022-08-31 NOTE — PROGRESS NOTES
Results for orders placed or performed in visit on 08/31/22   Cardiac EP device report    Narrative    MDT VVI ICD - ACTIVE SYSTEM IS MRI CONDITIONAL  CARELINK TRANSMISSION: BATTERY VOLTAGE ADEQUATE (12 1 YRS), : <0 15  ALL AVAILABLE LEAD PARAMETERS WITHIN NORMAL LIMITS  4 SVT-WAVELET EPISODES W/ AVAIL EGMS SHOWINGN SVT-ST W/ -182 BPM  MAX DURATION 3 MINS 9 SECS  PT DOES NOT TAKE ANY BB  EF: 55% (ECHO 3/31/21)  OPTI-VOL WITHIN NORMAL LIMITS  APPROPRIATELY FUNCTIONING ICD    [address]

## 2022-09-16 ENCOUNTER — HOSPITAL ENCOUNTER (OUTPATIENT)
Dept: RADIOLOGY | Facility: HOSPITAL | Age: 32
Discharge: HOME | End: 2022-09-16
Attending: OTOLARYNGOLOGY
Payer: COMMERCIAL

## 2022-09-16 DIAGNOSIS — J01.30 ACUTE NON-RECURRENT SPHENOIDAL SINUSITIS: ICD-10-CM

## 2022-09-16 DIAGNOSIS — H66.005 RECURRENT ACUTE SUPPURATIVE OTITIS MEDIA WITHOUT SPONTANEOUS RUPTURE OF LEFT TYMPANIC MEMBRANE: ICD-10-CM

## 2022-09-16 PROCEDURE — G1004 CDSM NDSC: HCPCS

## 2022-09-16 PROCEDURE — 70486 CT MAXILLOFACIAL W/O DYE: CPT

## 2022-09-18 ENCOUNTER — PATIENT MESSAGE (OUTPATIENT)
Dept: NEUROLOGY | Facility: CLINIC | Age: 32
End: 2022-09-18

## 2022-09-19 ENCOUNTER — TELEPHONE (OUTPATIENT)
Dept: NEUROLOGY | Facility: CLINIC | Age: 32
End: 2022-09-19

## 2022-09-19 DIAGNOSIS — G40.109 LOCALIZATION-RELATED EPILEPSY (HCC): Primary | ICD-10-CM

## 2022-09-19 RX ORDER — DIVALPROEX SODIUM 500 MG/1
TABLET, EXTENDED RELEASE ORAL
Qty: 90 TABLET | Refills: 5 | Status: SHIPPED | OUTPATIENT
Start: 2022-09-19 | End: 2022-09-27 | Stop reason: SDUPTHER

## 2022-09-19 NOTE — TELEPHONE ENCOUNTER
Start divalproex  mg morning and 1000 mg at bedtime  Have blood work done in 1 week  Have drawn prior to morning dose or in the late afternoon (trough level)  If trough valproate level is over 60 then will have him decrease levetiracetam to 1000 mg twice daily for one week  Will then likely have him decrease levetiracetam by 500 mg weekly until stopped

## 2022-09-19 NOTE — TELEPHONE ENCOUNTER
----- Message from Stephanie Sales RN sent at 9/19/2022  8:23 AM EDT -----  Regarding: FW: Depakote    ----- Message -----  From: Cheo Nunes  Sent: 9/19/2022   8:14 AM EDT  To: Neurology [facility] Clinical Team 2  Subject: Depakote                                         Please review  ----- Message -----  From: Blanco Sinclair  Sent: 9/18/2022   7:27 PM EDT  To: Neurology [place] [facility]  Subject: Depakote                                         This is Nanci Kwok, Damian's Mom  I appreciate you taking the time during Damian's last appointment to discuss medications with me  Both you and Camila have been extremely patient with us and it is very much appreciated  After a lot of discussion between my  and myself, we would like to take your advice and get Damian off of the [address] and started on the Depakote  With Damian's inability to communicate effectively, we felt this was our "safest" choice, since it is monitored regularly with lab work  I can honestly say I do not remember what the steps of the switch over consisted of, so please advised me if you would like us to get preliminary lab work done, come in for an appointment, etc   And also, if there are any other medications that have come out that would be better suited for [place], we are also open to your suggestions  Thanks again and I look forward to hearing from you or Camila

## 2022-09-27 ENCOUNTER — APPOINTMENT (OUTPATIENT)
Dept: LAB | Facility: HOSPITAL | Age: 32
End: 2022-09-27
Payer: COMMERCIAL

## 2022-09-27 ENCOUNTER — TELEPHONE (OUTPATIENT)
Dept: NEUROLOGY | Facility: CLINIC | Age: 32
End: 2022-09-27

## 2022-09-27 DIAGNOSIS — G40.109 LOCALIZATION-RELATED EPILEPSY (HCC): ICD-10-CM

## 2022-09-27 DIAGNOSIS — R56.9 SEIZURE (HCC): ICD-10-CM

## 2022-09-27 LAB
ALBUMIN SERPL BCP-MCNC: 5.1 G/DL (ref 3.5–5)
ALP SERPL-CCNC: 89 U/L (ref 43–122)
ALT SERPL W P-5'-P-CCNC: 29 U/L
ANION GAP SERPL CALCULATED.3IONS-SCNC: 12 MMOL/L (ref 5–14)
AST SERPL W P-5'-P-CCNC: 71 U/L (ref 17–59)
BASOPHILS # BLD AUTO: 0.02 THOUSANDS/ΜL (ref 0–0.1)
BASOPHILS NFR BLD AUTO: 1 % (ref 0–1)
BILIRUB SERPL-MCNC: 0.78 MG/DL (ref 0.2–1)
BUN SERPL-MCNC: 14 MG/DL (ref 5–25)
CALCIUM SERPL-MCNC: 10.3 MG/DL (ref 8.4–10.2)
CHLORIDE SERPL-SCNC: 103 MMOL/L (ref 96–108)
CO2 SERPL-SCNC: 23 MMOL/L (ref 21–32)
CREAT SERPL-MCNC: 1.03 MG/DL (ref 0.7–1.5)
EOSINOPHIL # BLD AUTO: 0.1 THOUSAND/ΜL (ref 0–0.61)
EOSINOPHIL NFR BLD AUTO: 2 % (ref 0–6)
ERYTHROCYTE [DISTWIDTH] IN BLOOD BY AUTOMATED COUNT: 12.4 % (ref 11.6–15.1)
GFR SERPL CREATININE-BSD FRML MDRD: 97 ML/MIN/1.73SQ M
GLUCOSE SERPL-MCNC: 94 MG/DL (ref 70–99)
HCT VFR BLD AUTO: 49.8 % (ref 36.5–49.3)
HGB BLD-MCNC: 17.7 G/DL (ref 12–17)
IMM GRANULOCYTES # BLD AUTO: 0.01 THOUSAND/UL (ref 0–0.2)
IMM GRANULOCYTES NFR BLD AUTO: 0 % (ref 0–2)
LYMPHOCYTES # BLD AUTO: 1.39 THOUSANDS/ΜL (ref 0.6–4.47)
LYMPHOCYTES NFR BLD AUTO: 33 % (ref 14–44)
MCH RBC QN AUTO: 29.9 PG (ref 26.8–34.3)
MCHC RBC AUTO-ENTMCNC: 35.5 G/DL (ref 31.4–37.4)
MCV RBC AUTO: 84 FL (ref 82–98)
MONOCYTES # BLD AUTO: 0.39 THOUSAND/ΜL (ref 0.17–1.22)
MONOCYTES NFR BLD AUTO: 9 % (ref 4–12)
NEUTROPHILS # BLD AUTO: 2.25 THOUSANDS/ΜL (ref 1.85–7.62)
NEUTS SEG NFR BLD AUTO: 55 % (ref 43–75)
NRBC BLD AUTO-RTO: 0 /100 WBCS
PLATELET # BLD AUTO: 168 THOUSANDS/UL (ref 149–390)
PMV BLD AUTO: 9.7 FL (ref 8.9–12.7)
POTASSIUM SERPL-SCNC: 5.6 MMOL/L (ref 3.5–5.3)
PROT SERPL-MCNC: 8.4 G/DL (ref 6.4–8.4)
RBC # BLD AUTO: 5.91 MILLION/UL (ref 3.88–5.62)
SODIUM SERPL-SCNC: 138 MMOL/L (ref 135–147)
VALPROATE SERPL-MCNC: 111.5 UG/ML (ref 50–100)
WBC # BLD AUTO: 4.16 THOUSAND/UL (ref 4.31–10.16)

## 2022-09-27 PROCEDURE — 85025 COMPLETE CBC W/AUTO DIFF WBC: CPT

## 2022-09-27 PROCEDURE — 36415 COLL VENOUS BLD VENIPUNCTURE: CPT

## 2022-09-27 PROCEDURE — 80203 DRUG SCREEN QUANT ZONISAMIDE: CPT

## 2022-09-27 PROCEDURE — 80177 DRUG SCRN QUAN LEVETIRACETAM: CPT

## 2022-09-27 PROCEDURE — 80053 COMPREHEN METABOLIC PANEL: CPT

## 2022-09-27 PROCEDURE — 80164 ASSAY DIPROPYLACETIC ACD TOT: CPT

## 2022-09-27 RX ORDER — DIVALPROEX SODIUM 250 MG/1
250 TABLET, EXTENDED RELEASE ORAL
Qty: 30 TABLET | Refills: 5 | Status: SHIPPED | OUTPATIENT
Start: 2022-09-27 | End: 2022-10-20

## 2022-09-27 RX ORDER — DIVALPROEX SODIUM 500 MG/1
500 TABLET, EXTENDED RELEASE ORAL 2 TIMES DAILY
Qty: 60 TABLET | Refills: 5 | Status: SHIPPED | OUTPATIENT
Start: 2022-09-27

## 2022-09-27 NOTE — TELEPHONE ENCOUNTER
----- Message from Kimberly Maradiaga sent at 9/27/2022  3:35 PM EDT -----  Regarding: FW: Depakote    ----- Message -----  From: Gigi Melissa  Sent: 9/27/2022   3:03 PM EDT  To: Neurology [place] [facility]  Subject: Depakote                                         I just saw that Damian's Valproate Acid level is 111 5 ug/mL  Since that is greater than the 60 mentioned above, we could then be decreasing Damian's Keppra to 1000mg twice daily?

## 2022-09-27 NOTE — TELEPHONE ENCOUNTER
The valproate level is higher than anticipated  Was this a trough level? Please confirm current divalproex dose/schedule  Any side effects? If this was a trough level, I will have him decrease divalproex ER to 500 mg morning and 750 mg evening (would need 250 mg pills) and then recheck a level in 2 weeks  He can begin decreasing levetiracetam now       Levetiracetam schedule:  Week 1 (now): levetiracetam 1000 mg twice daily  Week 2: levetiracetam 750 mg twice daliy  Week 3: levetiracetam 500 mg twice daily   Week 4: levetiracetam 250 mg twice daily  Week 5: stop levetiracetam

## 2022-09-27 NOTE — TELEPHONE ENCOUNTER
Spoke to mom  Confirmed this was a trough level  Divalproex 500mg in am, 1000mg in pm    Please send new script to the pharmacy for 250mg tablets

## 2022-09-27 NOTE — TELEPHONE ENCOUNTER
Prior recommendations were to:    Yimi Morris MD          Start divalproex  mg morning and 1000 mg at bedtime  Have blood work done in 1 week  Have drawn prior to morning dose or in the late afternoon (trough level)  If trough valproate level is over 60 then will have him decrease levetiracetam to 1000 mg twice daily for one week  Will then likely have him decrease levetiracetam by 500 mg weekly until stopped          Do you want to proceed with these recommendations?

## 2022-09-28 ENCOUNTER — IN-CLINIC DEVICE VISIT (OUTPATIENT)
Dept: CARDIOLOGY CLINIC | Facility: CLINIC | Age: 32
End: 2022-09-28
Payer: COMMERCIAL

## 2022-09-28 DIAGNOSIS — Z95.810 AICD (AUTOMATIC CARDIOVERTER/DEFIBRILLATOR) PRESENT: Primary | ICD-10-CM

## 2022-09-28 PROCEDURE — 93282 PRGRMG EVAL IMPLANTABLE DFB: CPT | Performed by: INTERNAL MEDICINE

## 2022-09-28 NOTE — PROGRESS NOTES
Results for orders placed or performed in visit on 09/28/22   Cardiac EP device report    Narrative    MDT VVI ICD - [address] [place] [address] Dr INTERROGATED IN THE [place] OFFICE: [address]   0%  ALL AVAILABLE LEAD PARAMETERS WITHIN NORMAL LIMITS  NO SIGNIFICANT HIGH RATE EPISODES  OPTI-VOL WITHIN NORMAL LIMITS  NO PROGRAMMING CHANGES MADE TO DEVICE PARAMETERS  NORMAL DEVICE FUNCTION   NC

## 2022-09-28 NOTE — TELEPHONE ENCOUNTER
If symptoms do not improve over this weekend then further decrease in divalproex can be considered early next week  Placing order for repeat valproate level and CMP to be done in about 2 weeks

## 2022-09-28 NOTE — TELEPHONE ENCOUNTER
Dr Mcgrath: Mom sent following eLama message in response to my call yesterday  Any recommendations while making med changes? Or just time? Fernando Ayon  to Me      8:23 PM  Good evening, Cherry Gallo, thank you for calling earlier this evening  Regarding the side effects, Judie Sher is definitely drowsy and falling asleep during the day  Tonight (Tuesday) he fell asleep for about a 1/2 hour before we went out for dinner  On the way there, he just kept nodding off, even when we got to the restaurant, he put his head down on the table  He has been somewhat more confused, lethargic/dazed  Oh, he also has been burping more  He also is showing an increase in "twitching" his head and hand  I noticed a lot of his labs came back high  He is most definitely not himself

## 2022-09-29 ENCOUNTER — NURSE TRIAGE (OUTPATIENT)
Dept: OTHER | Facility: OTHER | Age: 32
End: 2022-09-29

## 2022-09-29 LAB
LEVETIRACETAM SERPL-MCNC: 22 UG/ML (ref 10–40)
ZONISAMIDE SERPL-MCNC: <2 UG/ML (ref 10–40)

## 2022-09-29 NOTE — TELEPHONE ENCOUNTER
Is he doing any better today? I think Dr Lemuel Lemon just decreased Depakote dose due to high levels

## 2022-09-29 NOTE — TELEPHONE ENCOUNTER
Regarding: Possible Med Effect  ----- Message from [facility] sent at 9/29/2022 12:38 AM EDT -----  "My son just started a new medication about a week ago but tonight he just started vomiting and I am not sure what can be the cause "

## 2022-09-29 NOTE — TELEPHONE ENCOUNTER
Reason for Disposition   Vomiting a prescription medication    Answer Assessment - Initial Assessment Questions  1  VOMITING SEVERITY: "How many times have you vomited in the past 24 hours?"      - MILD:  1 - 2 times/day     - MODERATE: 3 - 5 times/day, decreased oral intake without significant weight loss or symptoms of dehydration     - SEVERE: 6 or more times/day, vomits everything or nearly everything, with significant weight loss, symptoms of dehydration       once  2  ONSET: "When did the vomiting begin?"       tonight  3  FLUIDS: "What fluids or food have you vomited up today?" "Have you been able to keep any fluids down?"      Emptied out stomach  4  ABDOMINAL PAIN: "Are your having any abdominal pain?" If yes : "How bad is it and what does it feel like?" (e g , crampy, dull, intermittent, constant)       Unsure, son is autistic  5  DIARRHEA: "Is there any diarrhea?" If Yes, ask: "How many times today?"       Denies  6  CONTACTS: "Is there anyone else in the family with the same symptoms?"       Denies  7  CAUSE: "What do you think is causing your vomiting?"      Depakote, recent med change, has had a few side effects  8   HYDRATION STATUS: "Any signs of dehydration?" (e g , dry mouth [not only dry lips], too weak to stand) "When did you last urinate?"      Seems weak    Protocols used: [facility]

## 2022-10-03 NOTE — TELEPHONE ENCOUNTER
Drowsiness is improved some  No n/v this weekend  He still seems confused and dazed  He did start seeming a little tired and dazed a few days after starting divalproex about 2 weeks ago  Things got significantly worse around 9/27 and there was n/v  There have been no fevers or sick contacts  Current meds  Divalproex 500-750 mg  Levetiracetam 1000 mg bid    Will decrease divalproex to 500 mg bid and have level checked after 5-6 doses (may not quite be steady state, but will be close)  Continue levetiracetam 1000 mg bid until confirming adequate valproate level and then can likely continue decreasing levetiracetam  They will call us if he does not seem to be trending toward improvement or if symptoms worsen

## 2022-10-03 NOTE — TELEPHONE ENCOUNTER
Update from mom: Rei Sue  to Joann Sanitago RN      8:57 PM  Good evening/morning  Ed Tomlinson had a weekend that was just up & down  After he was sick during the night Wednesday into Thursday, he slept most of Thursday & Friday  He did eat a little bit of soup for dinner on Friday  Did not eat next to nothing the rest of the day  We have been making him get up and drink some water and use the bathroom  Saturday, he slept during the morning and into the early afternoon but did eat a little more of the soup for dinner  At bedtime he did not fall asleep as easily as he has been doing nor did he sleep the whole night  He was up at least twice  He still is giving off that dazed, drugged out look  There is also some confusion noted in his responses  I did see the order for the lab work for next week and will have that done next Tuesday  Let me know if there is anything else I should be doing or if you have any questions regarding this past week  Thank you all for a quick response & for your patience is wading through the effects of this medication change

## 2022-10-06 ENCOUNTER — APPOINTMENT (OUTPATIENT)
Dept: LAB | Facility: HOSPITAL | Age: 32
End: 2022-10-06
Payer: COMMERCIAL

## 2022-10-06 DIAGNOSIS — G40.109 LOCALIZATION-RELATED EPILEPSY (HCC): ICD-10-CM

## 2022-10-06 LAB
ALBUMIN SERPL BCP-MCNC: 4.2 G/DL (ref 3.5–5)
ALP SERPL-CCNC: 79 U/L (ref 46–116)
ALT SERPL W P-5'-P-CCNC: 29 U/L (ref 12–78)
ANION GAP SERPL CALCULATED.3IONS-SCNC: 6 MMOL/L (ref 4–13)
AST SERPL W P-5'-P-CCNC: 20 U/L (ref 5–45)
BILIRUB SERPL-MCNC: 0.36 MG/DL (ref 0.2–1)
BUN SERPL-MCNC: 11 MG/DL (ref 5–25)
CALCIUM SERPL-MCNC: 9.5 MG/DL (ref 8.3–10.1)
CHLORIDE SERPL-SCNC: 102 MMOL/L (ref 96–108)
CO2 SERPL-SCNC: 29 MMOL/L (ref 21–32)
CREAT SERPL-MCNC: 0.92 MG/DL (ref 0.6–1.3)
GFR SERPL CREATININE-BSD FRML MDRD: 111 ML/MIN/1.73SQ M
GLUCOSE SERPL-MCNC: 96 MG/DL (ref 65–140)
POTASSIUM SERPL-SCNC: 3.9 MMOL/L (ref 3.5–5.3)
PROT SERPL-MCNC: 7.6 G/DL (ref 6.4–8.4)
SODIUM SERPL-SCNC: 137 MMOL/L (ref 135–147)
VALPROATE SERPL-MCNC: 77 UG/ML (ref 50–100)

## 2022-10-06 PROCEDURE — 36415 COLL VENOUS BLD VENIPUNCTURE: CPT

## 2022-10-06 PROCEDURE — 80053 COMPREHEN METABOLIC PANEL: CPT

## 2022-10-06 PROCEDURE — 80164 ASSAY DIPROPYLACETIC ACD TOT: CPT

## 2022-10-06 PROCEDURE — 80165 DIPROPYLACETIC ACID FREE: CPT

## 2022-10-07 NOTE — TELEPHONE ENCOUNTER
I called Mrs Kelby Linton back at the number listed in [facility]  Ariella Owusu appears to be less sleepy than he was earlier in the week but he has been having more cognitive trouble  His repeat Depakote level was 77 yesterday  Per Dr Maggy Suárez prior notes, I will implement the plan of trying to reduce his dose of levetiracetam   Will have him decrease his levetiracetam to be 500 mg the morning and 1000 mg night  I did ask the give our office a call next week with an update on how he is doing

## 2022-10-07 NOTE — TELEPHONE ENCOUNTER
Any recommendations for mom? Dion Denny, RN 2 hours ago (6:01 AM)   I saw Damian's levels have come down; however, it's been a rough week  He's not processing  For example, it takes him awhile to make cereal and then eat, lot of prompting for the steps, lot of staring off into space  At one point when I got him to go on the computer, he just started doing a vocalization that he does when he is frustrated, got up, came over to me, started jamming his fingers into me and head butting me in the arm  He was extremely hard to calm down  He did the same when we were getting ready for bed, out of the clear blue ted  His face gets all off, like something is not right  Several times, he just started crying  I heard him awake the other night, went out where his behavior was increasing, calmed him down slightly, gave him some Tylenol, sat on the floor, he laid his head on my lap and I just started lightly rubbing his head  He was up from about 12:05 till 3:10  The autism makes communication hard enough, but this has really been 100 times worse

## 2022-10-10 NOTE — TELEPHONE ENCOUNTER
Please ask how he has done since decreasing the levetiracetam dose to 500 mg AM / 1000 mg PM  We can consider further decrease in levetiracetam dose based on how things have been going

## 2022-10-10 NOTE — TELEPHONE ENCOUNTER
Spoke to mom  Reports Friday evening and saturday patient was great - back to baseline  sunday patient was alittle "testy" or not happy but nothing concerning  Mom says today patient is tired from not sleeping well last but overall doing well  She will update the office later this week   Will continue for now with 500mg in am and 1000mg pm

## 2022-10-12 LAB — MISCELLANEOUS LAB TEST RESULT: NORMAL

## 2022-11-10 DIAGNOSIS — G40.109 LOCALIZATION-RELATED EPILEPSY (HCC): Primary | ICD-10-CM

## 2022-11-12 ENCOUNTER — APPOINTMENT (OUTPATIENT)
Dept: LAB | Facility: HOSPITAL | Age: 32
End: 2022-11-12

## 2022-11-12 DIAGNOSIS — G40.109 LOCALIZATION-RELATED EPILEPSY (HCC): ICD-10-CM

## 2022-11-12 LAB
25(OH)D3 SERPL-MCNC: 14.2 NG/ML (ref 30–100)
ALBUMIN SERPL BCP-MCNC: 4.1 G/DL (ref 3.5–5)
ALP SERPL-CCNC: 72 U/L (ref 46–116)
ALT SERPL W P-5'-P-CCNC: 42 U/L (ref 12–78)
ANION GAP SERPL CALCULATED.3IONS-SCNC: 5 MMOL/L (ref 4–13)
AST SERPL W P-5'-P-CCNC: 25 U/L (ref 5–45)
BILIRUB SERPL-MCNC: 0.45 MG/DL (ref 0.2–1)
BUN SERPL-MCNC: 12 MG/DL (ref 5–25)
CALCIUM SERPL-MCNC: 9.9 MG/DL (ref 8.3–10.1)
CHLORIDE SERPL-SCNC: 106 MMOL/L (ref 96–108)
CO2 SERPL-SCNC: 27 MMOL/L (ref 21–32)
CREAT SERPL-MCNC: 0.92 MG/DL (ref 0.6–1.3)
GFR SERPL CREATININE-BSD FRML MDRD: 111 ML/MIN/1.73SQ M
GLUCOSE P FAST SERPL-MCNC: 92 MG/DL (ref 65–99)
POTASSIUM SERPL-SCNC: 4.5 MMOL/L (ref 3.5–5.3)
PROT SERPL-MCNC: 7.7 G/DL (ref 6.4–8.4)
SODIUM SERPL-SCNC: 138 MMOL/L (ref 135–147)
VALPROATE SERPL-MCNC: 98 UG/ML (ref 50–100)

## 2022-11-15 ENCOUNTER — OFFICE VISIT (OUTPATIENT)
Dept: NEUROLOGY | Facility: CLINIC | Age: 32
End: 2022-11-15

## 2022-11-15 VITALS
DIASTOLIC BLOOD PRESSURE: 80 MMHG | HEART RATE: 86 BPM | SYSTOLIC BLOOD PRESSURE: 132 MMHG | BODY MASS INDEX: 25.46 KG/M2 | WEIGHT: 193 LBS

## 2022-11-15 DIAGNOSIS — G40.109 LOCALIZATION-RELATED EPILEPSY (HCC): ICD-10-CM

## 2022-11-15 RX ORDER — DIVALPROEX SODIUM 500 MG/1
500 TABLET, EXTENDED RELEASE ORAL
Qty: 90 TABLET | Refills: 1 | Status: SHIPPED | OUTPATIENT
Start: 2022-11-15

## 2022-11-15 RX ORDER — ERGOCALCIFEROL 1.25 MG/1
50000 CAPSULE ORAL WEEKLY
Qty: 12 CAPSULE | Refills: 1 | Status: SHIPPED | OUTPATIENT
Start: 2022-11-15

## 2022-11-15 RX ORDER — DIVALPROEX SODIUM 250 MG/1
250 TABLET, EXTENDED RELEASE ORAL DAILY
Qty: 90 TABLET | Refills: 2 | Status: SHIPPED | OUTPATIENT
Start: 2022-11-15

## 2022-11-15 NOTE — PROGRESS NOTES
Review of Systems   Constitutional: Positive for appetite change (increase in appetite)  Negative for fever  HENT: Negative  Negative for hearing loss, tinnitus, trouble swallowing and voice change  Eyes: Positive for photophobia  Negative for pain and visual disturbance  Respiratory: Negative  Negative for shortness of breath  Cardiovascular: Negative  Negative for palpitations  Gastrointestinal: Negative  Negative for nausea and vomiting  Endocrine: Negative  Negative for cold intolerance  Genitourinary: Negative  Negative for dysuria, frequency and urgency  Musculoskeletal: Negative  Negative for gait problem, myalgias and neck pain  Skin: Negative  Negative for rash  Allergic/Immunologic: Negative  Neurological: Positive for tremors (both hands )  Negative for dizziness, seizures, syncope, facial asymmetry, speech difficulty, weakness, light-headedness, numbness and headaches         "zoning spells"    Hematological: Negative  Does not bruise/bleed easily  Psychiatric/Behavioral: Positive for agitation, behavioral problems and sleep disturbance  Negative for confusion and hallucinations  The patient is nervous/anxious  "increase in activity/talking"    All other systems reviewed and are negative

## 2022-11-15 NOTE — PATIENT INSTRUCTIONS
- Decrease Depakote to 250 mg in the morning and 500 mg at night  - Have blood work done in about 2 weeks     - Start vitamin D 50,0000 units weekly  - Call the office with seizures or concerns  - Follow up in 3 months or sooner if needed

## 2022-11-15 NOTE — PROGRESS NOTES
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 Brugada syndrome (s/p ICD placement)  He is currently on divalproex  mg BID, recently transitioned from levetiracetam due to mood side effects  There may be some side effects from divalproex ER, noted below as reported by mother but since his level is on the higher side, will decrease dose down to 250 mg in the morning and 500 mg at night  Overall, mother does feel that he is doing better with behaviors on the divalproex rather than the levetiracetam but feels that the other side effects are worse  Routine EEG during recent hospitalization in February revealed a 50 second focal electrographic seizure arising from the right mid temporal region without definite clinical correlate other than the possibility of prominent swallowing noted by the tech  Continuous video EEG done after, revealed a few right frontotemporal spikes and one poorly formed sharp wave from the same region  Prior EEGs have been normal so this information obtained during hospitalization is helpful for classifying his epilepsy as focal onset  MRI brain during hospitalization with 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, asymmetrically thinner left fornix, and possibly smaller left mammillary body  Noted white matter changes  His neurologic exam at today's visit is at baseline  Plan to decrease divalproex as above to 250 mg in the morning and 500 mg at night  If he continues to have mood related side effects of this medicaiton on lower dose that are not tolerable, consider transition to lamotrigine   Of note, if this medicaiton is started, recommend EKG be done prior do and once at goal dose due to history of Brugada syndrome  Possibility of breakthrough seizure discussed with decreasing dose  Repeat VPA level ordered to be done in about 2 weeks  Mother will contact the office with any issues or concerns  Follow up in 3 months or sooner if needed  Vitamin D deficiency  Vitamin D deficiency noted on recent blood work  While Depakote may contribute to lowering vitamin D level, he likely has not been on this medication long enough for his vitamin D to be as low as it is  Recommended high dose vitamin D replacement with vitamin D2 50,000 units weekly  Repeat level in a few months  He will Return in about 3 months (around 2/15/2023) for Follow up with Braden  Subjective:  Zeina Hester is a 36 y o  male with epilepsy, autism and Brugada syndrome s/p ICD presenting for follow up, who is returning to Neurology office for follow up of his seizures  HE was last seen in the office by Dr Luly Quinn on 8/19/2022  At that time, staring spells were occurring a few times per month, his mother may have to say his name twice and then he is back to baseline  There was also sometimes behavioral arrest while talking, last occurring about one month prior to visit  At that time he was on levetiracetam 1500 mg BID and pyridoxine with significant behavioral worsening  Dr Luly Quinn recommended transition to divalproex  Recommended 3 month follow up  Since his last visit, his mother feels that the Depakote has been causing side effects  He is having trouble at work  Seems more irritated  They are concerned about worsening anxiety, agitation/restlessness, trouble sleeping (not more than usual), acting aggressive/being angry  Increase in activity/talking (repeating more than usual- typically 3 times but recently can repeat up to 10 times or up to a half hour)  He has been having tremors in his hands  Patient's father has noticed he is squinting and they think he is sensitive to light   His appetite has increased  Behaviors are better than keppra but the other side effects are worse  He did have an episode of staring in the office which was stopped by tapping his foot or calling his name  Unlikely related to seizure  Latest Reference Range & Units 11/12/22 07:58   Vit D, 25-Hydroxy 30 0 - 100 0 ng/mL 14 2 (L)       Current seizure medications:  - divalproex  mg BID  Other medications as per Epic  Latest Reference Range & Units 11/12/22 07:58   VALPROIC ACID TOTAL 50 - 100 ug/mL 98     Special Features  Status epilepticus: no  Self Injury Seizures: fall in 3/2021 cause hit to head  Precipitating Factors: none     Epilepsy Risk Factors:  Autism     Prior AEDs:  vimpat - tremors, confusion, difficulty sleeping, worsening mood  Levetiracetam - aggressive behaviors     Prior Evaluation:  EEGs normal (2016, 2020)  CT head 3/2021- no acute findings  MRI brain w wo contrast 3/2/22: IMPRESSION:  No acute intracranial abnormality  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, asymmetrically thinner left fornix, and possibly smaller left mammillary body  A few white matter changes, suggestive of minimal chronic microangiopathy  Severe left sphenoid sinus disease with inspissated material   The study was marked in EPIC for immediate notification      EEG 2/25/2022-    EEG Interpretation: This Routine EEG recorded during wakefulness, drowsiness, and sleep is abnormal   The study captures a 50 second focal electrographic seizure arising from the right mid temporal region without definite clinical correlate other than the possibility of prominent swallowing noted by the tech  Excessive beta activities are likely a medication effect related to benzodiazepine administration       cveeg 2/25-2/:  Day 1 Interpretation:    This prolonged, continuous video-EEG recording is abnormal    Diffuse theta frequency slowing and intermittent generalized rhythmic delta activity suggest moderate nonspecific diffuse cerebral dysfunction  Intermittent low amplitude right temporal polymorphic delta slowing suggests underlying focal neuronal dysfunction that may be structural in etiology  2 right frontotemporal spikes in sleep raise the possibility of underlying focal epileptogenic potential    No seizures are present       Day 2 Interpretation: This prolonged, continuous video-EEG recording is abnormal    Diffuse theta frequency slowing and intermittent generalized rhythmic delta activity suggest moderate nonspecific diffuse cerebral dysfunction  Intermittent low amplitude right temporal polymorphic delta slowing suggests underlying focal neuronal dysfunction that may be structural in etiology   One poorly formed right frontotemporal low amplitude sharp wave observed in sleep raises the possibility of underlying focal epileptogenic potential    No seizures are present       Psychiatric History:  autism     Social History:   Driving: No  Lives Alone: No    His history was also obtained from his mother, who was present at today's visit  I reviewed prior neurology notes, most recent labs, as documented in [employer], and summarized above  Objective:    Blood pressure 132/80, pulse 86, weight 87 5 kg (193 lb)  Physical Exam  No apparent distress  Appears well nourished  Mood appropriate for situation     Neurologic Exam  Mental status- Alert  Single word verbalization  Follows simple commands  Limited attention and understanding of medical situation       Cranial Nerves- blinks to stimuli, EOMS normal, tracks examiner in room, facial muscles symmetric, hearing intact bilaterally to finger rubs, shoulder shrug symmetrical      Motor- Appropriate strength  Moves all extremities freely   No tremor      Sensory-  Intact distally in all extremities to light touch       DTRs- 2+ and symmetric in all extremities      Gait- wide based steady gait     Coordination- high fives without ataxia  ROS:  Review of Systems   Constitutional: Positive for appetite change (increase in appetite)  Negative for fever  HENT: Negative  Negative for hearing loss, tinnitus, trouble swallowing and voice change  Eyes: Positive for photophobia  Negative for pain and visual disturbance  Respiratory: Negative  Negative for shortness of breath  Cardiovascular: Negative  Negative for palpitations  Gastrointestinal: Negative  Negative for nausea and vomiting  Endocrine: Negative  Negative for cold intolerance  Genitourinary: Negative  Negative for dysuria, frequency and urgency  Musculoskeletal: Negative  Negative for gait problem, myalgias and neck pain  Skin: Negative  Negative for rash  Allergic/Immunologic: Negative  Neurological: Positive for tremors (both hands )  Negative for dizziness, seizures, syncope, facial asymmetry, speech difficulty, weakness, light-headedness, numbness and headaches         "zoning spells"    Hematological: Negative  Does not bruise/bleed easily  Psychiatric/Behavioral: Positive for agitation, behavioral problems and sleep disturbance  Negative for confusion and hallucinations  The patient is nervous/anxious  "increase in activity/talking"    All other systems reviewed and are negative  ROS obtained by MA and reviewed by myself  This note may have been created using voice recognition software  There may be unintentional errors such as grammatical errors, spelling errors, or pronoun errors

## 2022-11-18 ENCOUNTER — TELEPHONE (OUTPATIENT)
Dept: FAMILY MEDICINE CLINIC | Facility: CLINIC | Age: 32
End: 2022-11-18

## 2022-11-18 PROBLEM — E55.9 VITAMIN D DEFICIENCY: Status: ACTIVE | Noted: 2022-11-18

## 2022-11-18 NOTE — ASSESSMENT & PLAN NOTE
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 Brugada syndrome (s/p ICD placement)  He is currently on divalproex  mg BID, recently transitioned from levetiracetam due to mood side effects  There may be some side effects from divalproex ER, noted below as reported by mother but since his level is on the higher side, will decrease dose down to 250 mg in the morning and 500 mg at night  Overall, mother does feel that he is doing better with behaviors on the divalproex rather than the levetiracetam but feels that the other side effects are worse  Routine EEG during recent hospitalization in February revealed a 50 second focal electrographic seizure arising from the right mid temporal region without definite clinical correlate other than the possibility of prominent swallowing noted by the tech  Continuous video EEG done after, revealed a few right frontotemporal spikes and one poorly formed sharp wave from the same region  Prior EEGs have been normal so this information obtained during hospitalization is helpful for classifying his epilepsy as focal onset  MRI brain during hospitalization with 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, asymmetrically thinner left fornix, and possibly smaller left mammillary body  Noted white matter changes  His neurologic exam at today's visit is at baseline  Plan to decrease divalproex as above to 250 mg in the morning and 500 mg at night  If he continues to have mood related side effects of this medicaiton on lower dose that are not tolerable, consider transition to lamotrigine  Of note, if this medicaiton is started, recommend EKG be done prior do and once at goal dose due to history of Brugada syndrome  Possibility of breakthrough seizure discussed with decreasing dose  Repeat VPA level ordered to be done in about 2 weeks  Mother will contact the office with any issues or concerns   Follow up in 3 months or sooner if needed

## 2022-11-18 NOTE — ASSESSMENT & PLAN NOTE
Vitamin D deficiency noted on recent blood work  While Depakote may contribute to lowering vitamin D level, he likely has not been on this medication long enough for his vitamin D to be as low as it is  Recommended high dose vitamin D replacement with vitamin D2 50,000 units weekly  Repeat level in a few months

## 2022-12-01 ENCOUNTER — APPOINTMENT (OUTPATIENT)
Dept: LAB | Facility: HOSPITAL | Age: 32
End: 2022-12-01

## 2022-12-01 DIAGNOSIS — G40.109 LOCALIZATION-RELATED EPILEPSY (HCC): ICD-10-CM

## 2022-12-01 LAB — VALPROATE SERPL-MCNC: 51 UG/ML (ref 50–100)

## 2023-01-04 ENCOUNTER — REMOTE DEVICE CLINIC VISIT (OUTPATIENT)
Dept: CARDIOLOGY CLINIC | Facility: CLINIC | Age: 33
End: 2023-01-04

## 2023-01-04 DIAGNOSIS — Z95.810 AICD (AUTOMATIC CARDIOVERTER/DEFIBRILLATOR) PRESENT: Primary | ICD-10-CM

## 2023-01-04 NOTE — PROGRESS NOTES
Results for orders placed or performed in visit on 01/04/23   Cardiac EP device report    Narrative    MDT VVI ICD - ACTIVE SYSTEM IS MRI CONDITIONAL  CARELINK TRANSMISSION: BATTERY VOLTAGE ADEQUATE (11 8 YRS)   <0 1% (VVI 40); ALL AVAILABLE LEAD PARAMETERS WITHIN NORMAL LIMITS  4 SVT-WAVELET EPISODES WITH EGM'S FOR ST, MOST RECENT EPISODE DURATION 57 SECS  @ -176 BPM  EF 55% (3/2021 ECHO)  NO AV SLICK BLOCKERS  APPROPRIATELY FUNCTIONING ICD     ES

## 2023-01-20 DIAGNOSIS — G40.109 LOCALIZATION-RELATED EPILEPSY (HCC): Primary | ICD-10-CM

## 2023-01-23 ENCOUNTER — APPOINTMENT (OUTPATIENT)
Dept: LAB | Facility: HOSPITAL | Age: 33
End: 2023-01-23

## 2023-01-23 DIAGNOSIS — G40.109 LOCALIZATION-RELATED EPILEPSY (HCC): ICD-10-CM

## 2023-01-23 LAB
25(OH)D3 SERPL-MCNC: 32.3 NG/ML (ref 30–100)
ALBUMIN SERPL BCP-MCNC: 4.9 G/DL (ref 3.5–5)
ALP SERPL-CCNC: 72 U/L (ref 34–104)
ALT SERPL W P-5'-P-CCNC: 32 U/L (ref 7–52)
AMMONIA PLAS-SCNC: 40 UMOL/L (ref 18–72)
ANION GAP SERPL CALCULATED.3IONS-SCNC: 6 MMOL/L (ref 4–13)
AST SERPL W P-5'-P-CCNC: 32 U/L (ref 13–39)
BASOPHILS # BLD AUTO: 0.02 THOUSANDS/ÂΜL (ref 0–0.1)
BASOPHILS NFR BLD AUTO: 1 % (ref 0–1)
BILIRUB SERPL-MCNC: 0.45 MG/DL (ref 0.2–1)
BUN SERPL-MCNC: 11 MG/DL (ref 5–25)
CALCIUM SERPL-MCNC: 9.7 MG/DL (ref 8.4–10.2)
CHLORIDE SERPL-SCNC: 106 MMOL/L (ref 96–108)
CO2 SERPL-SCNC: 26 MMOL/L (ref 21–32)
CREAT SERPL-MCNC: 0.85 MG/DL (ref 0.6–1.3)
EOSINOPHIL # BLD AUTO: 0.05 THOUSAND/ÂΜL (ref 0–0.61)
EOSINOPHIL NFR BLD AUTO: 1 % (ref 0–6)
ERYTHROCYTE [DISTWIDTH] IN BLOOD BY AUTOMATED COUNT: 12 % (ref 11.6–15.1)
GFR SERPL CREATININE-BSD FRML MDRD: 116 ML/MIN/1.73SQ M
GLUCOSE P FAST SERPL-MCNC: 88 MG/DL (ref 65–99)
HCT VFR BLD AUTO: 47.6 % (ref 36.5–49.3)
HGB BLD-MCNC: 16.8 G/DL (ref 12–17)
IMM GRANULOCYTES # BLD AUTO: 0.01 THOUSAND/UL (ref 0–0.2)
IMM GRANULOCYTES NFR BLD AUTO: 0 % (ref 0–2)
LYMPHOCYTES # BLD AUTO: 1.3 THOUSANDS/ÂΜL (ref 0.6–4.47)
LYMPHOCYTES NFR BLD AUTO: 37 % (ref 14–44)
MCH RBC QN AUTO: 30.7 PG (ref 26.8–34.3)
MCHC RBC AUTO-ENTMCNC: 35.3 G/DL (ref 31.4–37.4)
MCV RBC AUTO: 87 FL (ref 82–98)
MONOCYTES # BLD AUTO: 0.34 THOUSAND/ÂΜL (ref 0.17–1.22)
MONOCYTES NFR BLD AUTO: 10 % (ref 4–12)
NEUTROPHILS # BLD AUTO: 1.78 THOUSANDS/ÂΜL (ref 1.85–7.62)
NEUTS SEG NFR BLD AUTO: 51 % (ref 43–75)
NRBC BLD AUTO-RTO: 0 /100 WBCS
PLATELET # BLD AUTO: 171 THOUSANDS/UL (ref 149–390)
PMV BLD AUTO: 9.6 FL (ref 8.9–12.7)
POTASSIUM SERPL-SCNC: 4.2 MMOL/L (ref 3.5–5.3)
PROT SERPL-MCNC: 7.3 G/DL (ref 6.4–8.4)
RBC # BLD AUTO: 5.48 MILLION/UL (ref 3.88–5.62)
SODIUM SERPL-SCNC: 138 MMOL/L (ref 135–147)
VALPROATE SERPL-MCNC: 45 UG/ML (ref 50–100)
WBC # BLD AUTO: 3.5 THOUSAND/UL (ref 4.31–10.16)

## 2023-02-03 ENCOUNTER — OFFICE VISIT (OUTPATIENT)
Dept: FAMILY MEDICINE CLINIC | Facility: CLINIC | Age: 33
End: 2023-02-03

## 2023-02-03 VITALS
SYSTOLIC BLOOD PRESSURE: 139 MMHG | OXYGEN SATURATION: 98 % | HEIGHT: 73 IN | WEIGHT: 192 LBS | TEMPERATURE: 97.6 F | HEART RATE: 107 BPM | BODY MASS INDEX: 25.45 KG/M2 | RESPIRATION RATE: 16 BRPM | DIASTOLIC BLOOD PRESSURE: 78 MMHG

## 2023-02-03 DIAGNOSIS — Z23 ENCOUNTER FOR IMMUNIZATION: ICD-10-CM

## 2023-02-03 DIAGNOSIS — Z11.59 NEED FOR HEPATITIS C SCREENING TEST: ICD-10-CM

## 2023-02-03 DIAGNOSIS — H60.00 FURUNCLE OF EAR: Primary | ICD-10-CM

## 2023-02-03 LAB — MISCELLANEOUS LAB TEST RESULT: NORMAL

## 2023-02-03 RX ORDER — CLINDAMYCIN HYDROCHLORIDE 300 MG/1
300 CAPSULE ORAL 4 TIMES DAILY
Qty: 28 CAPSULE | Refills: 0 | Status: SHIPPED | OUTPATIENT
Start: 2023-02-03 | End: 2023-02-10

## 2023-02-05 NOTE — PROGRESS NOTES
Assessment/Plan: Refer patient to ear nose and throat for further assessment of possible cyst/ ? Furuncle  ABX JIC if over weekend if becomes inflamed  No problem-specific Assessment & Plan notes found for this encounter  Diagnoses and all orders for this visit:    Furuncle of ear  -     Ambulatory Referral to Otolaryngology; Future  -     clindamycin (CLEOCIN) 300 MG capsule; Take 1 capsule (300 mg total) by mouth 4 (four) times a day for 7 days    Need for hepatitis C screening test    Encounter for immunization        1  Furuncle of ear  Ambulatory Referral to Otolaryngology    clindamycin (CLEOCIN) 300 MG capsule      2  Need for hepatitis C screening test        3  Encounter for immunization            Subjective:        Patient ID: Colonel Carter is a 29 y o  male  Chief Complaint   Patient presents with   • Cyst     Patient has cyst under right ear        Patient here today with mother and father with lump behind right ear  Just noticed it  Does not seem to bother him  Not draining  The following portions of the patient's history were reviewed and updated as appropriate: past medical history, past surgical history and problem list       Review of Systems   Constitutional: Negative for fever  HENT: Negative for congestion  Skin:        Lump/cyst behind right ear   Psychiatric/Behavioral:        Stable autism spectrum  Objective:  /78 (BP Location: Right arm, Patient Position: Sitting, Cuff Size: Adult)   Pulse (!) 107   Temp 97 6 °F (36 4 °C) (Temporal)   Resp 16   Ht 6' 1" (1 854 m)   Wt 87 1 kg (192 lb)   SpO2 98%   BMI 25 33 kg/m²        Physical Exam  Vitals and nursing note reviewed  Constitutional:       General: He is not in acute distress  HENT:      Head: Normocephalic  Comments: Cyst/furuncle behind right ear  Nondraining  Noninflamed  Eyes:      General: No scleral icterus  Pupils: Pupils are equal, round, and reactive to light  Cardiovascular:      Rate and Rhythm: Normal rate and regular rhythm  Heart sounds: Normal heart sounds  Pulmonary:      Breath sounds: Normal breath sounds  Lymphadenopathy:      Cervical: No cervical adenopathy  Skin:     Coloration: Skin is not pale  Neurological:      Comments: Stable autism spectrum/anxiety

## 2023-02-15 ENCOUNTER — OFFICE VISIT (OUTPATIENT)
Dept: NEUROLOGY | Facility: CLINIC | Age: 33
End: 2023-02-15

## 2023-02-15 VITALS
HEART RATE: 82 BPM | WEIGHT: 199 LBS | TEMPERATURE: 98.3 F | OXYGEN SATURATION: 95 % | SYSTOLIC BLOOD PRESSURE: 120 MMHG | BODY MASS INDEX: 26.37 KG/M2 | DIASTOLIC BLOOD PRESSURE: 90 MMHG | HEIGHT: 73 IN

## 2023-02-15 DIAGNOSIS — G40.109 LOCALIZATION-RELATED EPILEPSY (HCC): Primary | ICD-10-CM

## 2023-02-15 DIAGNOSIS — E55.9 VITAMIN D DEFICIENCY: ICD-10-CM

## 2023-02-15 NOTE — PATIENT INSTRUCTIONS
- Continue Depakote for now  - Have EKG done  - Possible medications include lamictal (lamotrigine) - would take a few months to get to a good, possible EKG changes, but mood stabilizing and typically well tolerated   - zonegran (zonisamide) - kidney stones (would need to drink plenty of water), sleepiness (would have to take once daily at bedtime), decreased appetite  - I will reach out to Dr Nuno Razo to see if he thinks any one of the options may be better than the other  - Call the office with possible seizures or concerns in the meantime  - Follow up in 3 months

## 2023-02-15 NOTE — PROGRESS NOTES
Patient ID: Zahra Lynn is a 29 y o  male with epilepsy, autism and Brugada syndrome s/p ICD presenting for follow up, who is returning to Neurology office for follow up of his seizures  Assessment/Plan:    Localization-related epilepsy [facility])  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 Brugada syndrome (s/p ICD placement)  He is currently on divalproex  mg in the morning and 500 mg at night  There are side effects from divalproex ER, prompting consideration to transition to an alternative medication  His behaviors seem to have worsened on divalproex to the point where he is aggressive towards his parents and has been having issues at work requiring 1:1 and only being able to work 2 days per week  His episodes of staring are unlikely related to seizure, observed in the office and he did stop staring when name was called or he was touched  There was a significant behavioral episode this morning, unclear if this was involved with any seizure activity (see description below) though mother feels that it may have been  Routine EEG during recent hospitalization in February revealed a 50 second focal electrographic seizure arising from the right mid temporal region without definite clinical correlate other than the possibility of prominent swallowing noted by the tech  Continuous video EEG done after, revealed a few right frontotemporal spikes and one poorly formed sharp wave from the same region  Prior EEGs have been normal so the information obtained during hospitalization was helpful for classifying his epilepsy as focal onset  MRI brain during hospitalization with 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, asymmetrically thinner left fornix, and possibly smaller left mammillary body  Noted white matter changes  His neurologic exam at today's visit is at baseline       Since patient has continued to be intolerant of lower dose of Depakote, reviewed alternative options  Due to his history of Brugada Syndrome and possibility of some of the AEDs causing EKG changes, will have EKG done prior to changing medication  Medication options discussed included lamotrigine and zonisamide  While lamotrigine is typically a mood stabilizing medication, it can contribute to EKG changes so this would need to be done with caution  It is also important to note that it would likely take several months to titrate to a good dose prior to being able to wean off of Depakote  Zonisamide was also reviewed  This medication was prescribed in the past but due to possible side effects, patient's mother was concerned and an alternative medication was prescribed instead  We did discuss these side effects today and her concerns and would be agreeable to trial this medication since titration would be quicker  If side effects were to develop, could be transitioned to an alternative medication  Given he has focal epilepsy, there are other options as well including oxcarbazepine or even xcopri if other options are exhausted  Discussed with mother that I would review the plan with Dr Nenita Rivas prior to making and changes to ensure that we are all on the same page  Mother will contact the office with any issues or concerns  Follow up in 3 months or sooner if needed with Dr Nenita Rivas  Vitamin D deficiency  Continue high dose vitamin D replacement weekly  He will Return in about 3 months (around 5/15/2023) for follow up with Dr Nenita Rivas  Subjective:  Mario Alberto Phillips is a 29 y o  male with epilepsy, autism and Brugada syndrome s/p ICD presenting for follow up, who is returning to Neurology office for follow up of his seizures  He was last seen in the office on 11/15/22  At that time, he was on Depakote  mg BID, recently transition from levetiracetam due to mood side effects   Given possible side effects from Depakote and a level that was elevated, recommended decreasing dose to 250 mg in the morning and 500 mg at night  Felt the behaviors were better on Depakote but that other side effects were worse  Noted if the patient continued to have mood related side effects or if not tolerated, consider transition to lamotrigine but would need EKG prior to and after at goal dose due to history of Brugada syndrome  His vitamin D was low at prior visit, recommended high dose replacement of D2 43782 units weekly and repeat level in several months  Since his last visit, he has not had any clear seizures  Tics are better  He does continue to have shaking since starting depakote  The worst thing that they have seen are the episodes where he acts out  This morning he had and episode and came into the computer room and wanted mom to write a sentence  Goes back out to the kitchen and she could tell he was going to go into one  He stiffened up, got upset, and started making noises and stomped back to mom banging his head on her, squeezing her and throwing her around  It took him a bit longer for her to get him calmed down than usual  She had to physically push him down and hold him down because every time she would let go he would get up and head butt her  Finally got up and went to the kitchen and went to the living room and she heard him lay on the floor  She thought he was crying and he was lying on the floor  She felt that this was like the whole seizure process but he was awake  Like he went into an episode and was postictal  He never typically will lay on the floor for any period of time and had an odd look on his face, blank look/in between look  Felt that it was very odd and for sure she would start seizing  He has seemed to be staring a lot more  Even prior to coming into the room he had his head tilted and staring  She tried to reach over without Lisa Roc seeing to point it out and he saw her move  Episodes seem to becoming too much       If he is staring they are able to get his attention to get him out of it  Sometimes takes more than one attempt  When he starts into behavioral episodes it seems like there is something wrong but he does not know how to verbalize what is wrong  She feels that he is trying to tell her that he needs something but can not  May do this with father as well  Since he has been on the Depakote this seems to have gotten worse  In the past, he would come after them but not try to hurt them or give the impression that something internally is wrong that he can not say  It is unclear if he is in pain  Would get agitated in the past for a clear reason but now it is for no reason  He does sweats a lot when this occurs as well  No change with the decrease in Depakote  Continues to have difficulty sleeping  Last night he was up constantly during the night  Sleep has become more of an issue  He does work at [employer] but due to recent behaviors, he has to have 1:1 supervision and is only able to work 2 days per week  Current seizure medications:  Depakote -500  Other medications as per Epic  Latest Reference Range & Units 10/06/22 16:45 11/12/22 07:58 12/01/22 07:56 01/23/23 09:47   VALPROIC ACID TOTAL 50 - 100 ug/mL 77 98 51 45 (L)       Special Features  Status epilepticus: no  Self Injury Seizures: fall in 3/2021 cause hit to head  Precipitating Factors: none     Epilepsy Risk Factors:  Autism     Prior AEDs:  vimpat - tremors, confusion, difficulty sleeping, worsening mood  Levetiracetam - aggressive behaviors     Prior Evaluation:  EEGs normal (2016, 2020)  CT head 3/2021- no acute findings  MRI brain w wo contrast 3/2/22: IMPRESSION:  No acute intracranial abnormality  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, asymmetrically thinner left fornix, and possibly smaller left mammillary body  A few white matter changes, suggestive of minimal chronic microangiopathy    Severe left sphenoid sinus disease with inspissated material   The study was marked in EPIC for immediate notification      EEG 2/25/2022-    EEG Interpretation: This Routine EEG recorded during wakefulness, drowsiness, and sleep is abnormal   The study captures a 50 second focal electrographic seizure arising from the right mid temporal region without definite clinical correlate other than the possibility of prominent swallowing noted by the tech  Excessive beta activities are likely a medication effect related to benzodiazepine administration       cveeg 2/25-2/:  Day 1 Interpretation: This prolonged, continuous video-EEG recording is abnormal    Diffuse theta frequency slowing and intermittent generalized rhythmic delta activity suggest moderate nonspecific diffuse cerebral dysfunction  Intermittent low amplitude right temporal polymorphic delta slowing suggests underlying focal neuronal dysfunction that may be structural in etiology  2 right frontotemporal spikes in sleep raise the possibility of underlying focal epileptogenic potential    No seizures are present       Day 2 Interpretation: This prolonged, continuous video-EEG recording is abnormal    Diffuse theta frequency slowing and intermittent generalized rhythmic delta activity suggest moderate nonspecific diffuse cerebral dysfunction  Intermittent low amplitude right temporal polymorphic delta slowing suggests underlying focal neuronal dysfunction that may be structural in etiology   One poorly formed right frontotemporal low amplitude sharp wave observed in sleep raises the possibility of underlying focal epileptogenic potential    No seizures are present       Psychiatric History:  autism     Social History:   Driving: No  Lives Alone: No    His history was also obtained from his mother and father, who were present at today's visit  I reviewed prior neurology notes, most recent labs, as documented in [email or web address], and summarized above          Objective:    Blood pressure 120/90, pulse 82, temperature 98 3 °F (36 8 °C), temperature source Temporal, height 6' 1" (1 854 m), weight 90 3 kg (199 lb), SpO2 95 %  Physical Exam  No apparent distress  Appears well nourished    Easily agitated, restless       Neurologic Exam  Mental status- Alert  Single word verbalization  Follows simple commands  Limited attention and understanding of medical situation       Cranial Nerves- blinks to stimuli, EOMS normal, tracks examiner in room, facial muscles symmetric, hearing intact bilaterally to finger rubs, shoulder shrug symmetrical      Motor- Appropriate strength  Moves all extremities freely  No tremor      Sensory-  Intact distally in all extremities to light touch       DTRs- 2+ and symmetric in all extremities      Gait- wide based steady gait     Coordination- high fives without ataxia         ROS:  Review of Systems   Constitutional: Negative  Negative for appetite change and fever  HENT: Negative  Negative for hearing loss, tinnitus, trouble swallowing and voice change  Eyes: Negative  Negative for photophobia, pain and visual disturbance  Respiratory: Negative  Negative for shortness of breath  Cardiovascular: Negative  Negative for palpitations  Gastrointestinal: Negative  Negative for nausea and vomiting  Endocrine: Negative  Negative for cold intolerance  Genitourinary: Negative  Negative for dysuria, frequency and urgency  Musculoskeletal: Negative  Negative for gait problem, myalgias and neck pain  Skin: Negative  Negative for rash  Allergic/Immunologic: Negative  Neurological: Negative  Negative for dizziness, tremors, seizures, syncope, facial asymmetry, speech difficulty, weakness, light-headedness, numbness and headaches  Hematological: Negative  Does not bruise/bleed easily  Psychiatric/Behavioral: Negative  Negative for confusion, hallucinations and sleep disturbance  ROS obtained by MA and reviewed by myself         This note may have been created using voice recognition software  There may be unintentional errors such as grammatical errors, spelling errors, or pronoun errors

## 2023-02-16 NOTE — ASSESSMENT & PLAN NOTE
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 Brugada syndrome (s/p ICD placement)  He is currently on divalproex  mg in the morning and 500 mg at night  There are side effects from divalproex ER, prompting consideration to transition to an alternative medication  His behaviors seem to have worsened on divalproex to the point where he is aggressive towards his parents and has been having issues at work requiring 1:1 and only being able to work 2 days per week  His episodes of staring are unlikely related to seizure, observed in the office and he did stop staring when name was called or he was touched  There was a significant behavioral episode this morning, unclear if this was involved with any seizure activity (see description below) though mother feels that it may have been  Routine EEG during recent hospitalization in February revealed a 50 second focal electrographic seizure arising from the right mid temporal region without definite clinical correlate other than the possibility of prominent swallowing noted by the tech  Continuous video EEG done after, revealed a few right frontotemporal spikes and one poorly formed sharp wave from the same region  Prior EEGs have been normal so the information obtained during hospitalization was helpful for classifying his epilepsy as focal onset  MRI brain during hospitalization with 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, asymmetrically thinner left fornix, and possibly smaller left mammillary body  Noted white matter changes  His neurologic exam at today's visit is at baseline  Since patient has continued to be intolerant of lower dose of Depakote, reviewed alternative options  Due to his history of Brugada Syndrome and possibility of some of the AEDs causing EKG changes, will have EKG done prior to changing medication  Medication options discussed included lamotrigine and zonisamide   While lamotrigine is typically a mood stabilizing medication, it can contribute to EKG changes so this would need to be done with caution  It is also important to note that it would likely take several months to titrate to a good dose prior to being able to wean off of Depakote  Zonisamide was also reviewed  This medication was prescribed in the past but due to possible side effects, patient's mother was concerned and an alternative medication was prescribed instead  We did discuss these side effects today and her concerns and would be agreeable to trial this medication since titration would be quicker  If side effects were to develop, could be transitioned to an alternative medication  Given he has focal epilepsy, there are other options as well including oxcarbazepine or even xcopri if other options are exhausted  Discussed with mother that I would review the plan with Dr Nathen Coe prior to making and changes to ensure that we are all on the same page  Mother will contact the office with any issues or concerns  Follow up in 3 months or sooner if needed with Dr Nathen Coe

## 2023-02-17 DIAGNOSIS — G40.109 LOCALIZATION-RELATED EPILEPSY (HCC): Primary | ICD-10-CM

## 2023-02-17 RX ORDER — ZONISAMIDE 100 MG/1
CAPSULE ORAL
Qty: 90 CAPSULE | Refills: 2 | Status: SHIPPED | OUTPATIENT
Start: 2023-02-17 | End: 2023-03-20 | Stop reason: SDUPTHER

## 2023-02-20 ENCOUNTER — APPOINTMENT (OUTPATIENT)
Dept: LAB | Facility: HOSPITAL | Age: 33
End: 2023-02-20

## 2023-02-20 DIAGNOSIS — G40.109 LOCALIZATION-RELATED EPILEPSY (HCC): ICD-10-CM

## 2023-02-22 LAB
ATRIAL RATE: 83 BPM
P AXIS: 29 DEGREES
PR INTERVAL: 158 MS
QRS AXIS: -21 DEGREES
QRSD INTERVAL: 98 MS
QT INTERVAL: 362 MS
QTC INTERVAL: 425 MS
T WAVE AXIS: 30 DEGREES
VENTRICULAR RATE: 83 BPM

## 2023-03-15 ENCOUNTER — PATIENT MESSAGE (OUTPATIENT)
Dept: NEUROLOGY | Facility: CLINIC | Age: 33
End: 2023-03-15

## 2023-03-15 ENCOUNTER — APPOINTMENT (OUTPATIENT)
Dept: LAB | Facility: HOSPITAL | Age: 33
End: 2023-03-15

## 2023-03-15 DIAGNOSIS — G40.109 LOCALIZATION-RELATED EPILEPSY (HCC): ICD-10-CM

## 2023-03-15 LAB
ALBUMIN SERPL BCP-MCNC: 5 G/DL (ref 3.5–5)
ALP SERPL-CCNC: 76 U/L (ref 34–104)
ALT SERPL W P-5'-P-CCNC: 24 U/L (ref 7–52)
ANION GAP SERPL CALCULATED.3IONS-SCNC: 7 MMOL/L (ref 4–13)
AST SERPL W P-5'-P-CCNC: 25 U/L (ref 13–39)
BASOPHILS # BLD AUTO: 0.02 THOUSANDS/ÂΜL (ref 0–0.1)
BASOPHILS NFR BLD AUTO: 1 % (ref 0–1)
BILIRUB SERPL-MCNC: 0.62 MG/DL (ref 0.2–1)
BUN SERPL-MCNC: 12 MG/DL (ref 5–25)
CALCIUM SERPL-MCNC: 9.8 MG/DL (ref 8.4–10.2)
CHLORIDE SERPL-SCNC: 107 MMOL/L (ref 96–108)
CO2 SERPL-SCNC: 25 MMOL/L (ref 21–32)
CREAT SERPL-MCNC: 0.92 MG/DL (ref 0.6–1.3)
EOSINOPHIL # BLD AUTO: 0.05 THOUSAND/ÂΜL (ref 0–0.61)
EOSINOPHIL NFR BLD AUTO: 1 % (ref 0–6)
ERYTHROCYTE [DISTWIDTH] IN BLOOD BY AUTOMATED COUNT: 12.1 % (ref 11.6–15.1)
GFR SERPL CREATININE-BSD FRML MDRD: 111 ML/MIN/1.73SQ M
GLUCOSE SERPL-MCNC: 90 MG/DL (ref 65–140)
HCT VFR BLD AUTO: 48.6 % (ref 36.5–49.3)
HGB BLD-MCNC: 17.4 G/DL (ref 12–17)
IMM GRANULOCYTES # BLD AUTO: 0.01 THOUSAND/UL (ref 0–0.2)
IMM GRANULOCYTES NFR BLD AUTO: 0 % (ref 0–2)
LYMPHOCYTES # BLD AUTO: 1.51 THOUSANDS/ÂΜL (ref 0.6–4.47)
LYMPHOCYTES NFR BLD AUTO: 41 % (ref 14–44)
MCH RBC QN AUTO: 31 PG (ref 26.8–34.3)
MCHC RBC AUTO-ENTMCNC: 35.8 G/DL (ref 31.4–37.4)
MCV RBC AUTO: 87 FL (ref 82–98)
MONOCYTES # BLD AUTO: 0.33 THOUSAND/ÂΜL (ref 0.17–1.22)
MONOCYTES NFR BLD AUTO: 9 % (ref 4–12)
NEUTROPHILS # BLD AUTO: 1.74 THOUSANDS/ÂΜL (ref 1.85–7.62)
NEUTS SEG NFR BLD AUTO: 48 % (ref 43–75)
NRBC BLD AUTO-RTO: 0 /100 WBCS
PLATELET # BLD AUTO: 193 THOUSANDS/UL (ref 149–390)
PMV BLD AUTO: 9.5 FL (ref 8.9–12.7)
POTASSIUM SERPL-SCNC: 3.9 MMOL/L (ref 3.5–5.3)
PROT SERPL-MCNC: 7.6 G/DL (ref 6.4–8.4)
RBC # BLD AUTO: 5.62 MILLION/UL (ref 3.88–5.62)
SODIUM SERPL-SCNC: 139 MMOL/L (ref 135–147)
WBC # BLD AUTO: 3.66 THOUSAND/UL (ref 4.31–10.16)

## 2023-03-17 LAB — ZONISAMIDE SERPL-MCNC: 12 UG/ML (ref 10–40)

## 2023-03-20 RX ORDER — ZONISAMIDE 100 MG/1
CAPSULE ORAL
Qty: 360 CAPSULE | Refills: 3 | Status: SHIPPED | OUTPATIENT
Start: 2023-03-20 | End: 2023-03-31

## 2023-03-31 ENCOUNTER — TELEPHONE (OUTPATIENT)
Dept: NEUROLOGY | Facility: CLINIC | Age: 33
End: 2023-03-31

## 2023-03-31 DIAGNOSIS — G40.109 LOCALIZATION-RELATED EPILEPSY (HCC): Primary | ICD-10-CM

## 2023-03-31 RX ORDER — ZONISAMIDE 100 MG/1
CAPSULE ORAL
Qty: 270 CAPSULE | Refills: 3 | Status: SHIPPED | OUTPATIENT
Start: 2023-03-31 | End: 2023-09-28 | Stop reason: SDUPTHER

## 2023-03-31 RX ORDER — DIVALPROEX SODIUM 250 MG/1
250 TABLET, EXTENDED RELEASE ORAL DAILY
Qty: 30 TABLET | Refills: 0 | Status: SHIPPED | OUTPATIENT
Start: 2023-03-31 | End: 2023-05-11

## 2023-03-31 NOTE — TELEPHONE ENCOUNTER
received -Hi, my name is Hilary burleson. I'm calling regarding my son Damian burleson [birth year]. I'm calling for janna. We had increased his new medication that starts with the Z at 400. And it doesn't seem like it's, it's doing well. He is up at night. He's getting a little more agitated than he should be. And I was wondering if we could take it back to the 300 once a day. And then also the depakote we are decreasing. However, I was wondering if I could cut the 500 in half because of, were low on the, on the 500 or on the 250. So if you could call me please janna i'd appreciate it or someone. Thank you.  [phone]  --------------------------------------------  See [employer] message from 3/15 for more info.    Called pt's mom hilary.   She is looking to decrease zonisamide back to 300mg.  States that his   Behavior has escalated since increased to 400mg. Getting more agitated and physical.    Currently taking   zonisamide 100mg 4 caps daily  depakote ER 250mg bid-Sunday they are to decrease to 250mg daily    Advised that she can not break depakote in half as it is ER tabs.  She only has 2 tabs left of depakote ER left.  One for tonight and 1 for tomorrow.   Please send script to the pharm for the rest of the taper of depakote and please advise on zonisamide    [phone] to leave detailed message or ok to send [email or web address] message

## 2023-04-05 ENCOUNTER — REMOTE DEVICE CLINIC VISIT (OUTPATIENT)
Dept: CARDIOLOGY CLINIC | Facility: CLINIC | Age: 33
End: 2023-04-05

## 2023-04-05 DIAGNOSIS — Z95.810 PRESENCE OF IMPLANTABLE CARDIOVERTER-DEFIBRILLATOR (ICD): Primary | ICD-10-CM

## 2023-04-05 NOTE — PROGRESS NOTES
MDT VVI ICD - ACTIVE SYSTEM IS MRI CONDITIONAL   CARELINK TRANSMISSION:  BATTERY VOLTAGE ADEQUATE (11 6 YR )    0%   ALL LEAD PARAMETERS WITHIN NORMAL LIMITS   12 SVT-WAVELET EPISODES WITH AVG   V -176 BPM   OPTI-VOL WITHIN NORMAL LIMITS   NORMAL DEVICE FUNCTION   RG

## 2023-04-25 DIAGNOSIS — G40.109 LOCALIZATION-RELATED EPILEPSY (HCC): Primary | ICD-10-CM

## 2023-05-01 DIAGNOSIS — G40.109 LOCALIZATION-RELATED EPILEPSY (HCC): ICD-10-CM

## 2023-05-01 RX ORDER — ERGOCALCIFEROL 1.25 MG/1
CAPSULE ORAL
Qty: 12 CAPSULE | Refills: 1 | Status: SHIPPED | OUTPATIENT
Start: 2023-05-01

## 2023-05-08 ENCOUNTER — APPOINTMENT (OUTPATIENT)
Dept: LAB | Facility: HOSPITAL | Age: 33
End: 2023-05-08

## 2023-05-08 DIAGNOSIS — G40.109 LOCALIZATION-RELATED EPILEPSY (HCC): ICD-10-CM

## 2023-05-10 LAB — ZONISAMIDE SERPL-MCNC: 15.7 UG/ML (ref 10–40)

## 2023-05-11 ENCOUNTER — OFFICE VISIT (OUTPATIENT)
Dept: FAMILY MEDICINE CLINIC | Facility: CLINIC | Age: 33
End: 2023-05-11

## 2023-05-11 VITALS
BODY MASS INDEX: 25.79 KG/M2 | WEIGHT: 190.4 LBS | SYSTOLIC BLOOD PRESSURE: 110 MMHG | HEART RATE: 74 BPM | DIASTOLIC BLOOD PRESSURE: 72 MMHG | OXYGEN SATURATION: 97 % | TEMPERATURE: 97.8 F | HEIGHT: 72 IN

## 2023-05-11 DIAGNOSIS — Z00.00 ANNUAL PHYSICAL EXAM: Primary | ICD-10-CM

## 2023-05-11 DIAGNOSIS — F84.0 ACTIVE AUTISTIC DISORDER: ICD-10-CM

## 2023-05-11 DIAGNOSIS — R46.89 BEHAVIORAL CHANGE: ICD-10-CM

## 2023-05-11 RX ORDER — PYRIDOXINE HCL (VITAMIN B6) 50 MG
50 TABLET ORAL DAILY
COMMUNITY

## 2023-05-11 NOTE — PROGRESS NOTES
[facility] PRIMARY CARE    NAME: Suha Chun  AGE: 29 y o  SEX: male  : 1990     DATE: 2023     Assessment and Plan:   Fermin Wellington was seen today for physical exam     Diagnoses and all orders for this visit:    Annual physical exam    Active autistic disorder    Behavioral change    Patient will be seen in the near future by neurology  Would suggest referral to psychiatry  Problem List Items Addressed This Visit        Other    Active autistic disorder   Other Visit Diagnoses     Annual physical exam    -  Primary    Behavioral change              Immunizations and preventive care screenings were discussed with patient today  Appropriate education was printed on patient's after visit summary  Counseling:  Alcohol/drug use: N/A  Dental Health: discussed importance of regular tooth brushing, flossing, and dental visits  Injury prevention: discussed safety/seat belts, safety helmets, smoke detectors, carbon dioxide detectors  Sexual health: N/A  · Exercise: the importance of regular exercise/physical activity was discussed  Recommend exercise 5 times per week for at least 30 minutes  Return in 1 year (on 2024) for Annual physical      Chief Complaint:     Chief Complaint   Patient presents with   • Physical Exam     Here for yearly physical exam, behaviors have escalated  History of Present Illness:     Adult Annual Physical   Patient here for a comprehensive physical exam  The patient reports problems - outbursts at home and VIA (1:1)  Initially neurology thought could be related to seizure medications but things did not seem to be improving with the Zonegran  Has follow-up visit with neurology on May 16  Diet and Physical Activity  · Diet/Nutrition: well balanced diet  Trying  · Exercise: walking        Depression Screening  PHQ-2/9 Depression Screening    Little interest or pleasure in doing things: 0 - not at all  Feeling down, depressed, or hopeless: 0 - not at all  PHQ-2 Score: 0  PHQ-2 Interpretation: Negative depression screen       General Health  · Sleep: variable  · Hearing: normal - bilateral   · Vision: most recent eye exam <1 year ago  · Dental: regular dental visits          Health  · History of STDs?: no      Review of Systems:     Review of Systems   Past Medical History:     Past Medical History:   Diagnosis Date   • Autism    • Brugada syndrome    • Environmental allergies    • Seizure-like activity ([address] )     last assessed: 7/11/16   • Skin lesion     last assessed: 12/30/13   • Skin rash     last assessed: 3/1/13      Past Surgical History:     Past Surgical History:   Procedure Laterality Date   • ADENOIDECTOMY     • CARDIAC ICD GENERATOR CHANGE  03/29/2021   • DENTAL SURGERY      wisdom teeth   • MYRINGOTOMY W/ TUBES      with ventilating tube insertion; x6      Social History:     Social History     Socioeconomic History   • Marital status: Single     Spouse name: None   • Number of children: None   • Years of education: None   • Highest education level: None   Occupational History   • None   Tobacco Use   • Smoking status: Never   • Smokeless tobacco: Never   Vaping Use   • Vaping Use: Never used   Substance and Sexual Activity   • Alcohol use: Never   • Drug use: Never   • Sexual activity: Not Currently   Other Topics Concern   • None   Social History Narrative   • None     Social Determinants of Health     Financial Resource Strain: Not on file   Food Insecurity: Not on file   Transportation Needs: Not on file   Physical Activity: Not on file   Stress: Not on file   Social Connections: Not on file   Intimate Partner Violence: Not on file   Housing Stability: Not on file      Family History:     Family History   Problem Relation Age of Onset   • No Known Problems Mother    • No Known Problems Father    • Breast cancer Maternal Grandmother    • Cancer Maternal Grandfather         Salivary "gland cancer   • Other Paternal Grandmother         brain tumor   • COPD Paternal Grandmother    • Hypertension Paternal Grandmother    • Cancer Paternal Grandfather       Current Medications:     Current Outpatient Medications   Medication Sig Dispense Refill   • acetaminophen (TYLENOL) 325 mg tablet Take 3 tablets (975 mg total) by mouth every 6 (six) hours as needed for mild pain, headaches or fever  0   • ergocalciferol (VITAMIN D2) 50,000 units TAKE 1 CAPSULE BY MOUTH ONE TIME PER WEEK 12 capsule 1   • vitamin B-12 (CYANOCOBALAMIN) 100 MCG TABS Take 50 mcg by mouth daily     • zonisamide (Zonegran) 100 mg capsule Take three capsules by mouth at night time  270 capsule 3   • citalopram (CeleXA) 10 mg tablet Take 1 tablet (10 mg total) by mouth daily 30 tablet 2     No current facility-administered medications for this visit  Allergies: Allergies   Allergen Reactions   • Cefaclor Hives   • Sulfamethoxazole-Trimethoprim Hives      Physical Exam:     /72   Pulse 74   Temp 97 8 °F (36 6 °C) (Temporal)   Ht 5' 11 75\" (1 822 m)   Wt 86 4 kg (190 lb 6 4 oz)   SpO2 97%   BMI 26 00 kg/m²     Physical Exam  Vitals reviewed  Constitutional:       General: He is not in acute distress  Appearance: He is well-developed and normal weight  HENT:      Head: Normocephalic and atraumatic  Right Ear: Tympanic membrane normal       Left Ear: Tympanic membrane normal       Nose: Nose normal       Mouth/Throat:      Mouth: Mucous membranes are moist    Eyes:      Conjunctiva/sclera: Conjunctivae normal    Cardiovascular:      Rate and Rhythm: Normal rate and regular rhythm  Heart sounds: No murmur heard  Pulmonary:      Effort: Pulmonary effort is normal  No respiratory distress  Breath sounds: Normal breath sounds  Abdominal:      General: Bowel sounds are normal       Palpations: Abdomen is soft  Tenderness: There is no abdominal tenderness     Musculoskeletal:         General: No " swelling  Normal range of motion  Skin:     General: Skin is warm and dry  Capillary Refill: Capillary refill takes less than 2 seconds  Neurological:      Mental Status: He is alert  Mental status is at baseline     Psychiatric:      Comments: Nonverbal   Cooperative          Leighann Perez, DO   [facility] PRIMARY [facility]

## 2023-05-16 ENCOUNTER — TELEPHONE (OUTPATIENT)
Dept: PSYCHIATRY | Facility: CLINIC | Age: 33
End: 2023-05-16

## 2023-05-16 ENCOUNTER — TELEPHONE (OUTPATIENT)
Dept: NEUROLOGY | Facility: CLINIC | Age: 33
End: 2023-05-16

## 2023-05-16 ENCOUNTER — OFFICE VISIT (OUTPATIENT)
Dept: NEUROLOGY | Facility: CLINIC | Age: 33
End: 2023-05-16

## 2023-05-16 VITALS
RESPIRATION RATE: 16 BRPM | OXYGEN SATURATION: 97 % | BODY MASS INDEX: 26.36 KG/M2 | DIASTOLIC BLOOD PRESSURE: 70 MMHG | SYSTOLIC BLOOD PRESSURE: 118 MMHG | TEMPERATURE: 98.2 F | WEIGHT: 193 LBS | HEART RATE: 83 BPM

## 2023-05-16 DIAGNOSIS — F84.0 AUTISTIC DISORDER: Primary | ICD-10-CM

## 2023-05-16 DIAGNOSIS — R46.89 AGGRESSIVE BEHAVIOR: ICD-10-CM

## 2023-05-16 DIAGNOSIS — I49.8 BRUGADA SYNDROME: ICD-10-CM

## 2023-05-16 DIAGNOSIS — G40.109 LOCALIZATION-RELATED EPILEPSY (HCC): ICD-10-CM

## 2023-05-16 RX ORDER — CITALOPRAM 10 MG/1
10 TABLET ORAL DAILY
Qty: 30 TABLET | Refills: 2 | Status: SHIPPED | OUTPATIENT
Start: 2023-05-16 | End: 2023-05-22

## 2023-05-16 NOTE — TELEPHONE ENCOUNTER
"Behavioral Health Outpatient Intake Questions    Referred By   : Neuro    Please advise interviewee that they need to answer all questions truthfully to allow for best care, and any misrepresentations of information may affect their ability to be seen at this clinic   => Was this discussed? Yes     If Minor Child (under age 25)    Who is/are the legal guardian(s) of the child? Is there a custody agreement? No     • If \"YES\"- Custody orders must be obtained prior to scheduling the first appointment  • In addition, Consent to Treatment must be signed by all legal guardians prior to scheduling the first appointment    • If \"NO\"- Consent to Treatment must be signed by all legal guardians prior to scheduling the first appointment    Behavioral Health Outpatient Intake History -     Presenting Problem (in patient's own words): Autistic  Behavior issues an aggression  Med mgmt    Are there any communication barriers for this patient? Yes                                               If yes, please describe barriers: autism/communication disorder  • If there is a unique situation, please refer to [address] [place] [address] for final determination  Are you taking any psychiatric medications? No   •   If \"YES\" -What are they   •   If \"YES\" -Who prescribes? Has the Patient previously received outpatient Talk Therapy or Medication Management from [facility]  No     •    If \"YES\"- When, Where and with Whom? •    If \"NO\" -Has Patient received these services elsewhere? •   If \"YES\" -When, Where, and with Whom? Has the Patient abused alcohol or other substances in the last 6 months ? No  No concerns of substance abuse are reported  •  If \"YES\" -What substance, How much, How often? •  If illegal substance: Refer to [facility] [employer] [facility] (for [facility]) or [facility]    •  If Alcohol in excess of 10 drinks per week:  Refer to [employer] (for [facility]) or [address] [place] [address] History-     Is this treatment " "court ordered? No   If \"yes \"send to :  • Talk Therapy : Send to [facility] for final determination   • Med Management: Send to Dr Norma Briscoe for final determination     Has the Patient been convicted of a felony? No   If \"Yes\" send to -When, What? • Talk Therapy : Send to [facility] for final determination   • Med Management: Send to Dr Norma Briscoe for final determination     ACCEPTED as a patient Yes  • If \"Yes\" Appointment Date: 5/22 with Jessica    Referred Elsewhere? No  • If “Yes” - (Where? Ex: [address], [address] [facility], etc )       Name of [address] [place], [place] [address]on [facility] WD#[id number]     Insurance Phone #  If ins is primary or secondary? If patient is a minor, parents information such as Name, D  O B of guarantor    "

## 2023-05-16 NOTE — Clinical Note
Dr Brooklyn Goode, Are you okay with Beatriz Carey starting citalopram? My hope is to start it now while trying to get him in with psychiatry for episodes of aggressive behavior   Abbe Araiza

## 2023-05-16 NOTE — PATIENT INSTRUCTIONS
Continue zonisamide 300 mg nightly  Start citalopram 10 mg daily (after checking with cardiology)  Schedule visit with psychiatry  Return in about 3 months (AP)

## 2023-05-17 NOTE — TELEPHONE ENCOUNTER
----- Message from Alan Mccurdy MD sent at 5/16/2023  9:16 PM EDT -----  Please let Damian's mother know cardiology is ok with him starting citalopram  Thanks    ----- Message -----  From: Cornelia Hodgkin, DO  Sent: 5/16/2023   5:45 PM EDT  To: Alan Mccurdy MD    He can be placed on that no problem  Thanks  barbara  ----- Message -----  From: Alan Mccurdy MD  Sent: 5/16/2023   2:00 PM EDT  To: Cornelia Hodgkin, DO Dr Glendon Balsam, Are you okay with Kate Johnson starting citalopram? My hope is to start it now while trying to get him in with psychiatry for episodes of aggressive behavior   Thanks Jose G

## 2023-05-22 ENCOUNTER — OFFICE VISIT (OUTPATIENT)
Dept: PSYCHIATRY | Facility: CLINIC | Age: 33
End: 2023-05-22

## 2023-05-22 DIAGNOSIS — F84.0 AUTISTIC DISORDER: Primary | ICD-10-CM

## 2023-05-22 DIAGNOSIS — F84.0 AUTISTIC DISORDER: ICD-10-CM

## 2023-05-22 RX ORDER — ESCITALOPRAM OXALATE 10 MG/1
TABLET ORAL
Qty: 30 TABLET | Refills: 1 | Status: SHIPPED | OUTPATIENT
Start: 2023-05-22

## 2023-05-22 RX ORDER — ESCITALOPRAM OXALATE 5 MG/1
10 TABLET ORAL DAILY
Qty: 30 TABLET | Refills: 1 | Status: SHIPPED | OUTPATIENT
Start: 2023-05-22 | End: 2023-05-22

## 2023-05-22 NOTE — PSYCH
Psychiatric Evaluation - Behavioral Health     Identification Data:Damian [place] 29 y o  male MRN: [id number]      Chief Complaint:   Angry outbursts and impulsivity  History of present illness:    Patient is a 29years old  male with history of autism which was first diagnosed at age 5  The patient recently had a seizure and was evaluated by neurology and started on Zonegran  His mother has noticed that the patient is more irritable and becomes physically forceful and aggressive and although he has not struck anyone, but he grabs and head butts parents on their shoulders and sometimes manhandles them  There has been no significant change in his sleep pattern or appetite  Neurologist started him on citalopram but it was not started  His mother wanted to discuss that with the psychiatrist     Psychiatric Review Of Systems:  Change in sleep: no  Appetite changes: no  Weight changes: no  Change in energy/anergy: no  Change in interest/pleasure/anhedonia: no  Somatic symptoms: no  Anxiety/panic: no  Manic symptoms: no  Guilt feelings:no  Hopeless: no  Self injurious behavior/risky behavior: yes    Historical Information     Past Psychiatric History:     No prior history of psychiatric treatment  Since childhood he was in behavioral therapy through [employer]  No evidence of a mood disorder prior to this  According to his mother he had some hoarding behavior since childhood  substance Abuse History:    None  Family Psychiatric History:     History of mood disorder on father's side  Social History:  Developmental:Autistic since age 5  Only child    Education: special Ed due to autism  Marital history: single  Living arrangement, social support: parents  Occupational History: unemployed  Access to firearms:    Traumatic History:   Abuse:none is reported  Other Traumatic Events: None    Past Medical History:   Diagnosis Date   • Autism    • Brugada syndrome    • Environmental allergies    • Seizure-like activity ([facility] [address] )     last assessed: 7/11/16   • Skin lesion     last assessed: 12/30/13   • Skin rash     last assessed: 3/1/13       Medical Review Of Systems:  Pertinent items are noted in HPI  Meds/Allergies   all current active meds have been reviewed  Allergies   Allergen Reactions   • Cefaclor Hives   • Sulfamethoxazole-Trimethoprim Hives     Objective      Mental Status Evaluation:  Appearance:  {Adequate hygiene and grooming and Poor eye contact   Behavior:  restless and fidgety   Speech:   Language Unable to assess due to patient factors  Unable to assess due to patient factors   Mood:  Irritable   Affect: Thought process blunted and constricted  Unable to assess due to scant verbalization   Associations: Unable to assess due to scant verbalization   Thought Content:  Cannot be assessed due to patient factors   Perceptual Disturbances: Cannot be assessed due to patient factors   Risk Potential: Can be aggressive   Orientation  Unable to assess due to patient factors   Memory Not tested   Attention/Concentration attention span appeared shorter than expected for age   Fund of knowledge Not assessed   Insight:  No insight   Judgment: Unable to assess due to patient factors   Gait/Station: normal gait/station and normal balance   Motor Activity: No abnormal movement noted        Diagnosis ICD-10-CM Associated Orders   1  Autistic disorder  F84 0 Ambulatory Referral to Psychiatry                 Plan: Lexapro 5 mg daily  His mother will call next week with an update  I will see him in 4 weeks  Risks, benefits and possible side effects of Medications:   Patient does not verbalize understanding at this time and will require further explanation  TREATMENT PLAN (Medication Management Only)        Krishna ELAM    Name/Date of Birth/MRN:  Mahesh Sands 32 y o  1990 MRN: [id number]  Date of Treatment Plan: May 22, 2023  Diagnosis/Diagnoses:   1   Autistic disorder      Strengths/Personal Resources for Self-Care: supportive family, family ties, self-reliance  Area/Areas of need (in own words): mood instability, anger control, behavioral problems  1  Long Term Goal:   control anger control, control impulse control  Target Date: 2 months - 7/22/2023  Person/Persons responsible for completion of goal: family  2  Short Term Objective (s) - How will we reach this goal?:   A  Provider new recommended medication/dosage changes and/or continue medication(s): continue current medications as prescribed (Lexapro)  B   N/A   C   N/A  Target Date: 4 weeks - 6/19/2023  Person/Persons Responsible for Completion of Goal: Damian   Progress Towards Goals: starting treatment  Treatment Modality: medication management every 4 weeks  Review due 180 days from date of this plan: 6 months - 11/22/2023  Expected length of service: ongoing treatment unless revised  My Physician/PA/NP and I have developed this plan together and I agree to work on the goals and objectives  I understand the treatment goals that were developed for my treatment    Electronic Signatures: on file (unless signed below)    Reema Landon MD 05/22/23

## 2023-05-31 ENCOUNTER — TELEPHONE (OUTPATIENT)
Dept: PSYCHIATRY | Facility: CLINIC | Age: 33
End: 2023-05-31

## 2023-05-31 NOTE — TELEPHONE ENCOUNTER
Spoke with the patient's mother  She reported that the patient is doing well and is tolerating the medication Lexapro 5 mg daily  No significant behavioral issues  Sleep and appetite are adequate

## 2023-05-31 NOTE — TELEPHONE ENCOUNTER
Ag Garcia and/or Parent  Elvieluna Garza requested a call back to discuss update on medication that patient is taking  They can be reached at P# [phone]       Thank you

## 2023-06-12 ENCOUNTER — TELEPHONE (OUTPATIENT)
Dept: PSYCHIATRY | Facility: CLINIC | Age: 33
End: 2023-06-12

## 2023-06-12 NOTE — TELEPHONE ENCOUNTER
Patient is calling regarding cancelling an appointment      Date/Time: 23 at 9:30a    Reason: patient attending a     Patient was rescheduled: YES [x] NO []  If yes, when was Patient reschedule for: 23 at 11:30a    Patient requesting call back to reschedule: YES [] NO [x]

## 2023-06-19 ENCOUNTER — OFFICE VISIT (OUTPATIENT)
Dept: PSYCHIATRY | Facility: CLINIC | Age: 33
End: 2023-06-19
Payer: COMMERCIAL

## 2023-06-19 PROCEDURE — 99213 OFFICE O/P EST LOW 20 MIN: CPT | Performed by: PSYCHIATRY & NEUROLOGY

## 2023-06-19 NOTE — PSYCH
Rebecca Dacosta 1990 [id number]     The patient was seen for continuing care and pharmacotherapy  He was accompanied by his mother  He appeared to be calmer and was able to tolerate the visit without being disruptive  His mother also notes that Liz Garcia has better frustration tolerance and is more redirectable  No particular issues with Lexapro  Sleep and appetite are adequate        ROS:    Current Mental Status Evaluation:  Appearance:  Adequate hygiene and grooming and Poor eye contact   Behavior:  calm   Mood:  Uncertain   Affect: blunted   Speech: Disarticulate and Dysarthric   Thought Process:  Unable to assess due to scant verbalization   Thought Content:  Cannot be assessed due to patient factors   Perceptual Disturbances: Cannot be assessed due to patient factors   Risk Potential: Unable to assess due to patient factors   Orientation:        No diagnosis found  Current Outpatient Medications   Medication Instructions   • acetaminophen (TYLENOL) 975 mg, Oral, Every 6 hours PRN   • ergocalciferol (VITAMIN D2) 50,000 units TAKE 1 CAPSULE BY MOUTH ONE TIME PER WEEK   • escitalopram (LEXAPRO) 10 mg tablet 0 5 TABLET DAILY FOR ONE WEEK THEN INCREASE TO ONE TABLET DAILY   • vitamin B-12 (CYANOCOBALAMIN) 50 mcg, Oral, Daily   • zonisamide (Zonegran) 100 mg capsule Take three capsules by mouth at night time  Treatment Recommendations- Risks Benefits    We will continue with Lexapro 10 mg daily  Follow-up appointment in 1 month  Risks, Benefits And Possible Side Effects Of Medications: The risks and benefits of medications and proposed treatment plan were discussed with the patient's caregiver/POA/guardian  The individual understands and agrees    VIRTUAL VISIT DISCLAIMER    Rebecca Dacosta verbally agrees to participate in [facility]   Pt is aware that [facility] could be limited without vital signs or the ability to perform a full hands-on physical exam  Liz Oddpaulo Saul Zhou understands he or the provider may request at any time to terminate the video visit and request the patient to seek care or treatment in person       Leatha Kim MD 06/19/23

## 2023-07-05 ENCOUNTER — REMOTE DEVICE CLINIC VISIT (OUTPATIENT)
Dept: CARDIOLOGY CLINIC | Facility: CLINIC | Age: 33
End: 2023-07-05
Payer: COMMERCIAL

## 2023-07-05 DIAGNOSIS — Z95.810 PRESENCE OF IMPLANTABLE CARDIOVERTER-DEFIBRILLATOR (ICD): Primary | ICD-10-CM

## 2023-07-05 PROCEDURE — 93295 DEV INTERROG REMOTE 1/2/MLT: CPT | Performed by: INTERNAL MEDICINE

## 2023-07-05 PROCEDURE — 93296 REM INTERROG EVL PM/IDS: CPT | Performed by: INTERNAL MEDICINE

## 2023-07-05 NOTE — PROGRESS NOTES
MDT SINGLE CHAMBER ICD - ACTIVE SYSTEM IS MRI CONDITIONAL   CARELINK TRANSMISSION:  BATTERY VOLTAGE ADEQUATE (11.2 YR.).   0%.  ALL LEAD PARAMETERS WITHIN NORMAL LIMITS.  22 SVT-WAVELET EPISODES WITH AVG.  V -182 BPM.  OPTI-VOL WITHIN NORMAL LIMITS.  NORMAL DEVICE FUNCTION.  RG

## 2023-07-06 ENCOUNTER — TELEPHONE (OUTPATIENT)
Dept: PSYCHIATRY | Facility: CLINIC | Age: 33
End: 2023-07-06

## 2023-07-06 NOTE — TELEPHONE ENCOUNTER
Cosme Najera and/or Pts Mother Jerrlyn Bosworth requested a call back to discuss the pts medicine. She stated hes going back to how he was before starting the new medication. Hes aggressive, violent and his OCD is extreme right now. She said he seems worse then he was before. They can be reached at P# [phone]      Thank you.

## 2023-07-10 DIAGNOSIS — F84.0 AUTISTIC DISORDER: ICD-10-CM

## 2023-07-10 RX ORDER — ESCITALOPRAM OXALATE 10 MG/1
TABLET ORAL
Qty: 45 TABLET | Refills: 1 | Status: SHIPPED | OUTPATIENT
Start: 2023-07-10

## 2023-07-10 NOTE — TELEPHONE ENCOUNTER
Medication Refill Request     Name of Medication Lexapro  Dose/Frequency 10 mg  Quantity 30  Verified pharmacy   [x]  Verified ordering Provider   [x]  Does patient have enough for the next 3 days? Yes [x] No []  Does patient have a follow-up appointment scheduled?  Yes [x] No []   If so when is appointment: 7/31/23

## 2023-07-27 ENCOUNTER — TELEPHONE (OUTPATIENT)
Dept: PSYCHIATRY | Facility: CLINIC | Age: 33
End: 2023-07-27

## 2023-07-27 NOTE — TELEPHONE ENCOUNTER
Mother called to reschedule 7/3112pm due to mother having to work. Reschedule for next available appt 10/10 10am in office and placed on cancellation list for earlier appt.

## 2023-08-23 ENCOUNTER — TELEPHONE (OUTPATIENT)
Dept: FAMILY MEDICINE CLINIC | Facility: CLINIC | Age: 33
End: 2023-08-23

## 2023-08-23 NOTE — TELEPHONE ENCOUNTER
Patient's mother is requesting a letter stating that Amber Archuleta can not attend [employer] duty due to medical conditions. Mother stated that he is autistic and he gets seizures, he also has brugada. Patient's mom would liked to be called at [phone]  As soon as letter is ready for .

## 2023-09-03 ENCOUNTER — OFFICE VISIT (OUTPATIENT)
Dept: URGENT CARE | Age: 33
End: 2023-09-03
Payer: COMMERCIAL

## 2023-09-03 VITALS
OXYGEN SATURATION: 97 % | WEIGHT: 190 LBS | TEMPERATURE: 97.8 F | HEART RATE: 83 BPM | RESPIRATION RATE: 20 BRPM | BODY MASS INDEX: 25.95 KG/M2

## 2023-09-03 DIAGNOSIS — H66.92 ACUTE OTITIS MEDIA OF LEFT EAR WITH PERFORATION: Primary | ICD-10-CM

## 2023-09-03 DIAGNOSIS — H72.92 ACUTE OTITIS MEDIA OF LEFT EAR WITH PERFORATION: Primary | ICD-10-CM

## 2023-09-03 PROCEDURE — 99213 OFFICE O/P EST LOW 20 MIN: CPT

## 2023-09-03 RX ORDER — OFLOXACIN 3 MG/ML
10 SOLUTION AURICULAR (OTIC) DAILY
Qty: 3.5 ML | Refills: 0 | Status: SHIPPED | OUTPATIENT
Start: 2023-09-03 | End: 2023-09-10

## 2023-09-03 RX ORDER — AZITHROMYCIN 250 MG/1
TABLET, FILM COATED ORAL
Qty: 6 TABLET | Refills: 0 | Status: SHIPPED | OUTPATIENT
Start: 2023-09-03 | End: 2023-09-07

## 2023-09-03 NOTE — PATIENT INSTRUCTIONS
Take antibiotic as prescribed. Recommend probiotic use while taking antibiotic. Use antibiotic ear drops as directed. Acetaminophen for pain. Follow-up with ENT. Follow-up with PCP in 3-5 days. Go to ER if symptoms worsen.

## 2023-09-03 NOTE — PROGRESS NOTES
[facility]        NAME: Lavon Chavez is a 29 y.o. male  : 1990    MRN: [id number]  DATE: September 3, 2023  TIME: 7:54 PM      Assessment and Plan     Acute otitis media of left ear with perforation [H66.92, H72.92]  1. Acute otitis media of left ear with perforation  azithromycin (ZITHROMAX) 250 mg tablet    ofloxacin (FLOXIN) 0.3 % otic solution            Patient Instructions     Take antibiotic as prescribed. Recommend probiotic use while taking antibiotic. Use antibiotic ear drops as directed. Acetaminophen for pain. Follow-up with ENT. Follow-up with PCP in 3-5 days. Go to ER if symptoms worsen. Chief Complaint     Chief Complaint   Patient presents with   • Earache     Left ear drainage began yesterday          History of Present Illness     Patient is a 79-year-old male who presents with mother at bedside. History obtained from mother due to autism baseline. Reports drainage for one day. Reports he has been irritable. Reports he does see Dr. Tessa Chance from ENT. Denies fever. Denies vomiting. Review of Systems     Review of Systems   Constitutional: Negative for fever. HENT: Positive for ear discharge and ear pain. Gastrointestinal: Negative for vomiting. All other systems reviewed and are negative. Current Medications       Current Outpatient Medications:   •  azithromycin (ZITHROMAX) 250 mg tablet, Take 2 tablets today then 1 tablet daily x 4 days, Disp: 6 tablet, Rfl: 0  •  ergocalciferol (VITAMIN D2) 50,000 units, TAKE 1 CAPSULE BY MOUTH ONE TIME PER WEEK, Disp: 12 capsule, Rfl: 1  •  escitalopram (LEXAPRO) 10 mg tablet, 1.5 tablets daily, Disp: 45 tablet, Rfl: 1  •  ofloxacin (FLOXIN) 0.3 % otic solution, Administer 10 drops into the left ear daily for 7 days, Disp: 3.5 mL, Rfl: 0  •  vitamin B-12 (CYANOCOBALAMIN) 100 MCG TABS, Take 50 mcg by mouth daily, Disp: , Rfl:   •  zonisamide (Zonegran) 100 mg capsule, Take three capsules by mouth at night time. , Disp: 270 capsule, Rfl: 3  •  acetaminophen (TYLENOL) 325 mg tablet, Take 3 tablets (975 mg total) by mouth every 6 (six) hours as needed for mild pain, headaches or fever (Patient not taking: Reported on 9/3/2023), Disp: , Rfl: 0    Current Allergies     Allergies as of 09/03/2023 - Reviewed 09/03/2023   Allergen Reaction Noted   • Cefaclor Hives 01/24/2013   • Sulfamethoxazole-trimethoprim Hives 01/24/2013              The following portions of the patient's history were reviewed and updated as appropriate: allergies, current medications, past family history, past medical history, past social history, past surgical history and problem list.     Past Medical History:   Diagnosis Date   • Autism    • Brugada syndrome    • Environmental allergies    • Seizure-like activity ([address])     last assessed: 7/11/16   • Skin lesion     last assessed: 12/30/13   • Skin rash     last assessed: 3/1/13       Past Surgical History:   Procedure Laterality Date   • ADENOIDECTOMY     • CARDIAC ICD GENERATOR CHANGE  03/29/2021   • DENTAL SURGERY      wisdom teeth   • MYRINGOTOMY W/ TUBES      with ventilating tube insertion; x6       Family History   Problem Relation Age of Onset   • No Known Problems Mother    • No Known Problems Father    • Breast cancer Maternal Grandmother    • Cancer Maternal Grandfather         Salivary gland cancer   • Other Paternal Grandmother         brain tumor   • COPD Paternal Grandmother    • Hypertension Paternal Grandmother    • Cancer Paternal Grandfather          Medications have been verified. Objective     Pulse 83   Temp 97.8 °F (36.6 °C)   Resp 20   Wt 86.2 kg (190 lb)   SpO2 97%   BMI 25.95 kg/m²   No LMP for male patient. Physical Exam     Physical Exam  Vitals and nursing note reviewed. Constitutional:       General: He is not in acute distress. Appearance: Normal appearance. He is not ill-appearing, toxic-appearing or diaphoretic.    HENT:      Left Ear: Ear canal and external ear normal. Drainage and tenderness present. Tympanic membrane is injected, perforated and erythematous. Cardiovascular:      Rate and Rhythm: Normal rate. Pulses: Normal pulses. Heart sounds: Normal heart sounds. Skin:     Capillary Refill: Capillary refill takes less than 2 seconds. Neurological:      Mental Status: He is alert. Psychiatric:         Mood and Affect: Mood normal.         Behavior: Behavior normal.         Thought Content:  Thought content normal.         Judgment: Judgment normal.

## 2023-09-05 ENCOUNTER — TELEPHONE (OUTPATIENT)
Dept: PSYCHIATRY | Facility: CLINIC | Age: 33
End: 2023-09-05

## 2023-09-05 NOTE — TELEPHONE ENCOUNTER
Patient's mother is calling regarding cancelling an appointment. Date/Time: 10/10/23 at 10:00a    Reason: Patient needs a sooner appt.     Patient was rescheduled: YES [x] NO []  If yes, when was Patient reschedule for: 10/3/23 at 10:30a    Patient requesting call back to reschedule: YES [] NO [x]

## 2023-09-08 DIAGNOSIS — F84.0 AUTISTIC DISORDER: ICD-10-CM

## 2023-09-11 DIAGNOSIS — F84.0 AUTISTIC DISORDER: ICD-10-CM

## 2023-09-11 RX ORDER — ESCITALOPRAM OXALATE 10 MG/1
TABLET ORAL
Qty: 45 TABLET | Refills: 1 | Status: SHIPPED | OUTPATIENT
Start: 2023-09-11

## 2023-09-11 NOTE — TELEPHONE ENCOUNTER
Medication Refill Request     Name of Medication Lexapro   Dose/Frequency 10 mg/ 1.5 daily  Quantity 45  Verified pharmacy   [x]  Verified ordering Provider   [x]  Does patient have enough for the next 3 days? Yes [] No [x]  Does patient have a follow-up appointment scheduled?  Yes [x] No []   If so when is appointment: 10/3/2023

## 2023-09-28 ENCOUNTER — OFFICE VISIT (OUTPATIENT)
Dept: NEUROLOGY | Facility: CLINIC | Age: 33
End: 2023-09-28
Payer: COMMERCIAL

## 2023-09-28 VITALS
BODY MASS INDEX: 26.73 KG/M2 | TEMPERATURE: 98.1 F | OXYGEN SATURATION: 98 % | SYSTOLIC BLOOD PRESSURE: 118 MMHG | HEIGHT: 71 IN | HEART RATE: 90 BPM | DIASTOLIC BLOOD PRESSURE: 72 MMHG | WEIGHT: 190.9 LBS

## 2023-09-28 DIAGNOSIS — R46.89 AGGRESSIVE BEHAVIOR: Primary | ICD-10-CM

## 2023-09-28 DIAGNOSIS — G40.109 LOCALIZATION-RELATED EPILEPSY (HCC): ICD-10-CM

## 2023-09-28 PROCEDURE — 99215 OFFICE O/P EST HI 40 MIN: CPT | Performed by: NURSE PRACTITIONER

## 2023-09-28 RX ORDER — PYRIDOXINE HCL (VITAMIN B6) 100 MG
100 TABLET ORAL DAILY
COMMUNITY

## 2023-09-28 RX ORDER — ZONISAMIDE 100 MG/1
CAPSULE ORAL
Qty: 270 CAPSULE | Refills: 3 | Status: SHIPPED | OUTPATIENT
Start: 2023-09-28

## 2023-09-28 NOTE — PROGRESS NOTES
Patient ID: Miky Rogers is a 29 y.o. male with epilepsy in the setting of autism and Brugada syndrome (s/p ICD placement), who is returning to Neurology office for follow up of his seizures. 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 He is currently on zonisamide 300 mg HS without recurrence of seizures. There are issues with agitation and aggression, unclear if this medication has an impact on this. Divalproex was also trialed prior to this with concern that this may have contributed to aggressive behaviors as well. He is currently following with psychiatry for management of behaviors. He does continue to have episodes of staring. While some of them seem to be prolonged up to 1-2 minutes, he does respond if his name is called which makes the staring less concerning for seizures. Discussed with mother that if there is any change to this or if the staring becomes more concerning she will reach out. Could consider ambulatory EEG if needed although this may be difficult for patient to tolerate. Routine EEG during recent hospitalization in February revealed a 50 second focal electrographic seizure arising from the right mid temporal region without definite clinical correlate other than the possibility of prominent swallowing noted by the tech. Continuous video EEG done after, revealed a few right frontotemporal spikes and one poorly formed sharp wave from the same region. Prior EEGs have been normal. MRI brain during hospitalization with 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, asymmetrically thinner left fornix, and possibly smaller left mammillary body. Noted white matter changes. His neurologic exam at today's visit is at baseline. Recommend continuing current dose of zonisamide unchanged.  If there are breakthrough seizures, his dose of zonisamide could certainly be increased. Family will call the office with seizures, issues or concerns. Follow up in 3 months or sooner if needed with Dr Shari May. Aggressive behavior  Following with psychiatry. Currently on escitalopram. Has follow up tomorrow. Mother reports continued intermittent aggressive behaviors. Reviewed discussing the possibility of an as needed medication as he does become violent at times with his parents and this is a safety concern for his parents. Would avoid haldol but could consider lorazepam or clonazepam if psychiatry deems this is appropriate. Mother does report a behavioral specialist will be coming to the home. He will Return in about 3 months (around 12/28/2023) for Follow up with Dr Shari May. Subjective:  Milo Nava is a 29 y.o. male with epilepsy in the setting of autism and Brugada syndrome (s/p ICD placement), who is returning to Neurology office for follow up of his seizures. He was last seen in the office by Dr. Shari May on 5/16/2023. At that time it was noted that his epilepsy manifested as bilateral tonic-clonic seizures as well as right temporal electrographic seizure captured on EEG which was asymptomatic or clinically subtle (swallowing sounds). Seizures were controlled at that time on zonisamide monotherapy of 300 mg at night. The primary concern at that visit was intermittent, aggressive and sometimes violent behaviors. Seizures were controlled at that time on zonisamide monotherapy of 300 mg at night. The primary concern at that visit was intermittent, aggressive and sometimes violent behaviors. , limited his opportunities to participate in work program and they were not able to find outside help to work with him at home because of these behaviors. Noted risk to parents as well. He was given an ASAP referral to psychiatry at that visit.   Citalopram was also started while waiting to get in with behavioral health noted some risk of prolonged QT with escitalopram but this is not a common problem, however given his history recommended confirming with cardiology that it was okay to start this medication. He was to continue with his zonisamide unchanged and follow-up in 3 months. He has been following with psychiatry since his last visit, currently on escitalopram 15 mg daily. Next visit with psychiatry is tomorrow. Behaviors have continued to be an issue with the gabby. HE continues to have violent outbursts. He does head butt and gets very aggressive. They hare having a behavioral specialist come to the house. Since his last visit, he has not had any seizures. Continues on zonisamide 300 mg HS. They have been noticing more staring. Sometimes he stares for 1-2 minutes. He does still come out of it if she says something to him. Able to work about 2 days per week now. They are working on getting him into a program so he can be more busy. Needs 1:1 due to recent behaviors. Current seizure medications:  - zonisamide 300 mg HS  Other medications as per [facility]. Event/Seizure semiology:  Generalized tonic clonic seizure   Subclinical seizure - frequent swallowing noted on EEG     Special Features  Status epilepticus: no  Self Injury Seizures: fall in 3/2021 cause hit to head  Precipitating Factors: none     Epilepsy Risk Factors:  Autism     Prior AEDs:  vimpat - tremors, confusion, difficulty sleeping, worsening mood  Levetiracetam - aggressive behaviors  Divalproex-concerned it contributed to aggressive behaviors, but behaviors did not improve when transitioned to zonisamide     Prior Evaluation:  CT head 3/2021- no acute findings     MRI brain w wo contrast 3/2/22: IMPRESSION:  No acute intracranial abnormality. 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, asymmetrically thinner left fornix, and possibly smaller left mammillary body.    A few white matter changes, suggestive of minimal chronic microangiopathy. Severe left sphenoid sinus disease with inspissated material.  The study was marked in EPIC for immediate notification.     EEGs normal in  2016 and 2020     REEG 2/25/2022: The study captures a 48 second focal electrographic seizure arising from the right mid temporal region without definite clinical correlate other than the possibility of prominent swallowing noted by the tech.    cveeg 2/25-2/:  Day 1 Interpretation: This prolonged, continuous video-EEG recording is abnormal.   Diffuse theta frequency slowing and intermittent generalized rhythmic delta activity suggest moderate nonspecific diffuse cerebral dysfunction. Intermittent low amplitude right temporal polymorphic delta slowing suggests underlying focal neuronal dysfunction that may be structural in etiology. 2 right frontotemporal spikes in sleep raise the possibility of underlying focal epileptogenic potential.   No seizures are present.    Day 2 Interpretation: This prolonged, continuous video-EEG recording is abnormal.   Diffuse theta frequency slowing and intermittent generalized rhythmic delta activity suggest moderate nonspecific diffuse cerebral dysfunction. Intermittent low amplitude right temporal polymorphic delta slowing suggests underlying focal neuronal dysfunction that may be structural in etiology.  One poorly formed right frontotemporal low amplitude sharp wave observed in sleep raises the possibility of underlying focal epileptogenic potential.   No seizures are present.      Psychiatric History:  autism     Social History:   Driving: No  Lives Alone: No. Lives with parents. His history was also obtained from his mother, who was present at today's visit. I reviewed prior neurology notes, most recent labs, as documented in [email or web address], and summarized above.         Objective:    Blood pressure 118/72, pulse 90, temperature 98.1 °F (36.7 °C), temperature source Temporal, height 5' 11" (1.803 m), weight 86.6 kg (190 lb 14.4 oz), SpO2 98 %. Physical Exam  No apparent distress. Appears well nourished.   Easily agitated, restless.      Neurologic Exam  Mental status- Alert. Single word verbalization. Follows simple commands. Limited attention and understanding of medical situation.      Cranial Nerves- PERRL, blinks to stimuli, EOMS normal, tracks examiner in room, facial muscles symmetric, hearing intact bilaterally to finger rubs, shoulder shrug symmetrical.     Motor- Appropriate strength. Moves all extremities freely. No lateralizing weakness. No tremor.     Sensory-  Intact distally in all extremities to light touch.      DTRs- 2+ and symmetric in all extremities.     Gait- wide based steady gait     Coordination- high fives bilaterally without ataxia.      ROS:  Review of Systems   Constitutional: Negative for appetite change, fatigue and fever. HENT: Negative. Negative for hearing loss, tinnitus, trouble swallowing and voice change. Eyes: Negative. Negative for photophobia, pain and visual disturbance. Respiratory: Negative. Negative for shortness of breath. Cardiovascular: Negative. Negative for palpitations. Gastrointestinal: Negative. Negative for nausea and vomiting. Endocrine: Negative. Negative for cold intolerance. Genitourinary: Negative. Negative for dysuria, frequency and urgency. Musculoskeletal: Negative for back pain, gait problem, myalgias and neck pain. Skin: Negative. Negative for rash. Allergic/Immunologic: Negative. Neurological: Negative. Negative for dizziness, tremors, seizures, syncope, facial asymmetry, speech difficulty, weakness, light-headedness, numbness and headaches. Hematological: Negative. Does not bruise/bleed easily. Psychiatric/Behavioral: Negative. Negative for confusion, hallucinations and sleep disturbance. All other systems reviewed and are negative. ROS obtained by MA and reviewed by myself.      This note may have been created using voice recognition software. There may be unintentional errors such as grammatical errors, spelling errors, or pronoun errors.

## 2023-09-28 NOTE — ASSESSMENT & PLAN NOTE
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 He is currently on zonisamide 300 mg HS without recurrence of seizures. There are issues with agitation and aggression, unclear if this medication has an impact on this. Divalproex was also trialed prior to this with concern that this may have contributed to aggressive behaviors as well. He is currently following with psychiatry for management of behaviors. He does continue to have episodes of staring. While some of them seem to be prolonged up to 1-2 minutes, he does respond if his name is called which makes the staring less concerning for seizures. Discussed with mother that if there is any change to this or if the staring becomes more concerning she will reach out. Could consider ambulatory EEG if needed although this may be difficult for patient to tolerate. Routine EEG during recent hospitalization in February revealed a 50 second focal electrographic seizure arising from the right mid temporal region without definite clinical correlate other than the possibility of prominent swallowing noted by the tech. Continuous video EEG done after, revealed a few right frontotemporal spikes and one poorly formed sharp wave from the same region. Prior EEGs have been normal. MRI brain during hospitalization with 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, asymmetrically thinner left fornix, and possibly smaller left mammillary body. Noted white matter changes. His neurologic exam at today's visit is at baseline. Recommend continuing current dose of zonisamide unchanged. If there are breakthrough seizures, his dose of zonisamide could certainly be increased. Family will call the office with seizures, issues or concerns. Follow up in 3 months or sooner if needed with Dr Leora Dickson.

## 2023-09-28 NOTE — ASSESSMENT & PLAN NOTE
Following with psychiatry. Currently on escitalopram. Has follow up tomorrow. Mother reports continued intermittent aggressive behaviors. Reviewed discussing the possibility of an as needed medication as he does become violent at times with his parents and this is a safety concern for his parents. Would avoid haldol but could consider lorazepam or clonazepam if psychiatry deems this is appropriate. Mother does report a behavioral specialist will be coming to the home.

## 2023-09-28 NOTE — PATIENT INSTRUCTIONS
- Continue current dose of zonisamide  - Continue following with psychiatry  - Talk to his psychiatrist about a PRN for agitation/aggression - talk about lorazepam or clonazepam (avoid haldol)  - Call the office with any possible seizures or concerns  - Follow up in 3 months with Dr Ivette Powell

## 2023-10-03 ENCOUNTER — OFFICE VISIT (OUTPATIENT)
Dept: PSYCHIATRY | Facility: CLINIC | Age: 33
End: 2023-10-03
Payer: COMMERCIAL

## 2023-10-03 DIAGNOSIS — F84.0 AUTISTIC DISORDER: Primary | ICD-10-CM

## 2023-10-03 PROCEDURE — 99213 OFFICE O/P EST LOW 20 MIN: CPT | Performed by: PSYCHIATRY & NEUROLOGY

## 2023-10-03 RX ORDER — LORAZEPAM 1 MG/1
1 TABLET ORAL EVERY 6 HOURS PRN
Qty: 30 TABLET | Refills: 1 | Status: SHIPPED | OUTPATIENT
Start: 2023-10-03

## 2023-10-03 RX ORDER — ESCITALOPRAM OXALATE 20 MG/1
20 TABLET ORAL DAILY
Qty: 30 TABLET | Refills: 1 | Status: SHIPPED | OUTPATIENT
Start: 2023-10-03

## 2023-10-05 ENCOUNTER — REMOTE DEVICE CLINIC VISIT (OUTPATIENT)
Dept: CARDIOLOGY CLINIC | Facility: CLINIC | Age: 33
End: 2023-10-05
Payer: COMMERCIAL

## 2023-10-05 DIAGNOSIS — Z95.810 AICD (AUTOMATIC CARDIOVERTER/DEFIBRILLATOR) PRESENT: Primary | ICD-10-CM

## 2023-10-05 PROCEDURE — 93282 PRGRMG EVAL IMPLANTABLE DFB: CPT | Performed by: INTERNAL MEDICINE

## 2023-10-05 NOTE — PROGRESS NOTES
Results for orders placed or performed in visit on 10/05/23   Cardiac EP device report    Narrative    MDT SINGLE CHAMBER ICD - ACTIVE SYSTEM IS MRI CONDITIONAL  CARELINK TRANSMISSION: BATTERY VOLTAGE ADEQUATE (11.1 YRS).  <0.1% ALL AVAILABLE LEAD PARAMETERS WITHIN NORMAL LIMITS. NO SIGNIFICANT HIGH RATE EPISODES. 10 SVT-WAVELET EPISODES NOTED. OPTI-VOL WITHIN NORMAL LIMITS. NORMAL DEVICE FUNCTION.  AM/GV

## 2023-11-01 DIAGNOSIS — F84.0 AUTISTIC DISORDER: ICD-10-CM

## 2023-11-01 RX ORDER — ESCITALOPRAM OXALATE 20 MG/1
20 TABLET ORAL DAILY
Qty: 30 TABLET | Refills: 1 | Status: SHIPPED | OUTPATIENT
Start: 2023-11-01

## 2023-11-06 RX ORDER — ESCITALOPRAM OXALATE 20 MG/1
20 TABLET ORAL DAILY
Qty: 30 TABLET | Refills: 1 | Status: SHIPPED | OUTPATIENT
Start: 2023-11-06 | End: 2023-11-14 | Stop reason: SDUPTHER

## 2023-11-14 ENCOUNTER — OFFICE VISIT (OUTPATIENT)
Dept: PSYCHIATRY | Facility: CLINIC | Age: 33
End: 2023-11-14
Payer: COMMERCIAL

## 2023-11-14 ENCOUNTER — TELEPHONE (OUTPATIENT)
Dept: PSYCHIATRY | Facility: CLINIC | Age: 33
End: 2023-11-14

## 2023-11-14 DIAGNOSIS — F84.0 AUTISTIC DISORDER: ICD-10-CM

## 2023-11-14 PROCEDURE — 99213 OFFICE O/P EST LOW 20 MIN: CPT | Performed by: PSYCHIATRY & NEUROLOGY

## 2023-11-14 RX ORDER — LORAZEPAM 1 MG/1
1 TABLET ORAL EVERY 6 HOURS PRN
Qty: 30 TABLET | Refills: 1
Start: 2023-11-14

## 2023-11-14 RX ORDER — ESCITALOPRAM OXALATE 20 MG/1
20 TABLET ORAL DAILY
Qty: 30 TABLET | Refills: 1 | OUTPATIENT
Start: 2023-11-14

## 2023-11-14 RX ORDER — ESCITALOPRAM OXALATE 20 MG/1
20 TABLET ORAL DAILY
Qty: 30 TABLET | Refills: 1 | Status: SHIPPED | OUTPATIENT
Start: 2023-11-14 | End: 2023-11-14 | Stop reason: SDUPTHER

## 2023-11-14 RX ORDER — ESCITALOPRAM OXALATE 20 MG/1
20 TABLET ORAL DAILY
Qty: 30 TABLET | Refills: 1 | Status: SHIPPED | OUTPATIENT
Start: 2023-11-14

## 2023-11-14 NOTE — PSYCH
Fatmata Adam 1990 [id number]     The patient was seen for continuing care and pharmacotherapy. Accompanied by his mother. His behavior has been under good control and lorazepam has been helpful. He has been sleeping and eating well. ROS:  No specific complaints  Current Mental Status Evaluation:  Appearance:  Adequate hygiene and grooming   Behavior:  Calm   Mood:  Uncertain   Affect: appropriate   Speech: Unable to assess due to patient factors   Thought Process:  Unable to assess due to scant verbalization   Thought Content:  Cannot be assessed due to patient factors   Perceptual Disturbances: Cannot be assessed due to patient factors   Risk Potential: Unable to assess due to patient factors   Orientation:         Diagnosis ICD-10-CM Associated Orders   1. Autistic disorder  F84.0 LORazepam (ATIVAN) 1 mg tablet     escitalopram (LEXAPRO) 20 mg tablet             Current Outpatient Medications   Medication Instructions    acetaminophen (TYLENOL) 975 mg, Oral, Every 6 hours PRN    ciprofloxacin-dexamethasone (CIPRODEX) otic suspension 4 drops, Left Ear, 2 times daily    ciprofloxacin-dexamethasone (CIPRODEX) otic suspension 4 drops, Left Ear, 2 times daily    ergocalciferol (VITAMIN D2) 50,000 units TAKE 1 CAPSULE BY MOUTH ONE TIME PER WEEK    escitalopram (LEXAPRO) 20 mg, Oral, Daily, TAKE 1.5 TABLETS BY MOUTH DAILY    LORazepam (ATIVAN) 1 mg, Oral, Every 6 hours PRN    pyridoxine (B-6) 100 mg, Oral, Daily    zonisamide (Zonegran) 100 mg capsule Take three capsules by mouth at night time. Treatment Recommendations- Risks Benefits    We will continue with Lexapro 20 mg daily as well as lorazepam as needed. Follow-up in 2 months  Risks, Benefits And Possible Side Effects Of Medications: The risks and benefits of medications and proposed treatment plan were discussed with the patient's caregiver/POA/guardian.   The individual understands and agrees    VIRTUAL VISIT Domínguez Bachelor Jose Rogel verbally agrees to participate in [employer]. Pt is aware that [employer] could be limited without vital signs or the ability to perform a full hands-on physical exam. Morgan Jacobson understands he or the provider may request at any time to terminate the video visit and request the patient to seek care or treatment in person.      Severo Yeager MD 11/14/23

## 2023-11-14 NOTE — TELEPHONE ENCOUNTER
Pt mother stated that the med is not ready at pharmacy, spoke to the provider he stated that he sent the new script over    Spoke to someone at the pharmacy and they stated that he needs to be sent back over they only have deactivated one.

## 2023-12-03 DIAGNOSIS — G40.109 LOCALIZATION-RELATED EPILEPSY (HCC): ICD-10-CM

## 2023-12-04 RX ORDER — ERGOCALCIFEROL 1.25 MG/1
CAPSULE ORAL
Qty: 4 CAPSULE | Refills: 5 | Status: SHIPPED | OUTPATIENT
Start: 2023-12-04

## 2023-12-26 ENCOUNTER — TELEPHONE (OUTPATIENT)
Dept: PSYCHIATRY | Facility: CLINIC | Age: 33
End: 2023-12-26

## 2023-12-26 NOTE — TELEPHONE ENCOUNTER
Patient is calling regarding cancelling an appointment.    Date/Time: 1/17/2024 12pm    Reason:     Patient was rescheduled: YES [x] NO []  If yes, when was Patient reschedule for: 1/24/2024 12:30pm    Patient requesting call back to reschedule: YES [] NO [x]

## 2024-01-10 ENCOUNTER — OFFICE VISIT (OUTPATIENT)
Dept: NEUROLOGY | Facility: CLINIC | Age: 34
End: 2024-01-10
Payer: COMMERCIAL

## 2024-01-10 VITALS
SYSTOLIC BLOOD PRESSURE: 118 MMHG | WEIGHT: 192 LBS | TEMPERATURE: 98.1 F | HEART RATE: 86 BPM | OXYGEN SATURATION: 97 % | DIASTOLIC BLOOD PRESSURE: 76 MMHG | BODY MASS INDEX: 26.88 KG/M2 | HEIGHT: 71 IN

## 2024-01-10 DIAGNOSIS — I49.8 BRUGADA SYNDROME: ICD-10-CM

## 2024-01-10 DIAGNOSIS — F84.0 AUTISTIC DISORDER: ICD-10-CM

## 2024-01-10 DIAGNOSIS — E55.9 VITAMIN D DEFICIENCY: ICD-10-CM

## 2024-01-10 DIAGNOSIS — G40.109 LOCALIZATION-RELATED EPILEPSY (HCC): Primary | ICD-10-CM

## 2024-01-10 PROCEDURE — 99214 OFFICE O/P EST MOD 30 MIN: CPT | Performed by: PSYCHIATRY & NEUROLOGY

## 2024-01-10 RX ORDER — MELATONIN
1000 DAILY
Qty: 90 TABLET | Refills: 3 | Status: SHIPPED | OUTPATIENT
Start: 2024-01-10

## 2024-01-10 NOTE — PROGRESS NOTES
Review of Systems   Constitutional:  Negative for appetite change, fatigue and fever.   HENT: Negative.  Negative for hearing loss, tinnitus, trouble swallowing and voice change.    Eyes: Negative.  Negative for photophobia, pain and visual disturbance.   Respiratory: Negative.  Negative for shortness of breath.    Cardiovascular: Negative.  Negative for palpitations.   Gastrointestinal: Negative.  Negative for nausea and vomiting.   Endocrine: Negative.  Negative for cold intolerance.   Genitourinary: Negative.  Negative for dysuria, frequency and urgency.   Musculoskeletal:  Negative for back pain, gait problem, myalgias, neck pain and neck stiffness.   Skin: Negative.  Negative for rash.   Allergic/Immunologic: Negative.    Neurological: Negative.  Negative for dizziness, tremors, seizures, syncope, facial asymmetry, speech difficulty, weakness, light-headedness, numbness and headaches.        Pts mother states pt is staring more frequently    Hematological: Negative.  Does not bruise/bleed easily.   Psychiatric/Behavioral:  Positive for agitation, behavioral problems and decreased concentration. Negative for confusion, hallucinations and sleep disturbance.    All other systems reviewed and are negative.

## 2024-01-10 NOTE — PROGRESS NOTES
[facility]  Follow Up Visit    Impression/Plan    Mr. Jackson is a 33 y.o. male with epilepsy in the setting of autism and Brugada syndrome (s/p ICD placement).   His epilepsy is manifest as bilateral tonic-clonic seizures as well as right temporal electrographic seizure captured on EEG while I was asymptomatic or clinically subtle (swallowing sounds).  Clear seizures are currently controlled on zonisamide. There are staring spells and he may lose track of what he is doing during a task more commonly than in the past. A staring spell witnessed in the office today (identified by his mother) did not appear to me to be consistent with seizure, I was able to get his attention and there was no postictal state. However, its possible that some staring or problems completing tasks could be related to seizure. Obtaining routine EEG. He will not tolerate ambulatory EEG. Longer EEG monitoring would need to be done in the hospital with constant supervision by his mother.      Behaviors have worsened some lately. He was redirectable in the office today and was not aggressive. He is following closely with psychiatry.     Patient Instructions   Schedule EEG.   Keep track of staring events.   Continue zonisamide 300 mg nightly.   Return in 3 months. (AP).     Diagnoses and all orders for this visit:    Localization-related epilepsy (HCC)  -     EEG Awake and asleep; Future    Vitamin D deficiency  -     cholecalciferol (VITAMIN D3) 1,000 units tablet; Take 1 tablet (1,000 Units total) by mouth daily    Autistic disorder    Brugada syndrome        Subjective    Damian Jackson is returning to the [facility] for follow up.     Interval Events:   Seizures since last visit: staring spells, unclear if seizure  Hospitalizations: no    Last seen 9/2023. No change to zonisamide at that visit.     Staring spell at least once per day, usually 2-3 times, but can be pulled out  of it with a prompt. Sometimes may stop in the middle of writing. Seems to be losing track of what he is doing in a task. E.g. getting pens together and may lose track and will move to next task and have to be reminded to go back. Doesn't change with prn lorazepam used for behavior.     Behavior has worsened some. Following with psychiatry. Using prn lorazepam for behavior.     Brief staring episode pointed out by mom during visit today. He responded after I called his name twice in quick succession. He was at his baseline. There were no subtle movements or automatisms. The event appeared behavioral.     Current AEDs:  - zonisamide 300 mg HS  Other medications as per Epic.     Event/Seizure semiology:  Generalized tonic clonic seizure   Subclinical seizure - frequent swallowing noted on EEG     Special Features  Status epilepticus: no  Self Injury Seizures: fall in 3/2021 cause hit to head  Precipitating Factors: none     Epilepsy Risk Factors:  Autism     Prior AEDs:  vimpat - tremors, confusion, difficulty sleeping, worsening mood  Levetiracetam - aggressive behaviors  Divalproex-concerned it contributed to aggressive behaviors, but behaviors did not improve when transitioned to zonisamide     Prior Evaluation:  CT head 3/2021- no acute findings     MRI brain w wo contrast 3/2/22:  IMPRESSION:  No acute intracranial abnormality.  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, asymmetrically thinner left fornix, and possibly smaller left mammillary body.   A few white matter changes, suggestive of minimal chronic microangiopathy.  Severe left sphenoid sinus disease with inspissated material.  The study was marked in EPIC for immediate notification.     EEGs normal in  2016 and 2020     REEG 2/25/2022: The study captures a 50 second focal electrographic seizure arising from the right mid temporal region without definite clinical correlate other than the possibility of prominent swallowing  "noted by the tech.      cveeg 2/25-2/:  Day 1 Interpretation:   This prolonged, continuous video-EEG recording is abnormal.   Diffuse theta frequency slowing and intermittent generalized rhythmic delta activity suggest moderate nonspecific diffuse cerebral dysfunction. Intermittent low amplitude right temporal polymorphic delta slowing suggests underlying focal neuronal dysfunction that may be structural in etiology. 2 right frontotemporal spikes in sleep raise the possibility of underlying focal epileptogenic potential.   No seizures are present.    Day 2 Interpretation:   This prolonged, continuous video-EEG recording is abnormal.   Diffuse theta frequency slowing and intermittent generalized rhythmic delta activity suggest moderate nonspecific diffuse cerebral dysfunction. Intermittent low amplitude right temporal polymorphic delta slowing suggests underlying focal neuronal dysfunction that may be structural in etiology.  One poorly formed right frontotemporal low amplitude sharp wave observed in sleep raises the possibility of underlying focal epileptogenic potential.   No seizures are present.      Psychiatric History:  autism     Social History:   Driving: No  Lives Alone: No. Lives with parents.       Objective    /76 (BP Location: Right arm, Patient Position: Sitting, Cuff Size: Adult)   Pulse 86   Temp 98.1 °F (36.7 °C) (Temporal)   Ht 5' 11\" (1.803 m)   Wt 87.1 kg (192 lb)   SpO2 97%   BMI 26.78 kg/m²      General Exam  No acute distress.    Neurologic Exam  Mental Status:  Alert. Single words and short phrases. Writes on paper tablet. His mother is able to redirect him when necessary.   Cranial Nerves:  Face symmetric.   Motor:  moves all 4 extremities well without evidence of weakness or asymmetry.   Gait: Steady casual gait.         "

## 2024-01-10 NOTE — PATIENT INSTRUCTIONS
Schedule EEG.   Keep track of staring events.   Continue zonisamide 300 mg nightly.   Return in 3 months. (AP).

## 2024-01-12 DIAGNOSIS — F84.0 AUTISTIC DISORDER: ICD-10-CM

## 2024-01-15 RX ORDER — ESCITALOPRAM OXALATE 20 MG/1
20 TABLET ORAL DAILY
Qty: 30 TABLET | Refills: 1 | Status: SHIPPED | OUTPATIENT
Start: 2024-01-15 | End: 2024-01-24 | Stop reason: SDUPTHER

## 2024-01-24 ENCOUNTER — OFFICE VISIT (OUTPATIENT)
Dept: PSYCHIATRY | Facility: CLINIC | Age: 34
End: 2024-01-24
Payer: COMMERCIAL

## 2024-01-24 DIAGNOSIS — F84.0 AUTISTIC DISORDER: Primary | ICD-10-CM

## 2024-01-24 PROCEDURE — 99213 OFFICE O/P EST LOW 20 MIN: CPT | Performed by: PSYCHIATRY & NEUROLOGY

## 2024-01-24 RX ORDER — LORAZEPAM 0.5 MG/1
0.5 TABLET ORAL 3 TIMES DAILY
Qty: 90 TABLET | Refills: 1 | Status: SHIPPED | OUTPATIENT
Start: 2024-01-24

## 2024-01-24 RX ORDER — ESCITALOPRAM OXALATE 20 MG/1
20 TABLET ORAL DAILY
Qty: 30 TABLET | Refills: 1 | Status: SHIPPED | OUTPATIENT
Start: 2024-01-24

## 2024-01-24 NOTE — PSYCH
Damian Jackson 1990 [id number]     The patient was seen for continuing care and pharmacotherapy.Aggressive behavior has been worse since last visit. 4 episodes of aggression in November and 5 in December and 6 in January.He becomes physical with parents.Often unprovoked.  As needed doses of lorazepam alleviate the behavior but it takes a long time before it works.  He appeared to be more redirectable during the visit than he was during past visits.        ROS:  As HPI  Current Mental Status Evaluation:  Appearance:  Adequate hygiene and grooming and Poor eye contact   Behavior:  Calm   Mood:  Uncertain   Affect: blunted   Speech: Nonverbal   Thought Process:  Unable to assess due to scant verbalization   Thought Content:  Cannot be assessed due to patient factors   Perceptual Disturbances: Cannot be assessed due to patient factors   Risk Potential: Potentially aggresive and violent   Orientation:        No diagnosis found.       Current Outpatient Medications   Medication Instructions    acetaminophen (TYLENOL) 975 mg, Oral, Every 6 hours PRN    cholecalciferol (VITAMIN D3) 1,000 Units, Oral, Daily    ciprofloxacin-dexamethasone (CIPRODEX) otic suspension 4 drops, Left Ear, 2 times daily    ciprofloxacin-dexamethasone (CIPRODEX) otic suspension 4 drops, Left Ear, 2 times daily    escitalopram (LEXAPRO) 20 mg, Oral, Daily    LORazepam (ATIVAN) 1 mg, Oral, Every 6 hours PRN    pyridoxine (B-6) 100 mg, Oral, Daily    zonisamide (Zonegran) 100 mg capsule Take three capsules by mouth at night time.          Treatment Recommendations- Risks Benefits    We will start a standing order of lorazepam 0.5 mg 3 times daily in addition to Lexapro.  Advised about potential for sedation.  Mother will call with an update in any side effects follow-up in 4 weeks  Risks, Benefits And Possible Side Effects Of Medications:  The risks and benefits of medications and proposed treatment plan were discussed with the patient's  caregiver/POA/guardian.  The individual understands and agrees    VIRTUAL VISIT DISCLAIMER    Damian Isbelllambert verbally agrees to participate in Virtual Care Services. Pt is aware that Virtual Care Services could be limited without vital signs or the ability to perform a full hands-on physical exam. Damian Jackson understands he or the provider may request at any time to terminate the video visit and request the patient to seek care or treatment in person.     Dario Lopez MD 01/24/24

## 2024-01-30 ENCOUNTER — HOSPITAL ENCOUNTER (OUTPATIENT)
Dept: NEUROLOGY | Facility: CLINIC | Age: 34
Discharge: HOME | End: 2024-01-30
Payer: COMMERCIAL

## 2024-01-30 DIAGNOSIS — G40.109 LOCALIZATION-RELATED EPILEPSY (HCC): ICD-10-CM

## 2024-01-30 PROCEDURE — 95819 EEG AWAKE AND ASLEEP: CPT | Performed by: PSYCHIATRY & NEUROLOGY

## 2024-01-30 PROCEDURE — 95819 EEG AWAKE AND ASLEEP: CPT

## 2024-02-02 ENCOUNTER — REMOTE DEVICE CLINIC VISIT (OUTPATIENT)
Dept: CARDIOLOGY CLINIC | Facility: CLINIC | Age: 34
End: 2024-02-02
Payer: COMMERCIAL

## 2024-02-02 DIAGNOSIS — Z95.810 PRESENCE OF AUTOMATIC CARDIOVERTER/DEFIBRILLATOR (AICD): Primary | ICD-10-CM

## 2024-02-02 PROCEDURE — 93296 REM INTERROG EVL PM/IDS: CPT | Performed by: INTERNAL MEDICINE

## 2024-02-02 PROCEDURE — 93295 DEV INTERROG REMOTE 1/2/MLT: CPT | Performed by: INTERNAL MEDICINE

## 2024-02-02 NOTE — PROGRESS NOTES
Results for orders placed or performed in visit on 02/02/24   Cardiac EP device report    Narrative    MDT SINGLE CHAMBER ICD - ACTIVE SYSTEM IS MRI CONDITIONAL  CARELINK TRANSMISSION: BATTERY VOLTAGE ADEQUATE (10.8 YRS). : <0.1%. ALL AVAILABLE LEAD PARAMETERS WITHIN NORMAL LIMITS. 1 SVT EPISODE W/ EGM SHOWING SVT-ST @ 171 BPM, DURATION 48 SECS. PT DOES NOT TAKE ANY BB. EF: 55% (ECHO 3/31/21). OPTI-VOL WITHIN NORMAL LIMITS. NORMAL DEVICE FUNCTION. CH

## 2024-02-28 ENCOUNTER — OFFICE VISIT (OUTPATIENT)
Dept: PSYCHIATRY | Facility: CLINIC | Age: 34
End: 2024-02-28
Payer: COMMERCIAL

## 2024-02-28 DIAGNOSIS — F84.0 AUTISTIC DISORDER: Primary | ICD-10-CM

## 2024-02-28 PROCEDURE — 99213 OFFICE O/P EST LOW 20 MIN: CPT | Performed by: PSYCHIATRY & NEUROLOGY

## 2024-02-28 RX ORDER — LORAZEPAM 0.5 MG/1
0.5 TABLET ORAL 3 TIMES DAILY
Qty: 90 TABLET | Refills: 1 | Status: SHIPPED | OUTPATIENT
Start: 2024-02-28

## 2024-02-28 RX ORDER — ESCITALOPRAM OXALATE 20 MG/1
20 TABLET ORAL DAILY
Qty: 30 TABLET | Refills: 1 | Status: SHIPPED | OUTPATIENT
Start: 2024-02-28

## 2024-02-28 NOTE — PSYCH
TREATMENT PLAN (Medication Management Only)        [facility]    Name/Date of Birth/MRN:  Damian Jackson 33 y.o. 1990 MRN: [id number]  Date of Treatment Plan: February 28, 2024  Diagnosis/Diagnoses:   1. Autistic disorder      Strengths/Personal Resources for Self-Care: supportive family, taking medications as prescribed, family ties.  Area/Areas of need (in own words): anxiety, behavioral problems.  1. Long Term Goal:   maintain improvement in anxiety, continue improvement in behavior  Target Date: 3 months - 5/28/2024  Person/Persons responsible for completion of goal: family  2.  Short Term Objective (s) - How will we reach this goal?:   A.  Provider new recommended medication/dosage changes and/or continue medication(s): continue current medications as prescribed.  B.  N/A.  C.  N/A.  Target Date: 6 months - 8/28/2024  Person/Persons Responsible for Completion of Goal: family   Progress Towards Goals: improving gradually  Treatment Modality: medication management every 6 weeks  Review due 180 days from date of this plan: 6 months - 8/28/2024  Expected length of service: maintenance unless revised  My Physician/PA/NP and I have developed this plan together and I agree to work on the goals and objectives. I understand the treatment goals that were developed for my treatment.  Electronic Signatures: on file (unless signed below)    Dario Lopez MD 02/28/24

## 2024-02-28 NOTE — PSYCH
Damian Jackson 1990 [id number]     The patient was seen for continuing care and pharmacotherapy.  His parents were also present.  Since last visit and starting lorazepam, his behaviors have shown significant improvement.  Not much aggression and he has been calmer.  Sleep and appetite are adequate.  He has not been sedated.        ROS:  As HPI  Current Mental Status Evaluation:  Appearance:  Adequate hygiene and grooming   Behavior:  Calm   Mood:  Euthymic   Affect: blunted   Speech: Mute/refuses to talk   Thought Process:  Unable to assess due to scant verbalization   Thought Content:  Does not verbalize delusional material   Perceptual Disturbances: Cannot be assessed due to patient factors   Risk Potential: No suicidal or homicidal ideation   Orientation:         Diagnosis ICD-10-CM Associated Orders   1. Autistic disorder  F84.0 escitalopram (LEXAPRO) 20 mg tablet     LORazepam (Ativan) 0.5 mg tablet             Current Outpatient Medications   Medication Instructions    acetaminophen (TYLENOL) 975 mg, Oral, Every 6 hours PRN    cholecalciferol 1,000 Units, Oral, Daily    ciprofloxacin-dexamethasone (CIPRODEX) otic suspension 4 drops, Left Ear, 2 times daily    escitalopram (LEXAPRO) 20 mg, Oral, Daily    LORazepam (ATIVAN) 1 mg, Oral, Every 6 hours PRN    LORazepam (ATIVAN) 0.5 mg, Oral, 3 times daily    pyridoxine (B-6) 100 mg, Oral, Daily    zonisamide (Zonegran) 100 mg capsule Take three capsules by mouth at night time.          Treatment Recommendations- Risks Benefits    Continue with same medications and return to the office in 6 weeks  Risks, Benefits And Possible Side Effects Of Medications:  Risks, benefits, and possible side effects of medications explained to patient and patient verbalizes understanding    VIRTUAL VISIT DISCLAIMER    Damian Saykvng verbally agrees to participate in Virtual Care Services. Pt is aware that Virtual Care Services could be limited without vital signs or the  ability to perform a full hands-on physical exam. Damian Jackson understands he or the provider may request at any time to terminate the video visit and request the patient to seek care or treatment in person.     Dario Lopez MD 02/28/24

## 2024-03-18 ENCOUNTER — CLINICAL SUPPORT (OUTPATIENT)
Dept: FAMILY MEDICINE CLINIC | Facility: CLINIC | Age: 34
End: 2024-03-18
Payer: COMMERCIAL

## 2024-03-18 DIAGNOSIS — Z11.1 SCREENING FOR TUBERCULOSIS: Primary | ICD-10-CM

## 2024-03-18 PROCEDURE — 86580 TB INTRADERMAL TEST: CPT

## 2024-03-20 ENCOUNTER — CLINICAL SUPPORT (OUTPATIENT)
Dept: FAMILY MEDICINE CLINIC | Facility: CLINIC | Age: 34
End: 2024-03-20

## 2024-03-20 DIAGNOSIS — Z11.1 SCREENING FOR TUBERCULOSIS: Primary | ICD-10-CM

## 2024-03-20 LAB
INDURATION: 0 MM
TB SKIN TEST: NEGATIVE

## 2024-04-03 ENCOUNTER — TELEPHONE (OUTPATIENT)
Dept: PSYCHIATRY | Facility: CLINIC | Age: 34
End: 2024-04-03

## 2024-04-03 NOTE — TELEPHONE ENCOUNTER
Patients mother called office in regards to seeing if there is a sooner appointment next week due to mom having off work all next week. Writer informed mom there are no current openings as of right now. Patient was placed on cancellation list

## 2024-04-10 ENCOUNTER — OFFICE VISIT (OUTPATIENT)
Dept: NEUROLOGY | Facility: CLINIC | Age: 34
End: 2024-04-10
Payer: COMMERCIAL

## 2024-04-10 VITALS
TEMPERATURE: 98.3 F | DIASTOLIC BLOOD PRESSURE: 71 MMHG | WEIGHT: 191.6 LBS | BODY MASS INDEX: 26.82 KG/M2 | SYSTOLIC BLOOD PRESSURE: 118 MMHG | HEIGHT: 71 IN | HEART RATE: 79 BPM

## 2024-04-10 DIAGNOSIS — G40.109 LOCALIZATION-RELATED EPILEPSY (HCC): ICD-10-CM

## 2024-04-10 PROCEDURE — 99214 OFFICE O/P EST MOD 30 MIN: CPT | Performed by: NURSE PRACTITIONER

## 2024-04-10 RX ORDER — ZONISAMIDE 100 MG/1
CAPSULE ORAL
Qty: 270 CAPSULE | Refills: 3 | Status: SHIPPED | OUTPATIENT
Start: 2024-04-10

## 2024-04-10 NOTE — ASSESSMENT & PLAN NOTE
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 He is currently on zonisamide 300 mg HS without clear recurrence of seizures. There have been ongoing behavioral issues,he is currently following with psychiatry for management of behaviors.     He does continue to have episodes of staring. While some of them seem to be prolonged up to 1-2 minutes, he continues to respond if his name is called which makes the staring less concerning for seizures. Discussed with mother that if there is any change to this or if the staring becomes more concerning she will reach out. Could consider EMU admission for spell characterization (would not tolerate ambulatory EEG) vs trial of increased dose of zonisamide. It is important to note that there has not been an improvement, if anything may be more frequent with addition of lorazepam which was started by psychiatry. This may also support the thought that these are not related to seizure and rather spells of inattention or behavioral in nature.    Routine EEG during a prior hospitalization revealed 50 second focal electrographic seizure arising from the right mid temporal region without definite clinical correlate other than the possibility of prominent swallowing noted by the tech. Continuous video EEG done after, revealed a few right frontotemporal spikes and one poorly formed sharp wave from the same region. Prior EEGs have been normal and most recent EEG after his last visit was also normal. MRI brain during hospitalization with 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, asymmetrically thinner left fornix, and possibly smaller left mammillary body. Noted white matter changes. His neurologic exam at today's visit is at baseline.     Recommend continuing current dose of zonisamide unchanged. If there are breakthrough seizures, his dose of zonisamide could certainly be increased. Family will call  the office with seizures, issues or concerns. Follow up in 3-4 months or sooner if needed with Dr Mcgrath.

## 2024-04-10 NOTE — PROGRESS NOTES
Patient ID: Damian Jackson is a 33 y.o. male with epilepsy in the setting of autism and Brugada syndrome (s/p ICD placement), who is returning to Neurology office for follow up of his seizures.     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 He is currently on zonisamide 300 mg HS without clear recurrence of seizures. There have been ongoing behavioral issues,he is currently following with psychiatry for management of behaviors.     He does continue to have episodes of staring. While some of them seem to be prolonged up to 1-2 minutes, he continues to respond if his name is called which makes the staring less concerning for seizures. Discussed with mother that if there is any change to this or if the staring becomes more concerning she will reach out. Could consider EMU admission for spell characterization (would not tolerate ambulatory EEG) vs trial of increased dose of zonisamide. It is important to note that there has not been an improvement, if anything may be more frequent with addition of lorazepam which was started by psychiatry. This may also support the thought that these are not related to seizure and rather spells of inattention or behavioral in nature.    Routine EEG during a prior hospitalization revealed 50 second focal electrographic seizure arising from the right mid temporal region without definite clinical correlate other than the possibility of prominent swallowing noted by the tech. Continuous video EEG done after, revealed a few right frontotemporal spikes and one poorly formed sharp wave from the same region. Prior EEGs have been normal and most recent EEG after his last visit was also normal. MRI brain during hospitalization with 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, asymmetrically thinner left fornix, and possibly smaller left mammillary  body. Noted white matter changes. His neurologic exam at today's visit is at baseline.     Recommend continuing current dose of zonisamide unchanged. If there are breakthrough seizures, his dose of zonisamide could certainly be increased. Family will call the office with seizures, issues or concerns. Follow up in 3-4 months or sooner if needed with Dr Mcgrath.         Subjective:  Damian Jackson is a 33 y.o. male with epilepsy in the setting of autism and Brugada syndrome (s/p ICD placement), who is returning to Neurology office for follow up of his seizures. He was last seen in the office 1/10/24. At that time, noted his epilepsy is manifest as bilateral tonic-clonic seizures as well as right temporal electrographic seizure captured on EEG while asymptomatic or clinically subtle (swallowing sounds). Clear seizures were controlled on zonisamide at that time. There were staring spells where he may lose track of what he is doing during a task more commonly than in the past. There was a staring spell witnessed in the office (identified by his mother) did not appear from Dr Mcgrath to be consistent with seizure, and was able to get his attention and there was no postictal state. Recommended obtaining a routine EEG. Noted he will not tolerate ambulatory EEG. Longer EEG monitoring would need to be done in the hospital with constant supervision by his mother. Behaviors had worsened recently but was following closely with psychiatry.     Since their last visit, there have been no clear seizures. EEG was normal. There were a lot of aggressive behaviors so psychiatry (Dr Lopez) started him on lorazepam 1 mg PRN and then was started on 0.5 mg TID which has been helping but has seemed to be quite repetitive. Has an appointment coming up next week with psychiatry. Seems like he isn't on medication at times, like it wears off once he is due for next dose.     Continues to have episodes of zoning out. May be a little more  frequent.  Just blank staring, stone still. Blank face, no emotion. No lip smacking or automatisms. No swallowing sounds. He did have this during his office visit today. They were brief and quickly responded verbally when his name was said.    He is working with life skills at via 2 days per week.     Current seizure medications:  - zonisamide 300 mg HS  - lorazepam 0.5 mg TID - per psychiatry  Other medications as per Epic.    Event/Seizure semiology:  Generalized tonic clonic seizure   Subclinical seizure - frequent swallowing noted on EEG     Special Features  Status epilepticus: no  Self Injury Seizures: fall in 3/2021 cause hit to head  Precipitating Factors: none     Epilepsy Risk Factors:  Autism     Prior AEDs:  vimpat - tremors, confusion, difficulty sleeping, worsening mood  Levetiracetam - aggressive behaviors  Divalproex-concerned it contributed to aggressive behaviors, but behaviors did not improve when transitioned to zonisamide     Prior Evaluation:  CT head 3/2021- no acute findings     MRI brain w wo contrast 3/2/22:  IMPRESSION:  No acute intracranial abnormality.  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, asymmetrically thinner left fornix, and possibly smaller left mammillary body.   A few white matter changes, suggestive of minimal chronic microangiopathy.  Severe left sphenoid sinus disease with inspissated material.  The study was marked in EPIC for immediate notification.     EEGs normal in  2016 and 2020     REEG 2/25/2022: The study captures a 50 second focal electrographic seizure arising from the right mid temporal region without definite clinical correlate other than the possibility of prominent swallowing noted by the tech.      cveeg 2/25-2/:  Day 1 Interpretation:   This prolonged, continuous video-EEG recording is abnormal.   Diffuse theta frequency slowing and intermittent generalized rhythmic delta activity suggest moderate nonspecific diffuse  "cerebral dysfunction. Intermittent low amplitude right temporal polymorphic delta slowing suggests underlying focal neuronal dysfunction that may be structural in etiology. 2 right frontotemporal spikes in sleep raise the possibility of underlying focal epileptogenic potential.   No seizures are present.    Day 2 Interpretation:   This prolonged, continuous video-EEG recording is abnormal.   Diffuse theta frequency slowing and intermittent generalized rhythmic delta activity suggest moderate nonspecific diffuse cerebral dysfunction. Intermittent low amplitude right temporal polymorphic delta slowing suggests underlying focal neuronal dysfunction that may be structural in etiology.  One poorly formed right frontotemporal low amplitude sharp wave observed in sleep raises the possibility of underlying focal epileptogenic potential.   No seizures are present.      Routine EEG 1/30/2024: Normal EEG    Psychiatric History:  Autism  Following with psychiatry for management of behaviors     Social History:   Driving: No  Lives Alone: No. Lives with parents.        His history was also obtained from his mother, who was present at today's visit.      I reviewed prior neurology notes, most recent labs, recent EEG report as documented in Epic[employer], and summarized above.        Objective:    Blood pressure 118/71, pulse 79, temperature 98.3 °F (36.8 °C), temperature source Temporal, height 5' 11\" (1.803 m), weight 86.9 kg (191 lb 9.6 oz).    Physical Exam  No apparent distress.   Appears well nourished.   Restless.     Neurologic Exam  Mental status- Alert. Single word verbalization. Follows simple commands. Limited attention and understanding of medical situation.      Cranial Nerves- PERRL, blinks to stimuli, tracks examiner in room, facial muscles symmetric, hearing intact bilaterally to finger rubs, shoulder shrug symmetrical.     Motor- Appropriate strength. Moves all extremities freely. No clear lateralizing " weakness. No tremor.     Sensory-  Intact distally in all extremities to light touch.      DTRs- 2+ and symmetric in all extremities.     Gait- wide based steady gait     Coordination- high fives bilaterally without ataxia.     ROS:     Review of Systems   Constitutional:  Negative for appetite change, fatigue and fever.   HENT: Negative.  Negative for hearing loss, tinnitus, trouble swallowing and voice change.    Eyes: Negative.  Negative for photophobia, pain and visual disturbance.   Respiratory: Negative.  Negative for shortness of breath.    Cardiovascular: Negative.  Negative for palpitations.   Gastrointestinal: Negative.  Negative for nausea and vomiting.   Endocrine: Negative.  Negative for cold intolerance.   Genitourinary: Negative.  Negative for dysuria, frequency and urgency.   Musculoskeletal:  Negative for back pain, gait problem, myalgias, neck pain and neck stiffness.   Skin: Negative.  Negative for rash.   Allergic/Immunologic: Negative.    Neurological: Negative.  Negative for dizziness, tremors, seizures, syncope, facial asymmetry, speech difficulty, weakness, light-headedness, numbness and headaches.   Hematological: Negative.  Does not bruise/bleed easily.   Psychiatric/Behavioral: Negative.  Negative for confusion, hallucinations and sleep disturbance.    All other systems reviewed and are negative.        ROS obtained by MA and reviewed by myself.     This note may have been created using voice recognition software. There may be unintentional errors such as grammatical errors, spelling errors, or pronoun errors.

## 2024-04-10 NOTE — PATIENT INSTRUCTIONS
- Continue current dose of zonisamide 300 mg at night  - Let us know if staring gets more frequent or involves other symptoms such as swallowing, lip smacking, purposeless movements of his hands   - If they were to get more frequent or with concerning symptoms, could consider EMU admission or trial of increased dose of zonisamide  - Call the office with seizures, issues or concerns  - Let us know if there are questions about psychiatric medication changes  - Follow up in 3-4 months with Dr Mcgrath

## 2024-04-17 ENCOUNTER — OFFICE VISIT (OUTPATIENT)
Dept: PSYCHIATRY | Facility: CLINIC | Age: 34
End: 2024-04-17

## 2024-04-17 DIAGNOSIS — F84.0 AUTISTIC DISORDER: ICD-10-CM

## 2024-04-17 RX ORDER — LORAZEPAM 1 MG/1
1 TABLET ORAL EVERY 6 HOURS PRN
Qty: 30 TABLET | Refills: 1 | Status: SHIPPED | OUTPATIENT
Start: 2024-04-17

## 2024-04-17 RX ORDER — LORAZEPAM 0.5 MG/1
0.5 TABLET ORAL 4 TIMES DAILY
Qty: 120 TABLET | Refills: 1 | Status: SHIPPED | OUTPATIENT
Start: 2024-04-17

## 2024-04-17 RX ORDER — ESCITALOPRAM OXALATE 20 MG/1
20 TABLET ORAL DAILY
Qty: 30 TABLET | Refills: 1 | Status: SHIPPED | OUTPATIENT
Start: 2024-04-17

## 2024-04-17 NOTE — PSYCH
Damian Jackson 1990 [id number]     The patient was seen for continuing care and pharmacotherapy.  His mother was present and provided the history and update.  She has noticed several days of irritability and being impatient requiring constant attention from her.  When he is alone with his father, he is usually quiet and cooperative but when his mother is present, he might show some aggressive tendencies towards his father.  Sleep and appetite are adequate.  He helps his mother around the house with the chores and in particular with cooking and cleaning.        ROS:  As HPI  Current Mental Status Evaluation:  Appearance:  Adequate hygiene and grooming and Poor eye contact   Behavior:  Calm and Intrusive   Mood:  Euthymic   Affect: blunted   Speech: Disarticulate, Sparse, and Loud   Thought Process:  Unable to assess due to scant verbalization   Thought Content:  Cannot be assessed due to patient factors   Perceptual Disturbances: Cannot be assessed due to patient factors   Risk Potential: Low frustration tolerance   Orientation:        No diagnosis found.       Current Outpatient Medications   Medication Instructions    acetaminophen (TYLENOL) 975 mg, Oral, Every 6 hours PRN    cholecalciferol 1,000 Units, Oral, Daily    escitalopram (LEXAPRO) 20 mg, Oral, Daily    LORazepam (ATIVAN) 1 mg, Oral, Every 6 hours PRN    LORazepam (ATIVAN) 0.5 mg, Oral, 3 times daily    pyridoxine (B-6) 100 mg, Oral, Daily    zonisamide (Zonegran) 100 mg capsule Take three capsules by mouth at night time.          Treatment Recommendations- Risks Benefits    We will increase lorazepam 0.5 mg to 4 times a day and use lorazepam 1 mg every 6 hours as needed for episodes of significant behavioral issues.  Follow-up in 3 months  Risks, Benefits And Possible Side Effects Of Medications:      VIRTUAL VISIT DISCLAIMER    Damian Jackson verbally agrees to participate in Virtual Care Services. Pt is aware that Virtual Care Services could be  limited without vital signs or the ability to perform a full hands-on physical exam. Damian Jackson understands he or the provider may request at any time to terminate the video visit and request the patient to seek care or treatment in person.     Dario Lopez MD 04/17/24

## 2024-05-03 ENCOUNTER — REMOTE DEVICE CLINIC VISIT (OUTPATIENT)
Dept: CARDIOLOGY CLINIC | Facility: CLINIC | Age: 34
End: 2024-05-03
Payer: COMMERCIAL

## 2024-05-03 DIAGNOSIS — Z95.810 AICD (AUTOMATIC CARDIOVERTER/DEFIBRILLATOR) PRESENT: Primary | ICD-10-CM

## 2024-05-03 PROCEDURE — 93296 REM INTERROG EVL PM/IDS: CPT | Performed by: INTERNAL MEDICINE

## 2024-05-03 PROCEDURE — 93295 DEV INTERROG REMOTE 1/2/MLT: CPT | Performed by: INTERNAL MEDICINE

## 2024-05-03 NOTE — PROGRESS NOTES
Results for orders placed or performed in visit on 05/03/24   Cardiac EP device report    Narrative    MDT SINGLE CHAMBER ICD - ACTIVE SYSTEM IS MRI CONDITIONAL  CARELINK TRANSMISSION: BATTERY VOLTAGE ADEQUATE (10.6 YRS). <0.1%. ALL AVAILABLE LEAD PARAMETERS WITHIN NORMAL LIMITS. 1 SVT-WAVELET EPISODE @ 171 BPM LASTING 24 SEC. OPTI-VOL WITHIN NORMAL LIMITS. NORMAL DEVICE FUNCTION. GV

## 2024-05-15 ENCOUNTER — OFFICE VISIT (OUTPATIENT)
Dept: FAMILY MEDICINE CLINIC | Facility: CLINIC | Age: 34
End: 2024-05-15
Payer: COMMERCIAL

## 2024-05-15 VITALS
SYSTOLIC BLOOD PRESSURE: 101 MMHG | HEIGHT: 71 IN | TEMPERATURE: 97.6 F | OXYGEN SATURATION: 96 % | HEART RATE: 82 BPM | DIASTOLIC BLOOD PRESSURE: 60 MMHG | BODY MASS INDEX: 27.22 KG/M2 | WEIGHT: 194.4 LBS

## 2024-05-15 DIAGNOSIS — Z13.220 SCREENING FOR LIPID DISORDERS: ICD-10-CM

## 2024-05-15 DIAGNOSIS — G40.109 LOCALIZATION-RELATED EPILEPSY (HCC): ICD-10-CM

## 2024-05-15 DIAGNOSIS — Z00.00 ANNUAL PHYSICAL EXAM: Primary | ICD-10-CM

## 2024-05-15 DIAGNOSIS — I49.8 BRUGADA SYNDROME: ICD-10-CM

## 2024-05-15 DIAGNOSIS — E87.1 HYPONATREMIA: ICD-10-CM

## 2024-05-15 PROCEDURE — 99395 PREV VISIT EST AGE 18-39: CPT | Performed by: FAMILY MEDICINE

## 2024-05-15 NOTE — PROGRESS NOTES
Adult Annual Physical  Name: Damian Jackson      : 1990      MRN: [id number]  Encounter Provider: Kaleigh Wheeler DO  Encounter Date: 5/15/2024   Encounter department: [facility] PRIMARY CARE    Assessment & Plan   1. Annual physical exam  -     CBC and Platelet; Future  2. Brugada syndrome  -     CBC and Platelet; Future  3. Hyponatremia  -     Comprehensive metabolic panel; Future  4. Screening for lipid disorders  -     Lipid panel; Future  -     CBC and Platelet; Future  5. Localization-related epilepsy (HCC)  -     Zonisamide level; Future    Immunizations and preventive care screenings were discussed with patient today. Appropriate education was printed on patient's after visit summary.    Counseling:  Alcohol/drug use: discussed moderation in alcohol intake, the recommendations for healthy alcohol use stains.  Dental Health: discussed importance of regular tooth brushing, flossing, and dental visits.  Injury prevention: discussed safety/seat belts, safety helmets, smoke detectors, carbon dioxide detectors.  Sexual health: na   Exercise: the importance of regular exercise/physical activity was discussed. Recommend exercise 3-5 times per week for at least 30 minutes.        33-year-old male who is accompanied by mom.  Doing well with addition of lorazepam from mental health and neurologist to help with his aggressive behavior.       History of Present Illness     Adult Annual Physical:  Patient presents for annual physical. Diet and exercise can.     Diet and Physical Activity:  - Diet/Nutrition: well balanced diet.  - Exercise: walking.    Depression Screening:  - PHQ-2 Score: 0    General Health:  - Sleep: sleeps well.  - Hearing: normal hearing bilateral ears.  - Vision: no vision problems and goes for regular eye exams.  - Dental: regular dental visits.     Health:  - History of STDs: no.   - Urinary symptoms: none.     Advanced Care Planning:  - Has an advanced directive?: no    -  "ACP document given to patient?: no      Review of Systems   All other systems reviewed and are negative.        Objective     /60   Pulse 82   Temp 97.6 °F (36.4 °C)   Ht 5' 11\" (1.803 m)   Wt 88.2 kg (194 lb 6.4 oz)   SpO2 96%   BMI 27.11 kg/m²     Physical Exam  Vitals and nursing note reviewed.   Constitutional:       General: He is not in acute distress.     Appearance: Normal appearance. He is well-developed.   HENT:      Head: Normocephalic and atraumatic.      Right Ear: Tympanic membrane normal.      Left Ear: Tympanic membrane normal.      Nose: Nose normal.      Mouth/Throat:      Mouth: Mucous membranes are moist.   Eyes:      Conjunctiva/sclera: Conjunctivae normal.   Neck:      Vascular: No carotid bruit.   Cardiovascular:      Rate and Rhythm: Normal rate and regular rhythm.      Heart sounds: No murmur heard.  Pulmonary:      Effort: Pulmonary effort is normal. No respiratory distress.      Breath sounds: Normal breath sounds.   Abdominal:      Palpations: Abdomen is soft.      Tenderness: There is no abdominal tenderness.   Genitourinary:     Penis: Normal.       Testes: Normal.   Musculoskeletal:         General: No swelling. Normal range of motion.   Skin:     Findings: No rash.   Neurological:      Mental Status: He is alert.      Comments: Mostly nonverbal   Psychiatric:         Mood and Affect: Mood normal.         Behavior: Behavior normal.         Thought Content: Thought content normal.         Judgment: Judgment normal.     Parents  Administrative Statements usually do not    "

## 2024-05-15 NOTE — PROGRESS NOTES
"Adult Annual Physical  Name: Damian Jackson      : 1990      MRN: [id number]  Encounter Provider: aKleigh Wheeler DO  Encounter Date: 5/15/2024   Encounter department: [facility] PRIMARY CARE    Assessment & Plan   1. Annual physical exam    Immunizations and preventive care screenings were discussed with patient today. Appropriate education was printed on patient's after visit summary.    Counseling:  {Annual Physical; Counselin}         History of Present Illness   {Disappearing Hyperlinks I Encounters * My Last Note * Since Last Visit * History :74752}  Adult Annual Physical  Review of Systems  {Select to Display PMH (Optional):38802}    Objective   {Disappearing Hyperlinks   Review Vitals * Enter New Vitals * Results Review * Labs * Imaging * Cardiology * Procedures * Lung Cancer Screening :58835}  /60   Pulse 82   Temp 97.6 °F (36.4 °C)   Ht 5' 11\" (1.803 m)   Wt 88.2 kg (194 lb 6.4 oz)   SpO2 96%   BMI 27.11 kg/m²     Physical Exam  Administrative Statements {Disappearing Hyperlinks I  Level of Service * PCMH/PCSP:13141}  {Time Spent Statement (Optional):12903}      "

## 2024-05-15 NOTE — PROGRESS NOTES
Adult Annual Physical  Name: Damian Jackson      : 1990      MRN: [id number]  Encounter Provider: Kaleigh Wheeler DO  Encounter Date: 5/15/2024   Encounter department: [facility] PRIMARY CARE    Assessment & Plan   1. Annual physical exam  -     CBC and Platelet; Future  2. Brugada syndrome  -     CBC and Platelet; Future  3. Hyponatremia  -     Comprehensive metabolic panel; Future  4. Screening for lipid disorders  -     Lipid panel; Future  -     CBC and Platelet; Future  5. Localization-related epilepsy (HCC)  -     Zonisamide level; Future    Immunizations and preventive care screenings were discussed with patient today. Appropriate education was printed on patient's after visit summary.    Counseling:  Alcohol/drug use: discussed moderation in alcohol intake, the recommendations for healthy alcohol use, and avoidance of illicit drug use.  Dental Health: discussed importance of regular tooth brushing, flossing, and dental visits.  Injury prevention: discussed safety/seat belts, safety helmets, smoke detectors, carbon dioxide detectors, and smoking near bedding or upholstery.  Sexual health: discussed sexually transmitted diseases, partner selection, use of condoms, avoidance of unintended pregnancy, and contraceptive alternatives.  Exercise: the importance of regular exercise/physical activity was discussed. Recommend exercise 3-5 times per week for at least 30 minutes.       Depression Screening and Follow-up Plan: Patient was screened for depression during today's encounter. They screened negative with a PHQ-2 score of 0.      Chief Complaint   Patient presents with   • Annual Exam    33-year-old female here for annual physical.  Accompanied by mom.  Behavior has been improved with the use of lorazepam from behavioral health provider/neurology.     Review of Systems   All other systems reviewed and are negative.      Past Medical History   Past Medical History:   Diagnosis Date   •  Autism    • Brugada syndrome    • Environmental allergies    • Seizure-like activity (HCC)     last assessed: 7/11/16   • Skin lesion     last assessed: 12/30/13   • Skin rash     last assessed: 3/1/13     Past Surgical History:   Procedure Laterality Date   • ADENOIDECTOMY     • CARDIAC ICD GENERATOR CHANGE  03/29/2021   • DENTAL SURGERY      wisdom teeth   • MYRINGOTOMY W/ TUBES      with ventilating tube insertion; x6     Family History   Problem Relation Age of Onset   • No Known Problems Mother    • No Known Problems Father    • Breast cancer Maternal Grandmother    • Cancer Maternal Grandfather         Salivary gland cancer   • Other Paternal Grandmother         brain tumor   • COPD Paternal Grandmother    • Hypertension Paternal Grandmother    • Cancer Paternal Grandfather      Current Outpatient Medications on File Prior to Visit   Medication Sig Dispense Refill   • acetaminophen (TYLENOL) 325 mg tablet Take 3 tablets (975 mg total) by mouth every 6 (six) hours as needed for mild pain, headaches or fever  0   • cholecalciferol (VITAMIN D3) 1,000 units tablet Take 1 tablet (1,000 Units total) by mouth daily 90 tablet 3   • escitalopram (LEXAPRO) 20 mg tablet Take 1 tablet (20 mg total) by mouth daily 30 tablet 1   • LORazepam (Ativan) 0.5 mg tablet Take 1 tablet (0.5 mg total) by mouth 4 (four) times a day 120 tablet 1   • LORazepam (ATIVAN) 1 mg tablet Take 1 tablet (1 mg total) by mouth every 6 (six) hours as needed for anxiety 30 tablet 1   • pyridoxine (B-6) 100 MG tablet Take 100 mg by mouth daily     • zonisamide (Zonegran) 100 mg capsule Take three capsules by mouth at night time. 270 capsule 3     No current facility-administered medications on file prior to visit.     Allergies   Allergen Reactions   • Cefaclor Hives   • Sulfamethoxazole-Trimethoprim Hives      Current Outpatient Medications on File Prior to Visit   Medication Sig Dispense Refill   • acetaminophen (TYLENOL) 325 mg tablet Take 3  "tablets (975 mg total) by mouth every 6 (six) hours as needed for mild pain, headaches or fever  0   • cholecalciferol (VITAMIN D3) 1,000 units tablet Take 1 tablet (1,000 Units total) by mouth daily 90 tablet 3   • escitalopram (LEXAPRO) 20 mg tablet Take 1 tablet (20 mg total) by mouth daily 30 tablet 1   • LORazepam (Ativan) 0.5 mg tablet Take 1 tablet (0.5 mg total) by mouth 4 (four) times a day 120 tablet 1   • LORazepam (ATIVAN) 1 mg tablet Take 1 tablet (1 mg total) by mouth every 6 (six) hours as needed for anxiety 30 tablet 1   • pyridoxine (B-6) 100 MG tablet Take 100 mg by mouth daily     • zonisamide (Zonegran) 100 mg capsule Take three capsules by mouth at night time. 270 capsule 3     No current facility-administered medications on file prior to visit.      Social History     Tobacco Use   • Smoking status: Never   • Smokeless tobacco: Never   Vaping Use   • Vaping status: Never Used   Substance and Sexual Activity   • Alcohol use: Never   • Drug use: Never   • Sexual activity: Not Currently       Objective   {Disappearing Hyperlinks   Review Vitals * Enter New Vitals * Results Review * Labs * Imaging * Cardiology * Procedures * Lung Cancer Screening :77324}  /60   Pulse 82   Temp 97.6 °F (36.4 °C)   Ht 5' 11\" (1.803 m)   Wt 88.2 kg (194 lb 6.4 oz)   SpO2 96%   BMI 27.11 kg/m²     Physical Exam  Vitals reviewed.   Constitutional:       Appearance: Normal appearance.   HENT:      Right Ear: Tympanic membrane normal.      Left Ear: Tympanic membrane normal.      Nose: Nose normal.      Mouth/Throat:      Mouth: Mucous membranes are moist.   Cardiovascular:      Rate and Rhythm: Normal rate and regular rhythm.   Pulmonary:      Effort: Pulmonary effort is normal.      Breath sounds: Normal breath sounds.   Genitourinary:     Penis: Normal.       Testes: Normal.   Musculoskeletal:         General: Normal range of motion.   Skin:     Findings: No rash.   Neurological:      Mental Status: He is " alert and oriented to person, place, and time.   Psychiatric:         Mood and Affect: Mood normal.       Administrative Statements {Disappearing Hyperlinks I  Level of Service * [facility] [place]:53348}  I have spent a total time of 25 minutes on 05/15/24 In caring for this patient including Patient and family education, Impressions, Counseling / Coordination of care, Documenting in the medical record, Reviewing / ordering tests, medicine, procedures  , and Obtaining or reviewing history  .

## 2024-05-22 ENCOUNTER — APPOINTMENT (OUTPATIENT)
Dept: LAB | Facility: HOSPITAL | Age: 34
End: 2024-05-22
Payer: COMMERCIAL

## 2024-05-22 DIAGNOSIS — G40.109 LOCALIZATION-RELATED EPILEPSY (HCC): ICD-10-CM

## 2024-05-22 DIAGNOSIS — Z13.220 SCREENING FOR LIPID DISORDERS: ICD-10-CM

## 2024-05-22 DIAGNOSIS — Z00.00 ANNUAL PHYSICAL EXAM: ICD-10-CM

## 2024-05-22 DIAGNOSIS — I49.8 BRUGADA SYNDROME: ICD-10-CM

## 2024-05-22 DIAGNOSIS — E87.1 HYPONATREMIA: ICD-10-CM

## 2024-05-22 LAB
ALBUMIN SERPL BCP-MCNC: 4.7 G/DL (ref 3.5–5)
ALP SERPL-CCNC: 69 U/L (ref 34–104)
ALT SERPL W P-5'-P-CCNC: 21 U/L (ref 7–52)
ANION GAP SERPL CALCULATED.3IONS-SCNC: 5 MMOL/L (ref 4–13)
AST SERPL W P-5'-P-CCNC: 22 U/L (ref 13–39)
BILIRUB SERPL-MCNC: 0.5 MG/DL (ref 0.2–1)
BUN SERPL-MCNC: 11 MG/DL (ref 5–25)
CALCIUM SERPL-MCNC: 9.3 MG/DL (ref 8.4–10.2)
CHLORIDE SERPL-SCNC: 111 MMOL/L (ref 96–108)
CHOLEST SERPL-MCNC: 185 MG/DL
CO2 SERPL-SCNC: 24 MMOL/L (ref 21–32)
CREAT SERPL-MCNC: 1.04 MG/DL (ref 0.6–1.3)
ERYTHROCYTE [DISTWIDTH] IN BLOOD BY AUTOMATED COUNT: 12.1 % (ref 11.6–15.1)
GFR SERPL CREATININE-BSD FRML MDRD: 95 ML/MIN/1.73SQ M
GLUCOSE P FAST SERPL-MCNC: 89 MG/DL (ref 65–99)
HCT VFR BLD AUTO: 47.1 % (ref 36.5–49.3)
HDLC SERPL-MCNC: 46 MG/DL
HGB BLD-MCNC: 16.9 G/DL (ref 12–17)
LDLC SERPL CALC-MCNC: 116 MG/DL (ref 0–100)
MCH RBC QN AUTO: 31.5 PG (ref 26.8–34.3)
MCHC RBC AUTO-ENTMCNC: 35.9 G/DL (ref 31.4–37.4)
MCV RBC AUTO: 88 FL (ref 82–98)
NONHDLC SERPL-MCNC: 139 MG/DL
PLATELET # BLD AUTO: 180 THOUSANDS/UL (ref 149–390)
PMV BLD AUTO: 9.7 FL (ref 8.9–12.7)
POTASSIUM SERPL-SCNC: 3.7 MMOL/L (ref 3.5–5.3)
PROT SERPL-MCNC: 6.8 G/DL (ref 6.4–8.4)
RBC # BLD AUTO: 5.37 MILLION/UL (ref 3.88–5.62)
SODIUM SERPL-SCNC: 140 MMOL/L (ref 135–147)
TRIGL SERPL-MCNC: 117 MG/DL
WBC # BLD AUTO: 4.52 THOUSAND/UL (ref 4.31–10.16)

## 2024-05-22 PROCEDURE — 80061 LIPID PANEL: CPT

## 2024-05-22 PROCEDURE — 80053 COMPREHEN METABOLIC PANEL: CPT

## 2024-05-22 PROCEDURE — 80203 DRUG SCREEN QUANT ZONISAMIDE: CPT

## 2024-05-22 PROCEDURE — 36415 COLL VENOUS BLD VENIPUNCTURE: CPT

## 2024-05-22 PROCEDURE — 85027 COMPLETE CBC AUTOMATED: CPT

## 2024-05-23 DIAGNOSIS — F84.0 AUTISTIC DISORDER: ICD-10-CM

## 2024-05-24 LAB — ZONISAMIDE SERPL-MCNC: 19.3 UG/ML (ref 10–40)

## 2024-05-28 RX ORDER — ESCITALOPRAM OXALATE 20 MG/1
20 TABLET ORAL DAILY
Qty: 30 TABLET | Refills: 1 | Status: SHIPPED | OUTPATIENT
Start: 2024-05-28

## 2024-06-17 DIAGNOSIS — F84.0 AUTISTIC DISORDER: ICD-10-CM

## 2024-06-17 RX ORDER — LORAZEPAM 1 MG/1
1 TABLET ORAL EVERY 6 HOURS PRN
Qty: 30 TABLET | Refills: 1 | Status: SHIPPED | OUTPATIENT
Start: 2024-06-17

## 2024-06-17 RX ORDER — LORAZEPAM 0.5 MG/1
0.5 TABLET ORAL 4 TIMES DAILY
Qty: 120 TABLET | Refills: 1 | Status: SHIPPED | OUTPATIENT
Start: 2024-06-17

## 2024-06-17 NOTE — TELEPHONE ENCOUNTER
Medication Refill Request     Name of Medication Ativan  Dose/Frequency 1mg  Quantity 30  Verified pharmacy   [x]  Verified ordering Provider   [x]  Does patient have enough for the next 3 days? Yes [x] No []  Does patient have a follow-up appointment scheduled? Yes [x] No []   If so when is appointment: 7/17/24    Medication Refill Request     Name of Medication Ativan  Dose/Frequency 0.5mg  Quantity 120  Verified pharmacy   [x]  Verified ordering Provider   [x]  Does patient have enough for the next 3 days? Yes [x] No []

## 2024-07-17 ENCOUNTER — OFFICE VISIT (OUTPATIENT)
Dept: PSYCHIATRY | Facility: CLINIC | Age: 34
End: 2024-07-17
Payer: COMMERCIAL

## 2024-07-17 DIAGNOSIS — F84.0 AUTISTIC DISORDER: ICD-10-CM

## 2024-07-17 PROCEDURE — 99213 OFFICE O/P EST LOW 20 MIN: CPT | Performed by: PSYCHIATRY & NEUROLOGY

## 2024-07-17 RX ORDER — ESCITALOPRAM OXALATE 20 MG/1
20 TABLET ORAL DAILY
Qty: 30 TABLET | Refills: 2 | Status: SHIPPED | OUTPATIENT
Start: 2024-07-17

## 2024-07-17 RX ORDER — LORAZEPAM 1 MG/1
1 TABLET ORAL EVERY 6 HOURS PRN
Qty: 30 TABLET | Refills: 2 | Status: SHIPPED | OUTPATIENT
Start: 2024-07-17 | End: 2024-07-22 | Stop reason: SDUPTHER

## 2024-07-17 NOTE — PSYCH
Damian Jackson 1990 [id number]     The patient was seen for continuing care and pharmacotherapy.He was accompanied by his mother.He has been doing generally better. Can sleep well thru the night. Lorezepam 1 mg three times a day seems to be effective controlling behavior.        ROS:    Current Mental Status Evaluation:  Appearance:  Adequate hygiene and grooming and Poor eye contact   Behavior:  Restless and Fidgety   Mood:  Uncertain   Affect: appropriate   Speech: Sparse and Dysarthric   Thought Process:  Unable to assess due to scant verbalization   Thought Content:  Cannot be assessed due to patient factors   Perceptual Disturbances: Cannot be assessed due to patient factors   Risk Potential: Unable to assess due to patient factors   Orientation:        No diagnosis found.       Current Outpatient Medications   Medication Instructions    acetaminophen (TYLENOL) 975 mg, Oral, Every 6 hours PRN    cholecalciferol (VITAMIN D3) 1,000 Units, Oral, Daily    escitalopram (LEXAPRO) 20 mg, Oral, Daily    LORazepam (ATIVAN) 1 mg, Oral, Every 6 hours PRN    LORazepam (ATIVAN) 0.5 mg, Oral, 4 times daily    pyridoxine (B-6) 100 mg, Oral, Daily    zonisamide (Zonegran) 100 mg capsule Take three capsules by mouth at night time.          Treatment Recommendations- Risks Benefits    Occasionally he can take prn dose of lorezepam 2 mg for severe agitation.  Risks, Benefits And Possible Side Effects Of Medications:  The risks and benefits of medications and proposed treatment plan were discussed with the patient's caregiver/POA/guardian.  The individual understands and agrees    VIRTUAL VISIT DISCLAIMER    Damian Renetta verbally agrees to participate in Virtual Care Services. Pt is aware that Virtual Care Services could be limited without vital signs or the ability to perform a full hands-on physical exam. Damian Jackson understands he or the provider may request at any time to terminate the video visit and request the  patient to seek care or treatment in person.     Dario Lopez MD 07/17/24

## 2024-07-19 ENCOUNTER — TELEPHONE (OUTPATIENT)
Age: 34
End: 2024-07-19

## 2024-07-19 NOTE — TELEPHONE ENCOUNTER
Patients mother  called the RX Refill Line. Message is being forwarded to the office.     Patients mother called regarding the Lorazepam.  She stated that his old lorazepam script was for 1 every 6 hours as needed.  The dr was supposed to change it TID.  They only gave her 30 pills so that will only last her 10 days.  She's asking for a new script with the new directions  and larger quantity be resent to [facility]    Please contact patients mother at [phone]

## 2024-07-22 DIAGNOSIS — F84.0 AUTISTIC DISORDER: ICD-10-CM

## 2024-07-22 RX ORDER — LORAZEPAM 1 MG/1
1 TABLET ORAL EVERY 6 HOURS PRN
Qty: 90 TABLET | Refills: 2 | Status: SHIPPED | OUTPATIENT
Start: 2024-07-22

## 2024-08-08 ENCOUNTER — REMOTE DEVICE CLINIC VISIT (OUTPATIENT)
Dept: CARDIOLOGY CLINIC | Facility: CLINIC | Age: 34
End: 2024-08-08
Payer: COMMERCIAL

## 2024-08-08 DIAGNOSIS — Z95.810 AICD (AUTOMATIC CARDIOVERTER/DEFIBRILLATOR) PRESENT: Primary | ICD-10-CM

## 2024-08-08 PROCEDURE — 93295 DEV INTERROG REMOTE 1/2/MLT: CPT | Performed by: INTERNAL MEDICINE

## 2024-08-08 PROCEDURE — 93296 REM INTERROG EVL PM/IDS: CPT | Performed by: INTERNAL MEDICINE

## 2024-08-08 NOTE — PROGRESS NOTES
Results for orders placed or performed in visit on 08/08/24   Cardiac EP device report    Narrative    MDT SINGLE CHAMBER ICD - ACTIVE SYSTEM IS MRI CONDITIONAL  CARELINK TRANSMISSION: BATTERY VOLTAGE ADEQUATE (10.6 YRS). <0.1%. ALL AVAILABLE LEAD PARAMETERS WITHIN NORMAL LIMITS. 1 SVT-WAVELET EPISODE @ 176 BPM LASTING 27 SEC. OPTI-VOL WITHIN NORMAL LIMITS. NORMAL DEVICE FUNCTION. GV

## 2024-08-26 ENCOUNTER — TELEPHONE (OUTPATIENT)
Age: 34
End: 2024-08-26

## 2024-08-26 NOTE — TELEPHONE ENCOUNTER
Patient called requesting refill for escitalopram (LEXAPRO) 20 mg tablet. Patient made aware medication was refilled on 7/17 for 30 with 2 refills to [facility] pharmacy. Patient instructed to contact the pharmacy to obtain refills of medication. Patient verbalized understanding.

## 2024-09-06 ENCOUNTER — OFFICE VISIT (OUTPATIENT)
Dept: NEUROLOGY | Facility: CLINIC | Age: 34
End: 2024-09-06

## 2024-09-06 VITALS
BODY MASS INDEX: 27.58 KG/M2 | OXYGEN SATURATION: 96 % | DIASTOLIC BLOOD PRESSURE: 70 MMHG | HEIGHT: 71 IN | TEMPERATURE: 98 F | HEART RATE: 82 BPM | SYSTOLIC BLOOD PRESSURE: 124 MMHG | WEIGHT: 197 LBS

## 2024-09-06 DIAGNOSIS — G40.109 LOCALIZATION-RELATED EPILEPSY (HCC): Primary | ICD-10-CM

## 2024-09-06 RX ORDER — ZONISAMIDE 100 MG/1
CAPSULE ORAL
Qty: 270 CAPSULE | Refills: 3 | Status: SHIPPED | OUTPATIENT
Start: 2024-09-06

## 2024-09-06 NOTE — PROGRESS NOTES
[facility]  Follow Up Visit    Impression/Plan    Mr. Jackson is a 33 y.o. male with epilepsy in the setting of autism manifest as  bilateral tonic-clonic seizures as well as right temporal electrographic seizure captured on rEEG in 2022 while asymptomatic or clinically subtle (swallowing sounds).  Clear seizures are currently controlled on zonisamide. There are staring spells and he may lose track of what he is doing during a task however his mother notes a decrease in frequency from 3-4 times a day to now 3-4 times a week.  If patient has worsening staring spells we can consider repeat routine EEG vs longer EEG monitoring done in the hospital with constant supervision by his mother.  He will not tolerate ambulatory EEG.  His neurological exam is limited due to not following commands but he otherwise nonfocal in nature, moves all extremities and can follow simple commands.  .     Patient Instructions   Continue taking Zonisamide 300 mg at night    Diagnoses and all orders for this visit:    Localization-related epilepsy (HCC)  -     zonisamide (Zonegran) 100 mg capsule; Take three capsules by mouth at night time.        Subjective    Damian Jackson is returning to the [facility] for follow up.     Interval Events:   Seizures since last visit: None  Hospitalizations: no    Current AEDs:  - zonisamide 300 mg HS   - lorazepam 1 mg TID, mother will give additional 0.5 mg for behavioral issues as needed - has not been needed since dose was increased by Psychiatry  Medication side effects: None  Medication adherence: Yes    Event/Seizure semiology:  Generalized tonic clonic seizure   Subclinical seizure - frequent swallowing noted on EEG     Special Features  Status epilepticus: no  Self Injury Seizures: fall in 3/2021 cause hit to head  Precipitating Factors: none     Epilepsy Risk Factors:  Autism     Prior AEDs:  vimpat - tremors, confusion,  difficulty sleeping, worsening mood  Levetiracetam - aggressive behaviors  Divalproex-concerned it contributed to aggressive behaviors, but behaviors did not improve when transitioned to zonisamide     Prior Evaluation:  CT head 3/2021- no acute findings     MRI brain w wo contrast 3/2/22:  IMPRESSION:  No acute intracranial abnormality.  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, asymmetrically thinner left fornix, and possibly smaller left mammillary body.   A few white matter changes, suggestive of minimal chronic microangiopathy.  Severe left sphenoid sinus disease with inspissated material.  The study was marked in EPIC for immediate notification.     EEGs normal in  2016 and 2020     REEG 2/25/2022: The study captures a 50 second focal electrographic seizure arising from the right mid temporal region without definite clinical correlate other than the possibility of prominent swallowing noted by the tech.      cveeg 2/25-2/:  Day 1 Interpretation:   This prolonged, continuous video-EEG recording is abnormal.   Diffuse theta frequency slowing and intermittent generalized rhythmic delta activity suggest moderate nonspecific diffuse cerebral dysfunction. Intermittent low amplitude right temporal polymorphic delta slowing suggests underlying focal neuronal dysfunction that may be structural in etiology. 2 right frontotemporal spikes in sleep raise the possibility of underlying focal epileptogenic potential.   No seizures are present.    Day 2 Interpretation:   This prolonged, continuous video-EEG recording is abnormal.   Diffuse theta frequency slowing and intermittent generalized rhythmic delta activity suggest moderate nonspecific diffuse cerebral dysfunction. Intermittent low amplitude right temporal polymorphic delta slowing suggests underlying focal neuronal dysfunction that may be structural in etiology.  One poorly formed right frontotemporal low amplitude sharp wave  "observed in sleep raises the possibility of underlying focal epileptogenic potential.   No seizures are present.      Routine EEG 1/30/2024: Normal EEG     Psychiatric History:  Autism  Following with psychiatry for management of behaviors     Social History:   Driving: No  Lives Alone: No. Lives with parents.     ROS:  Constitutional:  Negative for appetite change, fatigue and fever.   HENT: Negative.  Negative for hearing loss, tinnitus, trouble swallowing and voice change.    Eyes: Negative.  Negative for photophobia, pain and visual disturbance.   Respiratory: Negative.  Negative for shortness of breath.    Cardiovascular: Negative.  Negative for palpitations.   Gastrointestinal: Negative.  Negative for nausea and vomiting.   Endocrine: Negative.  Negative for cold intolerance.   Genitourinary: Negative.  Negative for dysuria, frequency and urgency.   Musculoskeletal:  Negative for back pain, gait problem, myalgias, neck pain and neck stiffness.   Skin: Negative.  Negative for rash.   Allergic/Immunologic: Negative.    Neurological: Negative.  Negative for dizziness, tremors, seizures, syncope, facial asymmetry, speech difficulty, weakness, light-headedness, numbness and headaches.   Hematological: Negative.  Does not bruise/bleed easily.   Psychiatric/Behavioral: Negative.  Negative for confusion, hallucinations and sleep disturbance.    All other systems reviewed and are negative.      Objective    /70 (BP Location: Right arm, Patient Position: Sitting, Cuff Size: Adult)   Pulse 82   Temp 98 °F (36.7 °C) (Temporal)   Ht 5' 11\" (1.803 m)   Wt 89.4 kg (197 lb)   SpO2 96%   BMI 27.48 kg/m²      General Exam  No acute distress.  Restless    Neurologic Exam  Mental Status:  Alert, can state name and age  Language: Dysarthric, speaks in broken syllables.  Can write sentences on paper.   Cranial Nerves:  EOMI, no nystagums. Face symmetric.   Motor:   moves all 4 extremities well without evidence of " weakness or asymmetry.   Coordination: Able to follow my finger with his finger without ataxia with right hand, did not use left hand.  DTRs: Normal and symmetric (biceps, patella).  Gait: Normal casual gait.

## 2024-10-17 ENCOUNTER — OFFICE VISIT (OUTPATIENT)
Dept: PSYCHIATRY | Facility: CLINIC | Age: 34
End: 2024-10-17
Payer: COMMERCIAL

## 2024-10-17 DIAGNOSIS — F84.0 AUTISTIC DISORDER: ICD-10-CM

## 2024-10-17 PROCEDURE — 99213 OFFICE O/P EST LOW 20 MIN: CPT | Performed by: PSYCHIATRY & NEUROLOGY

## 2024-10-17 RX ORDER — ESCITALOPRAM OXALATE 20 MG/1
20 TABLET ORAL DAILY
Qty: 30 TABLET | Refills: 2 | Status: SHIPPED | OUTPATIENT
Start: 2024-10-17

## 2024-10-17 RX ORDER — LORAZEPAM 0.5 MG/1
0.5 TABLET ORAL EVERY 6 HOURS PRN
Start: 2024-10-17

## 2024-10-17 RX ORDER — LORAZEPAM 1 MG/1
1 TABLET ORAL 3 TIMES DAILY
Qty: 90 TABLET | Refills: 2 | Status: SHIPPED | OUTPATIENT
Start: 2024-10-17

## 2024-10-17 NOTE — PSYCH
Damian Jackson 1990 [id number]     The patient was seen for continuing care and pharmacotherapy. All in all he is holding his own. Lorazepam helps control behaviors.Sleep and appetite are good.  No significant aggressive behavior.    ROS:  As HPI  Current Mental Status Evaluation:  Appearance:  Adequate hygiene and grooming and Poor eye contact   Behavior:  Calm   Mood:  Uncertain   Affect: Flat   Speech: Mute/refuses to talk   Thought Process:  Unable to assess due to scant verbalization   Thought Content:  Cannot be assessed due to patient factors   Perceptual Disturbances: Cannot be assessed due to patient factors   Risk Potential: Unable to assess due to patient factors   Orientation:        No diagnosis found.       Current Outpatient Medications   Medication Instructions    acetaminophen (TYLENOL) 975 mg, Oral, Every 6 hours PRN    cholecalciferol (VITAMIN D3) 1,000 Units, Oral, Daily    escitalopram (LEXAPRO) 20 mg, Oral, Daily    LORazepam (ATIVAN) 1 mg, Oral, Every 6 hours PRN    pyridoxine (B-6) 100 mg, Oral, Daily    zonisamide (Zonegran) 100 mg capsule Take three capsules by mouth at night time.          Treatment Recommendations- Risks Benefits    We will continue with same doses of medications.  Follow-up in 3 months  Risks, Benefits And Possible Side Effects Of Medications:  The risks and benefits of medications and proposed treatment plan were discussed with the patient's caregiver/POA/guardian.  The individual understands and agrees    VIRTUAL VISIT DISCLAIMER    Damian Jackson verbally agrees to participate in Virtual Care Services. Pt is aware that Virtual Care Services could be limited without vital signs or the ability to perform a full hands-on physical exam. Damian Jackson understands he or the provider may request at any time to terminate the video visit and request the patient to seek care or treatment in person.     Dario Lopez MD 10/17/24

## 2024-10-17 NOTE — BH TREATMENT PLAN
TREATMENT PLAN (Medication Management Only)        [facility]    Name and Date of Birth:  Damian Jackson 33 y.o. 1990  Date of Treatment Plan: October 17, 2024  Diagnosis/Diagnoses:    1. Autistic disorder      Strengths/Personal Resources for Self-Care: supportive family, family ties.  Area/Areas of need (in own words): anxiety symptoms  1. Long Term Goal: maintain control of anxiety.  Target Date:6 months - 4/17/2025  Person/Persons responsible for completion of goal: family  2.  Short Term Objective (s) - How will we reach this goal?:   A. Provider new recommended medication/dosage changes and/or continue medication(s): continue current medications as prescribed.  B. N/A.  C. N/A.  Target Date:6 months - 4/17/2025  Person/Persons Responsible for Completion of Goal: family  Progress Towards Goals: stable, continuing treatment  Treatment Modality: medication management every 3 months  Review due 180 days from date of this plan:  12 months  Expected length of service: ongoing treatment  My Physician/PA/NP and I have developed this plan together and I agree to work on the goals and objectives. I understand the treatment goals that were developed for my treatment.

## 2024-11-07 ENCOUNTER — REMOTE DEVICE CLINIC VISIT (OUTPATIENT)
Dept: CARDIOLOGY CLINIC | Facility: CLINIC | Age: 34
End: 2024-11-07
Payer: COMMERCIAL

## 2024-11-07 DIAGNOSIS — Z95.810 AICD (AUTOMATIC CARDIOVERTER/DEFIBRILLATOR) PRESENT: Primary | ICD-10-CM

## 2024-11-07 PROCEDURE — 93296 REM INTERROG EVL PM/IDS: CPT | Performed by: INTERNAL MEDICINE

## 2024-11-07 PROCEDURE — 93295 DEV INTERROG REMOTE 1/2/MLT: CPT | Performed by: INTERNAL MEDICINE

## 2024-11-07 NOTE — PROGRESS NOTES
Results for orders placed or performed in visit on 11/07/24   Cardiac EP device report    Narrative    MDT SINGLE CHAMBER ICD - ACTIVE SYSTEM IS MRI CONDITIONAL  CARELINK TRANSMISSION: BATTERY VOLTAGE ADEQUATE (10.2 YRS). <0.1%. ALL AVAILABLE LEAD PARAMETERS WITHIN NORMAL LIMITS. NO SIGNIFICANT HIGH RATE EPISODES. OPTI-VOL WITHIN NORMAL LIMITS. NORMAL DEVICE FUNCTION. GV

## 2024-12-27 DIAGNOSIS — E55.9 VITAMIN D DEFICIENCY: ICD-10-CM

## 2024-12-27 RX ORDER — CHOLECALCIFEROL (VITAMIN D3) 25 MCG
1000 TABLET ORAL DAILY
Qty: 90 TABLET | Refills: 1 | Status: SHIPPED | OUTPATIENT
Start: 2024-12-27

## 2025-01-20 ENCOUNTER — TELEMEDICINE (OUTPATIENT)
Dept: PSYCHIATRY | Facility: CLINIC | Age: 35
End: 2025-01-20
Payer: COMMERCIAL

## 2025-01-20 DIAGNOSIS — F84.0 AUTISTIC DISORDER: ICD-10-CM

## 2025-01-20 PROCEDURE — 99212 OFFICE O/P EST SF 10 MIN: CPT | Performed by: PSYCHIATRY & NEUROLOGY

## 2025-01-20 RX ORDER — ESCITALOPRAM OXALATE 20 MG/1
20 TABLET ORAL DAILY
Qty: 30 TABLET | Refills: 2 | Status: SHIPPED | OUTPATIENT
Start: 2025-01-20

## 2025-01-20 RX ORDER — LORAZEPAM 1 MG/1
1 TABLET ORAL 3 TIMES DAILY
Qty: 90 TABLET | Refills: 2 | Status: SHIPPED | OUTPATIENT
Start: 2025-01-20

## 2025-01-20 RX ORDER — LORAZEPAM 0.5 MG/1
0.5 TABLET ORAL EVERY 6 HOURS PRN
Qty: 45 TABLET | Refills: 0 | Status: SHIPPED | OUTPATIENT
Start: 2025-01-20

## 2025-01-20 NOTE — PSYCH
MEDICATION MANAGEMENT NOTE    PSYCHIATRIC VIRTUAL VISIT        [facility]      Name and Date of Birth:  Damian Jackson 34 y.o. 1990 MRN: [id number]    Psychiatric Virtual Visit:    Verification of patient location: Patient is located in the state that I hold an active license: [place]    REQUIRED DOCUMENTATION:     Provider located at [facility] at [address] [place] [address].  TeleMed provider: Dario Lopez MD  Patient was identified by name and date of birth. Damian Jackson was informed that this is a telemedicine visit and that the visit is being conducted through the Epic Embedded platform. He agrees to proceed..  My office door was closed. No one else was in the room.  He acknowledged consent and understanding of privacy and security of the video platform. The patient has agreed to participate and understands they can discontinue the visit at any time. Patient is aware this is a billable service.         This note was shared with patient.    I spent 14 minutes directly with the patient during this visit        Review Of Systems:     Mood Euthymic   Thought Content Normal   General As HPI   Physical symptoms As Noted in HPI       Laboratory Results: No results found for this or any previous visit.    Subjective:  The patient was seen for continuing care and pharmacotherapy with his mother present, providing history and current status.  Most recently, when his demands are not met immediately, he becomes upset and shows aggressive tendencies but not to the extent that it used to be.  He generally sleeps and eats well and attends to his grooming        Objective:   Stable    Mental status:  Appearance adequate hygiene and grooming, restless and fidgety, and poor eye contact    Mood Uncertain   Affect affect appropriate    Speech Non-verbal   Thought Processes Unable to assess   Hallucinations Unable to assess   Thought  Content Cannot be assessed due to patient factors   Abnormal Thoughts Unable to assess   Orientation Unable to assess       Assessment/Plan:       Diagnoses and all orders for this visit:    Autistic disorder  -     LORazepam (Ativan) 0.5 mg tablet; Take 1 tablet (0.5 mg total) by mouth every 6 (six) hours as needed for anxiety For breakthrough anxiety  -     LORazepam (ATIVAN) 1 mg tablet; Take 1 tablet (1 mg total) by mouth 3 (three) times a day  -     escitalopram (LEXAPRO) 20 mg tablet; Take 1 tablet (20 mg total) by mouth daily        1. Autistic disorder        Treatment Recommendations- Risks Benefits    We will continue with same medications.  Follow-up in 3 months  Risks, Benefits And Possible Side Effects Of Medications:  The risks and benefits of medications and proposed treatment plan were discussed with the patient's caregiver/POA/guardian.  The individual understands and agrees    VIRTUAL VISIT DISCLAIMER    Damian Jackson verbally agrees to participate in Virtual Care Services. Pt is aware that Virtual Care Services could be limited without vital signs or the ability to perform a full hands-on physical exam. Damian Jackson understands he or the provider may request at any time to terminate the video visit and request the patient to seek care or treatment in person.     Dario Lopez MD 01/20/25

## 2025-01-21 ENCOUNTER — TELEPHONE (OUTPATIENT)
Dept: PSYCHIATRY | Facility: CLINIC | Age: 35
End: 2025-01-21

## 2025-01-21 NOTE — TELEPHONE ENCOUNTER
Spoke with parent/guardian and scheduled 4 month follow up for Dario Lopez also scheduled an additional one for them.

## 2025-01-21 NOTE — PSYCH
MEDICATION MANAGEMENT NOTE     PSYCHIATRIC VIRTUAL VISIT         [facility]        Name and Date of Birth:  Damian Jackson 34 y.o. 1990 MRN: [id number]  Virtual Regular Visit    Verification of patient location:    Patient is located at Home in the following state in which I hold an active license [place]      Assessment/Plan:    Problem List Items Addressed This Visit          Behavioral Health    Autistic disorder    Relevant Medications    LORazepam (Ativan) 0.5 mg tablet    LORazepam (ATIVAN) 1 mg tablet    escitalopram (LEXAPRO) 20 mg tablet       Goals addressed in session: Goal 1     Depression Follow-up Plan Completed: Not applicable    Reason for visit is   continuing care and pharmacotherapy       Encounter provider Dario Lopez MD      Recent Visits  Date Type Provider Dept   01/20/25 Telemedicine Dario Lopez MD [facility]   Showing recent visits within past 7 days and meeting all other requirements  Today's Visits  Date Type Provider Dept   01/21/25 Telephone Dario Lopez MD [facility]   Showing today's visits and meeting all other requirements  Future Appointments  No visits were found meeting these conditions.  Showing future appointments within next 150 days and meeting all other requirements       The patient was identified by name and date of birth. Damian Jackson was informed that this is a telemedicine visit and that the visit is being conducted throughthe Epic Embedded platform. He agrees to proceed..  My office door was closed. No one else was in the room.  His mother acknowledged consent and understanding of privacy and security of the video platform. The patient's mother has agreed to participate and understands they can discontinue the visit at any time.    Patient is aware this is a billable service.     Subjective  Damian Jackson is a 34 y.o. male  .The patient was seen for continuing  care and pharmacotherapy with his mother present, providing history and current status.  Most recently, when his demands are not met immediately, he becomes upset and shows aggressive tendencies but not to the extent that it used to be.  He generally sleeps and eats well and attends to his grooming            Past Medical History:   Diagnosis Date    Autism     Brugada syndrome     Environmental allergies     Seizure-like activity (HCC)     last assessed: 7/11/16    Skin lesion     last assessed: 12/30/13    Skin rash     last assessed: 3/1/13       Past Surgical History:   Procedure Laterality Date    ADENOIDECTOMY      CARDIAC ICD GENERATOR CHANGE  03/29/2021    DENTAL SURGERY      wisdom teeth    MYRINGOTOMY W/ TUBES      with ventilating tube insertion; x6       Current Outpatient Medications   Medication Sig Dispense Refill    escitalopram (LEXAPRO) 20 mg tablet Take 1 tablet (20 mg total) by mouth daily 30 tablet 2    LORazepam (Ativan) 0.5 mg tablet Take 1 tablet (0.5 mg total) by mouth every 6 (six) hours as needed for anxiety For breakthrough anxiety 45 tablet 0    LORazepam (ATIVAN) 1 mg tablet Take 1 tablet (1 mg total) by mouth 3 (three) times a day 90 tablet 2    acetaminophen (TYLENOL) 325 mg tablet Take 3 tablets (975 mg total) by mouth every 6 (six) hours as needed for mild pain, headaches or fever  0    cholecalciferol (VITAMIN D3) 1,000 units tablet TAKE 1 TABLET BY MOUTH EVERY DAY 90 tablet 1    pyridoxine (B-6) 100 MG tablet Take 100 mg by mouth daily      zonisamide (Zonegran) 100 mg capsule Take three capsules by mouth at night time. 270 capsule 3     No current facility-administered medications for this visit.        Allergies   Allergen Reactions    Cefaclor Hives    Sulfamethoxazole-Trimethoprim Hives       Review of Systems  Mood Euthymic   Thought Content Normal   General As HPI   Physical symptoms As Noted in HPI      Video Exam  Expand All Collapse All              Psychiatric Virtual  Visit:     Verification of patient location: Patient is located in the state that I hold an active license: [place]     REQUIRED DOCUMENTATION:      Provider located at [facility] at [address] [place] [address].  TeleMed provider: Dario Lopez MD  Patient was identified by name and date of birth. Damian Jackson was informed that this is a telemedicine visit and that the visit is being conducted through the Epic Embedded platform. He agrees to proceed..  My office door was closed. No one else was in the room.  He acknowledged consent and understanding of privacy and security of the video platform. The patient has agreed to participate and understands they can discontinue the visit at any time. Patient is aware this is a billable service.            This note was shared with patient.     I spent 14 minutes directly with the patient during this visit           Review Of Systems:     Mood Euthymic   Thought Content Normal   General As HPI   Physical symptoms As Noted in HPI         Laboratory Results: No results found for this or any previous visit.     Subjective:  The patient was seen for continuing care and pharmacotherapy with his mother present, providing history and current status.  Most recently, when his demands are not met immediately, he becomes upset and shows aggressive tendencies but not to the extent that it used to be.  He generally sleeps and eats well and attends to his grooming           Objective:   Stable     Mental status:  Appearance adequate hygiene and grooming, restless and fidgety, and poor eye contact    Mood Uncertain   Affect affect appropriate    Speech Non-verbal   Thought Processes Unable to assess   Hallucinations Unable to assess   Thought Content Cannot be assessed due to patient factors   Abnormal Thoughts Unable to assess   Orientation Unable to assess                 Visit Time    Visit Start Time: 10:30 am  Visit Stop Time: 10:45  Total Visit  Duration:  20 minutes including documentation

## 2025-01-23 ENCOUNTER — TELEPHONE (OUTPATIENT)
Dept: PSYCHIATRY | Facility: CLINIC | Age: 35
End: 2025-01-23

## 2025-02-06 ENCOUNTER — REMOTE DEVICE CLINIC VISIT (OUTPATIENT)
Dept: CARDIOLOGY CLINIC | Facility: CLINIC | Age: 35
End: 2025-02-06
Payer: COMMERCIAL

## 2025-02-06 DIAGNOSIS — Z95.810 PRESENCE OF AUTOMATIC CARDIOVERTER/DEFIBRILLATOR (AICD): Primary | ICD-10-CM

## 2025-02-06 PROCEDURE — 93295 DEV INTERROG REMOTE 1/2/MLT: CPT | Performed by: INTERNAL MEDICINE

## 2025-02-06 NOTE — PROGRESS NOTES
MDT SC ICD - ACTIVE SYSTEM IS MRI CONDITIONAL   CARELINK TRANSMISSION: BATTERY VOLTAGE ADEQUATE (10.1 YR).  <0.1%. ALL LEAD PARAMETERS WITHIN NORMAL LIMITS. NO SIGNIFICANT HIGH RATE EPISODES. OPTI-VOL WITHIN NORMAL LIMITS. NORMAL DEVICE FUNCTION. [facility]/NC

## 2025-02-08 ENCOUNTER — RESULTS FOLLOW-UP (OUTPATIENT)
Dept: CARDIOLOGY CLINIC | Facility: CLINIC | Age: 35
End: 2025-02-08

## 2025-02-10 ENCOUNTER — OFFICE VISIT (OUTPATIENT)
Dept: URGENT CARE | Age: 35
End: 2025-02-10
Payer: COMMERCIAL

## 2025-02-10 VITALS
BODY MASS INDEX: 27.48 KG/M2 | DIASTOLIC BLOOD PRESSURE: 80 MMHG | WEIGHT: 197 LBS | HEART RATE: 83 BPM | OXYGEN SATURATION: 98 % | TEMPERATURE: 98.5 F | RESPIRATION RATE: 20 BRPM | SYSTOLIC BLOOD PRESSURE: 106 MMHG

## 2025-02-10 DIAGNOSIS — H72.92 ACUTE OTITIS MEDIA OF LEFT EAR WITH PERFORATION: Primary | ICD-10-CM

## 2025-02-10 DIAGNOSIS — H66.92 ACUTE OTITIS MEDIA OF LEFT EAR WITH PERFORATION: Primary | ICD-10-CM

## 2025-02-10 PROCEDURE — 99213 OFFICE O/P EST LOW 20 MIN: CPT

## 2025-02-10 RX ORDER — AZITHROMYCIN 250 MG/1
TABLET, FILM COATED ORAL
Qty: 6 TABLET | Refills: 0 | Status: SHIPPED | OUTPATIENT
Start: 2025-02-10 | End: 2025-02-14

## 2025-02-10 RX ORDER — OFLOXACIN 3 MG/ML
10 SOLUTION AURICULAR (OTIC) DAILY
Qty: 10 ML | Refills: 0 | Status: SHIPPED | OUTPATIENT
Start: 2025-02-10

## 2025-02-10 NOTE — PROGRESS NOTES
[facility]        NAME: Damian Jackson is a 34 y.o. male  : 1990    MRN: [id number]  DATE: February 10, 2025  TIME: 11:47 AM    Assessment and Plan   Acute otitis media of left ear with perforation [H66.92, H72.92]  1. Acute otitis media of left ear with perforation  azithromycin (ZITHROMAX) 250 mg tablet    ofloxacin (FLOXIN) 0.3 % otic solution            Patient Instructions     Use antibiotic as prescribed  Avoid Q-Tip use  Tylenol/Ibuprofen for discomfort  Fluids  Change pillowcase daily  Follow up with PCP in 3-5 days.  Proceed to ER if symptoms worsen.     Eat yogurt with live and active cultures and/or take a probiotic at least 3 hours before or after antibiotic dose. Monitor stool for diarrhea and/or blood. If this occurs, contact primary care doctor ASAP.    If tests are performed, our office will contact you with results only if changes need to made to the care plan discussed with you at the visit. [facility]    Chief Complaint     Chief Complaint   Patient presents with   • Earache     Started yesterday morning with pulling on left ear as noted by pt's caretaker.  Also has had redness and drainage.  Unsure of fevers.  Denies URI sxs.           History of Present Illness       Patient is a 33 yo male with significant PMH of chronic left TM perforation presenting in the clinic today for ear drainage which began yesterday. Patient presents with his mother. Admits left ear pulling and purulent left ear drainage. Denies fever, sore throat, cough, congestion, rhinorrhea, v/d. Admits the use of tylenol for sx management. Denies recent sick contacts and recent URI sx.      Review of Systems   Review of Systems   Constitutional:  Negative for fever.   HENT:  Positive for ear discharge and ear pain. Negative for congestion, rhinorrhea and sore throat.    Gastrointestinal:  Negative for diarrhea and vomiting.         Current Medications       Current  Outpatient Medications:   •  acetaminophen (TYLENOL) 325 mg tablet, Take 3 tablets (975 mg total) by mouth every 6 (six) hours as needed for mild pain, headaches or fever, Disp: , Rfl: 0  •  azithromycin (ZITHROMAX) 250 mg tablet, Take 2 tablets today then 1 tablet daily x 4 days, Disp: 6 tablet, Rfl: 0  •  cholecalciferol (VITAMIN D3) 1,000 units tablet, TAKE 1 TABLET BY MOUTH EVERY DAY, Disp: 90 tablet, Rfl: 1  •  escitalopram (LEXAPRO) 20 mg tablet, Take 1 tablet (20 mg total) by mouth daily, Disp: 30 tablet, Rfl: 2  •  LORazepam (Ativan) 0.5 mg tablet, Take 1 tablet (0.5 mg total) by mouth every 6 (six) hours as needed for anxiety For breakthrough anxiety, Disp: 45 tablet, Rfl: 0  •  LORazepam (ATIVAN) 1 mg tablet, Take 1 tablet (1 mg total) by mouth 3 (three) times a day, Disp: 90 tablet, Rfl: 2  •  ofloxacin (FLOXIN) 0.3 % otic solution, Administer 10 drops into the left ear daily, Disp: 10 mL, Rfl: 0  •  pyridoxine (B-6) 100 MG tablet, Take 100 mg by mouth daily, Disp: , Rfl:   •  zonisamide (Zonegran) 100 mg capsule, Take three capsules by mouth at night time., Disp: 270 capsule, Rfl: 3    Current Allergies     Allergies as of 02/10/2025 - Reviewed 02/10/2025   Allergen Reaction Noted   • Cefaclor Hives 01/24/2013   • Sulfamethoxazole-trimethoprim Hives 01/24/2013            The following portions of the patient's history were reviewed and updated as appropriate: allergies, current medications, past family history, past medical history, past social history, past surgical history and problem list.     Past Medical History:   Diagnosis Date   • Autism    • Brugada syndrome    • Environmental allergies    • Seizure-like activity (HCC)     last assessed: 7/11/16   • Skin lesion     last assessed: 12/30/13   • Skin rash     last assessed: 3/1/13       Past Surgical History:   Procedure Laterality Date   • ADENOIDECTOMY     • CARDIAC ICD GENERATOR CHANGE  03/29/2021   • DENTAL SURGERY      wisdom teeth   •  MYRINGOTOMY W/ TUBES      with ventilating tube insertion; x6       Family History   Problem Relation Age of Onset   • No Known Problems Mother    • No Known Problems Father    • Breast cancer Maternal Grandmother    • Cancer Maternal Grandfather         Salivary gland cancer   • Other Paternal Grandmother         brain tumor   • COPD Paternal Grandmother    • Hypertension Paternal Grandmother    • Cancer Paternal Grandfather          Medications have been verified.        Objective   /80   Pulse 83   Temp 98.5 °F (36.9 °C)   Resp 20   Wt 89.4 kg (197 lb)   SpO2 98%   BMI 27.48 kg/m²        Physical Exam     Physical Exam  Vitals reviewed.   Constitutional:       General: He is not in acute distress.     Appearance: Normal appearance. He is normal weight. He is not ill-appearing.   HENT:      Head: Normocephalic.      Right Ear: Tympanic membrane, ear canal and external ear normal. No drainage or tenderness. There is no impacted cerumen. Tympanic membrane is not perforated or erythematous.      Left Ear: External ear normal. Drainage and tenderness present. There is no impacted cerumen. Tympanic membrane is perforated and erythematous.      Nose: Nose normal. No congestion or rhinorrhea.      Mouth/Throat:      Mouth: Mucous membranes are moist.   Eyes:      General:         Right eye: No discharge.         Left eye: No discharge.      Conjunctiva/sclera: Conjunctivae normal.   Cardiovascular:      Rate and Rhythm: Normal rate and regular rhythm.      Pulses: Normal pulses.      Heart sounds: Normal heart sounds.      No friction rub. No gallop.   Pulmonary:      Effort: Pulmonary effort is normal.      Breath sounds: Normal breath sounds. No wheezing, rhonchi or rales.   Musculoskeletal:      Cervical back: Normal range of motion and neck supple.   Skin:     General: Skin is warm.      Findings: No rash.   Neurological:      Mental Status: He is alert.   Psychiatric:         Mood and Affect: Mood  normal.         Behavior: Behavior normal.

## 2025-03-04 ENCOUNTER — OFFICE VISIT (OUTPATIENT)
Dept: NEUROLOGY | Facility: CLINIC | Age: 35
End: 2025-03-04
Payer: COMMERCIAL

## 2025-03-04 VITALS
BODY MASS INDEX: 27.33 KG/M2 | RESPIRATION RATE: 18 BRPM | TEMPERATURE: 98.2 F | OXYGEN SATURATION: 94 % | WEIGHT: 195.2 LBS | HEART RATE: 81 BPM | DIASTOLIC BLOOD PRESSURE: 60 MMHG | HEIGHT: 71 IN | SYSTOLIC BLOOD PRESSURE: 102 MMHG

## 2025-03-04 DIAGNOSIS — G40.109 LOCALIZATION-RELATED EPILEPSY (HCC): Primary | ICD-10-CM

## 2025-03-04 DIAGNOSIS — F84.0 AUTISTIC DISORDER: ICD-10-CM

## 2025-03-04 PROCEDURE — 99214 OFFICE O/P EST MOD 30 MIN: CPT | Performed by: PHYSICIAN ASSISTANT

## 2025-03-04 NOTE — ASSESSMENT & PLAN NOTE
Mr. Jackson is a 34 y.o. male with epilepsy in the setting of autism manifest as bilateral tonic-clonic seizures as well as right temporal electrographic seizure captured on rEEG in 2022 while asymptomatic or clinically subtle (swallowing sounds).  Clear seizures are currently controlled on zonisamide. There are staring spells and he may lose track of what he is doing during a task.  We have considered longer EEG monitoring to try and capture these events, but patient has difficulty tolerating EEGs.  These are occurring at about the same rate as last visit.  If patient has worsening staring spells we can consider repeat routine EEG vs longer EEG monitoring done in the hospital with constant supervision by his mother.  He will not tolerate ambulatory EEG.     Plan:  - Continue zonisamide 300 mg at bedtime  - Call the office if there is any increase in the staring episodes.  Would then consider repeat routine EEG versus longer video EEG monitoring  - Blood work can be done with next set of routine labs (BMP, zonisamide level)  - Follow-up in 6 months or sooner if needed

## 2025-03-04 NOTE — PATIENT INSTRUCTIONS
Continue zonisamide 300mg at bedtime  I ordered blood work (BMP, zonisamide level) which can be done with next set of routine labs (can wait until he sees PCP in May)  Call if any new symptoms or increase in staring episodes   Follow up in 6 months

## 2025-03-04 NOTE — PROGRESS NOTES
"Name: Damian Jackson      : 1990      MRN: [id number]  Encounter Provider: Elizabeth Gould PA-C  Encounter Date: 3/4/2025   Encounter department: [facility]  :  Assessment & Plan  Localization-related epilepsy (HCC)  Mr. Jackson is a 34 y.o. male with epilepsy in the setting of autism manifest as bilateral tonic-clonic seizures as well as right temporal electrographic seizure captured on rEEG in  while asymptomatic or clinically subtle (swallowing sounds).  Clear seizures are currently controlled on zonisamide. There are staring spells and he may lose track of what he is doing during a task.  We have considered longer EEG monitoring to try and capture these events, but patient has difficulty tolerating EEGs.  These are occurring at about the same rate as last visit.  If patient has worsening staring spells we can consider repeat routine EEG vs longer EEG monitoring done in the hospital with constant supervision by his mother.  He will not tolerate ambulatory EEG.     Plan:  - Continue zonisamide 300 mg at bedtime  - Call the office if there is any increase in the staring episodes.  Would then consider repeat routine EEG versus longer video EEG monitoring  - Blood work can be done with next set of routine labs (BMP, zonisamide level)  - Follow-up in 6 months or sooner if needed       Autistic disorder  Patient follows with psychiatry             History of Present Illness   HPI   Damian Jackson is returning to the [facility] for follow up.      Interval Events:   Seizures since last visit: None  Hospitalizations: no    Patient is here with his mother today.  He has not had any clear seizures.  He still has the same staring episodes.  Sometimes these episodes can occur several times a day, and sometimes he can go days without having them.  He seems to be tolerating his medications well.  Sleep is \"on and off\".  Some nights are good, some nights " are not as bad.  His behaviors are about the same.  He still has some behavioral outbursts.  Continues to follow with psychiatry.     Current AEDs:  - zonisamide 300 mg HS   - lorazepam 1 mg TID, mother will give additional 0.5 mg for behavioral issues as needed - has not been needed since dose was increased by Psychiatry  Medication side effects: None  Medication adherence: Yes     Event/Seizure semiology:  Generalized tonic clonic seizure   Subclinical seizure - frequent swallowing noted on EEG     Special Features  Status epilepticus: no  Self Injury Seizures: fall in 3/2021 cause hit to head  Precipitating Factors: none     Epilepsy Risk Factors:  Autism     Prior AEDs:  vimpat - tremors, confusion, difficulty sleeping, worsening mood  Levetiracetam - aggressive behaviors  Divalproex-concerned it contributed to aggressive behaviors, but behaviors did not improve when transitioned to zonisamide     Prior Evaluation:  CT head 3/2021- no acute findings     MRI brain w wo contrast 3/2/22:  IMPRESSION:  No acute intracranial abnormality.  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, asymmetrically thinner left fornix, and possibly smaller left mammillary body.   A few white matter changes, suggestive of minimal chronic microangiopathy.  Severe left sphenoid sinus disease with inspissated material.  The study was marked in EPIC for immediate notification.     EEGs normal in  2016 and 2020     REEG 2/25/2022: The study captures a 50 second focal electrographic seizure arising from the right mid temporal region without definite clinical correlate other than the possibility of prominent swallowing noted by the tech.      cveeg 2/25-2/:  Day 1 Interpretation:   This prolonged, continuous video-EEG recording is abnormal.   Diffuse theta frequency slowing and intermittent generalized rhythmic delta activity suggest moderate nonspecific diffuse cerebral dysfunction. Intermittent low  amplitude right temporal polymorphic delta slowing suggests underlying focal neuronal dysfunction that may be structural in etiology. 2 right frontotemporal spikes in sleep raise the possibility of underlying focal epileptogenic potential.   No seizures are present.    Day 2 Interpretation:   This prolonged, continuous video-EEG recording is abnormal.   Diffuse theta frequency slowing and intermittent generalized rhythmic delta activity suggest moderate nonspecific diffuse cerebral dysfunction. Intermittent low amplitude right temporal polymorphic delta slowing suggests underlying focal neuronal dysfunction that may be structural in etiology.  One poorly formed right frontotemporal low amplitude sharp wave observed in sleep raises the possibility of underlying focal epileptogenic potential.   No seizures are present.      Routine EEG 1/30/2024: Normal EEG     Psychiatric History:  Autism  Following with psychiatry for management of behaviors     Social History:   Driving: No  Lives Alone: No. Lives with parents.       Review of Systems   Constitutional: Negative.  Negative for chills, fatigue and fever.   HENT: Negative.  Negative for hearing loss, tinnitus and trouble swallowing.    Eyes:  Negative for photophobia, pain and visual disturbance.   Respiratory: Negative.  Negative for cough and shortness of breath.    Cardiovascular: Negative.  Negative for chest pain and palpitations.   Gastrointestinal:  Negative for constipation, diarrhea, nausea and vomiting.   Endocrine: Negative.    Genitourinary: Negative.  Negative for difficulty urinating and urgency.   Musculoskeletal: Negative.  Negative for gait problem.   Skin: Negative.  Negative for rash.   Neurological: Negative.  Negative for dizziness, tremors, seizures, weakness, numbness and headaches.   Hematological: Negative.    Psychiatric/Behavioral:  Negative for confusion and sleep disturbance.     I have personally reviewed the MA's review of systems and  "made changes as necessary.    Current Outpatient Medications on File Prior to Visit   Medication Sig Dispense Refill    acetaminophen (TYLENOL) 325 mg tablet Take 3 tablets (975 mg total) by mouth every 6 (six) hours as needed for mild pain, headaches or fever  0    cholecalciferol (VITAMIN D3) 1,000 units tablet TAKE 1 TABLET BY MOUTH EVERY DAY 90 tablet 1    escitalopram (LEXAPRO) 20 mg tablet Take 1 tablet (20 mg total) by mouth daily 30 tablet 2    LORazepam (Ativan) 0.5 mg tablet Take 1 tablet (0.5 mg total) by mouth every 6 (six) hours as needed for anxiety For breakthrough anxiety 45 tablet 0    LORazepam (ATIVAN) 1 mg tablet Take 1 tablet (1 mg total) by mouth 3 (three) times a day 90 tablet 2    pyridoxine (B-6) 100 MG tablet Take 100 mg by mouth daily      zonisamide (Zonegran) 100 mg capsule Take three capsules by mouth at night time. 270 capsule 3    ofloxacin (FLOXIN) 0.3 % otic solution Administer 10 drops into the left ear daily (Patient not taking: Reported on 3/4/2025) 10 mL 0     No current facility-administered medications on file prior to visit.         Objective   /60 (BP Location: Left arm, Patient Position: Sitting, Cuff Size: Standard)   Pulse 81   Temp 98.2 °F (36.8 °C) (Temporal)   Resp 18   Ht 5' 11\" (1.803 m)   Wt 88.5 kg (195 lb 3.2 oz)   SpO2 94%   BMI 27.22 kg/m²     Physical Exam  Constitutional:       Appearance: Normal appearance.   Eyes:      Extraocular Movements: EOM normal.      Pupils: Pupils are equal, round, and reactive to light.   Neurological:      Mental Status: He is alert.   Psychiatric:      Comments: Patient seemed to be in good spirits today       Neurological Exam  Mental Status  Alert. Orientation: Oriented to person (able to state his name).  Patient is minimally verbal.  He mainly repeats the same couple of words to his mother, difficult to understand.  He was able to follow simple commands when instructions were given.  Had difficulty with more " complex commands.    Cranial Nerves  CN III, IV, VI: Extraocular movements intact bilaterally. Pupils equal round and reactive to light bilaterally.  CN VII: Full and symmetric facial movement.  CN IX, X: Palate elevates symmetrically  CN XII: Tongue midline without atrophy or fasciculations.    Motor   No abnormal involuntary movements.  Patient had difficulty following instructions for confrontational muscle testing, but with repeat instructions and cues, he was able to follow the instructions and there was no focal weakness appreciated.    Coordination  Right: Finger-to-nose normal.Left: Finger-to-nose normal.    Gait    Steady casual gait with no assistive device used.

## 2025-04-26 DIAGNOSIS — F84.0 AUTISTIC DISORDER: ICD-10-CM

## 2025-04-26 DIAGNOSIS — E55.9 VITAMIN D DEFICIENCY: ICD-10-CM

## 2025-04-28 DIAGNOSIS — F84.0 AUTISTIC DISORDER: ICD-10-CM

## 2025-04-28 RX ORDER — CHOLECALCIFEROL (VITAMIN D3) 25 MCG
1000 TABLET ORAL DAILY
Qty: 90 TABLET | Refills: 1 | Status: SHIPPED | OUTPATIENT
Start: 2025-04-28

## 2025-04-28 RX ORDER — ESCITALOPRAM OXALATE 20 MG/1
20 TABLET ORAL DAILY
Qty: 30 TABLET | Refills: 2 | Status: SHIPPED | OUTPATIENT
Start: 2025-04-28

## 2025-04-28 RX ORDER — LORAZEPAM 1 MG/1
1 TABLET ORAL 3 TIMES DAILY
Qty: 90 TABLET | Refills: 0 | Status: SHIPPED | OUTPATIENT
Start: 2025-04-28

## 2025-04-28 NOTE — TELEPHONE ENCOUNTER
03/28/2025 01/20/2025 LORazepam (Tablet) 90.0 30 1 MG [facility] Medicaid 2 / 2 PA   1 [id number] 02/23/2025 01/20/2025 LORazepam (Tablet) 90.0 30 1 MG [facility] Medicaid 1 / 2 PA   1 [id number] 01/24/2025 01/20/2025 LORazepam (Tablet) 90.0 30 1 MG [facility] Medicaid 0 / 2 PA   1 [id number] 01/20/2025 01/20/2025 LORazepam (Tablet) 45.0 12 0.5 MG [facility] Medicaid 0 / 0 PA   1 [id number] 12/27/2024 10/17/2024 LORazepam (Tablet) 90.0 30 1 MG [facility] Medicaid 2 / 2 PA   1 [id number] 11/23/2024 10/17/2024 LORazepam (Tablet) 90.0 30 1 MG [facility] Medicaid 1 / 2 PA   1 [id number] 10/17/2024 10/17/2024 LORazepam (Tablet) 90.0 30 1 MG [facility] Medicaid 0 / 2 PA   1 [id number] 09/21/2024 07/22/2024 LORazepam (Tablet) 90.0 22 1 MG

## 2025-04-28 NOTE — TELEPHONE ENCOUNTER
Reason for call:   [x] Refill   [] Prior Auth  [] Other:     Office:   [] PCP/Provider -   [x] Specialty/Provider - [facility]  Authorized By: Dario Lopez MD      Medication: LORazepam (ATIVAN) 1 mg tablet    Dose/Frequency: Take 1 tablet (1 mg total) by mouth 3 (three) times a day,     Quantity: 90 tablet     Pharmacy: [facility]/pharmacy #[id number] [place] - [address] [place] [address] Pharmacy   Does the patient have enough for 3 days?   [x] Yes   [] No - Send as HP to POD    Mail Away Pharmacy   Does the patient have enough for 10 days?   [] Yes   [] No - Send as HP to POD

## 2025-05-08 ENCOUNTER — REMOTE DEVICE CLINIC VISIT (OUTPATIENT)
Dept: CARDIOLOGY CLINIC | Facility: CLINIC | Age: 35
End: 2025-05-08
Payer: COMMERCIAL

## 2025-05-08 DIAGNOSIS — Z95.810 PRESENCE OF IMPLANTABLE CARDIOVERTER-DEFIBRILLATOR (ICD): ICD-10-CM

## 2025-05-08 DIAGNOSIS — I49.8 BRUGADA SYNDROME: Primary | ICD-10-CM

## 2025-05-08 DIAGNOSIS — R55 SYNCOPE AND COLLAPSE: ICD-10-CM

## 2025-05-08 PROCEDURE — 93295 DEV INTERROG REMOTE 1/2/MLT: CPT | Performed by: INTERNAL MEDICINE

## 2025-05-08 PROCEDURE — 93296 REM INTERROG EVL PM/IDS: CPT | Performed by: INTERNAL MEDICINE

## 2025-05-08 NOTE — PROGRESS NOTES
Results for orders placed or performed in visit on 05/08/25   Cardiac EP device report    Narrative    MDT SINGLE CHAMBER ICD - ACTIVE SYSTEM IS MRI CONDITIONAL  CARELINK TRANSMISSION:  BATTERY VOLTAGE ADEQUATE (9.9 YRS).   <0.1%.  ALL AVAILABLE LEAD PARAMETERS WITHIN NORMAL LIMITS.  NO SIGNIFICANT HIGH RATE EPISODES.  OPTI-VOL WITHIN NORMAL LIMITS. PT NEEDS B>AX PATHWAY RE-PROGRAMMING PER MDT FIELD ADVISORY-WILL HAVE PT SCHEDULED.  NORMAL DEVICE FUNCTION.  PAS

## 2025-05-11 ENCOUNTER — RESULTS FOLLOW-UP (OUTPATIENT)
Dept: CARDIOLOGY CLINIC | Facility: CLINIC | Age: 35
End: 2025-05-11

## 2025-05-19 ENCOUNTER — OFFICE VISIT (OUTPATIENT)
Dept: PSYCHIATRY | Facility: CLINIC | Age: 35
End: 2025-05-19
Payer: COMMERCIAL

## 2025-05-19 DIAGNOSIS — F84.0 AUTISTIC DISORDER: ICD-10-CM

## 2025-05-19 PROCEDURE — 99213 OFFICE O/P EST LOW 20 MIN: CPT | Performed by: PSYCHIATRY & NEUROLOGY

## 2025-05-19 RX ORDER — LORAZEPAM 1 MG/1
1 TABLET ORAL 3 TIMES DAILY
Qty: 90 TABLET | Refills: 2 | Status: SHIPPED | OUTPATIENT
Start: 2025-05-19

## 2025-05-19 RX ORDER — ESCITALOPRAM OXALATE 20 MG/1
20 TABLET ORAL DAILY
Qty: 30 TABLET | Refills: 2 | Status: SHIPPED | OUTPATIENT
Start: 2025-05-19

## 2025-05-19 NOTE — PSYCH
Damian Jackson 1990 [id number]     The patient was seen for continuing care and pharmacotherapy. He was accompanied by his mother who provided the updated history. He had only 2 episodes of mild agitation in the past several months. Sleep and appetite are adequate. Has been more active physically and goes to gym. He continues to have ritualistic behaviors which are chronic.        ROS:  No issues  Current Mental Status Evaluation:  Appearance:  Adequate hygiene and grooming   Behavior:  Calm   Mood:  Euthymic   Affect: appropriate   Speech: Normal volume and Normal rate   Thought Process:  Unable to assess due to scant verbalization   Thought Content:  Cannot be assessed due to patient factors   Perceptual Disturbances: Cannot be assessed due to patient factors   Risk Potential: No suicidal or homicidal ideation   Orientation:         Diagnosis ICD-10-CM Associated Orders   1. Autistic disorder  F84.0 LORazepam (ATIVAN) 1 mg tablet     escitalopram (LEXAPRO) 20 mg tablet             Current Outpatient Medications   Medication Instructions    acetaminophen (TYLENOL) 975 mg, Oral, Every 6 hours PRN    cholecalciferol (VITAMIN D3) 1,000 Units, Oral, Daily    escitalopram (LEXAPRO) 20 mg, Oral, Daily    LORazepam (ATIVAN) 0.5 mg, Oral, Every 6 hours PRN, For breakthrough anxiety    LORazepam (ATIVAN) 1 mg, Oral, 3 times daily    ofloxacin (FLOXIN) 0.3 % otic solution 10 drops, Left Ear, Daily    pyridoxine (B-6) 100 mg, Daily    zonisamide (Zonegran) 100 mg capsule Take three capsules by mouth at night time.          Treatment Recommendations- Risks Benefits    We will continue with lorazepam and Lexapro.  Follow-up in 3 months  Risks, Benefits And Possible Side Effects Of Medications:  The risks and benefits of medications and proposed treatment plan were discussed with the patient's caregiver/POA/guardian.  The individual understands and agrees    VIRTUAL VISIT DISCLAIMER    Damian Renetta verbally agrees to  participate in Virtual Care Services. Pt is aware that Virtual Care Services could be limited without vital signs or the ability to perform a full hands-on physical exam. Damian Jacksno understands he or the provider may request at any time to terminate the video visit and request the patient to seek care or treatment in person.     Dario Lopez MD 05/19/25

## 2025-05-19 NOTE — BH TREATMENT PLAN
TREATMENT PLAN (Medication Management Only)        [facility]    Name and Date of Birth:  Damian Jackson 34 y.o. 1990  MRN: [id number]  Date of Treatment Plan: May 19, 2025  Diagnosis/Diagnoses:    1. Autistic disorder      Strengths/Personal Resources for Self-Care: supportive family, taking medications as prescribed, family ties.  Area/Areas of need (in own words): anxiety, mood instability  1. Long Term Goal:   maintain control of anxiety, maintain control of mood stability.  Target Date:3 months - 8/19/2025  Person/Persons responsible for completion of goal: family  2.  Short Term Objective (s) - How will we reach this goal?:   A.  Provider new recommended medication/dosage changes and/or continue medication(s): continue current medications as prescribed.  B.  N/A.  C.  N/A.  Target Date:3 months - 8/19/2025  Person/Persons Responsible for Completion of Goal: family  Progress Towards Goals: continuing treatment  Treatment Modality: medication management every 3 months  Review due 180 days from date of this plan: 6 months - 11/19/2025  Expected length of service: ongoing treatment unless revised  My Physician/PA/NP and I have developed this plan together and I agree to work on the goals and objectives. I understand the treatment goals that were developed for my treatment.   Electronic Signatures: on file (unless signed below)    Dario Lopez MD 05/19/25

## 2025-05-21 ENCOUNTER — OFFICE VISIT (OUTPATIENT)
Dept: FAMILY MEDICINE CLINIC | Facility: CLINIC | Age: 35
End: 2025-05-21
Payer: COMMERCIAL

## 2025-05-21 VITALS
BODY MASS INDEX: 26.37 KG/M2 | OXYGEN SATURATION: 97 % | WEIGHT: 199 LBS | TEMPERATURE: 97.5 F | SYSTOLIC BLOOD PRESSURE: 110 MMHG | HEIGHT: 73 IN | HEART RATE: 81 BPM | DIASTOLIC BLOOD PRESSURE: 78 MMHG

## 2025-05-21 DIAGNOSIS — L98.491 SUPERFICIAL ULCER OF SKIN (HCC): ICD-10-CM

## 2025-05-21 DIAGNOSIS — Z00.00 ANNUAL PHYSICAL EXAM: Primary | ICD-10-CM

## 2025-05-21 DIAGNOSIS — I49.8 BRUGADA SYNDROME: ICD-10-CM

## 2025-05-21 DIAGNOSIS — F84.0 AUTISTIC DISORDER: ICD-10-CM

## 2025-05-21 DIAGNOSIS — R46.89 AGGRESSIVE BEHAVIOR: ICD-10-CM

## 2025-05-21 DIAGNOSIS — G40.109 LOCALIZATION-RELATED EPILEPSY (HCC): ICD-10-CM

## 2025-05-21 PROCEDURE — 99395 PREV VISIT EST AGE 18-39: CPT | Performed by: FAMILY MEDICINE

## 2025-05-21 RX ORDER — MUPIROCIN 2 %
OINTMENT (GRAM) TOPICAL 3 TIMES DAILY
Qty: 30 G | Refills: 0 | Status: SHIPPED | OUTPATIENT
Start: 2025-05-21

## 2025-05-21 NOTE — PROGRESS NOTES
Adult Annual Physical  Name: Damian Jackson      : 1990      MRN: [id number]  Encounter Provider: Kaleigh Wheeler DO  Encounter Date: 2025   Encounter department: [facility]    :  Assessment & Plan  Annual physical exam         Superficial ulcer of skin (HCC)-left foot dorsum at 4th-5th metatarsal  Wound care daily with soap and water apply Bactroban and nonstick Telfa and Coban type dressing until healed.  Orders:  •  mupirocin (BACTROBAN) 2 % ointment; Apply topically 3 (three) times a day    Brugada syndrome  Follows with Dr. Garner-cardiology electrophysiologist       Autistic disorder  Follows with psychiatry as well-medications as noted       Aggressive behavior  Followed by psychiatry and on meds.       Localization-related epilepsy (HCC)  Followed by neurology and on Zonegran.Epilepsy in the setting of autism manifest as bilateral tonic-clonic seizures as well as right temporal electrographic seizure captured on rEEG in         Need for hepatitis C screening test         Annual physical exam         Need for hepatitis C screening test         Annual physical exam               Preventive Screenings:  - Diabetes Screening: screening up-to-date  - Cholesterol Screening: screening up-to-date   - Hepatitis C screening: screening up-to-date and screening not indicated   - HIV screening: screening not indicated   - Colon cancer screening: screening not indicated   - Lung cancer screening: screening not indicated   - Prostate cancer screening: screening not indicated     Immunizations:    - Risks/benefits immunizations discussed      Counseling/Anticipatory Guidance:  - Alcohol: discussed moderation in alcohol intake and recommendations for healthy alcohol use.   - Tobacco use: discussed harms of tobacco use and management options for quitting.   - Dental health: discussed importance of regular tooth brushing, flossing, and dental visits.   - Diet: discussed  "recommendations for a healthy/well-balanced diet.   - Exercise: the importance of regular exercise/physical activity was discussed. Recommend exercise 3-5 times per week for at least 30 minutes.   - Injury prevention: discussed safety/seat belts, safety helmets, smoke detectors, carbon monoxide detectors, and smoking near bedding or upholstery.       Depression Screening and Follow-up Plan: Patient was screened for depression during today's encounter. They screened negative with a PHQ-2 score of 0.          History of Present Illness   Chief Complaint   Patient presents with   • Annual Exam     C/O bottom of left foot - he's picking at it.  Would like checked.      Adult Annual Physical:  Patient presents for annual physical.     Diet and Physical Activity:  - Diet/Nutrition: no special diet, well balanced diet, portion control, limited junk food, low carb diet and consuming 3-5 servings of fruits/vegetables daily.  - Exercise: no formal exercise. Membership to [employer] just started.    Depression Screening:  - PHQ-2 Score: 0    General Health:  - Sleep: sleeps well and sleeps poorly. Varies  - Vision: no vision problems.  - Dental: regular dental visits, brushes teeth once daily and floss regularly.    Advanced Care Planning:  - Has an advanced directive?: no    - Has a durable medical POA?: yes      Review of Systems     Media Information    Document Information    Clinical Image - Mobile Device   left foot   05/21/2025 10:06 AM   Attached To:   Office Visit on 5/21/25 with Kaleigh Wheeler DO   Source Information    Kaleigh Wheeler DO   [facility]   Document History        Objective   /78   Pulse 81   Temp 97.5 °F (36.4 °C) (Temporal)   Ht 6' 0.5\" (1.842 m)   Wt 90.3 kg (199 lb)   SpO2 97%   BMI 26.62 kg/m²     Physical Exam  Vitals and nursing note reviewed.   Constitutional:       General: He is not in acute distress.     Appearance: Normal appearance. He is well-developed. "      Comments: Pleasant 34-year-old male accompanied by mom who appears stated age cooperative   HENT:      Head: Normocephalic and atraumatic.      Right Ear: Tympanic membrane normal.      Left Ear: Tympanic membrane normal.      Nose: Nose normal.      Mouth/Throat:      Mouth: Mucous membranes are moist.     Eyes:      Conjunctiva/sclera: Conjunctivae normal.     Neck:      Vascular: No carotid bruit.     Cardiovascular:      Rate and Rhythm: Normal rate and regular rhythm.      Heart sounds: No murmur heard.  Pulmonary:      Effort: Pulmonary effort is normal. No respiratory distress.      Breath sounds: Normal breath sounds.   Abdominal:      Palpations: Abdomen is soft.      Tenderness: There is no abdominal tenderness.   Genitourinary:     Comments: Deferred    Musculoskeletal:         General: No swelling.     Skin:     Findings: No rash.      Comments: Superficial skin ulcer across the dorsum of the foot just below the ridge of the webs of the toes third fourth fifth 5 cm in length and approximately 3 cm wide.  No purulence no surrounding cellulitic changes     Neurological:      Mental Status: He is alert.      Comments: Minimally verbal   Psychiatric:         Mood and Affect: Mood normal.         Behavior: Behavior normal.     The wound is cleansed and dressed with Neosporin and Telfa pad.  Mom is alerted to watch for any signs of infection (redness, pus, pain, increased swelling or fever) and call if such occurs. Home wound care instructions are provided. Tetanus vaccination status reviewed: N/A.

## 2025-05-21 NOTE — PATIENT INSTRUCTIONS
"Patient Education     Routine physical for adults   The Basics   Written by the doctors and editors at [facility]   What is a physical? -- A physical is a routine visit, or \"check-up,\" with your doctor. You might also hear it called a \"wellness visit\" or \"preventive visit.\"  During each visit, the doctor will:   Ask about your physical and mental health   Ask about your habits, behaviors, and lifestyle   Do an exam   Give you vaccines if needed   Talk to you about any medicines you take   Give advice about your health   Answer your questions  Getting regular check-ups is an important part of taking care of your health. It can help your doctor find and treat any problems you have. But it's also important for preventing health problems.  A routine physical is different from a \"sick visit.\" A sick visit is when you see a doctor because of a health concern or problem. Since physicals are scheduled ahead of time, you can think about what you want to ask the doctor.  How often should I get a physical? -- It depends on your age and health. In general, for people age 21 years and older:   If you are younger than 50 years, you might be able to get a physical every 3 years.   If you are 50 years or older, your doctor might recommend a physical every year.  If you have an ongoing health condition, like diabetes or high blood pressure, your doctor will probably want to see you more often.  What happens during a physical? -- In general, each visit will include:   Physical exam - The doctor or nurse will check your height, weight, heart rate, and blood pressure. They will also look at your eyes and ears. They will ask about how you are feeling and whether you have any symptoms that bother you.   Medicines - It's a good idea to bring a list of all the medicines you take to each doctor visit. Your doctor will talk to you about your medicines and answer any questions. Tell them if you are having any side effects that bother you. You " "should also tell them if you are having trouble paying for any of your medicines.   Habits and behaviors - This includes:   Your diet   Your exercise habits   Whether you smoke, drink alcohol, or use drugs   Whether you are sexually active   Whether you feel safe at home  Your doctor will talk to you about things you can do to improve your health and lower your risk of health problems. They will also offer help and support. For example, if you want to quit smoking, they can give you advice and might prescribe medicines. If you want to improve your diet or get more physical activity, they can help you with this, too.   Lab tests, if needed - The tests you get will depend on your age and situation. For example, your doctor might want to check your:   Cholesterol   Blood sugar   Iron level   Vaccines - The recommended vaccines will depend on your age, health, and what vaccines you already had. Vaccines are very important because they can prevent certain serious or deadly infections.   Discussion of screening - \"Screening\" means checking for diseases or other health problems before they cause symptoms. Your doctor can recommend screening based on your age, risk, and preferences. This might include tests to check for:   Cancer, such as breast, prostate, cervical, ovarian, colorectal, prostate, lung, or skin cancer   Sexually transmitted infections, such as chlamydia and gonorrhea   Mental health conditions like depression and anxiety  Your doctor will talk to you about the different types of screening tests. They can help you decide which screenings to have. They can also explain what the results might mean.   Answering questions - The physical is a good time to ask the doctor or nurse questions about your health. If needed, they can refer you to other doctors or specialists, too.  Adults older than 65 years often need other care, too. As you get older, your doctor will talk to you about:   How to prevent falling at " home   Hearing or vision tests   Memory testing   How to take your medicines safely   Making sure that you have the help and support you need at home  All topics are updated as new evidence becomes available and our peer review process is complete.  This topic retrieved from [employer] on: May 02, 2024.  Topic 066332 Version 1.0  Release: 32.4.3 - C32.122  © 2024 [employer] and/or its affiliates. All rights reserved.  Consumer Information Use and Disclaimer   Disclaimer: This generalized information is a limited summary of diagnosis, treatment, and/or medication information. It is not meant to be comprehensive and should be used as a tool to help the user understand and/or assess potential diagnostic and treatment options. It does NOT include all information about conditions, treatments, medications, side effects, or risks that may apply to a specific patient. It is not intended to be medical advice or a substitute for the medical advice, diagnosis, or treatment of a health care provider based on the health care provider's examination and assessment of a patient's specific and unique circumstances. Patients must speak with a health care provider for complete information about their health, medical questions, and treatment options, including any risks or benefits regarding use of medications. This information does not endorse any treatments or medications as safe, effective, or approved for treating a specific patient. Up[employer] Inc[employer] and its affiliates disclaim any warranty or liability relating to this information or the use thereof.The use of this information is governed by the Terms of Use, available at https://www.woltersePARuwer.com/en/know/clinical-effectiveness-terms. 2024© Up[employer]Date[employer]. and its affiliates and/or licensors. All rights reserved.  Copyright   © 2024 [employer] and/or its affiliates. All rights reserved.

## 2025-06-04 ENCOUNTER — TELEPHONE (OUTPATIENT)
Dept: PSYCHIATRY | Facility: CLINIC | Age: 35
End: 2025-06-04

## 2025-06-04 NOTE — TELEPHONE ENCOUNTER
Letter sent via [employer] and to address on file to inform patient that current medication management provider will be transitioning to an all virtual platform. Due to patient's insurance coverage, patient must be transferred to an in office provider. Office number provided to contact if still interested in services.

## 2025-06-25 NOTE — ASSESSMENT & PLAN NOTE
Followed by neurology and on [facility].Epilepsy in the setting of autism manifest as bilateral tonic-clonic seizures as well as right temporal electrographic seizure captured on rEEG in 2022

## 2025-06-26 ENCOUNTER — TELEPHONE (OUTPATIENT)
Dept: PSYCHIATRY | Facility: CLINIC | Age: 35
End: 2025-06-26

## 2025-06-26 NOTE — TELEPHONE ENCOUNTER
Called and spoke with patients Mother and scheduled BRIAN for 8/12 130PM in [place].    8/20 with current provider was canceled as provider will be all virtual and patients coverage does now allow patient to continue with provider.

## 2025-08-01 ENCOUNTER — IN-CLINIC DEVICE VISIT (OUTPATIENT)
Dept: CARDIOLOGY CLINIC | Facility: CLINIC | Age: 35
End: 2025-08-01
Payer: COMMERCIAL

## 2025-08-01 DIAGNOSIS — Z95.810 AICD (AUTOMATIC CARDIOVERTER/DEFIBRILLATOR) PRESENT: Primary | ICD-10-CM

## 2025-08-01 PROCEDURE — 93282 PRGRMG EVAL IMPLANTABLE DFB: CPT | Performed by: INTERNAL MEDICINE

## 2025-08-12 ENCOUNTER — OFFICE VISIT (OUTPATIENT)
Dept: PSYCHIATRY | Facility: CLINIC | Age: 35
End: 2025-08-12
Payer: COMMERCIAL